# Patient Record
Sex: MALE | Race: WHITE | NOT HISPANIC OR LATINO | Employment: UNEMPLOYED | URBAN - METROPOLITAN AREA
[De-identification: names, ages, dates, MRNs, and addresses within clinical notes are randomized per-mention and may not be internally consistent; named-entity substitution may affect disease eponyms.]

---

## 2023-12-15 ENCOUNTER — OFFICE VISIT (OUTPATIENT)
Dept: WOUND CARE | Facility: CLINIC | Age: 63
End: 2023-12-15
Payer: COMMERCIAL

## 2023-12-15 PROCEDURE — 11042 DBRDMT SUBQ TIS 1ST 20SQCM/<: CPT

## 2023-12-15 PROCEDURE — 99213 OFFICE O/P EST LOW 20 MIN: CPT | Mod: 25

## 2023-12-15 PROCEDURE — 87185 SC STD ENZYME DETCJ PER NZM: CPT | Mod: OUT,PORLAB | Performed by: CLINICAL NURSE SPECIALIST

## 2023-12-15 PROCEDURE — 11042 DBRDMT SUBQ TIS 1ST 20SQCM/<: CPT | Performed by: CLINICAL NURSE SPECIALIST

## 2023-12-15 PROCEDURE — 99203 OFFICE O/P NEW LOW 30 MIN: CPT | Performed by: CLINICAL NURSE SPECIALIST

## 2023-12-18 ENCOUNTER — LAB REQUISITION (OUTPATIENT)
Dept: LAB | Facility: HOSPITAL | Age: 63
End: 2023-12-18
Payer: COMMERCIAL

## 2023-12-18 DIAGNOSIS — E11.621 TYPE 2 DIABETES MELLITUS WITH FOOT ULCER (CODE) (MULTI): ICD-10-CM

## 2023-12-20 LAB
B-LACTAMASE ORGANISM ISLT: NEGATIVE
BACTERIA SPEC CULT: ABNORMAL
BACTERIA SPEC CULT: ABNORMAL
GRAM STN SPEC: ABNORMAL
GRAM STN SPEC: ABNORMAL

## 2023-12-22 ENCOUNTER — APPOINTMENT (OUTPATIENT)
Dept: CARDIOLOGY | Facility: HOSPITAL | Age: 63
End: 2023-12-22
Payer: COMMERCIAL

## 2023-12-22 ENCOUNTER — HOSPITAL ENCOUNTER (EMERGENCY)
Facility: HOSPITAL | Age: 63
Discharge: HOME | End: 2023-12-22
Attending: EMERGENCY MEDICINE
Payer: COMMERCIAL

## 2023-12-22 ENCOUNTER — APPOINTMENT (OUTPATIENT)
Dept: RADIOLOGY | Facility: HOSPITAL | Age: 63
End: 2023-12-22
Payer: COMMERCIAL

## 2023-12-22 ENCOUNTER — APPOINTMENT (OUTPATIENT)
Dept: WOUND CARE | Facility: CLINIC | Age: 63
End: 2023-12-22
Payer: COMMERCIAL

## 2023-12-22 VITALS
BODY MASS INDEX: 32.19 KG/M2 | HEART RATE: 85 BPM | RESPIRATION RATE: 16 BRPM | WEIGHT: 224.87 LBS | HEIGHT: 70 IN | DIASTOLIC BLOOD PRESSURE: 96 MMHG | SYSTOLIC BLOOD PRESSURE: 142 MMHG | OXYGEN SATURATION: 96 % | TEMPERATURE: 99.4 F

## 2023-12-22 DIAGNOSIS — R73.9 HYPERGLYCEMIA: Primary | ICD-10-CM

## 2023-12-22 DIAGNOSIS — Z51.89 VISIT FOR WOUND CHECK: ICD-10-CM

## 2023-12-22 LAB
ALBUMIN SERPL BCP-MCNC: 4 G/DL (ref 3.4–5)
ALP SERPL-CCNC: 110 U/L (ref 33–136)
ALT SERPL W P-5'-P-CCNC: 34 U/L (ref 10–52)
ANION GAP BLDV CALCULATED.4IONS-SCNC: 13 MMOL/L (ref 10–25)
ANION GAP SERPL CALC-SCNC: 13 MMOL/L (ref 10–20)
AST SERPL W P-5'-P-CCNC: 21 U/L (ref 9–39)
BASE EXCESS BLDV CALC-SCNC: 0.3 MMOL/L (ref -2–3)
BASOPHILS # BLD AUTO: 0.03 X10*3/UL (ref 0–0.1)
BASOPHILS NFR BLD AUTO: 0.5 %
BILIRUB SERPL-MCNC: 1.4 MG/DL (ref 0–1.2)
BNP SERPL-MCNC: 7 PG/ML (ref 0–99)
BODY TEMPERATURE: 37 DEGREES CELSIUS
BUN SERPL-MCNC: 13 MG/DL (ref 6–23)
CA-I BLDV-SCNC: 1.14 MMOL/L (ref 1.1–1.33)
CALCIUM SERPL-MCNC: 8.6 MG/DL (ref 8.6–10.3)
CARDIAC TROPONIN I PNL SERPL HS: 6 NG/L (ref 0–20)
CARDIAC TROPONIN I PNL SERPL HS: 7 NG/L (ref 0–20)
CHLORIDE BLDV-SCNC: 98 MMOL/L (ref 98–107)
CHLORIDE SERPL-SCNC: 99 MMOL/L (ref 98–107)
CO2 SERPL-SCNC: 24 MMOL/L (ref 21–32)
CREAT SERPL-MCNC: 1.12 MG/DL (ref 0.5–1.3)
CRP SERPL-MCNC: 0.42 MG/DL
EOSINOPHIL # BLD AUTO: 0.03 X10*3/UL (ref 0–0.7)
EOSINOPHIL NFR BLD AUTO: 0.5 %
ERYTHROCYTE [DISTWIDTH] IN BLOOD BY AUTOMATED COUNT: 13.5 % (ref 11.5–14.5)
ERYTHROCYTE [SEDIMENTATION RATE] IN BLOOD BY WESTERGREN METHOD: 4 MM/H (ref 0–20)
GFR SERPL CREATININE-BSD FRML MDRD: 75 ML/MIN/1.73M*2
GLUCOSE BLD MANUAL STRIP-MCNC: 333 MG/DL (ref 74–99)
GLUCOSE BLD MANUAL STRIP-MCNC: 450 MG/DL (ref 74–99)
GLUCOSE BLDV-MCNC: 425 MG/DL (ref 74–99)
GLUCOSE SERPL-MCNC: 441 MG/DL (ref 74–99)
HCO3 BLDV-SCNC: 25.4 MMOL/L (ref 22–26)
HCT VFR BLD AUTO: 38.4 % (ref 41–52)
HCT VFR BLD EST: 43 % (ref 41–52)
HGB BLD-MCNC: 14.3 G/DL (ref 13.5–17.5)
HGB BLDV-MCNC: 14.4 G/DL (ref 13.5–17.5)
IMM GRANULOCYTES # BLD AUTO: 0.08 X10*3/UL (ref 0–0.7)
IMM GRANULOCYTES NFR BLD AUTO: 1.4 % (ref 0–0.9)
INHALED O2 CONCENTRATION: 40 %
INR PPP: 1.2 (ref 0.9–1.1)
LACTATE BLDV-SCNC: 1.6 MMOL/L (ref 0.4–2)
LACTATE BLDV-SCNC: 2.5 MMOL/L (ref 0.4–2)
LYMPHOCYTES # BLD AUTO: 1.42 X10*3/UL (ref 1.2–4.8)
LYMPHOCYTES NFR BLD AUTO: 25.4 %
MAGNESIUM SERPL-MCNC: 1.63 MG/DL (ref 1.6–2.4)
MCH RBC QN AUTO: 33.6 PG (ref 26–34)
MCHC RBC AUTO-ENTMCNC: 37.2 G/DL (ref 32–36)
MCV RBC AUTO: 90 FL (ref 80–100)
MONOCYTES # BLD AUTO: 0.41 X10*3/UL (ref 0.1–1)
MONOCYTES NFR BLD AUTO: 7.3 %
NEUTROPHILS # BLD AUTO: 3.61 X10*3/UL (ref 1.2–7.7)
NEUTROPHILS NFR BLD AUTO: 64.9 %
NRBC BLD-RTO: 0 /100 WBCS (ref 0–0)
OXYHGB MFR BLDV: 46.1 % (ref 45–75)
PCO2 BLDV: 42 MM HG (ref 41–51)
PH BLDV: 7.39 PH (ref 7.33–7.43)
PLATELET # BLD AUTO: 112 X10*3/UL (ref 150–450)
PO2 BLDV: 29 MM HG (ref 35–45)
POTASSIUM BLDV-SCNC: 4.4 MMOL/L (ref 3.5–5.3)
POTASSIUM SERPL-SCNC: 4.2 MMOL/L (ref 3.5–5.3)
PROT SERPL-MCNC: 6.7 G/DL (ref 6.4–8.2)
PROTHROMBIN TIME: 13.1 SECONDS (ref 9.8–12.8)
RBC # BLD AUTO: 4.25 X10*6/UL (ref 4.5–5.9)
SAO2 % BLDV: 47 % (ref 45–75)
SODIUM BLDV-SCNC: 132 MMOL/L (ref 136–145)
SODIUM SERPL-SCNC: 132 MMOL/L (ref 136–145)
WBC # BLD AUTO: 5.6 X10*3/UL (ref 4.4–11.3)

## 2023-12-22 PROCEDURE — 83880 ASSAY OF NATRIURETIC PEPTIDE: CPT | Performed by: EMERGENCY MEDICINE

## 2023-12-22 PROCEDURE — 83735 ASSAY OF MAGNESIUM: CPT | Performed by: EMERGENCY MEDICINE

## 2023-12-22 PROCEDURE — 85652 RBC SED RATE AUTOMATED: CPT | Performed by: EMERGENCY MEDICINE

## 2023-12-22 PROCEDURE — 85025 COMPLETE CBC W/AUTO DIFF WBC: CPT | Performed by: EMERGENCY MEDICINE

## 2023-12-22 PROCEDURE — 96374 THER/PROPH/DIAG INJ IV PUSH: CPT | Performed by: EMERGENCY MEDICINE

## 2023-12-22 PROCEDURE — 84484 ASSAY OF TROPONIN QUANT: CPT | Performed by: EMERGENCY MEDICINE

## 2023-12-22 PROCEDURE — 83605 ASSAY OF LACTIC ACID: CPT | Performed by: EMERGENCY MEDICINE

## 2023-12-22 PROCEDURE — 36415 COLL VENOUS BLD VENIPUNCTURE: CPT | Performed by: EMERGENCY MEDICINE

## 2023-12-22 PROCEDURE — 84132 ASSAY OF SERUM POTASSIUM: CPT | Performed by: EMERGENCY MEDICINE

## 2023-12-22 PROCEDURE — 73630 X-RAY EXAM OF FOOT: CPT | Mod: RT,FR

## 2023-12-22 PROCEDURE — 73630 X-RAY EXAM OF FOOT: CPT | Mod: RIGHT SIDE | Performed by: RADIOLOGY

## 2023-12-22 PROCEDURE — 82947 ASSAY GLUCOSE BLOOD QUANT: CPT

## 2023-12-22 PROCEDURE — 85610 PROTHROMBIN TIME: CPT | Performed by: EMERGENCY MEDICINE

## 2023-12-22 PROCEDURE — 93005 ELECTROCARDIOGRAM TRACING: CPT

## 2023-12-22 PROCEDURE — 71045 X-RAY EXAM CHEST 1 VIEW: CPT

## 2023-12-22 PROCEDURE — 2500000002 HC RX 250 W HCPCS SELF ADMINISTERED DRUGS (ALT 637 FOR MEDICARE OP, ALT 636 FOR OP/ED): Performed by: EMERGENCY MEDICINE

## 2023-12-22 PROCEDURE — 86140 C-REACTIVE PROTEIN: CPT | Performed by: EMERGENCY MEDICINE

## 2023-12-22 PROCEDURE — 99284 EMERGENCY DEPT VISIT MOD MDM: CPT | Performed by: EMERGENCY MEDICINE

## 2023-12-22 PROCEDURE — 71045 X-RAY EXAM CHEST 1 VIEW: CPT | Mod: FOREIGN READ | Performed by: RADIOLOGY

## 2023-12-22 RX ADMIN — INSULIN HUMAN 4 UNITS: 100 INJECTION, SOLUTION PARENTERAL at 15:35

## 2023-12-22 ASSESSMENT — PAIN DESCRIPTION - ORIENTATION: ORIENTATION: RIGHT

## 2023-12-22 ASSESSMENT — COLUMBIA-SUICIDE SEVERITY RATING SCALE - C-SSRS
1. IN THE PAST MONTH, HAVE YOU WISHED YOU WERE DEAD OR WISHED YOU COULD GO TO SLEEP AND NOT WAKE UP?: NO
6. HAVE YOU EVER DONE ANYTHING, STARTED TO DO ANYTHING, OR PREPARED TO DO ANYTHING TO END YOUR LIFE?: NO
2. HAVE YOU ACTUALLY HAD ANY THOUGHTS OF KILLING YOURSELF?: NO

## 2023-12-22 ASSESSMENT — PAIN SCALES - GENERAL: PAINLEVEL_OUTOF10: 7

## 2023-12-22 ASSESSMENT — PAIN - FUNCTIONAL ASSESSMENT: PAIN_FUNCTIONAL_ASSESSMENT: 0-10

## 2023-12-22 ASSESSMENT — PAIN DESCRIPTION - PAIN TYPE: TYPE: ACUTE PAIN;SURGICAL PAIN

## 2023-12-22 ASSESSMENT — PAIN DESCRIPTION - LOCATION: LOCATION: FOOT

## 2023-12-22 NOTE — ED PROVIDER NOTES
HPI   Chief Complaint   Patient presents with    Hyperglycemia     Mechanical fall  -LOC        HPI  HISTORY OF PRESENT ILLNESS:  Patient is a 63-year-old male who presents emergency department multiple complaints.  He states that 1 week ago had a office visit where his physician had removed something on the plantar surface of the foot.  Patient stated that today, he was getting dizzy lightheaded.  Slipped on the wet floor and hit his back against a heavy door.  The patient was having some pain with his back.  Denied his head or pass out.  Continues to feel lightheaded.  Noted that his blood sugar was high after being instructed by his nursing hotline to check.  Was in the 400s.  Patient is not insulin dependent.  Uncertain whether or not the patient has had a history of diabetic ketoacidosis.    Past Medical History: Hypertension, orthostatic dizziness, diabetes, gout, benign tremor, depression      __________________________________________________________  PHYSICAL EXAM:    Appearance: Nontoxic-appearing  male, appears stated age, alert, oriented , cooperative   Skin: Skin at bottom of right foot look like there was a callus that had been shaved off.  No signs of infection.  Intact,  dry skin, no lesions, rash, petechiae or purpura.   Eyes: PERRLA, EOMs intact,  Conjunctiva pink with no redness or exudates.    HENT: Normocephalic, atraumatic. Nares patent   Neck: Supple. Trachea at midline.   Pulmonary: Lung sounds are clear bilaterally.  There is no rales, rhonchi, or wheezing.  Cardiac: Regular rate and rhythm, no rubs, murmurs, or gallops. No JVD,   Abdomen: Abdomen is soft, nontender, and nondistended.  No palpable organomegaly.  No rebound or guarding.  No CVA tenderness. Nonsurgical abdomen  Musculoskeletal: no edema, pain, cyanosis, or deformity in extremities. Pulses full and equal.   Neurological:  Cranial nerves are grossly intact, grossly normal sensation, no weakness, no focal findings  identified.    __________________________________________________________  MEDICAL DECISION MAKING:    Patient was seen and examined. Differential diagnosis for hyperglycemia includes occult infection, diabetic ketoacidosis, ACS.  Patient vital signs show no evidence of infection.  The patient was provided IV fluids.  Blood sugar was 450.  Labs were obtained.  Venous blood gas showed no evidence of diabetic ketoacidosis.  Troponins were negative x 2.  Twelve-lead EKG appeared nonischemic.  Patient was feeling better after IV fluids.  Blood sugar at downtrending patient was provided with 4 units of insulin and was allowed to eat.  I had also obtained inflammatory markers in addition to x-ray of the foot.  Patient CRP and ESR were negative.  X-ray showed no evidence of osteomyelitis.  He was feeling well enough to be discharged.  Given return precautions.  Patient verbalized understanding, agreed to plan, the patient was discharged home.    External Records Reviewed: I reviewed recent and relevant outside records including: Patient cardiology note from June 30, 2023    David Hartley  Emergency Medicine                  [place] Coma Scale Score: 15                  Patient History   Past Medical History:   Diagnosis Date    Diabetes (CMS/HCC)     HTN (hypertension)     Personal history of other diseases of the musculoskeletal system and connective tissue     History of gout    Personal history of other mental and behavioral disorders     History of depression     Past Surgical History:   Procedure Laterality Date    NECK SURGERY  02/01/2018    Neck Surgery    OTHER SURGICAL HISTORY  02/01/2018    Surgery Excision Lipoma    OTHER SURGICAL HISTORY  04/25/2022    Colonoscopy     No family history on file.  Social History     Tobacco Use    Smoking status: Former     Types: Cigarettes    Smokeless tobacco: Never   Vaping Use    Vaping Use: Never used   Substance Use Topics    Alcohol use: Never    Drug use: Never        Physical Exam   ED Triage Vitals [12/22/23 1143]   Temp Heart Rate Resp BP   37.4 °C (99.4 °F) 85 12 146/76      SpO2 Temp Source Heart Rate Source Patient Position   100 % Tympanic Monitor --      BP Location FiO2 (%)     -- --       Physical Exam    ED Course & [facility]   ED Course as of 12/22/23 1708   Fri Dec 22, 2023   1217 POCT Glucose, Venous(!): 425 [WJ]   1708 Patient twelve-lead EKG inter by myself shows sinus rhythm ventricular rate 88, normal [place] interval, left axis deviation, normal QRS duration, normal QT, no STEMI present. [WJ]      ED Course User Index  [WJ] David Hartley DO         Diagnoses as of 12/22/23 1708   Hyperglycemia   Visit for wound check       Medical Decision Making      Procedure  Procedures     David Hartley DO  12/23/23 1816

## 2024-01-05 ENCOUNTER — OFFICE VISIT (OUTPATIENT)
Dept: WOUND CARE | Facility: CLINIC | Age: 64
End: 2024-01-05
Payer: COMMERCIAL

## 2024-01-05 PROCEDURE — 11042 DBRDMT SUBQ TIS 1ST 20SQCM/<: CPT | Performed by: CLINICAL NURSE SPECIALIST

## 2024-01-05 PROCEDURE — 11042 DBRDMT SUBQ TIS 1ST 20SQCM/<: CPT | Mod: ZK

## 2024-01-10 LAB
ATRIAL RATE: 88 BPM
P AXIS: 39 DEGREES
PR INTERVAL: 147 MS
Q ONSET: 249 MS
QRS COUNT: 15 BEATS
QRS DURATION: 92 MS
QT INTERVAL: 359 MS
QTC CALCULATION(BAZETT): 435 MS
QTC FREDERICIA: 408 MS
R AXIS: -33 DEGREES
T AXIS: 47 DEGREES
T OFFSET: 429 MS
VENTRICULAR RATE: 88 BPM

## 2024-01-12 ENCOUNTER — OFFICE VISIT (OUTPATIENT)
Dept: WOUND CARE | Facility: CLINIC | Age: 64
End: 2024-01-12
Payer: COMMERCIAL

## 2024-01-12 PROCEDURE — 11042 DBRDMT SUBQ TIS 1ST 20SQCM/<: CPT

## 2024-01-12 PROCEDURE — 11042 DBRDMT SUBQ TIS 1ST 20SQCM/<: CPT | Performed by: CLINICAL NURSE SPECIALIST

## 2024-01-19 ENCOUNTER — APPOINTMENT (OUTPATIENT)
Dept: WOUND CARE | Facility: CLINIC | Age: 64
End: 2024-01-19
Payer: COMMERCIAL

## 2024-01-26 ENCOUNTER — OFFICE VISIT (OUTPATIENT)
Dept: WOUND CARE | Facility: CLINIC | Age: 64
End: 2024-01-26
Payer: COMMERCIAL

## 2024-01-26 PROCEDURE — 11042 DBRDMT SUBQ TIS 1ST 20SQCM/<: CPT

## 2024-02-02 ENCOUNTER — APPOINTMENT (OUTPATIENT)
Dept: WOUND CARE | Facility: CLINIC | Age: 64
End: 2024-02-02
Payer: COMMERCIAL

## 2024-02-07 ENCOUNTER — HOSPITAL ENCOUNTER (OUTPATIENT)
Dept: RADIOLOGY | Facility: HOSPITAL | Age: 64
Discharge: HOME | End: 2024-02-07
Payer: COMMERCIAL

## 2024-02-07 DIAGNOSIS — M50.10 CERVICAL DISC DISORDER WITH RADICULOPATHY, UNSPECIFIED CERVICAL REGION: ICD-10-CM

## 2024-02-07 PROCEDURE — 72141 MRI NECK SPINE W/O DYE: CPT | Performed by: RADIOLOGY

## 2024-02-07 PROCEDURE — 72141 MRI NECK SPINE W/O DYE: CPT

## 2024-02-09 ENCOUNTER — OFFICE VISIT (OUTPATIENT)
Dept: WOUND CARE | Facility: CLINIC | Age: 64
End: 2024-02-09
Payer: COMMERCIAL

## 2024-02-09 PROCEDURE — 11042 DBRDMT SUBQ TIS 1ST 20SQCM/<: CPT

## 2024-02-16 ENCOUNTER — OFFICE VISIT (OUTPATIENT)
Dept: WOUND CARE | Facility: CLINIC | Age: 64
End: 2024-02-16
Payer: COMMERCIAL

## 2024-02-16 PROCEDURE — 11042 DBRDMT SUBQ TIS 1ST 20SQCM/<: CPT

## 2024-02-23 ENCOUNTER — APPOINTMENT (OUTPATIENT)
Dept: WOUND CARE | Facility: CLINIC | Age: 64
End: 2024-02-23
Payer: COMMERCIAL

## 2024-02-25 ENCOUNTER — APPOINTMENT (OUTPATIENT)
Dept: CARDIOLOGY | Facility: HOSPITAL | Age: 64
End: 2024-02-25
Payer: COMMERCIAL

## 2024-02-25 ENCOUNTER — APPOINTMENT (OUTPATIENT)
Dept: RADIOLOGY | Facility: HOSPITAL | Age: 64
End: 2024-02-25
Payer: COMMERCIAL

## 2024-02-25 ENCOUNTER — HOSPITAL ENCOUNTER (EMERGENCY)
Facility: HOSPITAL | Age: 64
Discharge: HOME | End: 2024-02-25
Attending: STUDENT IN AN ORGANIZED HEALTH CARE EDUCATION/TRAINING PROGRAM
Payer: COMMERCIAL

## 2024-02-25 VITALS
HEART RATE: 80 BPM | HEIGHT: 71 IN | DIASTOLIC BLOOD PRESSURE: 85 MMHG | WEIGHT: 220 LBS | SYSTOLIC BLOOD PRESSURE: 142 MMHG | OXYGEN SATURATION: 97 % | RESPIRATION RATE: 18 BRPM | BODY MASS INDEX: 30.8 KG/M2 | TEMPERATURE: 96.9 F

## 2024-02-25 DIAGNOSIS — R51.9 NONINTRACTABLE HEADACHE, UNSPECIFIED CHRONICITY PATTERN, UNSPECIFIED HEADACHE TYPE: ICD-10-CM

## 2024-02-25 DIAGNOSIS — R53.1 GENERALIZED WEAKNESS: Primary | ICD-10-CM

## 2024-02-25 LAB
ANION GAP SERPL CALC-SCNC: 13 MMOL/L (ref 10–20)
BASOPHILS # BLD AUTO: 0.02 X10*3/UL (ref 0–0.1)
BASOPHILS NFR BLD AUTO: 0.4 %
BUN SERPL-MCNC: 17 MG/DL (ref 6–23)
CALCIUM SERPL-MCNC: 9 MG/DL (ref 8.6–10.3)
CARDIAC TROPONIN I PNL SERPL HS: 6 NG/L (ref 0–20)
CHLORIDE SERPL-SCNC: 101 MMOL/L (ref 98–107)
CO2 SERPL-SCNC: 23 MMOL/L (ref 21–32)
CREAT SERPL-MCNC: 1.02 MG/DL (ref 0.5–1.3)
EGFRCR SERPLBLD CKD-EPI 2021: 84 ML/MIN/1.73M*2
EOSINOPHIL # BLD AUTO: 0.04 X10*3/UL (ref 0–0.7)
EOSINOPHIL NFR BLD AUTO: 0.8 %
ERYTHROCYTE [DISTWIDTH] IN BLOOD BY AUTOMATED COUNT: 14 % (ref 11.5–14.5)
GLUCOSE SERPL-MCNC: 250 MG/DL (ref 74–99)
HCT VFR BLD AUTO: 37.8 % (ref 41–52)
HGB BLD-MCNC: 14 G/DL (ref 13.5–17.5)
IMM GRANULOCYTES # BLD AUTO: 0.04 X10*3/UL (ref 0–0.7)
IMM GRANULOCYTES NFR BLD AUTO: 0.8 % (ref 0–0.9)
LACTATE SERPL-SCNC: 1.9 MMOL/L (ref 0.4–2)
LYMPHOCYTES # BLD AUTO: 1.29 X10*3/UL (ref 1.2–4.8)
LYMPHOCYTES NFR BLD AUTO: 24.9 %
MCH RBC QN AUTO: 33.8 PG (ref 26–34)
MCHC RBC AUTO-ENTMCNC: 37 G/DL (ref 32–36)
MCV RBC AUTO: 91 FL (ref 80–100)
MONOCYTES # BLD AUTO: 0.4 X10*3/UL (ref 0.1–1)
MONOCYTES NFR BLD AUTO: 7.7 %
NEUTROPHILS # BLD AUTO: 3.4 X10*3/UL (ref 1.2–7.7)
NEUTROPHILS NFR BLD AUTO: 65.4 %
NRBC BLD-RTO: 0 /100 WBCS (ref 0–0)
PLATELET # BLD AUTO: 107 X10*3/UL (ref 150–450)
POTASSIUM SERPL-SCNC: 4.7 MMOL/L (ref 3.5–5.3)
RBC # BLD AUTO: 4.14 X10*6/UL (ref 4.5–5.9)
SODIUM SERPL-SCNC: 132 MMOL/L (ref 136–145)
WBC # BLD AUTO: 5.2 X10*3/UL (ref 4.4–11.3)

## 2024-02-25 PROCEDURE — 70450 CT HEAD/BRAIN W/O DYE: CPT

## 2024-02-25 PROCEDURE — 70450 CT HEAD/BRAIN W/O DYE: CPT | Performed by: RADIOLOGY

## 2024-02-25 PROCEDURE — 36415 COLL VENOUS BLD VENIPUNCTURE: CPT | Performed by: NURSE PRACTITIONER

## 2024-02-25 PROCEDURE — 83605 ASSAY OF LACTIC ACID: CPT | Performed by: NURSE PRACTITIONER

## 2024-02-25 PROCEDURE — 71046 X-RAY EXAM CHEST 2 VIEWS: CPT

## 2024-02-25 PROCEDURE — 71046 X-RAY EXAM CHEST 2 VIEWS: CPT | Performed by: RADIOLOGY

## 2024-02-25 PROCEDURE — 2500000004 HC RX 250 GENERAL PHARMACY W/ HCPCS (ALT 636 FOR OP/ED): Performed by: NURSE PRACTITIONER

## 2024-02-25 PROCEDURE — 96374 THER/PROPH/DIAG INJ IV PUSH: CPT

## 2024-02-25 PROCEDURE — 99285 EMERGENCY DEPT VISIT HI MDM: CPT | Mod: 25

## 2024-02-25 PROCEDURE — 84484 ASSAY OF TROPONIN QUANT: CPT | Performed by: NURSE PRACTITIONER

## 2024-02-25 PROCEDURE — 93005 ELECTROCARDIOGRAM TRACING: CPT

## 2024-02-25 PROCEDURE — 80048 BASIC METABOLIC PNL TOTAL CA: CPT | Performed by: NURSE PRACTITIONER

## 2024-02-25 PROCEDURE — 85025 COMPLETE CBC W/AUTO DIFF WBC: CPT | Performed by: NURSE PRACTITIONER

## 2024-02-25 RX ORDER — ACETAMINOPHEN 325 MG/1
650 TABLET ORAL ONCE
Status: COMPLETED | OUTPATIENT
Start: 2024-02-25 | End: 2024-02-25

## 2024-02-25 RX ORDER — KETOROLAC TROMETHAMINE 30 MG/ML
15 INJECTION, SOLUTION INTRAMUSCULAR; INTRAVENOUS ONCE
Status: COMPLETED | OUTPATIENT
Start: 2024-02-25 | End: 2024-02-25

## 2024-02-25 RX ADMIN — KETOROLAC TROMETHAMINE 15 MG: 30 INJECTION, SOLUTION INTRAMUSCULAR; INTRAVENOUS at 18:05

## 2024-02-25 RX ADMIN — ACETAMINOPHEN 650 MG: 325 TABLET ORAL at 18:05

## 2024-02-25 ASSESSMENT — HEART SCORE
RISK FACTORS: >2 RISK FACTORS OR HX OF ATHEROSCLEROTIC DISEASE
HEART SCORE: 3
AGE: 45-64
HISTORY: SLIGHTLY SUSPICIOUS
TROPONIN: LESS THAN OR EQUAL TO NORMAL LIMIT
ECG: NORMAL

## 2024-02-25 ASSESSMENT — PAIN SCALES - GENERAL
PAINLEVEL_OUTOF10: 2
PAINLEVEL_OUTOF10: 4

## 2024-02-25 ASSESSMENT — PAIN DESCRIPTION - LOCATION: LOCATION: NECK

## 2024-02-25 ASSESSMENT — COLUMBIA-SUICIDE SEVERITY RATING SCALE - C-SSRS
6. HAVE YOU EVER DONE ANYTHING, STARTED TO DO ANYTHING, OR PREPARED TO DO ANYTHING TO END YOUR LIFE?: NO
1. IN THE PAST MONTH, HAVE YOU WISHED YOU WERE DEAD OR WISHED YOU COULD GO TO SLEEP AND NOT WAKE UP?: NO
2. HAVE YOU ACTUALLY HAD ANY THOUGHTS OF KILLING YOURSELF?: NO

## 2024-02-25 ASSESSMENT — LIFESTYLE VARIABLES
HAVE YOU EVER FELT YOU SHOULD CUT DOWN ON YOUR DRINKING: NO
HAVE PEOPLE ANNOYED YOU BY CRITICIZING YOUR DRINKING: NO
EVER FELT BAD OR GUILTY ABOUT YOUR DRINKING: NO
EVER HAD A DRINK FIRST THING IN THE MORNING TO STEADY YOUR NERVES TO GET RID OF A HANGOVER: NO

## 2024-02-25 ASSESSMENT — PAIN - FUNCTIONAL ASSESSMENT: PAIN_FUNCTIONAL_ASSESSMENT: 0-10

## 2024-02-25 NOTE — ED PROVIDER NOTES
Chief Complaint   Patient presents with   • Weakness, Gen       HPI       63 year old female presents to the Emergency Department today complaining of generalized weakness since this afternoon. Notes that he has had a discomfort in the right side of his head chronically that worsened this afternoon. The headache is not the first or worst of their life. Not of sudden onset or thunderclap in nature. Reports hat his blood pressure and blood sugar became elevated. Denies any associated fever, chills, neck pain, chest pain, shortness of breath, abdominal pain, nausea, vomiting, diarrhea, constipation, or urinary symptoms.        History provided by:  Patient             Patient History   Past Medical History:   Diagnosis Date   • Diabetes (CMS/HCC)    • HTN (hypertension)    • Personal history of other diseases of the musculoskeletal system and connective tissue     History of gout   • Personal history of other mental and behavioral disorders     History of depression     Past Surgical History:   Procedure Laterality Date   • NECK SURGERY  02/01/2018    Neck Surgery   • OTHER SURGICAL HISTORY  02/01/2018    Surgery Excision Lipoma   • OTHER SURGICAL HISTORY  04/25/2022    Colonoscopy     No family history on file.  Social History     Tobacco Use   • Smoking status: Former     Types: Cigarettes   • Smokeless tobacco: Never   Vaping Use   • Vaping Use: Never used   Substance Use Topics   • Alcohol use: Never   • Drug use: Never           Physical Exam  Constitutional:       Appearance: Normal appearance.   HENT:      Head: Normocephalic.      Right Ear: Tympanic membrane, ear canal and external ear normal.      Left Ear: Tympanic membrane, ear canal and external ear normal.      Nose: Nose normal.      Mouth/Throat:      Mouth: Mucous membranes are moist.      Pharynx: Oropharynx is clear. No oropharyngeal exudate or posterior oropharyngeal erythema.   Eyes:      Conjunctiva/sclera: Conjunctivae normal.      Pupils: Pupils  are equal, round, and reactive to light.   Cardiovascular:      Rate and Rhythm: Normal rate and regular rhythm.      Pulses:           Radial pulses are 3+ on the right side and 3+ on the left side.        Dorsalis pedis pulses are 3+ on the right side and 3+ on the left side.      Heart sounds: Normal heart sounds. No murmur heard.     No friction rub. No gallop.   Pulmonary:      Effort: Pulmonary effort is normal. No respiratory distress.      Breath sounds: Normal breath sounds. No wheezing, rhonchi or rales.   Abdominal:      General: Abdomen is flat. Bowel sounds are normal.      Palpations: Abdomen is soft.      Tenderness: There is no abdominal tenderness. There is no right CVA tenderness, left CVA tenderness, guarding or rebound. Negative signs include Torres's sign and McBurney's sign.   Musculoskeletal:         General: No swelling or deformity.      Cervical back: Full passive range of motion without pain.      Right lower leg: No edema.      Left lower leg: No edema.   Lymphadenopathy:      Cervical: No cervical adenopathy.   Skin:     Capillary Refill: Capillary refill takes less than 2 seconds.      Coloration: Skin is not jaundiced.      Findings: No rash.   Neurological:      General: No focal deficit present.      Mental Status: He is alert and oriented to person, place, and time. Mental status is at baseline.      Gait: Gait is intact.   Psychiatric:         Mood and Affect: Mood normal.         Behavior: Behavior is cooperative.         Labs Reviewed   CBC WITH AUTO DIFFERENTIAL - Abnormal       Result Value    WBC 5.2      nRBC 0.0      RBC 4.14 (*)     Hemoglobin 14.0      Hematocrit 37.8 (*)     MCV 91      MCH 33.8      MCHC 37.0 (*)     RDW 14.0      Platelets 107 (*)     Neutrophils % 65.4      Immature Granulocytes %, Automated 0.8      Lymphocytes % 24.9      Monocytes % 7.7      Eosinophils % 0.8      Basophils % 0.4      Neutrophils Absolute 3.40      Immature Granulocytes Absolute,  Automated 0.04      Lymphocytes Absolute 1.29      Monocytes Absolute 0.40      Eosinophils Absolute 0.04      Basophils Absolute 0.02     BASIC METABOLIC PANEL - Abnormal    Glucose 250 (*)     Sodium 132 (*)     Potassium 4.7      Chloride 101      Bicarbonate 23      Anion Gap 13      Urea Nitrogen 17      Creatinine 1.02      eGFR 84      Calcium 9.0     LACTATE - Normal    Lactate 1.9      Narrative:     Venipuncture immediately after or during the administration of Metamizole may lead to falsely low results. Testing should be performed immediately  prior to Metamizole dosing.   TROPONIN I, HIGH SENSITIVITY - Normal    Troponin I, High Sensitivity 6      Narrative:     Less than 99th percentile of normal range cutoff-  Female and children under 18 years old <14 ng/L; Male <21 ng/L: Negative  Repeat testing should be performed if clinically indicated.     Female and children under 18 years old 14-50 ng/L; Male 21-50 ng/L:  Consistent with possible cardiac damage and possible increased clinical   risk. Serial measurements may help to assess extent of myocardial damage.     >50 ng/L: Consistent with cardiac damage, increased clinical risk and  myocardial infarction. Serial measurements may help assess extent of   myocardial damage.      NOTE: Children less than 1 year old may have higher baseline troponin   levels and results should be interpreted in conjunction with the overall   clinical context.     NOTE: Troponin I testing is performed using a different   testing methodology at [facility] than at other   [facility]. Direct result comparisons should only   be made within the same method.       CT head wo IV contrast   Final Result   No evidence of acute cortical infarct or intracranial hemorrhage.                  MACRO:   None                  Signed by: Blas Akins 2/25/2024 4:27 PM   Dictation workstation:   [id number]      XR chest 2 views   Final Result   1.  No evidence of acute  cardiopulmonary process.                  MACRO:   None        Signed by: Nahum Ramirez 2/25/2024 4:21 PM   Dictation workstation:   [id number]               ED Course & MDM   ED Course as of 02/25/24 1753   Sun Feb 25, 2024   1455 EKG as interpreted by myself demonstrates sinus rhythm ventricular rate of 89, left axis deviation with left ventricular hypertrophy noted, normal intervals, no evidence of an acute STEMI or malignant arrhythmia. [NS]      ED Course User Index  [NS] Diego Donnelly MD         Diagnoses as of 02/25/24 1753   Generalized weakness   Nonintractable headache, unspecified chronicity pattern, unspecified headache type           Medical Decision Making  EKG interpreted by Dr. Donnelly. Indication: hypertension. Findings: NSR with a ventricular rate of 89, left axis, normal intervals, and no acute ischemic or injury pattern. Impression: No acute pathology.       Patient was seen and evaluated by Dr. Donnelly. Saline lock was established with labs drawn and results as above. Blood counts, electrolytes, kidney function, and lactate were unremarkable. Heart Score- 3 with normal EKG and troponin. At this time, we feel that the weakness is atypical for acute coronary syndrome. We find no underlying evidence of an acute infectious process or pneumothorax on CXR. Clinically, we do not feel they are exhibiting signs of pulmonary embolism or thoracic aortic dissection (no connective tissue disorder, no tachycardia, tachypnea, hypoxia, and mediastinum normal in size on CXR). CT scan of his head without contrast shows no acute pathology. There are no signs of meningitis or subarachnoid hemorrhage. Exhibits no signs of CVA/TIA. Given Tylenol and Toradol with improvement in his headache. Follow up with their doctor in 3 days. Return if worse in any way. Discharged in stable condition with computer instructions.    Diagnostic Impression:     1. Acute weakness with cephalgia    2. IV meds  in ED             Your medication list      You have not been prescribed any medications.           Procedure  Procedures     Ulises Castillo, APRN-CNP  02/25/24 0580

## 2024-02-25 NOTE — ED TRIAGE NOTES
Pt to ED with c/o high blood pressure and blood sugar. Pt took metformin prior to ems arrival. BP was 151/91 upon arrival. Pt reports feeling shaky today, but is not sure if it is related to his pinched nerve in his neck he's had an mri for. Pt also complains kishan a HA. Per ems after 500cc IVF .

## 2024-03-01 ENCOUNTER — OFFICE VISIT (OUTPATIENT)
Dept: WOUND CARE | Facility: CLINIC | Age: 64
End: 2024-03-01
Payer: COMMERCIAL

## 2024-03-01 PROCEDURE — 11042 DBRDMT SUBQ TIS 1ST 20SQCM/<: CPT

## 2024-03-04 ENCOUNTER — APPOINTMENT (OUTPATIENT)
Dept: CARDIOLOGY | Facility: HOSPITAL | Age: 64
End: 2024-03-04
Payer: COMMERCIAL

## 2024-03-04 ENCOUNTER — HOSPITAL ENCOUNTER (EMERGENCY)
Facility: HOSPITAL | Age: 64
Discharge: HOME | End: 2024-03-04
Attending: EMERGENCY MEDICINE
Payer: COMMERCIAL

## 2024-03-04 ENCOUNTER — APPOINTMENT (OUTPATIENT)
Dept: RADIOLOGY | Facility: HOSPITAL | Age: 64
End: 2024-03-04
Payer: COMMERCIAL

## 2024-03-04 VITALS
RESPIRATION RATE: 18 BRPM | TEMPERATURE: 97.9 F | OXYGEN SATURATION: 97 % | HEART RATE: 92 BPM | BODY MASS INDEX: 30.8 KG/M2 | SYSTOLIC BLOOD PRESSURE: 144 MMHG | WEIGHT: 220 LBS | DIASTOLIC BLOOD PRESSURE: 92 MMHG | HEIGHT: 71 IN

## 2024-03-04 DIAGNOSIS — R42 LIGHTHEADED: ICD-10-CM

## 2024-03-04 DIAGNOSIS — R19.7 ACUTE DIARRHEA: Primary | ICD-10-CM

## 2024-03-04 LAB
ALBUMIN SERPL BCP-MCNC: 4.5 G/DL (ref 3.4–5)
ALP SERPL-CCNC: 105 U/L (ref 33–136)
ALT SERPL W P-5'-P-CCNC: 32 U/L (ref 10–52)
ANION GAP SERPL CALC-SCNC: 14 MMOL/L (ref 10–20)
APPEARANCE UR: CLEAR
AST SERPL W P-5'-P-CCNC: 20 U/L (ref 9–39)
BASOPHILS # BLD AUTO: 0.02 X10*3/UL (ref 0–0.1)
BASOPHILS NFR BLD AUTO: 0.4 %
BILIRUB SERPL-MCNC: 1 MG/DL (ref 0–1.2)
BILIRUB UR STRIP.AUTO-MCNC: NEGATIVE MG/DL
BUN SERPL-MCNC: 20 MG/DL (ref 6–23)
CALCIUM SERPL-MCNC: 9.8 MG/DL (ref 8.6–10.3)
CARDIAC TROPONIN I PNL SERPL HS: 4 NG/L (ref 0–20)
CARDIAC TROPONIN I PNL SERPL HS: 5 NG/L (ref 0–20)
CHLORIDE SERPL-SCNC: 104 MMOL/L (ref 98–107)
CO2 SERPL-SCNC: 22 MMOL/L (ref 21–32)
COLOR UR: YELLOW
CREAT SERPL-MCNC: 1 MG/DL (ref 0.5–1.3)
EGFRCR SERPLBLD CKD-EPI 2021: 86 ML/MIN/1.73M*2
EOSINOPHIL # BLD AUTO: 0.01 X10*3/UL (ref 0–0.7)
EOSINOPHIL NFR BLD AUTO: 0.2 %
ERYTHROCYTE [DISTWIDTH] IN BLOOD BY AUTOMATED COUNT: 14 % (ref 11.5–14.5)
GLUCOSE SERPL-MCNC: 281 MG/DL (ref 74–99)
GLUCOSE UR STRIP.AUTO-MCNC: ABNORMAL MG/DL
HCT VFR BLD AUTO: 38.2 % (ref 41–52)
HGB BLD-MCNC: 13.7 G/DL (ref 13.5–17.5)
HOLD SPECIMEN: NORMAL
IMM GRANULOCYTES # BLD AUTO: 0.04 X10*3/UL (ref 0–0.7)
IMM GRANULOCYTES NFR BLD AUTO: 0.9 % (ref 0–0.9)
KETONES UR STRIP.AUTO-MCNC: ABNORMAL MG/DL
LACTATE SERPL-SCNC: 1.5 MMOL/L (ref 0.4–2)
LEUKOCYTE ESTERASE UR QL STRIP.AUTO: NEGATIVE
LIPASE SERPL-CCNC: 32 U/L (ref 9–82)
LYMPHOCYTES # BLD AUTO: 0.89 X10*3/UL (ref 1.2–4.8)
LYMPHOCYTES NFR BLD AUTO: 19.3 %
MCH RBC QN AUTO: 32.9 PG (ref 26–34)
MCHC RBC AUTO-ENTMCNC: 35.9 G/DL (ref 32–36)
MCV RBC AUTO: 92 FL (ref 80–100)
MONOCYTES # BLD AUTO: 0.35 X10*3/UL (ref 0.1–1)
MONOCYTES NFR BLD AUTO: 7.6 %
NEUTROPHILS # BLD AUTO: 3.3 X10*3/UL (ref 1.2–7.7)
NEUTROPHILS NFR BLD AUTO: 71.6 %
NITRITE UR QL STRIP.AUTO: NEGATIVE
NRBC BLD-RTO: 0 /100 WBCS (ref 0–0)
PH UR STRIP.AUTO: 6 [PH]
PLATELET # BLD AUTO: 118 X10*3/UL (ref 150–450)
POTASSIUM SERPL-SCNC: 4.2 MMOL/L (ref 3.5–5.3)
PROT SERPL-MCNC: 7.3 G/DL (ref 6.4–8.2)
PROT UR STRIP.AUTO-MCNC: NEGATIVE MG/DL
RBC # BLD AUTO: 4.16 X10*6/UL (ref 4.5–5.9)
RBC # UR STRIP.AUTO: NEGATIVE /UL
SODIUM SERPL-SCNC: 136 MMOL/L (ref 136–145)
SP GR UR STRIP.AUTO: 1.03
UROBILINOGEN UR STRIP.AUTO-MCNC: 4 MG/DL
WBC # BLD AUTO: 4.6 X10*3/UL (ref 4.4–11.3)

## 2024-03-04 PROCEDURE — 71045 X-RAY EXAM CHEST 1 VIEW: CPT

## 2024-03-04 PROCEDURE — 84484 ASSAY OF TROPONIN QUANT: CPT | Performed by: EMERGENCY MEDICINE

## 2024-03-04 PROCEDURE — 99283 EMERGENCY DEPT VISIT LOW MDM: CPT | Mod: 25

## 2024-03-04 PROCEDURE — 96360 HYDRATION IV INFUSION INIT: CPT

## 2024-03-04 PROCEDURE — 83690 ASSAY OF LIPASE: CPT | Performed by: EMERGENCY MEDICINE

## 2024-03-04 PROCEDURE — 93005 ELECTROCARDIOGRAM TRACING: CPT

## 2024-03-04 PROCEDURE — 85025 COMPLETE CBC W/AUTO DIFF WBC: CPT | Performed by: EMERGENCY MEDICINE

## 2024-03-04 PROCEDURE — 80053 COMPREHEN METABOLIC PANEL: CPT | Performed by: EMERGENCY MEDICINE

## 2024-03-04 PROCEDURE — 2500000004 HC RX 250 GENERAL PHARMACY W/ HCPCS (ALT 636 FOR OP/ED): Performed by: EMERGENCY MEDICINE

## 2024-03-04 PROCEDURE — 81003 URINALYSIS AUTO W/O SCOPE: CPT | Performed by: EMERGENCY MEDICINE

## 2024-03-04 PROCEDURE — 83605 ASSAY OF LACTIC ACID: CPT | Performed by: EMERGENCY MEDICINE

## 2024-03-04 PROCEDURE — 71045 X-RAY EXAM CHEST 1 VIEW: CPT | Mod: FOREIGN READ | Performed by: RADIOLOGY

## 2024-03-04 PROCEDURE — 36415 COLL VENOUS BLD VENIPUNCTURE: CPT | Performed by: EMERGENCY MEDICINE

## 2024-03-04 RX ADMIN — SODIUM CHLORIDE, POTASSIUM CHLORIDE, SODIUM LACTATE AND CALCIUM CHLORIDE 1000 ML: 600; 310; 30; 20 INJECTION, SOLUTION INTRAVENOUS at 09:00

## 2024-03-04 ASSESSMENT — LIFESTYLE VARIABLES
HAVE PEOPLE ANNOYED YOU BY CRITICIZING YOUR DRINKING: NO
EVER HAD A DRINK FIRST THING IN THE MORNING TO STEADY YOUR NERVES TO GET RID OF A HANGOVER: NO
HAVE YOU EVER FELT YOU SHOULD CUT DOWN ON YOUR DRINKING: NO
EVER FELT BAD OR GUILTY ABOUT YOUR DRINKING: NO

## 2024-03-04 ASSESSMENT — PAIN SCALES - GENERAL: PAINLEVEL_OUTOF10: 0 - NO PAIN

## 2024-03-04 ASSESSMENT — COLUMBIA-SUICIDE SEVERITY RATING SCALE - C-SSRS
2. HAVE YOU ACTUALLY HAD ANY THOUGHTS OF KILLING YOURSELF?: NO
1. IN THE PAST MONTH, HAVE YOU WISHED YOU WERE DEAD OR WISHED YOU COULD GO TO SLEEP AND NOT WAKE UP?: NO
6. HAVE YOU EVER DONE ANYTHING, STARTED TO DO ANYTHING, OR PREPARED TO DO ANYTHING TO END YOUR LIFE?: NO

## 2024-03-04 ASSESSMENT — PAIN DESCRIPTION - PROGRESSION: CLINICAL_PROGRESSION: NOT CHANGED

## 2024-03-04 ASSESSMENT — PAIN - FUNCTIONAL ASSESSMENT: PAIN_FUNCTIONAL_ASSESSMENT: 0-10

## 2024-03-04 NOTE — ED PROVIDER NOTES
HPI   Chief Complaint   Patient presents with    Diarrhea     Also complaint of being shakey, dizzy, and blurred vision bilaterally        HPI     HISTORY OF PRESENT ILLNESS:  63-year-old male presents emergency department diarrhea.  Patient states that he had 2 episodes of loose stools this morning. He also noted his blood sugars were in 340s.  The patient also feels like he is lightheaded, having intermittent blurred vision with positional changes.  No abdominal pain chest pain, nausea, vomiting, dizziness, trouble with ambulation.    Past Medical History:Hypertension, orthostatic dizziness, diabetes, gout, benign tremor, depression  Past Surgical History: ? epigastric abdominal surgery  social: nondrinker    _________________________________________________________  PHYSICAL EXAM:    Appearance: Nontoxic-appearing elderly  male, appears stated age, alert, oriented , cooperative   Skin: Intact,  dry skin, no lesions, rash, petechiae or purpura.   Eyes: PERRLA, EOMs intact,  Conjunctiva pink with no redness or exudates.    HENT: Normocephalic, atraumatic. Nares patent   Neck: Supple. Trachea at midline.   Pulmonary: Lung sounds are clear bilaterally.  There is no rales, rhonchi, or wheezing.  Cardiac: Regular rate and rhythm, no rubs, murmurs, or gallops. No JVD,   Abdomen: Abdomen is soft, nontender, and nondistended.  No palpable organomegaly.  No rebound or guarding.  No CVA tenderness. Nonsurgical abdomen  Genitourinary: Exam deferred.  Musculoskeletal: no edema, pain, cyanosis, or deformity in extremities. Pulses full and equal.   Neurological:  Cranial nerves are grossly intact, grossly normal sensation, no weakness, no focal findings identified.    __________________________________________________________  MEDICAL DECISION MAKING:    Patient was seen and examined. Differential diagnosis fo for this and elevated blood sugars includes dehydration, diabetic ketoacidosis, other occult injury. The  patient was complaining of blurry vision.  However, he is able to ambulate without difficulty.  Initial heart rate was tachycardic at 110.  Patient was provided IV fluids and laboratory workup was obtained to evaluate.  Patient had no evidence of a leukocytosis.  Troponins were negative for possible cold ACS.  Urinalysis negative.  Patient chest x-ray was negative.  Tachycardia resolved.  Blood sugars were 281.  Patient did not have an anion gap.  Patient at this point was reevaluated.  Is feeling better in terms of dizziness.  He was advised supportive care instructions regarding his diarrhea stools and advised to monitor blood sugars.  Return precautions given.  Patient advised close outpatient follow-up.  Patient was discharged from the emergency department.    Chronic Medical Conditions Significantly Affecting Care: Functional, orthostatic dizziness: Diabetes, gout, benign hypertension, depression    External Records Reviewed: I reviewed recent and relevant outside records including: Coronary artery recurrent current cardiac regimen with her    David Hartley  Emergency Medicine               [place] Coma Scale Score: 15                     Patient History   Past Medical History:   Diagnosis Date    Diabetes (CMS/HCC)     HTN (hypertension)     Personal history of other diseases of the musculoskeletal system and connective tissue     History of gout    Personal history of other mental and behavioral disorders     History of depression     Past Surgical History:   Procedure Laterality Date    NECK SURGERY  02/01/2018    Neck Surgery    OTHER SURGICAL HISTORY  02/01/2018    Surgery Excision Lipoma    OTHER SURGICAL HISTORY  04/25/2022    Colonoscopy     No family history on file.  Social History     Tobacco Use    Smoking status: Former     Types: Cigarettes    Smokeless tobacco: Never   Vaping Use    Vaping Use: Never used   Substance Use Topics    Alcohol use: Never    Drug use: Never       Physical Exam   ED  Triage Vitals [03/04/24 0835]   Temperature Heart Rate Respirations BP   36.6 °C (97.9 °F) (!) 110 18 147/63      Pulse Ox Temp src Heart Rate Source Patient Position   97 % -- -- --      BP Location FiO2 (%)     -- --       Physical Exam    ED Course & [facility]   ED Course as of 03/04/24 1553   Mon Mar 04, 2024   0931 Patient twelve-lead EKG inter by myself shows sinus rhythm, ventricular rate 99, normal [place] interval, left axis deviation, normal QRS duration, normal QT, no STEMI. [WJ]      ED Course User Index  [WJ] David Hartley DO         Diagnoses as of 03/04/24 1553   Acute diarrhea   Lightheaded       Medical Decision Making      Procedure  Procedures     David Hartley DO  03/06/24 0815

## 2024-03-08 ENCOUNTER — APPOINTMENT (OUTPATIENT)
Dept: WOUND CARE | Facility: CLINIC | Age: 64
End: 2024-03-08
Payer: COMMERCIAL

## 2024-03-12 LAB
ATRIAL RATE: 89 BPM
P AXIS: 53 DEGREES
PR INTERVAL: 156 MS
Q ONSET: 251 MS
QRS COUNT: 14 BEATS
QRS DURATION: 91 MS
QT INTERVAL: 364 MS
QTC CALCULATION(BAZETT): 443 MS
QTC FREDERICIA: 414 MS
R AXIS: -37 DEGREES
T AXIS: 40 DEGREES
T OFFSET: 433 MS
VENTRICULAR RATE: 89 BPM

## 2024-03-15 ENCOUNTER — APPOINTMENT (OUTPATIENT)
Dept: CARDIOLOGY | Facility: CLINIC | Age: 64
End: 2024-03-15
Payer: COMMERCIAL

## 2024-03-16 LAB
ATRIAL RATE: 99 BPM
P AXIS: 25 DEGREES
PR INTERVAL: 152 MS
Q ONSET: 249 MS
QRS COUNT: 16 BEATS
QRS DURATION: 92 MS
QT INTERVAL: 361 MS
QTC CALCULATION(BAZETT): 464 MS
QTC FREDERICIA: 426 MS
R AXIS: -32 DEGREES
T AXIS: 62 DEGREES
T OFFSET: 430 MS
VENTRICULAR RATE: 99 BPM

## 2024-03-22 ENCOUNTER — APPOINTMENT (OUTPATIENT)
Dept: WOUND CARE | Facility: CLINIC | Age: 64
End: 2024-03-22
Payer: COMMERCIAL

## 2024-03-29 ENCOUNTER — APPOINTMENT (OUTPATIENT)
Dept: WOUND CARE | Facility: CLINIC | Age: 64
End: 2024-03-29
Payer: COMMERCIAL

## 2024-06-04 ENCOUNTER — PHARMACY VISIT (OUTPATIENT)
Dept: PHARMACY | Facility: CLINIC | Age: 64
End: 2024-06-04
Payer: MEDICAID

## 2024-06-04 ENCOUNTER — APPOINTMENT (OUTPATIENT)
Dept: RADIOLOGY | Facility: HOSPITAL | Age: 64
End: 2024-06-04
Payer: COMMERCIAL

## 2024-06-04 ENCOUNTER — HOSPITAL ENCOUNTER (EMERGENCY)
Facility: HOSPITAL | Age: 64
Discharge: HOME | End: 2024-06-04
Attending: EMERGENCY MEDICINE
Payer: COMMERCIAL

## 2024-06-04 ENCOUNTER — APPOINTMENT (OUTPATIENT)
Dept: CARDIOLOGY | Facility: HOSPITAL | Age: 64
End: 2024-06-04
Payer: COMMERCIAL

## 2024-06-04 VITALS
OXYGEN SATURATION: 98 % | HEIGHT: 70 IN | SYSTOLIC BLOOD PRESSURE: 148 MMHG | WEIGHT: 211.64 LBS | TEMPERATURE: 96.8 F | BODY MASS INDEX: 30.3 KG/M2 | RESPIRATION RATE: 18 BRPM | DIASTOLIC BLOOD PRESSURE: 90 MMHG | HEART RATE: 82 BPM

## 2024-06-04 DIAGNOSIS — J98.4 PNEUMONITIS: ICD-10-CM

## 2024-06-04 DIAGNOSIS — R10.11 RIGHT UPPER QUADRANT ABDOMINAL PAIN: Primary | ICD-10-CM

## 2024-06-04 LAB
ALBUMIN SERPL BCP-MCNC: 4.2 G/DL (ref 3.4–5)
ALP SERPL-CCNC: 105 U/L (ref 33–136)
ALT SERPL W P-5'-P-CCNC: 36 U/L (ref 10–52)
ANION GAP BLDV CALCULATED.4IONS-SCNC: 7 MMOL/L (ref 10–25)
ANION GAP SERPL CALC-SCNC: 13 MMOL/L (ref 10–20)
APPEARANCE UR: CLEAR
AST SERPL W P-5'-P-CCNC: 21 U/L (ref 9–39)
ATRIAL RATE: 76 BPM
BASE EXCESS BLDV CALC-SCNC: 4 MMOL/L (ref -2–3)
BASOPHILS # BLD AUTO: 0.02 X10*3/UL (ref 0–0.1)
BASOPHILS NFR BLD AUTO: 0.3 %
BILIRUB SERPL-MCNC: 0.8 MG/DL (ref 0–1.2)
BILIRUB UR STRIP.AUTO-MCNC: NEGATIVE MG/DL
BODY TEMPERATURE: 37 DEGREES CELSIUS
BUN SERPL-MCNC: 10 MG/DL (ref 6–23)
CA-I BLDV-SCNC: 1.18 MMOL/L (ref 1.1–1.33)
CALCIUM SERPL-MCNC: 9.5 MG/DL (ref 8.6–10.3)
CHLORIDE BLDV-SCNC: 99 MMOL/L (ref 98–107)
CHLORIDE SERPL-SCNC: 98 MMOL/L (ref 98–107)
CO2 SERPL-SCNC: 25 MMOL/L (ref 21–32)
COLOR UR: YELLOW
CREAT SERPL-MCNC: 0.94 MG/DL (ref 0.5–1.3)
EGFRCR SERPLBLD CKD-EPI 2021: >90 ML/MIN/1.73M*2
EOSINOPHIL # BLD AUTO: 0.03 X10*3/UL (ref 0–0.7)
EOSINOPHIL NFR BLD AUTO: 0.5 %
ERYTHROCYTE [DISTWIDTH] IN BLOOD BY AUTOMATED COUNT: 14.6 % (ref 11.5–14.5)
GLUCOSE BLDV-MCNC: 360 MG/DL (ref 74–99)
GLUCOSE SERPL-MCNC: 327 MG/DL (ref 74–99)
GLUCOSE UR STRIP.AUTO-MCNC: ABNORMAL MG/DL
HCO3 BLDV-SCNC: 29.7 MMOL/L (ref 22–26)
HCT VFR BLD AUTO: 35.3 % (ref 41–52)
HCT VFR BLD EST: 38 % (ref 41–52)
HGB BLD-MCNC: 13.1 G/DL (ref 13.5–17.5)
HGB BLDV-MCNC: 12.5 G/DL (ref 13.5–17.5)
HOLD SPECIMEN: NORMAL
IMM GRANULOCYTES # BLD AUTO: 0.12 X10*3/UL (ref 0–0.7)
IMM GRANULOCYTES NFR BLD AUTO: 1.8 % (ref 0–0.9)
INHALED O2 CONCENTRATION: 21 %
KETONES UR STRIP.AUTO-MCNC: NEGATIVE MG/DL
LACTATE BLDV-SCNC: 2.2 MMOL/L (ref 0.4–2)
LACTATE BLDV-SCNC: 3 MMOL/L (ref 0.4–2)
LEUKOCYTE ESTERASE UR QL STRIP.AUTO: NEGATIVE
LIPASE SERPL-CCNC: 48 U/L (ref 9–82)
LYMPHOCYTES # BLD AUTO: 1.69 X10*3/UL (ref 1.2–4.8)
LYMPHOCYTES NFR BLD AUTO: 26 %
MCH RBC QN AUTO: 33.9 PG (ref 26–34)
MCHC RBC AUTO-ENTMCNC: 37.1 G/DL (ref 32–36)
MCV RBC AUTO: 92 FL (ref 80–100)
MONOCYTES # BLD AUTO: 0.42 X10*3/UL (ref 0.1–1)
MONOCYTES NFR BLD AUTO: 6.5 %
NEUTROPHILS # BLD AUTO: 4.23 X10*3/UL (ref 1.2–7.7)
NEUTROPHILS NFR BLD AUTO: 64.9 %
NITRITE UR QL STRIP.AUTO: NEGATIVE
NRBC BLD-RTO: 0 /100 WBCS (ref 0–0)
OXYHGB MFR BLDV: 32.7 % (ref 45–75)
P AXIS: 34 DEGREES
PCO2 BLDV: 48 MM HG (ref 41–51)
PH BLDV: 7.4 PH (ref 7.33–7.43)
PH UR STRIP.AUTO: 7.5 [PH]
PLATELET # BLD AUTO: 114 X10*3/UL (ref 150–450)
PO2 BLDV: 24 MM HG (ref 35–45)
POTASSIUM BLDV-SCNC: 4.7 MMOL/L (ref 3.5–5.3)
POTASSIUM SERPL-SCNC: 4.6 MMOL/L (ref 3.5–5.3)
PR INTERVAL: 167 MS
PROT SERPL-MCNC: 6.9 G/DL (ref 6.4–8.2)
PROT UR STRIP.AUTO-MCNC: NEGATIVE MG/DL
Q ONSET: 249 MS
QRS COUNT: 12 BEATS
QRS DURATION: 90 MS
QT INTERVAL: 382 MS
QTC CALCULATION(BAZETT): 430 MS
QTC FREDERICIA: 413 MS
R AXIS: -31 DEGREES
RBC # BLD AUTO: 3.86 X10*6/UL (ref 4.5–5.9)
RBC # UR STRIP.AUTO: NEGATIVE /UL
SAO2 % BLDV: 33 % (ref 45–75)
SODIUM BLDV-SCNC: 131 MMOL/L (ref 136–145)
SODIUM SERPL-SCNC: 131 MMOL/L (ref 136–145)
SP GR UR STRIP.AUTO: 1.01
T AXIS: 11 DEGREES
T OFFSET: 440 MS
UROBILINOGEN UR STRIP.AUTO-MCNC: ABNORMAL MG/DL
VENTRICULAR RATE: 76 BPM
WBC # BLD AUTO: 6.5 X10*3/UL (ref 4.4–11.3)

## 2024-06-04 PROCEDURE — 82247 BILIRUBIN TOTAL: CPT | Performed by: EMERGENCY MEDICINE

## 2024-06-04 PROCEDURE — 74177 CT ABD & PELVIS W/CONTRAST: CPT | Performed by: RADIOLOGY

## 2024-06-04 PROCEDURE — 84132 ASSAY OF SERUM POTASSIUM: CPT | Mod: 59 | Performed by: EMERGENCY MEDICINE

## 2024-06-04 PROCEDURE — 2500000004 HC RX 250 GENERAL PHARMACY W/ HCPCS (ALT 636 FOR OP/ED): Performed by: EMERGENCY MEDICINE

## 2024-06-04 PROCEDURE — 36415 COLL VENOUS BLD VENIPUNCTURE: CPT | Performed by: EMERGENCY MEDICINE

## 2024-06-04 PROCEDURE — RXMED WILLOW AMBULATORY MEDICATION CHARGE

## 2024-06-04 PROCEDURE — 81003 URINALYSIS AUTO W/O SCOPE: CPT | Performed by: EMERGENCY MEDICINE

## 2024-06-04 PROCEDURE — 93005 ELECTROCARDIOGRAM TRACING: CPT

## 2024-06-04 PROCEDURE — 76705 ECHO EXAM OF ABDOMEN: CPT | Performed by: RADIOLOGY

## 2024-06-04 PROCEDURE — 85025 COMPLETE CBC W/AUTO DIFF WBC: CPT | Performed by: EMERGENCY MEDICINE

## 2024-06-04 PROCEDURE — 76705 ECHO EXAM OF ABDOMEN: CPT

## 2024-06-04 PROCEDURE — 96361 HYDRATE IV INFUSION ADD-ON: CPT

## 2024-06-04 PROCEDURE — 83690 ASSAY OF LIPASE: CPT | Performed by: EMERGENCY MEDICINE

## 2024-06-04 PROCEDURE — 2550000001 HC RX 255 CONTRASTS: Performed by: EMERGENCY MEDICINE

## 2024-06-04 PROCEDURE — 96374 THER/PROPH/DIAG INJ IV PUSH: CPT

## 2024-06-04 PROCEDURE — 83605 ASSAY OF LACTIC ACID: CPT | Performed by: EMERGENCY MEDICINE

## 2024-06-04 PROCEDURE — 96375 TX/PRO/DX INJ NEW DRUG ADDON: CPT

## 2024-06-04 PROCEDURE — 74177 CT ABD & PELVIS W/CONTRAST: CPT

## 2024-06-04 PROCEDURE — 99285 EMERGENCY DEPT VISIT HI MDM: CPT | Mod: 25

## 2024-06-04 RX ORDER — OXYCODONE AND ACETAMINOPHEN 5; 325 MG/1; MG/1
1 TABLET ORAL EVERY 6 HOURS PRN
Qty: 10 TABLET | Refills: 0 | Status: SHIPPED | OUTPATIENT
Start: 2024-06-04 | End: 2024-06-07

## 2024-06-04 RX ORDER — ONDANSETRON HYDROCHLORIDE 2 MG/ML
4 INJECTION, SOLUTION INTRAVENOUS ONCE
Status: COMPLETED | OUTPATIENT
Start: 2024-06-04 | End: 2024-06-04

## 2024-06-04 RX ORDER — LEVOFLOXACIN 750 MG/1
750 TABLET ORAL DAILY
Qty: 5 TABLET | Refills: 0 | Status: SHIPPED | OUTPATIENT
Start: 2024-06-04 | End: 2024-06-09

## 2024-06-04 RX ORDER — ONDANSETRON 8 MG/1
8 TABLET, ORALLY DISINTEGRATING ORAL EVERY 8 HOURS PRN
Qty: 20 TABLET | Refills: 0 | Status: SHIPPED | OUTPATIENT
Start: 2024-06-04

## 2024-06-04 RX ADMIN — ONDANSETRON 4 MG: 2 INJECTION INTRAMUSCULAR; INTRAVENOUS at 06:39

## 2024-06-04 RX ADMIN — SODIUM CHLORIDE 1000 ML: 9 INJECTION, SOLUTION INTRAVENOUS at 07:08

## 2024-06-04 RX ADMIN — IOHEXOL 75 ML: 350 INJECTION, SOLUTION INTRAVENOUS at 06:15

## 2024-06-04 RX ADMIN — HYDROMORPHONE HYDROCHLORIDE 0.5 MG: 0.5 INJECTION, SOLUTION INTRAMUSCULAR; INTRAVENOUS; SUBCUTANEOUS at 06:40

## 2024-06-04 ASSESSMENT — PAIN SCALES - GENERAL
PAINLEVEL_OUTOF10: 10 - WORST POSSIBLE PAIN
PAINLEVEL_OUTOF10: 9
PAINLEVEL_OUTOF10: 9
PAINLEVEL_OUTOF10: 4
PAINLEVEL_OUTOF10: 9
PAINLEVEL_OUTOF10: 3

## 2024-06-04 ASSESSMENT — PAIN DESCRIPTION - ORIENTATION: ORIENTATION: RIGHT

## 2024-06-04 ASSESSMENT — PAIN - FUNCTIONAL ASSESSMENT
PAIN_FUNCTIONAL_ASSESSMENT: 0-10

## 2024-06-04 ASSESSMENT — PAIN DESCRIPTION - LOCATION
LOCATION: ABDOMEN

## 2024-06-04 NOTE — ED TRIAGE NOTES
Pt states abdominal pain started approx 0100 this AM while lying in bed awake. Pt states vomiting x 1 episode. States last BM was early on Monday.

## 2024-06-04 NOTE — ED PROVIDER NOTES
HPI   Chief Complaint   Patient presents with    Abdominal Pain     Pt states abdominal pain started approx 0100 this AM while lying in bed awake. Pt states vomiting x 1 episode. States last BM was early on Monday.        HPI:  63-year-old male with history of hypertension presents emergency department with complaint of abdominal pain.  He was awakened around midnight began having mid abdominal pain that was gradual in onset and now is severe denies any radiation of the pain to any other part of his body he had a normal bowel movement just prior to the onset of his abdominal pain he says he has been very gassy and passing a lot of flatus and also was nauseous and had an episode of vomiting he denies any obvious fevers or chills he was in his usual state of health until around midnight when he began having symptoms of abdominal pain    Limitations to history: None  Independent Historians: None  External Records Reviewed: EMR record  ------------------------------------------------------------------------------------------------------------------------------------------  ROS: a ten point review of systems was performed and negative except as per HPI.  ------------------------------------------------------------------------------------------------------------------------------------------  PMH / PSH: as per HPI, reviewed in EMR and discussed with the patient []  MEDS:  reviewed in EMR and discussed with the patient []  ALLERGIES: reviewed in EMR[]  SocH:  as per HPI, otherwise reviewed in EMR []  FH:  as per HPI, otherwise reviewed in EMR []  ------------------------------------------------------------------------------------------------------------------------------------------  Physical Exam:  VS: As documented in the triage note and EMR flowsheet from this visit was reviewed  General: Well appearing. No acute distress.   Eyes:  Extraocular movements grossly intact. No scleral icterus.   HEENT: Atraumatic.  Normocephalic.    Neck: Supple. No gross masses  CV: RRR, audible S1/S2, 2+ symmetric peripheral pulses  Resp: Clear to auscultation bilaterally. No respiratory distress.  Non-labored respirations  GI: Soft, diffuse-tender, mildly distended, no rebound or gaurding  MSK: Symmetric muscle bulk. No gross step offs or deformities.  Skin: Warm, dry, no obvious rash.  Neuro: Speech fluent. Awake. Alert. Appropriate conversation.  Psych: Appropriate mood and affect for situation  ------------------------------------------------------------------------------------------------------------------------------------------  Hospital Course / Medical Decision Makin-year-old male presents emergency department with complaint of abdominal pain that started around midnight.  He also had an episode of nausea and vomiting as well.  Denies any prior abdominal surgeries he is diffusely tender on exam.  He was hooked up to the cardiac monitor peripheral IV access was obtained labs were drawn.  Labs showed shows normal lipase level electrolytes were all normal he underwent CT of the abdomen and pelvis which showed multiple different findings inflammation of the gallbladder with may be reactive in nature versus possible lymphoproliferative process and splenomegaly as well.  Because of the patient's tenderness right upper quadrant ultrasound was ordered and is currently pending he was given Dilaudid for pain control as well as Zofran no episodes of vomiting while here in the emergency department of note he had significant glucose in his urine he does have a history of diabetes on Trulicity and metformin.  He was counseled regarding his blood sugars running high as he does have glucose pulling into his urine he was given IV fluids no signs of acidosis or ketones in the urine.  Patient was signed out to the oncoming provider to follow-up on ultrasound results and final disposition    Final diagnosis and disposition: [] Abdominal pain,  glucosuria            aMrcy Kay MD                                      [place] Coma Scale Score: 15                     Patient History   Past Medical History:   Diagnosis Date    Diabetes (Multi)     HTN (hypertension)     Personal history of other diseases of the musculoskeletal system and connective tissue     History of gout    Personal history of other mental and behavioral disorders     History of depression     Past Surgical History:   Procedure Laterality Date    NECK SURGERY  02/01/2018    Neck Surgery    OTHER SURGICAL HISTORY  02/01/2018    Surgery Excision Lipoma    OTHER SURGICAL HISTORY  04/25/2022    Colonoscopy     No family history on file.  Social History     Tobacco Use    Smoking status: Former     Types: Cigarettes    Smokeless tobacco: Never   Vaping Use    Vaping status: Never Used   Substance Use Topics    Alcohol use: Never    Drug use: Never       Physical Exam   ED Triage Vitals [06/04/24 0322]   Temperature Heart Rate Respirations BP   35.8 °C (96.5 °F) 77 20 (!) 169/95      Pulse Ox Temp Source Heart Rate Source Patient Position   100 % Temporal -- Sitting      BP Location FiO2 (%)     Left arm --       Physical Exam    ED Course & MDM        Medical Decision Making      Procedure  Procedures     Marcy Kay MD  06/04/24 0702       Marcy Kay MD  06/04/24 0705

## 2024-06-04 NOTE — PROGRESS NOTES
Emergency Medicine Transition of Care Note.    I received Mane Nath in signout from Dr. Kay.  Please see the previous ED provider note for all HPI, PE and MDM up to the time of signout. This is in addition to the primary record.    In brief Mane Nath is an 63 y.o. male presenting for   Chief Complaint   Patient presents with    Abdominal Pain     Pt states abdominal pain started approx 0100 this AM while lying in bed awake. Pt states vomiting x 1 episode. States last BM was early on Monday.         Diagnoses as of 06/04/24 0942   Right upper quadrant abdominal pain   Pneumonitis       Medical Decision Making  Patient was signed out to me pending imaging studies.  I did review his workup.  His WBC count is normal.  pH normal.  Liver enzymes and lipase is normal.  Urinalysis is negative for infection.  CAT scan of the abdomen and pelvis shows some mild gallbladder wall distention with minimal wall thickening.  There is some groundglass opacities in the lower lungs.  Gallbladder ultrasound shows a dilated gallbladder with a positive sonographic Torres sign although there are no gallstones or biliary duct dilation.  At this time I do not feel this represents cholecystitis.  He may need surgical follow-up and an outpatient HIDA scan.  There is no signs of any infection at this time in the gallbladder region.  However, I will place him on antibiotics for the groundglass opacities in his lungs which could be causing the lower lung/upper abdominal pain.  I will treat him with 5 days of levofloxacin.  I will give him pain control as well as nausea control for home.  I will give him surgeon follow-up.        Final diagnoses:   None           Procedure  Procedures    Anupam Trujillo MD

## 2024-06-11 ENCOUNTER — OFFICE VISIT (OUTPATIENT)
Dept: SURGERY | Facility: CLINIC | Age: 64
End: 2024-06-11
Payer: COMMERCIAL

## 2024-06-11 VITALS
HEART RATE: 96 BPM | BODY MASS INDEX: 29.72 KG/M2 | DIASTOLIC BLOOD PRESSURE: 76 MMHG | SYSTOLIC BLOOD PRESSURE: 121 MMHG | WEIGHT: 207.6 LBS | OXYGEN SATURATION: 98 % | HEIGHT: 70 IN

## 2024-06-11 DIAGNOSIS — K59.00 CONSTIPATION, UNSPECIFIED CONSTIPATION TYPE: ICD-10-CM

## 2024-06-11 DIAGNOSIS — K76.0 FATTY LIVER: ICD-10-CM

## 2024-06-11 DIAGNOSIS — R10.11 RIGHT UPPER QUADRANT ABDOMINAL PAIN: Primary | ICD-10-CM

## 2024-06-11 PROBLEM — E78.00 HYPERCHOLESTEREMIA: Status: ACTIVE | Noted: 2024-06-11

## 2024-06-11 PROBLEM — R06.09 DYSPNEA ON EXERTION: Status: ACTIVE | Noted: 2024-06-11

## 2024-06-11 PROBLEM — R00.2 HEART PALPITATIONS: Status: ACTIVE | Noted: 2024-06-11

## 2024-06-11 PROBLEM — E11.9 DIABETES MELLITUS (MULTI): Status: ACTIVE | Noted: 2024-06-11

## 2024-06-11 PROBLEM — I10 HTN (HYPERTENSION): Status: ACTIVE | Noted: 2024-06-11

## 2024-06-11 PROBLEM — R42 DIZZINESS: Status: ACTIVE | Noted: 2024-06-11

## 2024-06-11 PROCEDURE — 99214 OFFICE O/P EST MOD 30 MIN: CPT | Performed by: SURGERY

## 2024-06-11 PROCEDURE — 3074F SYST BP LT 130 MM HG: CPT | Performed by: SURGERY

## 2024-06-11 PROCEDURE — 3078F DIAST BP <80 MM HG: CPT | Performed by: SURGERY

## 2024-06-11 PROCEDURE — 1036F TOBACCO NON-USER: CPT | Performed by: SURGERY

## 2024-06-11 RX ORDER — DOCUSATE SODIUM 100 MG/1
CAPSULE, LIQUID FILLED ORAL
COMMUNITY
Start: 2023-06-27

## 2024-06-11 RX ORDER — AMLODIPINE AND BENAZEPRIL HYDROCHLORIDE 10; 40 MG/1; MG/1
CAPSULE ORAL
COMMUNITY
Start: 2023-09-08

## 2024-06-11 RX ORDER — GABAPENTIN 400 MG/1
CAPSULE ORAL
COMMUNITY

## 2024-06-11 RX ORDER — ALBUTEROL SULFATE 90 UG/1
AEROSOL, METERED RESPIRATORY (INHALATION)
COMMUNITY
Start: 2023-11-15

## 2024-06-11 RX ORDER — CLINDAMYCIN HYDROCHLORIDE 150 MG/1
150 CAPSULE ORAL EVERY 6 HOURS
COMMUNITY
Start: 2023-12-21

## 2024-06-11 RX ORDER — PRIMIDONE 50 MG/1
1 TABLET ORAL NIGHTLY
COMMUNITY
Start: 2020-03-12

## 2024-06-11 RX ORDER — PREDNISONE 10 MG/1
TABLET ORAL
COMMUNITY
Start: 2023-11-15

## 2024-06-11 RX ORDER — MENTHOL/SORBITOL
LOZENGE MUCOUS MEMBRANE
COMMUNITY
Start: 2023-06-27

## 2024-06-11 RX ORDER — ALLOPURINOL 100 MG/1
TABLET ORAL
COMMUNITY
Start: 2017-09-19

## 2024-06-11 RX ORDER — PRAVASTATIN SODIUM 20 MG/1
1 TABLET ORAL NIGHTLY
COMMUNITY

## 2024-06-11 RX ORDER — VENLAFAXINE HYDROCHLORIDE 150 MG/1
CAPSULE, EXTENDED RELEASE ORAL
COMMUNITY

## 2024-06-11 RX ORDER — HYDROXYZINE PAMOATE 25 MG/1
CAPSULE ORAL
COMMUNITY
Start: 2024-05-26

## 2024-06-11 RX ORDER — BUSPIRONE HYDROCHLORIDE 10 MG/1
TABLET ORAL
COMMUNITY

## 2024-06-11 RX ORDER — NAPROXEN 500 MG/1
TABLET ORAL 2 TIMES DAILY
COMMUNITY
Start: 2022-12-11

## 2024-06-11 RX ORDER — ARIPIPRAZOLE 30 MG/1
30 TABLET ORAL NIGHTLY
COMMUNITY

## 2024-06-11 RX ORDER — VIT C/E/ZN/COPPR/LUTEIN/ZEAXAN 250MG-90MG
25 CAPSULE ORAL DAILY
COMMUNITY

## 2024-06-11 RX ORDER — OMEGA-3 FATTY ACIDS/FISH OIL 340-1000MG
CAPSULE ORAL
COMMUNITY

## 2024-06-11 RX ORDER — CYCLOBENZAPRINE HCL 10 MG
TABLET ORAL
COMMUNITY
Start: 2022-12-11

## 2024-06-11 RX ORDER — FENOFIBRATE 160 MG/1
1 TABLET ORAL DAILY
COMMUNITY
Start: 2017-09-19

## 2024-06-11 RX ORDER — DICYCLOMINE HYDROCHLORIDE 20 MG/1
TABLET ORAL 4 TIMES DAILY PRN
COMMUNITY
Start: 2021-12-21

## 2024-06-11 RX ORDER — METOPROLOL SUCCINATE 50 MG/1
50 TABLET, EXTENDED RELEASE ORAL DAILY
COMMUNITY

## 2024-06-11 RX ORDER — SIMETHICONE 80 MG
TABLET,CHEWABLE ORAL
COMMUNITY
Start: 2023-06-30

## 2024-06-11 RX ORDER — OMEPRAZOLE 40 MG/1
40 CAPSULE, DELAYED RELEASE ORAL DAILY
COMMUNITY

## 2024-06-11 RX ORDER — METFORMIN HYDROCHLORIDE 1000 MG/1
500 TABLET ORAL
COMMUNITY

## 2024-06-11 RX ORDER — TRAZODONE HYDROCHLORIDE 100 MG/1
TABLET ORAL
COMMUNITY

## 2024-06-11 RX ORDER — DONEPEZIL HYDROCHLORIDE 5 MG/1
TABLET, FILM COATED ORAL
COMMUNITY
Start: 2023-06-30

## 2024-06-11 RX ORDER — AMOXICILLIN AND CLAVULANATE POTASSIUM 875; 125 MG/1; MG/1
1 TABLET, FILM COATED ORAL 2 TIMES DAILY
Qty: 20 TABLET | Refills: 0 | Status: SHIPPED | OUTPATIENT
Start: 2024-06-11 | End: 2024-06-21

## 2024-06-11 RX ORDER — ATORVASTATIN CALCIUM 80 MG/1
1 TABLET, FILM COATED ORAL
COMMUNITY
Start: 2024-03-27

## 2024-06-11 RX ORDER — DULAGLUTIDE 3 MG/.5ML
INJECTION, SOLUTION SUBCUTANEOUS
COMMUNITY
Start: 2024-03-28

## 2024-06-11 RX ORDER — POLYETHYLENE GLYCOL 3350 17 G/17G
17 POWDER, FOR SOLUTION ORAL
COMMUNITY
Start: 2021-12-21

## 2024-06-11 ASSESSMENT — ENCOUNTER SYMPTOMS
COUGH: 0
ABDOMINAL PAIN: 1
EYE PAIN: 0
CONSTIPATION: 1
DIARRHEA: 0
WOUND: 0
DYSURIA: 0
ARTHRALGIAS: 1
VOMITING: 0
HEMATURIA: 0
POLYPHAGIA: 0
EYE REDNESS: 0
SHORTNESS OF BREATH: 1
PALPITATIONS: 1
WEAKNESS: 1
FREQUENCY: 0
NAUSEA: 0
MYALGIAS: 1
AGITATION: 0
FATIGUE: 0
CHILLS: 0
CONFUSION: 1
FLANK PAIN: 0
BRUISES/BLEEDS EASILY: 0
FEVER: 0
HEADACHES: 0
SPEECH DIFFICULTY: 0

## 2024-06-11 NOTE — LETTER
June 11, 2024     Thalia Smith MD  [address]  [facility] [address]  [facility] [address]    Patient: Mane Nath   YOB: 1960   Date of Visit: 6/11/2024       Dear Dr. Thalia Smith MD:    Thank you for referring Mane Nath to me for evaluation. Below are my notes for this consultation.  If you have questions, please do not hesitate to call me. I look forward to following your patient along with you.       Sincerely,     Sandra Jesus MD      CC: No Recipients  ______________________________________________________________________________________        GENERAL SURGERY OFFICE NOTE    Patient: Mane Nath    Age: 63 y.o.   Gender: male    MRN: [id number]    PCP: Thalia Smith MD        SUBJECTIVE     Chief Complaint  New Patient Visit (Patient was referred by [facility] ED for gallbladder. Patient states that he went to the ED for stomach pain that started on 6/3/24. Patient states that he had some vomiting with the pain. Patient states that he has been having abdominal pain off and on.)       JULY Gilliam is a 63-year-old white male who I am seeing as a referral from the emergency room for evaluation of abdominal pain.  About a week ago, he had gone to the emergency room in the evening after eating several corndogs for dinner.  He was experiencing some diffuse abdominal pain as well as right upper quadrant abdominal pain.  The pain had been intermittent.  He had a right upper quadrant ultrasound and CT evaluation of the abdomen/pelvis in the ER which both showed slight dilation of the gallbladder, but no gallstones and no secondary evidence of acute cholecystitis.  His WBC and LFTs were all within normal limits at that time.  He was given a 5-day course of levofloxacin which he felt helped with his abdominal pain.  He took all of the Percocet pain medication, but did not take any over-the-counter medications.  His last colonoscopy was more than 10 years ago, and he has never had an EGD.  " He does feel that eating makes his abdominal pain worse.  He has been on Prilosec for \"a few months\" due to some reflux symptoms.  His CT evaluation also showed constipation and a fatty liver.  He was found to have a glucose level greater than 300.  He has been to the emergency room 4 times in the last 6 months.  He states that he has an appointment with his PCP next week, but they are not on [facility] associated.  He is being followed by cardiology for chronic dizziness, hypertension, hypercholesterolemia, cardiac palpitations and dyspnea on exertion, but has not seen cardiology in more than a year.  He is not currently running any fevers.  Denies any diarrhea.    ROS  Review of Systems   Constitutional:  Negative for chills, fatigue and fever.   HENT:  Positive for hearing loss. Negative for congestion and ear pain.    Eyes:  Negative for pain and redness.   Respiratory:  Positive for shortness of breath. Negative for cough.    Cardiovascular:  Positive for palpitations. Negative for chest pain and leg swelling.   Gastrointestinal:  Positive for abdominal pain and constipation. Negative for diarrhea, nausea and vomiting.   Endocrine: Negative for polyphagia.   Genitourinary:  Negative for dysuria, flank pain, frequency and hematuria.   Musculoskeletal:  Positive for arthralgias and myalgias.   Skin:  Negative for rash and wound.   Allergic/Immunologic: Negative for immunocompromised state.   Neurological:  Positive for weakness. Negative for speech difficulty and headaches.   Hematological:  Does not bruise/bleed easily.   Psychiatric/Behavioral:  Positive for confusion. Negative for agitation.           HISTORY     Past Medical History:   Diagnosis Date   • Diabetes (Multi)    • HTN (hypertension)    • Personal history of other diseases of the musculoskeletal system and connective tissue     History of gout   • Personal history of other mental and behavioral disorders     History of depression        Past Surgical " History:   Procedure Laterality Date   • NECK SURGERY  02/01/2018    Neck Surgery   • OTHER SURGICAL HISTORY  02/01/2018    Surgery Excision Lipoma   • OTHER SURGICAL HISTORY  04/25/2022    Colonoscopy        Family History   Problem Relation Name Age of Onset   • Dementia Mother     • Heart attack Father     • Cholecystitis Sister          x3   • Cancer Father's Sister     • Prostate cancer Father's Brother     • Cancer Paternal Grandmother     • Diabetes Paternal Grandfather          Allergies   Allergen Reactions   • Latex Rash and Unknown        Social History     Tobacco Use   Smoking Status Former   • Types: Cigarettes   Smokeless Tobacco Never        Social History     Substance and Sexual Activity   Alcohol Use Never        HOME MEDICATIONS  Current Outpatient Medications   Medication Instructions   • albuterol 90 mcg/actuation inhaler inhale 2 puffs every 6 hours if needed   • allopurinol (Zyloprim) 100 mg tablet oral   • amLODIPine-benazepriL (Lotrel) 10-40 mg capsule TAKE ONE (1) CAPSULE BY MOUTH ONCE DAILY. **(LOTREL 10-40 MG CAP EQUIV)**   • ARIPiprazole (ABILIFY) 30 mg, oral, Nightly   • atorvastatin (Lipitor) 80 mg tablet 1 tablet, oral, Daily (0630)   • busPIRone (Buspar) 10 mg tablet take 2 tablets by mouth twice a day as directed   • cholecalciferol (VITAMIN D-3) 25 mcg, oral, Daily   • clindamycin (CLEOCIN) 150 mg, oral, Every 6 hours   • cyclobenzaprine (Flexeril) 10 mg tablet oral   • dicyclomine (Bentyl) 20 mg tablet 4 times daily PRN   • docusate sodium (Colace) 100 mg capsule TAKE ONE (1) CAPSULE BY MOUTH ONCE DAILY AS NEEDED. **(COLACE 100 MG CAP EQUIV)**   • donepezil (Aricept) 5 mg tablet oral, Daily RT   • fenofibrate (Triglide) 160 mg tablet 1 tablet, oral, Daily   • gabapentin (Neurontin) 400 mg capsule take 1 capsule by mouth twice a day THEN TAKE 2 CAPSULES AT BEDTIME   • hydrOXYzine pamoate (Vistaril) 25 mg capsule take 1 capsule by mouth every evening MAY TAKE IF UNABLE TO SLEEP  "AFTER 1 HOUR IN BED   • KRILL OIL ORAL oral   • metFORMIN (GLUCOPHAGE) 500 mg, oral, 2 times daily after meals   • metoprolol succinate XL (TOPROL-XL) 50 mg, oral, Daily   • naproxen (Naprosyn) 500 mg tablet oral, 2 times daily   • omega-3 fatty acids-fish oil (Fish OiL) 340-1,000 mg capsule oral   • omeprazole (PRILOSEC) 40 mg, oral, Daily   • ondansetron ODT (Zofran-ODT) 8 mg disintegrating tablet Dissolve 1 tablet (8 mg) by mouth every 8 hours if needed for nausea or vomiting.   • polyethylene glycol (MIRALAX) 17 g, oral   • pravastatin (Pravachol) 20 mg tablet 1 tablet, oral, Nightly   • predniSONE (Deltasone) 10 mg tablet take 3 tablets by mouth once daily for 3 days then 2 tablets once...  (REFER TO PRESCRIPTION NOTES).   • primidone (Mysoline) 50 mg tablet 1 tablet, oral, Nightly   • Probiotic, S.boulardii, 250 mg capsule TAKE ONE (1) CAPSULE BY MOUTH ONCE DAILY. *(FLORASTOR 250 MG CAP EQUIV)*   • simethicone (Mylicon) 80 mg chewable tablet oral   • traZODone (Desyrel) 100 mg tablet take 1 to 3 tablet by mouth at bedtime if needed   • Trulicity 3 mg/0.5 mL pen injector    • venlafaxine XR (Effexor-XR) 150 mg 24 hr capsule take 2 capsules by mouth once daily as directed          OBJECTIVE   Last Recorded Vitals.  Blood pressure 121/76, pulse 96, height 1.79 m (5' 10.47\"), weight 94.2 kg (207 lb 9.6 oz), SpO2 98%.     PHYSICAL EXAM  Physical Exam   General: Well-developed, well-nourished and in no acute distress.  Head: Normocephalic. Atraumatic.  Neck/thyroid: Neck is supple.   Eyes: Pupils equal round and reactive to light. Conjunctiva normal.  ENMT: No masses or deformity of external nose. External ears without masses.  Respiratory/Chest:  Normal respiratory effort.    Cardiovascular: Regular rate and rhythm.   Abdomen: Abdomen rounded secondary to body habitus.  Slightly tympanitic, but soft.  No peritoneal signs.  Small reducible umbilical hernia.  Musculoskeletal: Joints and limbs are grossly normal. " Normal gait. Normal range of motion of major joints.  Neuro: Oriented to person, place and time. No obvious neurological deficit. Motor strength grossly normal.  Psych: Normal mood and affect.    RESULTS   Labs  Component  Ref Range & Units 7 d ago  (6/4/24) 3 mo ago  (3/4/24) 3 mo ago  (2/25/24) 5 mo ago  (12/22/23) 8 mo ago  (9/23/23) 11 mo ago  (6/23/23) 1 yr ago  (10/7/22)   Glucose  74 - 99 mg/dL 327 High  281 High  250 High  441 High  312 High  139 High  260 High    Sodium  136 - 145 mmol/L 131 Low  136 132 Low  132 Low  134 Low  137 136   Potassium  3.5 - 5.3 mmol/L 4.6 4.2 4.7 4.2 5.0 CM 4.0 4.4   Chloride  98 - 107 mmol/L 98 104 101 99 97 Low  102 102   Bicarbonate  21 - 32 mmol/L 25 22 23 24 23 21 24   Anion Gap  10 - 20 mmol/L 13 14 13 13 19 18 14   Urea Nitrogen  6 - 23 mg/dL 10 20 17 13 34 High  17 11   Creatinine  0.50 - 1.30 mg/dL 0.94 1.00 1.02 1.12 1.40 High  1.15 1.02   eGFR  >60 mL/min/1.73m*2 >90 86 CM 84 CM 75 CM      Comment: Calculations of estimated GFR are performed using the 2021 CKD-EPI Study Refit equation without the race variable for the IDMS-Traceable creatinine methods.  https://jasn.asnjournals.org/content/early/2021/09/22/ASN.9893512135   Calcium  8.6 - 10.3 mg/dL 9.5 9.8 9.0 8.6 10.2 10.4 High  9.3   Albumin  3.4 - 5.0 g/dL 4.2 4.5  4.0 4.7 5.1 High     Alkaline Phosphatase  33 - 136 U/L 105 105  110 142 High  111    Total Protein  6.4 - 8.2 g/dL 6.9 7.3  6.7 7.6 7.8    AST  9 - 39 U/L 21 20  21 43 High  CM 31    Bilirubin, Total  0.0 - 1.2 mg/dL 0.8 1.0  1.4 High  1.5 High  1.7 High     ALT  10 - 52 U/L 36 32 CM  34 CM 66 High  CM 42 CM    Comment: Patients treated with Sulfasalazine may generate falsely decreased results for ALT.   [facility]       Component  Ref Range & Units 7 d ago  (6/4/24) 3 mo ago  (3/4/24) 3 mo ago  (2/25/24) 5 mo ago  (12/22/23) 8 mo ago  (9/23/23) 11 mo ago  (6/23/23) 1  yr ago  (10/7/22)   WBC  4.4 - 11.3 x10*3/uL 6.5 4.6 5.2 5.6 7.4 R 9.0 R 4.5 R   nRBC  0.0 - 0.0 /100 WBCs 0.0 0.0 0.0 0.0      RBC  4.50 - 5.90 x10*6/uL 3.86 Low  4.16 Low  4.14 Low  4.25 Low  4.34 Low  R 4.69 R 3.92 Low  R   Hemoglobin  13.5 - 17.5 g/dL 13.1 Low  13.7 14.0 14.3 14.6 15.7 13.1 Low    Hematocrit  41.0 - 52.0 % 35.3 Low  38.2 Low  37.8 Low  38.4 Low  39.8 Low  42.3 35.8 Low    MCV  80 - 100 fL 92 92 91 90 92 90 91   MCH  26.0 - 34.0 pg 33.9 32.9 33.8 33.6      MCHC  32.0 - 36.0 g/dL 37.1 High  35.9 37.0 High  37.2 High  36.7 High  37.1 High  36.6 High    RDW  11.5 - 14.5 % 14.6 High  14.0 14.0 13.5 13.5 13.5 13.2   Platelets  150 - 450 x10*3/uL 114 Low  118 Low  107 Low  112 Low  163 R 143 Low  R 126 Low  R       Radiology Resutls  US GALLBLADDER; 8:49 am      INDICATION:  Signs/Symptoms:epigastric abd pain n/v.      COMPARISON:  None.      ACCESSION NUMBER(S):  [id number]      ORDERING CLINICIAN:  ROBERT PRITCHETT      TECHNIQUE:  Limited abdominal ultrasound of the right upper quadrant was  performed utilizing gray scale imaging.      FINDINGS:  Liver: Mildly prominent with increased echogenicity of the parenchyma  due to fatty infiltration. No focal lesions. No intrahepatic biliary  distention. Gallbladder: Dilated gallbladder. No gallstones, wall  thickening, or pericholecystic fluid. Sonographic Torres's sign:  Positive Pancreas: The pancreas is obscured by overlying bowel gas.  CBD: 0.3 cm      IMPRESSION:  Dilated gallbladder and positive sonographic Torres's sign, however  no gallstones or biliary duct dilatation. Correlate clinically and if  there is high clinical suspicion for acute calculus cholecystitis  further evaluate with hepatobiliary scan.      MACRO:  None.      Signed by: Sergo Nichole 6/4/2024 9:03 AM  Dictation workstation:   [id number]    CT ABDOMEN PELVIS W IV CONTRAST;  6/4/2024 6:31 am      INDICATION:  Signs/Symptoms:abd pain n/v.      COMPARISON:  09/23/2023       ACCESSION NUMBER(S):  [id number]      ORDERING CLINICIAN:  ROBERT PRITCHETT      TECHNIQUE:  Axial CT images of the abdomen and pelvis with coronal and sagittal  reconstructed images obtained after intravenous administration of  contrast      FINDINGS:  LOWER CHEST: Nodular ground-glass opacities predominantly in the left  lower lobe which may be infectious or inflammatory. Small hiatal  hernia. BONES: No acute osseous abnormality. Mild degenerative  changes present. ABDOMINAL WALL: Small umbilical hernia.      ABDOMEN:      LIVER: Decreased attenuation of the liver parenchyma measuring up to  20.8 cm. BILE DUCTS: Normal caliber.  GALLBLADDER: No calcified gallstones. The gallbladder is mildly  distended up to 5.1 cm with possible trace wall thickening..  PANCREAS: Unremarkable. SPLEEN: Splenomegaly, 19.2 cm.  ADRENALS: Within normal limits.  KIDNEYS and URETERS: Symmetric renal enhancement. No hydronephrosis  or perinephric fluid collection.          VESSELS: Atherosclerotic calcifications without aneurysmal dilatation  seen. RETROPERITONEUM: No pathologically enlarged retroperitoneal  lymph nodes.      PELVIS:      REPRODUCTIVE ORGANS: The prostate measures up to 6.1 cm, enlarged.  BLADDER: Wall thickening of the urinary bladder noted.      BOWEL: No dilated bowel. Moderate stool burden. Diverticulosis  without CT evidence of acute diverticulitis. Normal appendix.  PERITONEUM: No ascites or free air, no fluid collection.      IMPRESSION:  Ground-glass nodule in opacities in the left lower lobe which may be  infectious or inflammatory.      Mild gallbladder wall distension with minimal wall thickening which  may be reactive. Hepatic steatosis noted.      Splenomegaly superior clinical correlation incurring  lymphoproliferative process recommended.      Moderate stool burden suggestive of constipation.      Diverticulosis.      MACRO:  None      Signed by: Sharmin Calvillo 6/4/2024 6:45 AM  Dictation workstation:    [id number]      ASSESSMENT / PLAN   Diagnoses and all orders for this visit:  Right upper quadrant abdominal pain  -     NM hepatobiliary w cholecystokinin; Future  -     amoxicillin-pot clavulanate (Augmentin) 875-125 mg tablet; Take 1 tablet by mouth 2 times a day for 10 days.  Fatty liver  Constipation, unspecified constipation type      Plan  1.  The patient that all of his abdominal pain does not seem classic for biliary disease.  Certainly some of the right upper quadrant pain could be associated with his gallbladder.  The CT/ultrasound only showed slight distention of his gallbladder which could have been secondary to his n.p.o. status.  Will get HIDA scan for further evaluation of his gallbladder.  Plan to call the patient with the results.  If the HIDA scan is abnormal, can consider a laparoscopic cholecystectomy surgery.  If the HIDA scan is normal, we will plan referral to GI for further evaluation as he has not had a colonoscopy in more than 10 years, CT scan showed constipation and may need to consider EGD.  2.  Will place him on a short course of Augmentin antibiotics to see if this helps with his pain.  3.  Recommended that he keep his appointment with his PCP for better control of his diabetes in case surgical intervention is needed.  4.  He has not seen cardiology in some time.  If he does require surgery, or even endoscopy, he would need cardiac clearance.  Will try to facilitate appointment with cardiology.  5.  He may use any over-the-counter medications to help with his constipation.  6.  He does have a fatty liver, but LFTs were within normal limits.  Recommended ongoing weight loss.  7.  Patient is on Trulicity.  This would need to be held for greater than 7 days prior to any surgical intervention.  8.  Recommended that he stay on a low grease low-fat diet to see if this helps with his symptoms.  The gallbladder dietary sheet was provided from the office.      Sandra Jesus MD,  [facility] Surgery  [address] [place] [address];   [address]  [place]  [address] [phone]

## 2024-06-11 NOTE — PATIENT INSTRUCTIONS
1.  A 10-day course of Augmentin (antibiotic) has been prescribed.  Start taking this antibiotic today.  This antibiotic is taken twice a day.  Take until gone.  2.  For further evaluation of your gallbladder, a HIDA scan has been scheduled.  Dr. Jesus will call you with these results.  3.  Keep your follow-up appointment with your primary care physician, especially for control of your sugar levels.  4.  Stay on a low grease, low-fat diet to see if this helps with your symptoms.  See the gallbladder dietary sheet provided from the office.  5.  In case you need surgery, you will need clearance from your heart doctor (cardiologist).  Please follow-up with your cardiologist within the next few weeks.  6.  The CT evaluation of your abdomen from the emergency room showed that you are constipated.  Please use any over-the-counter medications to help with constipation.

## 2024-06-11 NOTE — PROGRESS NOTES
"    GENERAL SURGERY OFFICE NOTE    Patient: Mane Nath    Age: 63 y.o.   Gender: male    MRN: [id number]    PCP: Thalia Smith MD        SUBJECTIVE     Chief Complaint  New Patient Visit (Patient was referred by [facility] ED for gallbladder. Patient states that he went to the ED for stomach pain that started on 6/3/24. Patient states that he had some vomiting with the pain. Patient states that he has been having abdominal pain off and on.)       JULY Gilliam is a 63-year-old white male who I am seeing as a referral from the emergency room for evaluation of abdominal pain.  About a week ago, he had gone to the emergency room in the evening after eating several corndogs for dinner.  He was experiencing some diffuse abdominal pain as well as right upper quadrant abdominal pain.  The pain had been intermittent.  He had a right upper quadrant ultrasound and CT evaluation of the abdomen/pelvis in the ER which both showed slight dilation of the gallbladder, but no gallstones and no secondary evidence of acute cholecystitis.  His WBC and LFTs were all within normal limits at that time.  He was given a 5-day course of levofloxacin which he felt helped with his abdominal pain.  He took all of the Percocet pain medication, but did not take any over-the-counter medications.  His last colonoscopy was more than 10 years ago, and he has never had an EGD.  He does feel that eating makes his abdominal pain worse.  He has been on Prilosec for \"a few months\" due to some reflux symptoms.  His CT evaluation also showed constipation and a fatty liver.  He was found to have a glucose level greater than 300.  He has been to the emergency room 4 times in the last 6 months.  He states that he has an appointment with his PCP next week, but they are not on  associated.  He is being followed by cardiology for chronic dizziness, hypertension, hypercholesterolemia, cardiac palpitations and dyspnea on exertion, but has not seen cardiology in " more than a year.  He is not currently running any fevers.  Denies any diarrhea.    ROS  Review of Systems   Constitutional:  Negative for chills, fatigue and fever.   HENT:  Positive for hearing loss. Negative for congestion and ear pain.    Eyes:  Negative for pain and redness.   Respiratory:  Positive for shortness of breath. Negative for cough.    Cardiovascular:  Positive for palpitations. Negative for chest pain and leg swelling.   Gastrointestinal:  Positive for abdominal pain and constipation. Negative for diarrhea, nausea and vomiting.   Endocrine: Negative for polyphagia.   Genitourinary:  Negative for dysuria, flank pain, frequency and hematuria.   Musculoskeletal:  Positive for arthralgias and myalgias.   Skin:  Negative for rash and wound.   Allergic/Immunologic: Negative for immunocompromised state.   Neurological:  Positive for weakness. Negative for speech difficulty and headaches.   Hematological:  Does not bruise/bleed easily.   Psychiatric/Behavioral:  Positive for confusion. Negative for agitation.           HISTORY     Past Medical History:   Diagnosis Date    Diabetes (Multi)     HTN (hypertension)     Personal history of other diseases of the musculoskeletal system and connective tissue     History of gout    Personal history of other mental and behavioral disorders     History of depression        Past Surgical History:   Procedure Laterality Date    NECK SURGERY  02/01/2018    Neck Surgery    OTHER SURGICAL HISTORY  02/01/2018    Surgery Excision Lipoma    OTHER SURGICAL HISTORY  04/25/2022    Colonoscopy        Family History   Problem Relation Name Age of Onset    Dementia Mother      Heart attack Father      Cholecystitis Sister          x3    Cancer Father's Sister      Prostate cancer Father's Brother      Cancer Paternal Grandmother      Diabetes Paternal Grandfather          Allergies   Allergen Reactions    Latex Rash and Unknown        Social History     Tobacco Use   Smoking Status  Former    Types: Cigarettes   Smokeless Tobacco Never        Social History     Substance and Sexual Activity   Alcohol Use Never        HOME MEDICATIONS  Current Outpatient Medications   Medication Instructions    albuterol 90 mcg/actuation inhaler inhale 2 puffs every 6 hours if needed    allopurinol (Zyloprim) 100 mg tablet oral    amLODIPine-benazepriL (Lotrel) 10-40 mg capsule TAKE ONE (1) CAPSULE BY MOUTH ONCE DAILY. **(LOTREL 10-40 MG CAP EQUIV)**    ARIPiprazole (ABILIFY) 30 mg, oral, Nightly    atorvastatin (Lipitor) 80 mg tablet 1 tablet, oral, Daily (0630)    busPIRone (Buspar) 10 mg tablet take 2 tablets by mouth twice a day as directed    cholecalciferol (VITAMIN D-3) 25 mcg, oral, Daily    clindamycin (CLEOCIN) 150 mg, oral, Every 6 hours    cyclobenzaprine (Flexeril) 10 mg tablet oral    dicyclomine (Bentyl) 20 mg tablet 4 times daily PRN    docusate sodium (Colace) 100 mg capsule TAKE ONE (1) CAPSULE BY MOUTH ONCE DAILY AS NEEDED. **(COLACE 100 MG CAP EQUIV)**    donepezil (Aricept) 5 mg tablet oral, Daily RT    fenofibrate (Triglide) 160 mg tablet 1 tablet, oral, Daily    gabapentin (Neurontin) 400 mg capsule take 1 capsule by mouth twice a day THEN TAKE 2 CAPSULES AT BEDTIME    hydrOXYzine pamoate (Vistaril) 25 mg capsule take 1 capsule by mouth every evening MAY TAKE IF UNABLE TO SLEEP AFTER 1 HOUR IN BED    KRILL OIL ORAL oral    metFORMIN (GLUCOPHAGE) 500 mg, oral, 2 times daily after meals    metoprolol succinate XL (TOPROL-XL) 50 mg, oral, Daily    naproxen (Naprosyn) 500 mg tablet oral, 2 times daily    omega-3 fatty acids-fish oil (Fish OiL) 340-1,000 mg capsule oral    omeprazole (PRILOSEC) 40 mg, oral, Daily    ondansetron ODT (Zofran-ODT) 8 mg disintegrating tablet Dissolve 1 tablet (8 mg) by mouth every 8 hours if needed for nausea or vomiting.    polyethylene glycol (MIRALAX) 17 g, oral    pravastatin (Pravachol) 20 mg tablet 1 tablet, oral, Nightly    predniSONE (Deltasone) 10 mg  "tablet take 3 tablets by mouth once daily for 3 days then 2 tablets once...  (REFER TO PRESCRIPTION NOTES).    primidone (Mysoline) 50 mg tablet 1 tablet, oral, Nightly    Probiotic, S.boulardii, 250 mg capsule TAKE ONE (1) CAPSULE BY MOUTH ONCE DAILY. *(FLORASTOR 250 MG CAP EQUIV)*    simethicone (Mylicon) 80 mg chewable tablet oral    traZODone (Desyrel) 100 mg tablet take 1 to 3 tablet by mouth at bedtime if needed    Trulicity 3 mg/0.5 mL pen injector     venlafaxine XR (Effexor-XR) 150 mg 24 hr capsule take 2 capsules by mouth once daily as directed          OBJECTIVE   Last Recorded Vitals.  Blood pressure 121/76, pulse 96, height 1.79 m (5' 10.47\"), weight 94.2 kg (207 lb 9.6 oz), SpO2 98%.     PHYSICAL EXAM  Physical Exam   General: Well-developed, well-nourished and in no acute distress.  Head: Normocephalic. Atraumatic.  Neck/thyroid: Neck is supple.   Eyes: Pupils equal round and reactive to light. Conjunctiva normal.  ENMT: No masses or deformity of external nose. External ears without masses.  Respiratory/Chest:  Normal respiratory effort.    Cardiovascular: Regular rate and rhythm.   Abdomen: Abdomen rounded secondary to body habitus.  Slightly tympanitic, but soft.  No peritoneal signs.  Small reducible umbilical hernia.  Musculoskeletal: Joints and limbs are grossly normal. Normal gait. Normal range of motion of major joints.  Neuro: Oriented to person, place and time. No obvious neurological deficit. Motor strength grossly normal.  Psych: Normal mood and affect.    RESULTS   Labs  Component  Ref Range & Units 7 d ago  (6/4/24) 3 mo ago  (3/4/24) 3 mo ago  (2/25/24) 5 mo ago  (12/22/23) 8 mo ago  (9/23/23) 11 mo ago  (6/23/23) 1 yr ago  (10/7/22)   Glucose  74 - 99 mg/dL 327 High  281 High  250 High  441 High  312 High  139 High  260 High    Sodium  136 - 145 mmol/L 131 Low  136 132 Low  132 Low  134 Low  137 136   Potassium  3.5 - 5.3 mmol/L 4.6 4.2 4.7 4.2 5.0 CM 4.0 4.4   Chloride  98 - 107 mmol/L " 98 104 101 99 97 Low  102 102   Bicarbonate  21 - 32 mmol/L 25 22 23 24 23 21 24   Anion Gap  10 - 20 mmol/L 13 14 13 13 19 18 14   Urea Nitrogen  6 - 23 mg/dL 10 20 17 13 34 High  17 11   Creatinine  0.50 - 1.30 mg/dL 0.94 1.00 1.02 1.12 1.40 High  1.15 1.02   eGFR  >60 mL/min/1.73m*2 >90 86 CM 84 CM 75 CM      Comment: Calculations of estimated GFR are performed using the 2021 CKD-EPI Study Refit equation without the race variable for the IDMS-Traceable creatinine methods.  https://jasn.asnjournals.org/content/early/2021/09/22/ASN.4216075415   Calcium  8.6 - 10.3 mg/dL 9.5 9.8 9.0 8.6 10.2 10.4 High  9.3   Albumin  3.4 - 5.0 g/dL 4.2 4.5  4.0 4.7 5.1 High     Alkaline Phosphatase  33 - 136 U/L 105 105  110 142 High  111    Total Protein  6.4 - 8.2 g/dL 6.9 7.3  6.7 7.6 7.8    AST  9 - 39 U/L 21 20  21 43 High  CM 31    Bilirubin, Total  0.0 - 1.2 mg/dL 0.8 1.0  1.4 High  1.5 High  1.7 High     ALT  10 - 52 U/L 36 32 CM  34 CM 66 High  CM 42 CM    Comment: Patients treated with Sulfasalazine may generate falsely decreased results for ALT.   Resulting [facility]       Component  Ref Range & Units 7 d ago  (6/4/24) 3 mo ago  (3/4/24) 3 mo ago  (2/25/24) 5 mo ago  (12/22/23) 8 mo ago  (9/23/23) 11 mo ago  (6/23/23) 1 yr ago  (10/7/22)   WBC  4.4 - 11.3 x10*3/uL 6.5 4.6 5.2 5.6 7.4 R 9.0 R 4.5 R   nRBC  0.0 - 0.0 /100 WBCs 0.0 0.0 0.0 0.0      RBC  4.50 - 5.90 x10*6/uL 3.86 Low  4.16 Low  4.14 Low  4.25 Low  4.34 Low  R 4.69 R 3.92 Low  R   Hemoglobin  13.5 - 17.5 g/dL 13.1 Low  13.7 14.0 14.3 14.6 15.7 13.1 Low    Hematocrit  41.0 - 52.0 % 35.3 Low  38.2 Low  37.8 Low  38.4 Low  39.8 Low  42.3 35.8 Low    MCV  80 - 100 fL 92 92 91 90 92 90 91   MCH  26.0 - 34.0 pg 33.9 32.9 33.8 33.6      MCHC  32.0 - 36.0 g/dL 37.1 High  35.9 37.0 High  37.2 High  36.7 High  37.1 High  36.6 High    RDW  11.5 - 14.5 % 14.6 High  14.0 14.0 13.5 13.5 13.5 13.2    Platelets  150 - 450 x10*3/uL 114 Low  118 Low  107 Low  112 Low  163 R 143 Low  R 126 Low  R       [facility]  US GALLBLADDER; 8:49 am      INDICATION:  Signs/Symptoms:epigastric abd pain n/v.      COMPARISON:  None.      ACCESSION NUMBER(S):  [id number]      ORDERING CLINICIAN:  ROBERT PRITCHETT      TECHNIQUE:  Limited abdominal ultrasound of the right upper quadrant was  performed utilizing gray scale imaging.      FINDINGS:  Liver: Mildly prominent with increased echogenicity of the parenchyma  due to fatty infiltration. No focal lesions. No intrahepatic biliary  distention. Gallbladder: Dilated gallbladder. No gallstones, wall  thickening, or pericholecystic fluid. Sonographic Torres's sign:  Positive Pancreas: The pancreas is obscured by overlying bowel gas.  CBD: 0.3 cm      IMPRESSION:  Dilated gallbladder and positive sonographic Torres's sign, however  no gallstones or biliary duct dilatation. Correlate clinically and if  there is high clinical suspicion for acute calculus cholecystitis  further evaluate with hepatobiliary scan.      MACRO:  None.      Signed by: Sergo Nichole 6/4/2024 9:03 AM  Dictation workstation:   [id number]    CT ABDOMEN PELVIS W IV CONTRAST;  6/4/2024 6:31 am      INDICATION:  Signs/Symptoms:abd pain n/v.      COMPARISON:  09/23/2023      ACCESSION NUMBER(S):  [id number]      ORDERING CLINICIAN:  ROBERT PRITCHETT      TECHNIQUE:  Axial CT images of the abdomen and pelvis with coronal and sagittal  reconstructed images obtained after intravenous administration of  contrast      FINDINGS:  LOWER CHEST: Nodular ground-glass opacities predominantly in the left  lower lobe which may be infectious or inflammatory. Small hiatal  hernia. BONES: No acute osseous abnormality. Mild degenerative  changes present. ABDOMINAL WALL: Small umbilical hernia.      ABDOMEN:      LIVER: Decreased attenuation of the liver parenchyma measuring up to  20.8 cm. BILE DUCTS: Normal  caliber.  GALLBLADDER: No calcified gallstones. The gallbladder is mildly  distended up to 5.1 cm with possible trace wall thickening..  PANCREAS: Unremarkable. SPLEEN: Splenomegaly, 19.2 cm.  ADRENALS: Within normal limits.  KIDNEYS and URETERS: Symmetric renal enhancement. No hydronephrosis  or perinephric fluid collection.          VESSELS: Atherosclerotic calcifications without aneurysmal dilatation  seen. RETROPERITONEUM: No pathologically enlarged retroperitoneal  lymph nodes.      PELVIS:      REPRODUCTIVE ORGANS: The prostate measures up to 6.1 cm, enlarged.  BLADDER: Wall thickening of the urinary bladder noted.      BOWEL: No dilated bowel. Moderate stool burden. Diverticulosis  without CT evidence of acute diverticulitis. Normal appendix.  PERITONEUM: No ascites or free air, no fluid collection.      IMPRESSION:  Ground-glass nodule in opacities in the left lower lobe which may be  infectious or inflammatory.      Mild gallbladder wall distension with minimal wall thickening which  may be reactive. Hepatic steatosis noted.      Splenomegaly superior clinical correlation incurring  lymphoproliferative process recommended.      Moderate stool burden suggestive of constipation.      Diverticulosis.      MACRO:  None      Signed by: Sharmin Calvillo 6/4/2024 6:45 AM  Dictation workstation:   [id number]      ASSESSMENT / PLAN   Diagnoses and all orders for this visit:  Right upper quadrant abdominal pain  -     NM hepatobiliary w cholecystokinin; Future  -     amoxicillin-pot clavulanate (Augmentin) 875-125 mg tablet; Take 1 tablet by mouth 2 times a day for 10 days.  Fatty liver  Constipation, unspecified constipation type      Plan  1.  The patient that all of his abdominal pain does not seem classic for biliary disease.  Certainly some of the right upper quadrant pain could be associated with his gallbladder.  The CT/ultrasound only showed slight distention of his gallbladder which could have been secondary  to his n.p.o. status.  Will get HIDA scan for further evaluation of his gallbladder.  Plan to call the patient with the results.  If the HIDA scan is abnormal, can consider a laparoscopic cholecystectomy surgery.  If the HIDA scan is normal, we will plan referral to GI for further evaluation as he has not had a colonoscopy in more than 10 years, CT scan showed constipation and may need to consider EGD.  2.  Will place him on a short course of Augmentin antibiotics to see if this helps with his pain.  3.  Recommended that he keep his appointment with his PCP for better control of his diabetes in case surgical intervention is needed.  4.  He has not seen cardiology in some time.  If he does require surgery, or even endoscopy, he would need cardiac clearance.  Will try to facilitate appointment with cardiology.  5.  He may use any over-the-counter medications to help with his constipation.  6.  He does have a fatty liver, but LFTs were within normal limits.  Recommended ongoing weight loss.  7.  Patient is on Trulicity.  This would need to be held for greater than 7 days prior to any surgical intervention.  8.  Recommended that he stay on a low grease low-fat diet to see if this helps with his symptoms.  The gallbladder dietary sheet was provided from the office.      Sandra Jesus MD, FACS   [facility]  [address];   [address]  [place]  [address] [phone]

## 2024-06-13 LAB
ATRIAL RATE: 76 BPM
P AXIS: 34 DEGREES
PR INTERVAL: 167 MS
Q ONSET: 249 MS
QRS COUNT: 12 BEATS
QRS DURATION: 90 MS
QT INTERVAL: 382 MS
QTC CALCULATION(BAZETT): 430 MS
QTC FREDERICIA: 413 MS
R AXIS: -31 DEGREES
T AXIS: 11 DEGREES
T OFFSET: 440 MS
VENTRICULAR RATE: 76 BPM

## 2024-06-25 ENCOUNTER — HOSPITAL ENCOUNTER (OUTPATIENT)
Dept: RADIOLOGY | Facility: HOSPITAL | Age: 64
Discharge: HOME | End: 2024-06-25
Payer: COMMERCIAL

## 2024-06-25 VITALS — BODY MASS INDEX: 29.16 KG/M2 | WEIGHT: 206 LBS

## 2024-06-25 DIAGNOSIS — R10.11 RIGHT UPPER QUADRANT ABDOMINAL PAIN: ICD-10-CM

## 2024-06-25 PROCEDURE — 3430000001 HC RX 343 DIAGNOSTIC RADIOPHARMACEUTICALS: Performed by: NUCLEAR MEDICINE

## 2024-06-25 PROCEDURE — A9537 TC99M MEBROFENIN: HCPCS | Performed by: NUCLEAR MEDICINE

## 2024-06-25 PROCEDURE — 78227 HEPATOBIL SYST IMAGE W/DRUG: CPT | Performed by: RADIOLOGY

## 2024-06-25 PROCEDURE — 78227 HEPATOBIL SYST IMAGE W/DRUG: CPT

## 2024-06-25 RX ORDER — SINCALIDE 5 UG/5ML
0.02 INJECTION, POWDER, LYOPHILIZED, FOR SOLUTION INTRAVENOUS ONCE
Status: CANCELLED | OUTPATIENT
Start: 2024-06-25 | End: 2024-06-25

## 2024-06-25 RX ORDER — SINCALIDE 5 UG/5ML
0.02 INJECTION, POWDER, LYOPHILIZED, FOR SOLUTION INTRAVENOUS ONCE
Status: CANCELLED | OUTPATIENT
Start: 2024-06-25

## 2024-06-25 RX ORDER — KIT FOR THE PREPARATION OF TECHNETIUM TC 99M MEBROFENIN 45 MG/10ML
5 INJECTION, POWDER, LYOPHILIZED, FOR SOLUTION INTRAVENOUS
Status: COMPLETED | OUTPATIENT
Start: 2024-06-25 | End: 2024-06-25

## 2024-06-26 ENCOUNTER — TELEPHONE (OUTPATIENT)
Dept: SURGERY | Facility: CLINIC | Age: 64
End: 2024-06-26
Payer: COMMERCIAL

## 2024-06-26 NOTE — RESULT ENCOUNTER NOTE
Please, call the patient and let him know that his hida scan showed that his gallbladder is NOT functioning normally and likely is the cause of some of his abdominal issues. Recommend surgical removal of the gallbladder. Can schedule after he is cleared by cardiology (appointment 7/2/24). When would he like to schedule surgery?

## 2024-06-26 NOTE — TELEPHONE ENCOUNTER
----- Message from Sandra Jesus MD sent at 6/26/2024 11:26 AM EDT -----  Please, call the patient and let him know that his hida scan showed that his gallbladder is NOT functioning normally and likely is the cause of some of his abdominal issues. Recommend surgical removal of the gallbladder. Can schedule after he is cleared by cardiology (appointment 7/2/24). When would he like to schedule surgery?

## 2024-07-02 ENCOUNTER — TELEPHONE (OUTPATIENT)
Dept: SURGERY | Facility: CLINIC | Age: 64
End: 2024-07-02

## 2024-07-02 ENCOUNTER — APPOINTMENT (OUTPATIENT)
Dept: CARDIOLOGY | Facility: CLINIC | Age: 64
End: 2024-07-02
Payer: COMMERCIAL

## 2024-07-02 VITALS
DIASTOLIC BLOOD PRESSURE: 84 MMHG | SYSTOLIC BLOOD PRESSURE: 130 MMHG | BODY MASS INDEX: 29.49 KG/M2 | WEIGHT: 206 LBS | HEART RATE: 91 BPM | HEIGHT: 70 IN

## 2024-07-02 DIAGNOSIS — I10 HYPERTENSION, UNSPECIFIED TYPE: Primary | ICD-10-CM

## 2024-07-02 DIAGNOSIS — Z01.810 PREOPERATIVE CARDIOVASCULAR EXAMINATION: ICD-10-CM

## 2024-07-02 DIAGNOSIS — R00.2 HEART PALPITATIONS: ICD-10-CM

## 2024-07-02 DIAGNOSIS — E78.00 HYPERCHOLESTEREMIA: ICD-10-CM

## 2024-07-02 PROCEDURE — 3008F BODY MASS INDEX DOCD: CPT | Performed by: INTERNAL MEDICINE

## 2024-07-02 PROCEDURE — 3075F SYST BP GE 130 - 139MM HG: CPT | Performed by: INTERNAL MEDICINE

## 2024-07-02 PROCEDURE — 3079F DIAST BP 80-89 MM HG: CPT | Performed by: INTERNAL MEDICINE

## 2024-07-02 PROCEDURE — 99214 OFFICE O/P EST MOD 30 MIN: CPT | Performed by: INTERNAL MEDICINE

## 2024-07-02 RX ORDER — DAPAGLIFLOZIN 10 MG/1
10 TABLET, FILM COATED ORAL DAILY
COMMUNITY
Start: 2023-06-30

## 2024-07-02 NOTE — TELEPHONE ENCOUNTER
Patient called stating that he has been cleared for gallbladder surgery by his cardiologist Dr. Soto 07/02/24. Patient would like a call back to schedule.

## 2024-07-02 NOTE — PATIENT INSTRUCTIONS
Thanks for following up in office today.    1)  I am going to say that you are at an acceptable risk for your upcoming laparoscopic cholecystectomy.       2)  No changes to your medications at this time.    3)  Please continue your cardiac medications as prescribed.    Follow up with LAXMI Bhagat NP  in 6  months  If you have any questions, please call [phone] and choose option for Dr. Soto's nurse Joellen Austin

## 2024-07-02 NOTE — PROGRESS NOTES
"Chief Complaint:   No chief complaint on file.     History Of Present Illness:    Mane Nath is a 63 y.o. male presenting for yearly follow up and risk stratification prior to gallbladder surgery.  H/o HTN, orthostatic dizziness, DM2, Gout, Unsteady gait, Benign tremor, Depression who presents for follow up.     To have laparoscopic cholecystectomy by Dr. Jesus, not yet scheduled.  Telling me he is no longer having any chest pain/pressure.  He can go up and down two flights at [employer] without any chest pain/SOB.  He still does have R shoulder pain, but feels like he has \"gotten used to it.\"  No LE edema, orthopnea, PND.  EKG today is NSR HR 91, left axis, and LVH.    ROS:  The remainder of the review of systems was obtained, as was negative as pertains to the chief complaint.    CV testing and labs reviewed:  CMP 6/2024  K 4.6  BUN 10  crea 0.94  ast 21  alt 36  H&H 13.1  35.3    Cardiac Stress Test 8/9/23:Adequate level of stress achieved.  2. No ECG changes from baseline.  3. There were no stress-induced wall motion abnormalities. This is a negative stress echo test for ischemia     NM Cardiac Stress 5/13/22: Normal stress myocardial perfusion imaging in response to  pharmacologic stress. Mild apical attenuation present, likely soft  tissue. Patient could not participate in prone imaging.  2. Well-maintained left ventricular function.     TTE 5/6/22:The left ventricular systolic function is normal with a 55-60% estimated ejection fraction.  2. There is moderate concentric left ventricular hypertrophy.                        Prior hx:  The patient was seen by me in 2018 for dizziness, had a 30 day event monitor which demonstrated SR and short runs of SVT but otherwise no heart block or other findings. TTE 1/2018 demonstrated normal LV function. He has noticed several new symptoms lately including: racing heart that he feels in his neck, can't tell me how often it happens, but this has been going on for the " past month. He has dizziness with standing. He also reports SINGH with walking short distances.      His EKG 4/22 demonstrates new findings including: SR with LVH, Tw abnormality in lead III and to a lesser extent in AvF, as well as repolarization abnl in I, aVL, and LVH.     In 2018 he was admitted to [facility] with dizziness, palpitations. He got up ~ 2AM to use the bathroom when he felt irregular HR, dizziness which lasted a few minutes. He did not actually pass out at the time. He was admitted to the hospital for ? Afib (EKG showed sinus rhythm with PAC). Serial cardiac enzymes were negative, echo demonstrated normal LV function, mild LVH, impaired relaxation. The plan at discharge was for a 30-day event monitor.       Last Recorded Vitals:  There were no vitals filed for this visit.    Past Medical History:  He has a past medical history of Diabetes (Multi), HTN (hypertension), Personal history of other diseases of the musculoskeletal system and connective tissue, and Personal history of other mental and behavioral disorders.    Past Surgical History:  He has a past surgical history that includes Neck surgery (02/01/2018); Other surgical history (02/01/2018); and Other surgical history (04/25/2022).      Social History:  He reports that he has quit smoking. His smoking use included cigarettes. He has never used smokeless tobacco. He reports that he does not drink alcohol and does not use drugs.    Family History:  Family History   Problem Relation Name Age of Onset    Dementia Mother      Heart attack Father      Cholecystitis Sister          x3    Cancer Father's Sister      Prostate cancer Father's Brother      Cancer Paternal Grandmother      Diabetes Paternal Grandfather          Allergies:  Latex    Outpatient Medications:  Current Outpatient Medications   Medication Instructions    albuterol 90 mcg/actuation inhaler inhale 2 puffs every 6 hours if needed    allopurinol (Zyloprim) 100 mg tablet oral     amLODIPine-benazepriL (Lotrel) 10-40 mg capsule TAKE ONE (1) CAPSULE BY MOUTH ONCE DAILY. **(LOTREL 10-40 MG CAP EQUIV)**    ARIPiprazole (ABILIFY) 30 mg, oral, Nightly    atorvastatin (Lipitor) 80 mg tablet 1 tablet, oral, Daily (0630)    busPIRone (Buspar) 10 mg tablet take 2 tablets by mouth twice a day as directed    cholecalciferol (VITAMIN D-3) 25 mcg, oral, Daily    clindamycin (CLEOCIN) 150 mg, oral, Every 6 hours    cyclobenzaprine (Flexeril) 10 mg tablet oral    dicyclomine (Bentyl) 20 mg tablet 4 times daily PRN    docusate sodium (Colace) 100 mg capsule TAKE ONE (1) CAPSULE BY MOUTH ONCE DAILY AS NEEDED. **(COLACE 100 MG CAP EQUIV)**    donepezil (Aricept) 5 mg tablet oral, Daily RT    fenofibrate (Triglide) 160 mg tablet 1 tablet, oral, Daily    gabapentin (Neurontin) 400 mg capsule take 1 capsule by mouth twice a day THEN TAKE 2 CAPSULES AT BEDTIME    hydrOXYzine pamoate (Vistaril) 25 mg capsule take 1 capsule by mouth every evening MAY TAKE IF UNABLE TO SLEEP AFTER 1 HOUR IN BED    KRILL OIL ORAL oral    metFORMIN (GLUCOPHAGE) 500 mg, oral, 2 times daily after meals    metoprolol succinate XL (TOPROL-XL) 50 mg, oral, Daily    naproxen (Naprosyn) 500 mg tablet oral, 2 times daily    omega-3 fatty acids-fish oil (Fish OiL) 340-1,000 mg capsule oral    omeprazole (PRILOSEC) 40 mg, oral, Daily    ondansetron ODT (Zofran-ODT) 8 mg disintegrating tablet Dissolve 1 tablet (8 mg) by mouth every 8 hours if needed for nausea or vomiting.    polyethylene glycol (MIRALAX) 17 g, oral    pravastatin (Pravachol) 20 mg tablet 1 tablet, oral, Nightly    predniSONE (Deltasone) 10 mg tablet take 3 tablets by mouth once daily for 3 days then 2 tablets once...  (REFER TO PRESCRIPTION NOTES).    primidone (Mysoline) 50 mg tablet 1 tablet, oral, Nightly    Probiotic, S.boulardii, 250 mg capsule TAKE ONE (1) CAPSULE BY MOUTH ONCE DAILY. *(FLORASTOR 250 MG CAP EQUIV)*    simethicone (Mylicon) 80 mg chewable tablet oral     "traZODone (Desyrel) 100 mg tablet take 1 to 3 tablet by mouth at bedtime if needed    Trulicity 3 mg/0.5 mL pen injector     venlafaxine XR (Effexor-XR) 150 mg 24 hr capsule take 2 capsules by mouth once daily as directed       Physical Exam:  Physical Exam  HENT:      Head: Normocephalic.      Nose: Nose normal.      Mouth/Throat:      Mouth: Mucous membranes are moist.   Cardiovascular:      Rate and Rhythm: Regular rhythm. Tachycardia present.      Comments: No murmurs  minimal leg swelling   Pulmonary:      Effort: Pulmonary effort is normal.      Breath sounds: Normal breath sounds.   Abdominal:      Palpations: Abdomen is soft.   Musculoskeletal:         General: Normal range of motion.      Cervical back: Normal range of motion.   Skin:     General: Skin is warm and dry.   Neurological:      General: No focal deficit present.      Mental Status: He is alert.   Psychiatric:         Mood and Affect: Mood normal.            Last Labs:  CBC -  Lab Results   Component Value Date    WBC 6.5 06/04/2024    HGB 13.1 (L) 06/04/2024    HCT 35.3 (L) 06/04/2024    MCV 92 06/04/2024     (L) 06/04/2024       CMP -  Lab Results   Component Value Date    CALCIUM 9.5 06/04/2024    PROT 6.9 06/04/2024    ALBUMIN 4.2 06/04/2024    AST 21 06/04/2024    ALT 36 06/04/2024    ALKPHOS 105 06/04/2024    BILITOT 0.8 06/04/2024       LIPID PANEL -   No results found for: \"CHOL\", \"TRIG\", \"HDL\", \"CHHDL\", \"LDLF\", \"VLDL\", \"NHDL\"    RENAL FUNCTION PANEL -   Lab Results   Component Value Date    GLUCOSE 327 (H) 06/04/2024     (L) 06/04/2024    K 4.6 06/04/2024    CL 98 06/04/2024    CO2 25 06/04/2024    ANIONGAP 13 06/04/2024    BUN 10 06/04/2024    CREATININE 0.94 06/04/2024    GFRMALE 57 (A) 09/23/2023    CALCIUM 9.5 06/04/2024    ALBUMIN 4.2 06/04/2024        Lab Results   Component Value Date    BNP 7 12/22/2023             Assessment/Plan   Preoperative risk stratification prior to laparoscopic cholecystectomy:  Currently " chest pain free at low-moderate level of physical activity, also had two recent stress tests - one MPI with low probability of ischemia, and stress echo no evidence of ischemia.      He is considered to be at acceptable (low) risk for cardiovascular events during his upcoming laparoscopic cholecystectomy.  He should continue his cardiovascular medications in the perioperative period.    Hypertension  Today BP in office 130/84  -continue amlodipine 10 mg/Benzepril 40 mg daily      R shoulder/arm tingling/weakness  Stress test negative for ischemia  Follow-up with PCP to be evaluated for right shoulder arthritis or other nerve issues     Orthostatic dizziness  Resolved with discontinuation of hydrochlorothiazide  Stable - denies symptoms     Dyslipidemia   No lipid panel on record  Patient states he has labs done at [facility], if no lipid panel in last year will complete as ordered     Type II DM  Managed by PCP

## 2024-07-03 ENCOUNTER — HOSPITAL ENCOUNTER (OUTPATIENT)
Facility: HOSPITAL | Age: 64
Setting detail: OUTPATIENT SURGERY
End: 2024-07-03
Attending: SURGERY | Admitting: SURGERY
Payer: COMMERCIAL

## 2024-07-03 DIAGNOSIS — K82.8 BILIARY DYSKINESIA: Primary | ICD-10-CM

## 2024-07-03 NOTE — TELEPHONE ENCOUNTER
Patient is scheduled, mailed instructions, and sent cardiac clearance to [facility].

## 2024-07-05 ENCOUNTER — TELEPHONE (OUTPATIENT)
Dept: SURGERY | Facility: CLINIC | Age: 64
End: 2024-07-05
Payer: COMMERCIAL

## 2024-07-05 NOTE — TELEPHONE ENCOUNTER
----- Message from Sandra Jesus MD sent at 7/5/2024  9:46 AM EDT -----  Regarding: Reschedule surgery  May reschedule his surgery to 7/29/2024 per his request.  Let him know that his surgery would be tentatively at 10 AM now, so he may have a clear liquid diet the morning of his surgery until 6 AM.    ----- Message -----  From: Awa Salamanca CMA  Sent: 7/5/2024   9:11 AM EDT  To: Sandra Jesus MD    Patient called in stating that he is not able to get transportation for the scheduled 7/22/24 surgery.  They patient would like to reschedule to 7/29/24.

## 2024-07-12 ENCOUNTER — CLINICAL SUPPORT (OUTPATIENT)
Dept: PREADMISSION TESTING | Facility: HOSPITAL | Age: 64
End: 2024-07-12
Payer: COMMERCIAL

## 2024-07-12 ASSESSMENT — DUKE ACTIVITY SCORE INDEX (DASI)
CAN YOU DO LIGHT WORK AROUND THE HOUSE LIKE DUSTING OR WASHING DISHES: YES
DASI METS SCORE: 5.7
CAN YOU HAVE SEXUAL RELATIONS: YES
CAN YOU CLIMB A FLIGHT OF STAIRS OR WALK UP A HILL: YES
CAN YOU DO MODERATE WORK AROUND THE HOUSE LIKE VACUUMING, SWEEPING FLOORS OR CARRYING GROCERIES: YES
CAN YOU WALK A BLOCK OR TWO ON LEVEL GROUND: YES
CAN YOU DO HEAVY WORK AROUND THE HOUSE LIKE SCRUBBING FLOORS OR LIFTING AND MOVING HEAVY FURNITURE: NO
CAN YOU RUN A SHORT DISTANCE: NO
CAN YOU DO YARD WORK LIKE RAKING LEAVES, WEEDING OR PUSHING A MOWER: NO
CAN YOU PARTICIPATE IN STRENOUS SPORTS LIKE SWIMMING, SINGLES TENNIS, FOOTBALL, BASKETBALL, OR SKIING: NO
TOTAL_SCORE: 24.2
CAN YOU PARTICIPATE IN MODERATE RECREATIONAL ACTIVITIES LIKE GOLF, BOWLING, DANCING, DOUBLES TENNIS OR THROWING A BASEBALL OR FOOTBALL: NO
CAN YOU WALK INDOORS, SUCH AS AROUND YOUR HOUSE: YES
CAN YOU TAKE CARE OF YOURSELF (EAT, DRESS, BATHE, OR USE TOILET): YES

## 2024-07-12 NOTE — PREPROCEDURE INSTRUCTIONS
No outpatient medications have been marked as taking for the 7/12/24 encounter (Clinical Support) with [facility].     Take Primidone and Metoprolol the morning of surgery.  Take Amlodipine the evening before as you normally do.   Last dose of Trulicity 7-20-24  Last dose of Farxiga 7-25-24         NPO Instructions:     Do not drink any liquid after midnight the night before your surgery  Do not eat any food after midnight the night before your surgery/procedure.  Candy, gum, mints and smoking of cigarettes, marijuana or vaping is not permitted after midnight prior to your surgery   Do not drink Alcohol 24 hours prior to surgery      Increase fluid intake day before surgery    Additional Instructions:      Review your medication instructions, take indicated medications    Wear  comfortable loose fitting clothing  Do not use moisturizers, creams, lotions or perfume  All jewelry and valuables should be left at home. May bring glasses and partials.    Stop blood thinning medications as instructed by ordering physician or surgeon    Shower or bathe the night before or day of surgery.  Reminder to use surgical soap if instructed to do so by your surgeon.    Brush teeth and avoid perfumes, colognes, powders, makeup, aftershave and hair spray    Go to Registration, in the main lobby, upon arrival on the day of surgery and have 's license and medical insurance card available.    Call [phone] the day before your surgery/procedure to find out what time you are to arrive the next day.     Please have a responsible adult to drive you home and be available to help you as needed after surgery.         NPO Instructions:    Do not eat any food after midnight the night before your surgery/procedure.    Additional Instructions:

## 2024-07-17 ENCOUNTER — ANESTHESIA EVENT (OUTPATIENT)
Dept: OPERATING ROOM | Facility: HOSPITAL | Age: 64
End: 2024-07-17
Payer: COMMERCIAL

## 2024-07-17 RX ORDER — MORPHINE SULFATE 2 MG/ML
2 INJECTION, SOLUTION INTRAMUSCULAR; INTRAVENOUS EVERY 5 MIN PRN
OUTPATIENT
Start: 2024-07-17

## 2024-07-17 RX ORDER — DIPHENHYDRAMINE HYDROCHLORIDE 50 MG/ML
12.5 INJECTION INTRAMUSCULAR; INTRAVENOUS ONCE AS NEEDED
OUTPATIENT
Start: 2024-07-17

## 2024-07-17 RX ORDER — ONDANSETRON HYDROCHLORIDE 2 MG/ML
4 INJECTION, SOLUTION INTRAVENOUS ONCE AS NEEDED
OUTPATIENT
Start: 2024-07-17

## 2024-07-17 RX ORDER — DROPERIDOL 2.5 MG/ML
0.62 INJECTION, SOLUTION INTRAMUSCULAR; INTRAVENOUS ONCE AS NEEDED
OUTPATIENT
Start: 2024-07-17

## 2024-07-17 RX ORDER — SODIUM CHLORIDE, SODIUM LACTATE, POTASSIUM CHLORIDE, CALCIUM CHLORIDE 600; 310; 30; 20 MG/100ML; MG/100ML; MG/100ML; MG/100ML
100 INJECTION, SOLUTION INTRAVENOUS CONTINUOUS
OUTPATIENT
Start: 2024-07-17

## 2024-07-17 RX ORDER — FAMOTIDINE 10 MG/ML
20 INJECTION INTRAVENOUS ONCE
OUTPATIENT
Start: 2024-07-17 | End: 2024-07-17

## 2024-07-17 RX ORDER — MEPERIDINE HYDROCHLORIDE 25 MG/ML
12.5 INJECTION INTRAMUSCULAR; INTRAVENOUS; SUBCUTANEOUS EVERY 10 MIN PRN
OUTPATIENT
Start: 2024-07-17

## 2024-07-17 RX ORDER — LIDOCAINE HYDROCHLORIDE 10 MG/ML
0.1 INJECTION, SOLUTION EPIDURAL; INFILTRATION; INTRACAUDAL; PERINEURAL ONCE
OUTPATIENT
Start: 2024-07-17 | End: 2024-07-17

## 2024-07-17 RX ORDER — HYDRALAZINE HYDROCHLORIDE 20 MG/ML
5 INJECTION INTRAMUSCULAR; INTRAVENOUS EVERY 30 MIN PRN
OUTPATIENT
Start: 2024-07-17

## 2024-07-17 RX ORDER — LABETALOL HYDROCHLORIDE 5 MG/ML
5 INJECTION, SOLUTION INTRAVENOUS ONCE AS NEEDED
OUTPATIENT
Start: 2024-07-17

## 2024-07-17 RX ORDER — OXYCODONE AND ACETAMINOPHEN 5; 325 MG/1; MG/1
1 TABLET ORAL EVERY 4 HOURS PRN
OUTPATIENT
Start: 2024-07-17

## 2024-07-17 RX ORDER — ALBUTEROL SULFATE 0.83 MG/ML
2.5 SOLUTION RESPIRATORY (INHALATION) ONCE AS NEEDED
OUTPATIENT
Start: 2024-07-17

## 2024-07-24 ENCOUNTER — PREP FOR PROCEDURE (OUTPATIENT)
Dept: SURGERY | Facility: CLINIC | Age: 64
End: 2024-07-24
Payer: COMMERCIAL

## 2024-07-24 RX ORDER — CEFAZOLIN SODIUM 2 G/100ML
2 INJECTION, SOLUTION INTRAVENOUS ONCE
OUTPATIENT
Start: 2024-07-24 | End: 2024-07-24

## 2024-07-24 NOTE — H&P
"GENERAL SURGERY PREOP H&P     Patient: Mane Nath    Age: 63 y.o.   Gender: male    MRN: [id number]    PCP: Thalia Smith MD          SUBJECTIVE      Chief Complaint  New Patient Visit (Patient was referred by [facility] ED for gallbladder. Patient states that he went to the ED for stomach pain that started on 6/3/24. Patient states that he had some vomiting with the pain. Patient states that he has been having abdominal pain off and on.)        JULY Gilliam is a 63-year-old white male who presents for a laparoscopic cholecystectomy surgery.  He had gone to the emergency room in the evening after eating several corndogs for dinner.  He was experiencing some diffuse abdominal pain as well as right upper quadrant abdominal pain.  The pain had been intermittent.  He had a right upper quadrant ultrasound and CT evaluation of the abdomen/pelvis in the ER which both showed slight dilation of the gallbladder, but no gallstones and no secondary evidence of acute cholecystitis.  His WBC and LFTs were all within normal limits at that time.  He was given a 5-day course of levofloxacin which he felt helped with his abdominal pain.  He took all of the Percocet pain medication, but did not take any over-the-counter medications.  His last colonoscopy was more than 10 years ago, and he has never had an EGD.  He does feel that eating makes his abdominal pain worse.  He has been on Prilosec for \"a few months\" due to some reflux symptoms.  His CT evaluation also showed constipation and a fatty liver.  He was found to have a glucose level greater than 300.  He has been to the emergency room 4 times in the last 6 months.  He states that he has an appointment with his PCP next week, but they are not on [facility].  He is being followed by cardiology for chronic dizziness, hypertension, hypercholesterolemia, cardiac palpitations and dyspnea on exertion, but has not seen cardiology in more than a year.  He is not currently running any " fevers.  Denies any diarrhea.  He had undergone a HIDA scan showing a gallbladder ejection fraction of 24%.     ROS  Review of Systems   Constitutional:  Negative for chills, fatigue and fever.   HENT:  Positive for hearing loss. Negative for congestion and ear pain.    Eyes:  Negative for pain and redness.   Respiratory:  Positive for shortness of breath. Negative for cough.    Cardiovascular:  Positive for palpitations. Negative for chest pain and leg swelling.   Gastrointestinal:  Positive for abdominal pain and constipation. Negative for diarrhea, nausea and vomiting.   Endocrine: Negative for polyphagia.   Genitourinary:  Negative for dysuria, flank pain, frequency and hematuria.   Musculoskeletal:  Positive for arthralgias and myalgias.   Skin:  Negative for rash and wound.   Allergic/Immunologic: Negative for immunocompromised state.   Neurological:  Positive for weakness. Negative for speech difficulty and headaches.   Hematological:  Does not bruise/bleed easily.   Psychiatric/Behavioral:  Positive for confusion. Negative for agitation.             HISTORY      Medical History        Past Medical History:   Diagnosis Date    Diabetes (Multi)      HTN (hypertension)      Personal history of other diseases of the musculoskeletal system and connective tissue       History of gout    Personal history of other mental and behavioral disorders       History of depression            Surgical History         Past Surgical History:   Procedure Laterality Date    NECK SURGERY   02/01/2018     Neck Surgery    OTHER SURGICAL HISTORY   02/01/2018     Surgery Excision Lipoma    OTHER SURGICAL HISTORY   04/25/2022     Colonoscopy            Family History          Family History   Problem Relation Name Age of Onset    Dementia Mother        Heart attack Father        Cholecystitis Sister             x3    Cancer Father's Sister        Prostate cancer Father's Brother        Cancer Paternal Grandmother        Diabetes  Paternal Grandfather                Allergies        Allergies   Allergen Reactions    Latex Rash and Unknown            Tobacco Use History   Social History           Tobacco Use   Smoking Status Former    Types: Cigarettes   Smokeless Tobacco Never            Social History          Substance and Sexual Activity   Alcohol Use Never         HOME MEDICATIONS       Current Outpatient Medications   Medication Instructions    albuterol 90 mcg/actuation inhaler inhale 2 puffs every 6 hours if needed    allopurinol (Zyloprim) 100 mg tablet oral    amLODIPine-benazepriL (Lotrel) 10-40 mg capsule TAKE ONE (1) CAPSULE BY MOUTH ONCE DAILY. **(LOTREL 10-40 MG CAP EQUIV)**    ARIPiprazole (ABILIFY) 30 mg, oral, Nightly    atorvastatin (Lipitor) 80 mg tablet 1 tablet, oral, Daily (0630)    busPIRone (Buspar) 10 mg tablet take 2 tablets by mouth twice a day as directed    cholecalciferol (VITAMIN D-3) 25 mcg, oral, Daily    clindamycin (CLEOCIN) 150 mg, oral, Every 6 hours    cyclobenzaprine (Flexeril) 10 mg tablet oral    dicyclomine (Bentyl) 20 mg tablet 4 times daily PRN    docusate sodium (Colace) 100 mg capsule TAKE ONE (1) CAPSULE BY MOUTH ONCE DAILY AS NEEDED. **(COLACE 100 MG CAP EQUIV)**    donepezil (Aricept) 5 mg tablet oral, Daily RT    fenofibrate (Triglide) 160 mg tablet 1 tablet, oral, Daily    gabapentin (Neurontin) 400 mg capsule take 1 capsule by mouth twice a day THEN TAKE 2 CAPSULES AT BEDTIME    hydrOXYzine pamoate (Vistaril) 25 mg capsule take 1 capsule by mouth every evening MAY TAKE IF UNABLE TO SLEEP AFTER 1 HOUR IN BED    KRILL OIL ORAL oral    metFORMIN (GLUCOPHAGE) 500 mg, oral, 2 times daily after meals    metoprolol succinate XL (TOPROL-XL) 50 mg, oral, Daily    naproxen (Naprosyn) 500 mg tablet oral, 2 times daily    omega-3 fatty acids-fish oil (Fish OiL) 340-1,000 mg capsule oral    omeprazole (PRILOSEC) 40 mg, oral, Daily    ondansetron ODT (Zofran-ODT) 8 mg disintegrating tablet Dissolve 1  "tablet (8 mg) by mouth every 8 hours if needed for nausea or vomiting.    polyethylene glycol (MIRALAX) 17 g, oral    pravastatin (Pravachol) 20 mg tablet 1 tablet, oral, Nightly    predniSONE (Deltasone) 10 mg tablet take 3 tablets by mouth once daily for 3 days then 2 tablets once...  (REFER TO PRESCRIPTION NOTES).    primidone (Mysoline) 50 mg tablet 1 tablet, oral, Nightly    Probiotic, S.boulardii, 250 mg capsule TAKE ONE (1) CAPSULE BY MOUTH ONCE DAILY. *(FLORASTOR 250 MG CAP EQUIV)*    simethicone (Mylicon) 80 mg chewable tablet oral    traZODone (Desyrel) 100 mg tablet take 1 to 3 tablet by mouth at bedtime if needed    Trulicity 3 mg/0.5 mL pen injector      venlafaxine XR (Effexor-XR) 150 mg 24 hr capsule take 2 capsules by mouth once daily as directed            OBJECTIVE   Last Recorded Vitals.  Blood pressure 121/76, pulse 96, height 1.79 m (5' 10.47\"), weight 94.2 kg (207 lb 9.6 oz), SpO2 98%.      PHYSICAL EXAM  Physical Exam   General: Well-developed, well-nourished and in no acute distress.  Head: Normocephalic. Atraumatic.  Neck/thyroid: Neck is supple.   Eyes: Pupils equal round and reactive to light. Conjunctiva normal.  ENMT: No masses or deformity of external nose. External ears without masses.  Respiratory/Chest:  Normal respiratory effort.     Cardiovascular: Regular rate and rhythm.   Abdomen: Abdomen rounded secondary to body habitus.  Slightly tympanitic, but soft.  No peritoneal signs.  Small reducible umbilical hernia.  Musculoskeletal: Joints and limbs are grossly normal. Normal gait. Normal range of motion of major joints.  Neuro: Oriented to person, place and time. No obvious neurological deficit. Motor strength grossly normal.  Psych: Normal mood and affect.     RESULTS   Labs  Component  Ref Range & Units 7 d ago  (6/4/24) 3 mo ago  (3/4/24) 3 mo ago  (2/25/24) 5 mo ago  (12/22/23) 8 mo ago  (9/23/23) 11 mo ago  (6/23/23) 1 yr ago  (10/7/22)   Glucose  74 - 99 mg/dL 327 High  281 " High  250 High  441 High  312 High  139 High  260 High    Sodium  136 - 145 mmol/L 131 Low  136 132 Low  132 Low  134 Low  137 136   Potassium  3.5 - 5.3 mmol/L 4.6 4.2 4.7 4.2 5.0 CM 4.0 4.4   Chloride  98 - 107 mmol/L 98 104 101 99 97 Low  102 102   Bicarbonate  21 - 32 mmol/L 25 22 23 24 23 21 24   Anion Gap  10 - 20 mmol/L 13 14 13 13 19 18 14   Urea Nitrogen  6 - 23 mg/dL 10 20 17 13 34 High  17 11   Creatinine  0.50 - 1.30 mg/dL 0.94 1.00 1.02 1.12 1.40 High  1.15 1.02   eGFR  >60 mL/min/1.73m*2 >90 86 CM 84 CM 75 CM         Comment: Calculations of estimated GFR are performed using the 2021 CKD-EPI Study Refit equation without the race variable for the IDMS-Traceable creatinine methods.  https://jasn.asnjournals.org/content/early/2021/09/22/ASN.0650552298   Calcium  8.6 - 10.3 mg/dL 9.5 9.8 9.0 8.6 10.2 10.4 High  9.3   Albumin  3.4 - 5.0 g/dL 4.2 4.5   4.0 4.7 5.1 High      Alkaline Phosphatase  33 - 136 U/L 105 105   110 142 High  111     Total Protein  6.4 - 8.2 g/dL 6.9 7.3   6.7 7.6 7.8     AST  9 - 39 U/L 21 20   21 43 High  CM 31     Bilirubin, Total  0.0 - 1.2 mg/dL 0.8 1.0   1.4 High  1.5 High  1.7 High      ALT  10 - 52 U/L 36 32 CM   34 CM 66 High  CM 42 CM     Comment: Patients treated with Sulfasalazine may generate falsely decreased results for ALT.   Resulting Agency [facility]         Component  Ref Range & Units 7 d ago  (6/4/24) 3 mo ago  (3/4/24) 3 mo ago  (2/25/24) 5 mo ago  (12/22/23) 8 mo ago  (9/23/23) 11 mo ago  (6/23/23) 1 yr ago  (10/7/22)   WBC  4.4 - 11.3 x10*3/uL 6.5 4.6 5.2 5.6 7.4 R 9.0 R 4.5 R   nRBC  0.0 - 0.0 /100 WBCs 0.0 0.0 0.0 0.0         RBC  4.50 - 5.90 x10*6/uL 3.86 Low  4.16 Low  4.14 Low  4.25 Low  4.34 Low  R 4.69 R 3.92 Low  R   Hemoglobin  13.5 - 17.5 g/dL 13.1 Low  13.7 14.0 14.3 14.6 15.7 13.1 Low    Hematocrit  41.0 - 52.0 % 35.3 Low  38.2 Low  37.8 Low  38.4 Low  39.8 Low  42.3 35.8 Low     MCV  80 - 100 fL 92 92 91 90 92 90 91   MCH  26.0 - 34.0 pg 33.9 32.9 33.8 33.6         MCHC  32.0 - 36.0 g/dL 37.1 High  35.9 37.0 High  37.2 High  36.7 High  37.1 High  36.6 High    RDW  11.5 - 14.5 % 14.6 High  14.0 14.0 13.5 13.5 13.5 13.2   Platelets  150 - 450 x10*3/uL 114 Low  118 Low  107 Low  112 Low  163 R 143 Low  R 126 Low  R         Radiology [facility]  US GALLBLADDER; 8:49 am      INDICATION:  Signs/Symptoms:epigastric abd pain n/v.      COMPARISON:  None.      ACCESSION NUMBER(S):  [id number]      ORDERING CLINICIAN:  ROBERT PRITCHETT      TECHNIQUE:  Limited abdominal ultrasound of the right upper quadrant was  performed utilizing gray scale imaging.      FINDINGS:  Liver: Mildly prominent with increased echogenicity of the parenchyma  due to fatty infiltration. No focal lesions. No intrahepatic biliary  distention. Gallbladder: Dilated gallbladder. No gallstones, wall  thickening, or pericholecystic fluid. Sonographic Torres's sign:  Positive Pancreas: The pancreas is obscured by overlying bowel gas.  CBD: 0.3 cm      IMPRESSION:  Dilated gallbladder and positive sonographic Torres's sign, however  no gallstones or biliary duct dilatation. Correlate clinically and if  there is high clinical suspicion for acute calculus cholecystitis  further evaluate with hepatobiliary scan.      MACRO:  None.      Signed by: Sergo Nichole 6/4/2024 9:03 AM  Dictation workstation:   [id number]     CT ABDOMEN PELVIS W IV CONTRAST;  6/4/2024 6:31 am      INDICATION:  Signs/Symptoms:abd pain n/v.      COMPARISON:  09/23/2023      ACCESSION NUMBER(S):  [id number]      ORDERING CLINICIAN:  ROBERT PRITCHETT      TECHNIQUE:  Axial CT images of the abdomen and pelvis with coronal and sagittal  reconstructed images obtained after intravenous administration of  contrast      FINDINGS:  LOWER CHEST: Nodular ground-glass opacities predominantly in the left  lower lobe which may be infectious or inflammatory. Small  hiatal  hernia. BONES: No acute osseous abnormality. Mild degenerative  changes present. ABDOMINAL WALL: Small umbilical hernia.      ABDOMEN:      LIVER: Decreased attenuation of the liver parenchyma measuring up to  20.8 cm. BILE DUCTS: Normal caliber.  GALLBLADDER: No calcified gallstones. The gallbladder is mildly  distended up to 5.1 cm with possible trace wall thickening..  PANCREAS: Unremarkable. SPLEEN: Splenomegaly, 19.2 cm.  ADRENALS: Within normal limits.  KIDNEYS and URETERS: Symmetric renal enhancement. No hydronephrosis  or perinephric fluid collection.          VESSELS: Atherosclerotic calcifications without aneurysmal dilatation  seen. RETROPERITONEUM: No pathologically enlarged retroperitoneal  lymph nodes.      PELVIS:      REPRODUCTIVE ORGANS: The prostate measures up to 6.1 cm, enlarged.  BLADDER: Wall thickening of the urinary bladder noted.      BOWEL: No dilated bowel. Moderate stool burden. Diverticulosis  without CT evidence of acute diverticulitis. Normal appendix.  PERITONEUM: No ascites or free air, no fluid collection.      IMPRESSION:  Ground-glass nodule in opacities in the left lower lobe which may be  infectious or inflammatory.      Mild gallbladder wall distension with minimal wall thickening which  may be reactive. Hepatic steatosis noted.      Splenomegaly superior clinical correlation incurring  lymphoproliferative process recommended.      Moderate stool burden suggestive of constipation.      Diverticulosis.      MACRO:  None      Signed by: Sharmin Calvillo 6/4/2024 6:45 AM  Dictation workstation:   [id number]     NM HEPATOBILIARY W CHOLECYSTOKININ;  6/25/2024 2:29 pm      INDICATION:  Signs/Symptoms:RUQ pain; US without stones.      COMPARISON:  None.      ACCESSION NUMBER(S):  [id number]      ORDERING CLINICIAN:  JOSELIN SOLIZ      TECHNIQUE:  DIVISION OF NUCLEAR MEDICINE  HEPATOBILIARY SCAN (HIDA), QUANTITATIVE      The patient received an intravenous dose of  5 mCi  of Tc-99m  mebrofenin (Choletec).  Sequential images of the upper abdomen were  then acquired over the next 60 minutes. In additional 30 minutes of  dynamic and static imaging were also obtained. An intravenous  infusion of the cholecystokinin (CCK) analogue, Sincalide, was then  administered followed by an additional period of imaging.  Computer  quantification of gallbladder emptying was also performed      FINDINGS:  There is prompt accumulation of activity within the liver and  adequate subsequent excretion via the biliary ductal system into the  small bowel.  The gallbladder first visualizes at about  35-40  minutes after radiopharmaceutical injection and progressively fills.  It should be noted that the orientation of the gallbladder was not  typical.      After Sincalide administration, there is poor contraction of the  gallbladder with further anterograde transit of activity into the  small bowel.  The gallbladder ejection fraction is calculated to be  24 % (normal above 38%).      IMPRESSION:  Patency of the common bile duct and cystic duct. No definite evidence  of acute cholecystitis.      Gallbladder ejection fraction was calculated to be 24% which is in  the abnormal range. In the proper clinical context, findings would be  consistent with biliary dyskinesia/chronic cholecystitis.     ASSESSMENT / PLAN   Biliary dyskinesia  Right upper quadrant abdominal pain  -     NM hepatobiliary w cholecystokinin; Future  -     amoxicillin-pot clavulanate (Augmentin) 875-125 mg tablet; Take 1 tablet by mouth 2 times a day for 10 days.  Fatty liver  Constipation, unspecified constipation type        Plan  1.  All of his abdominal pain does not seem classic for biliary disease.  Certainly some of the right upper quadrant pain could be associated with his gallbladder.  The CT/ultrasound only showed slight distention of his gallbladder which could have been secondary to his n.p.o. status.  HIDA scan showed a gallbladder  ejection fraction of 24% which is consistent with biliary dyskinesia.  Therefore, a cholecystectomy surgery has been offered.  The benefits and risk of the laparoscopic cholecystectomy have been reviewed with the patient.  The nature of the procedure was reviewed with the patient which included the risk and benefits of having surgery.  Alternative treatment options were reviewed. The risks included, but not limited to, infection, bleeding, injury surrounding organs, possible need for open procedure, possible need for other procedure, hernia formation at any incision site, postoperative bile leak, allergic reaction to medication and death.  2.  Educational material has been given to the patient regarding gallbladder surgery. We also discussed postcholecystectomy urgency/diarrhea.  3.  The patient has been encouraged to stay on a low crease, low-fat diet to help control symptoms pre-and postoperatively.  4.  Surgery is scheduled for 7/24/2024.  5.  Recommended that he keep his appointment with his PCP for better control of his diabetes in case surgical intervention is needed.  6.  He may use any over-the-counter medications to help with his constipation.  7.  He does have a fatty liver, but LFTs were within normal limits.  Recommended ongoing weight loss.  8.  Patient is on Trulicity.  This would need to be held for greater than 7 days prior to any surgical intervention.          Sandra Jesus MD, FACS   [facility]  [address];   [employer] [address]  [place]  [address] [phone]

## 2024-07-29 ENCOUNTER — APPOINTMENT (OUTPATIENT)
Dept: RADIOLOGY | Facility: HOSPITAL | Age: 64
End: 2024-07-29
Payer: COMMERCIAL

## 2024-07-29 ENCOUNTER — HOSPITAL ENCOUNTER (EMERGENCY)
Facility: HOSPITAL | Age: 64
Discharge: HOME | End: 2024-07-30
Attending: EMERGENCY MEDICINE
Payer: COMMERCIAL

## 2024-07-29 ENCOUNTER — TELEPHONE (OUTPATIENT)
Dept: SURGERY | Facility: CLINIC | Age: 64
End: 2024-07-29

## 2024-07-29 ENCOUNTER — APPOINTMENT (OUTPATIENT)
Dept: CARDIOLOGY | Facility: HOSPITAL | Age: 64
End: 2024-07-29
Payer: COMMERCIAL

## 2024-07-29 ENCOUNTER — ANESTHESIA (OUTPATIENT)
Dept: OPERATING ROOM | Facility: HOSPITAL | Age: 64
End: 2024-07-29
Payer: COMMERCIAL

## 2024-07-29 DIAGNOSIS — U07.1 COVID-19 VIRUS INFECTION: Primary | ICD-10-CM

## 2024-07-29 LAB
ALBUMIN SERPL BCP-MCNC: 4 G/DL (ref 3.4–5)
ALP SERPL-CCNC: 97 U/L (ref 33–136)
ALT SERPL W P-5'-P-CCNC: 22 U/L (ref 10–52)
ANION GAP SERPL CALC-SCNC: 10 MMOL/L (ref 10–20)
AST SERPL W P-5'-P-CCNC: 22 U/L (ref 9–39)
BILIRUB SERPL-MCNC: 1.3 MG/DL (ref 0–1.2)
BUN SERPL-MCNC: 15 MG/DL (ref 6–23)
CALCIUM SERPL-MCNC: 9.2 MG/DL (ref 8.6–10.3)
CHLORIDE SERPL-SCNC: 100 MMOL/L (ref 98–107)
CO2 SERPL-SCNC: 24 MMOL/L (ref 21–32)
CREAT SERPL-MCNC: 0.96 MG/DL (ref 0.5–1.3)
EGFRCR SERPLBLD CKD-EPI 2021: 90 ML/MIN/1.73M*2
GLUCOSE SERPL-MCNC: 262 MG/DL (ref 74–99)
POTASSIUM SERPL-SCNC: 4.2 MMOL/L (ref 3.5–5.3)
PROT SERPL-MCNC: 6.7 G/DL (ref 6.4–8.2)
SODIUM SERPL-SCNC: 130 MMOL/L (ref 136–145)

## 2024-07-29 PROCEDURE — 85379 FIBRIN DEGRADATION QUANT: CPT | Performed by: EMERGENCY MEDICINE

## 2024-07-29 PROCEDURE — 71045 X-RAY EXAM CHEST 1 VIEW: CPT | Mod: FOREIGN READ | Performed by: RADIOLOGY

## 2024-07-29 PROCEDURE — 83880 ASSAY OF NATRIURETIC PEPTIDE: CPT | Performed by: EMERGENCY MEDICINE

## 2024-07-29 PROCEDURE — 85025 COMPLETE CBC W/AUTO DIFF WBC: CPT | Performed by: EMERGENCY MEDICINE

## 2024-07-29 PROCEDURE — 85610 PROTHROMBIN TIME: CPT | Performed by: EMERGENCY MEDICINE

## 2024-07-29 PROCEDURE — 93005 ELECTROCARDIOGRAM TRACING: CPT

## 2024-07-29 PROCEDURE — 80053 COMPREHEN METABOLIC PANEL: CPT | Performed by: EMERGENCY MEDICINE

## 2024-07-29 PROCEDURE — 71045 X-RAY EXAM CHEST 1 VIEW: CPT

## 2024-07-29 PROCEDURE — 99283 EMERGENCY DEPT VISIT LOW MDM: CPT

## 2024-07-29 PROCEDURE — 84484 ASSAY OF TROPONIN QUANT: CPT | Performed by: EMERGENCY MEDICINE

## 2024-07-29 PROCEDURE — 36415 COLL VENOUS BLD VENIPUNCTURE: CPT | Performed by: EMERGENCY MEDICINE

## 2024-07-29 ASSESSMENT — LIFESTYLE VARIABLES
TOTAL SCORE: 0
EVER FELT BAD OR GUILTY ABOUT YOUR DRINKING: NO
HAVE PEOPLE ANNOYED YOU BY CRITICIZING YOUR DRINKING: NO
HAVE YOU EVER FELT YOU SHOULD CUT DOWN ON YOUR DRINKING: NO
EVER HAD A DRINK FIRST THING IN THE MORNING TO STEADY YOUR NERVES TO GET RID OF A HANGOVER: NO

## 2024-07-29 ASSESSMENT — PAIN SCALES - GENERAL: PAINLEVEL_OUTOF10: 0 - NO PAIN

## 2024-07-29 ASSESSMENT — PAIN - FUNCTIONAL ASSESSMENT: PAIN_FUNCTIONAL_ASSESSMENT: 0-10

## 2024-07-29 NOTE — TELEPHONE ENCOUNTER
Patient called and states that his laparoscopic cholecystectomy had to be canceled today 7/29/24 because he tested positive for covid. Patient would like to reschedule surgery for 8/26/24. Please advise.

## 2024-07-30 VITALS
RESPIRATION RATE: 10 BRPM | SYSTOLIC BLOOD PRESSURE: 124 MMHG | OXYGEN SATURATION: 95 % | HEIGHT: 70 IN | DIASTOLIC BLOOD PRESSURE: 58 MMHG | HEART RATE: 81 BPM | BODY MASS INDEX: 29.63 KG/M2 | TEMPERATURE: 98.2 F | WEIGHT: 207 LBS

## 2024-07-30 DIAGNOSIS — K82.8 BILIARY DYSKINESIA: Primary | ICD-10-CM

## 2024-07-30 LAB
ATRIAL RATE: 89 BPM
BASOPHILS # BLD AUTO: 0.01 X10*3/UL (ref 0–0.1)
BASOPHILS NFR BLD AUTO: 0.3 %
BNP SERPL-MCNC: 13 PG/ML (ref 0–99)
CARDIAC TROPONIN I PNL SERPL HS: 8 NG/L (ref 0–20)
D DIMER PPP FEU-MCNC: 277 NG/ML FEU
EOSINOPHIL # BLD AUTO: 0.04 X10*3/UL (ref 0–0.7)
EOSINOPHIL NFR BLD AUTO: 1 %
ERYTHROCYTE [DISTWIDTH] IN BLOOD BY AUTOMATED COUNT: 13.4 % (ref 11.5–14.5)
HCT VFR BLD AUTO: 33.5 % (ref 41–52)
HGB BLD-MCNC: 12.1 G/DL (ref 13.5–17.5)
IMM GRANULOCYTES # BLD AUTO: 0.02 X10*3/UL (ref 0–0.7)
IMM GRANULOCYTES NFR BLD AUTO: 0.5 % (ref 0–0.9)
INR PPP: 1.2 (ref 0.9–1.1)
LYMPHOCYTES # BLD AUTO: 1.02 X10*3/UL (ref 1.2–4.8)
LYMPHOCYTES NFR BLD AUTO: 26.7 %
MCH RBC QN AUTO: 32.9 PG (ref 26–34)
MCHC RBC AUTO-ENTMCNC: 36.1 G/DL (ref 32–36)
MCV RBC AUTO: 91 FL (ref 80–100)
MONOCYTES # BLD AUTO: 0.61 X10*3/UL (ref 0.1–1)
MONOCYTES NFR BLD AUTO: 16 %
NEUTROPHILS # BLD AUTO: 2.12 X10*3/UL (ref 1.2–7.7)
NEUTROPHILS NFR BLD AUTO: 55.5 %
NRBC BLD-RTO: 0 /100 WBCS (ref 0–0)
P AXIS: 31 DEGREES
PLATELET # BLD AUTO: 97 X10*3/UL (ref 150–450)
PR INTERVAL: 158 MS
PROTHROMBIN TIME: 13.5 SECONDS (ref 9.8–12.8)
Q ONSET: 251 MS
QRS COUNT: 15 BEATS
QRS DURATION: 94 MS
QT INTERVAL: 356 MS
QTC CALCULATION(BAZETT): 434 MS
QTC FREDERICIA: 405 MS
R AXIS: -29 DEGREES
RBC # BLD AUTO: 3.68 X10*6/UL (ref 4.5–5.9)
RBC MORPH BLD: NORMAL
T AXIS: 43 DEGREES
T OFFSET: 429 MS
VENTRICULAR RATE: 89 BPM
WBC # BLD AUTO: 3.8 X10*3/UL (ref 4.4–11.3)

## 2024-07-30 NOTE — ED PROVIDER NOTES
HPI   Chief Complaint   Patient presents with    COVID positive     Pt c/o symptoms since 7/27 and tested positive  at Dr. Office today  started on meds  today c/o increased coughing and not able to sleep  concerned getting worse   pt concerned about living alone and being able to care for self while sick        Patient presents to the emergency department secondary to COVID symptoms.  Patient tested +2 days ago for COVID.  Symptoms started about 3 days ago.  Patient has cough with sputum production.  He has general malaise.  Starting to have some chest discomfort with coughing.  No documented fever.  No abdominal pain.  No nausea or vomiting.  No change in bowel movements.  No change in urination.  Patient has tested positive for COVID in the past.  He has never received any COVID vaccines.  He states that he feels so unwell he is unsure about his ability to care for himself at home.                          Gilson Coma Scale Score: 15                  Patient History   Past Medical History:   Diagnosis Date    Diabetes (Multi)     HTN (hypertension)     Personal history of other diseases of the musculoskeletal system and connective tissue     History of gout    Personal history of other mental and behavioral disorders     History of depression     Past Surgical History:   Procedure Laterality Date    NECK SURGERY  02/01/2018    Neck Surgery    OTHER SURGICAL HISTORY  02/01/2018    Surgery Excision Lipoma    OTHER SURGICAL HISTORY  04/25/2022    Colonoscopy     Family History   Problem Relation Name Age of Onset    Dementia Mother      Heart attack Father      Cholecystitis Sister          x3    Cancer Father's Sister      Prostate cancer Father's Brother      Cancer Paternal Grandmother      Diabetes Paternal Grandfather       Social History     Tobacco Use    Smoking status: Former     Types: Cigarettes    Smokeless tobacco: Never   Vaping Use    Vaping status: Never Used   Substance Use Topics    Alcohol use:  Never    Drug use: Never       Physical Exam   ED Triage Vitals [07/29/24 2244]   Temperature Heart Rate Respirations BP   36.8 °C (98.2 °F) 93 20 138/75      Pulse Ox Temp Source Heart Rate Source Patient Position   98 % Tympanic Monitor Sitting      BP Location FiO2 (%)     Left arm --       Physical Exam  Vitals and nursing note reviewed.   Constitutional:       Appearance: Normal appearance.   HENT:      Head: Normocephalic and atraumatic.      Nose: Nose normal.      Mouth/Throat:      Mouth: Mucous membranes are moist.   Eyes:      Extraocular Movements: Extraocular movements intact.      Pupils: Pupils are equal, round, and reactive to light.   Cardiovascular:      Rate and Rhythm: Normal rate and regular rhythm.      Pulses: Normal pulses.      Heart sounds: Normal heart sounds.   Pulmonary:      Effort: Pulmonary effort is normal.      Breath sounds: Normal breath sounds.   Abdominal:      General: Abdomen is flat. Bowel sounds are normal.      Palpations: Abdomen is soft.      Tenderness: There is no abdominal tenderness. There is no guarding or rebound.   Musculoskeletal:         General: No swelling or tenderness. Normal range of motion.      Cervical back: Normal range of motion.      Right lower leg: No edema.      Left lower leg: No edema.   Skin:     General: Skin is warm and dry.      Capillary Refill: Capillary refill takes less than 2 seconds.   Neurological:      General: No focal deficit present.      Mental Status: He is alert and oriented to person, place, and time.   Psychiatric:         Mood and Affect: Mood normal.         Behavior: Behavior normal.       Labs Reviewed   CBC WITH AUTO DIFFERENTIAL - Abnormal       Result Value    WBC 3.8 (*)     nRBC 0.0      RBC 3.68 (*)     Hemoglobin 12.1 (*)     Hematocrit 33.5 (*)     MCV 91      MCH 32.9      MCHC 36.1 (*)     RDW 13.4      Platelets 97 (*)     Neutrophils % 55.5      Immature Granulocytes %, Automated 0.5      Lymphocytes % 26.7       Monocytes % 16.0      Eosinophils % 1.0      Basophils % 0.3      Neutrophils Absolute 2.12      Immature Granulocytes Absolute, Automated 0.02      Lymphocytes Absolute 1.02 (*)     Monocytes Absolute 0.61      Eosinophils Absolute 0.04      Basophils Absolute 0.01     COMPREHENSIVE METABOLIC PANEL - Abnormal    Glucose 262 (*)     Sodium 130 (*)     Potassium 4.2      Chloride 100      Bicarbonate 24      Anion Gap 10      Urea Nitrogen 15      Creatinine 0.96      eGFR 90      Calcium 9.2      Albumin 4.0      Alkaline Phosphatase 97      Total Protein 6.7      AST 22      Bilirubin, Total 1.3 (*)     ALT 22     PROTIME-INR - Abnormal    Protime 13.5 (*)     INR 1.2 (*)    TROPONIN I, HIGH SENSITIVITY - Normal    Troponin I, High Sensitivity 8      Narrative:     Less than 99th percentile of normal range cutoff-  Female and children under 18 years old <14 ng/L; Male <21 ng/L: Negative  Repeat testing should be performed if clinically indicated.     Female and children under 18 years old 14-50 ng/L; Male 21-50 ng/L:  Consistent with possible cardiac damage and possible increased clinical   risk. Serial measurements may help to assess extent of myocardial damage.     >50 ng/L: Consistent with cardiac damage, increased clinical risk and  myocardial infarction. Serial measurements may help assess extent of   myocardial damage.      NOTE: Children less than 1 year old may have higher baseline troponin   levels and results should be interpreted in conjunction with the overall   clinical context.     NOTE: Troponin I testing is performed using a different   testing methodology at [facility] than at other   [facility] hospitals. Direct result comparisons should only   be made within the same method.   B-TYPE NATRIURETIC PEPTIDE - Normal    BNP 13      Narrative:        <100 pg/mL - Heart failure unlikely  100-299 pg/mL - Intermediate probability of acute heart                  failure exacerbation. Correlate  with clinical                  context and patient history.    >=300 pg/mL - Heart Failure likely. Correlate with clinical                  context and patient history.    BNP testing is performed using different testing methodology at [facility] than at other [facility] hospitals. Direct result comparisons should only be made within the same method.      D-DIMER, NON VTE - Normal    D-Dimer Non VTE, Quant (ng/mL FEU) 277      Narrative:     The D-Dimer assay is reported in ng/mL Fibrinogen Equivalent Units (FEU). The results of this assay should NOT be used for the exclusion of Deep Vein Thrombosis and/or Pulmonary Embolism.   URINALYSIS WITH REFLEX CULTURE AND MICROSCOPIC    Narrative:     The following orders were created for panel order Urinalysis with Reflex Culture and Microscopic.  Procedure                               Abnormality         Status                     ---------                               -----------         ------                     Urinalysis with Reflex C...[[id number]]                                                 Extra Urine Gray Tube[[id number]]                                                         Please view results for these tests on the individual orders.   URINALYSIS WITH REFLEX CULTURE AND MICROSCOPIC   EXTRA URINE GRAY TUBE   MORPHOLOGY    RBC Morphology No significant RBC morphology present       Pain Management Panel           No data to display              XR chest 1 view   Final Result   Normal AP chest.   Signed by Adrián Garg MD        ED Course & MDM   Diagnoses as of 07/30/24 0231   COVID-19 virus infection       Medical Decision Making  Patient presents secondary to shortness of breath cough and chest pain.  Patient is positive for COVID.  Patient is evaluated in the emergency department with chest x-ray EKG and laboratory workup.  Patient's laboratory workup at this time is unremarkable.  He has a mild leukopenia associated with the viral infection.   D-dimer is negative.  Chest x-ray shows no acute disease.  Patient's laboratory workup does not meet criteria for admission to the hospital.  Patient's vital signs are stable.  Walking pulse ox is 95%.  At this time he can be discharged home and follow-up with his primary care physician or return here for any worsening symptoms.        Procedure  ECG 12 lead    Performed by: Char Rodriguez MD  Authorized by: Char Rodriguez MD    Interpretation:     Details:  EKG was obtained at 10:34 PM.  It is sinus rhythm rate of 89.  No acute ST elevation.  AL interval is 158 and QTc is 434.       Char Rodriguez MD  07/30/24 0231

## 2024-08-01 ENCOUNTER — TELEPHONE (OUTPATIENT)
Dept: PREADMISSION TESTING | Facility: HOSPITAL | Age: 64
End: 2024-08-01
Payer: COMMERCIAL

## 2024-08-14 ENCOUNTER — ANESTHESIA EVENT (OUTPATIENT)
Dept: OPERATING ROOM | Facility: HOSPITAL | Age: 64
End: 2024-08-14
Payer: COMMERCIAL

## 2024-08-16 ENCOUNTER — PHARMACY VISIT (OUTPATIENT)
Dept: PHARMACY | Facility: CLINIC | Age: 64
End: 2024-08-16
Payer: MEDICAID

## 2024-08-16 ENCOUNTER — APPOINTMENT (OUTPATIENT)
Dept: RADIOLOGY | Facility: HOSPITAL | Age: 64
End: 2024-08-16
Payer: COMMERCIAL

## 2024-08-16 ENCOUNTER — HOSPITAL ENCOUNTER (EMERGENCY)
Facility: HOSPITAL | Age: 64
Discharge: HOME | End: 2024-08-16
Payer: COMMERCIAL

## 2024-08-16 VITALS
TEMPERATURE: 98.4 F | OXYGEN SATURATION: 97 % | RESPIRATION RATE: 16 BRPM | HEART RATE: 67 BPM | HEIGHT: 70 IN | BODY MASS INDEX: 29.64 KG/M2 | SYSTOLIC BLOOD PRESSURE: 124 MMHG | WEIGHT: 207.01 LBS | DIASTOLIC BLOOD PRESSURE: 76 MMHG

## 2024-08-16 DIAGNOSIS — S80.01XA CONTUSION OF RIGHT KNEE, INITIAL ENCOUNTER: Primary | ICD-10-CM

## 2024-08-16 PROCEDURE — 99283 EMERGENCY DEPT VISIT LOW MDM: CPT

## 2024-08-16 PROCEDURE — 73564 X-RAY EXAM KNEE 4 OR MORE: CPT | Mod: RT

## 2024-08-16 PROCEDURE — 73564 X-RAY EXAM KNEE 4 OR MORE: CPT | Mod: RIGHT SIDE | Performed by: RADIOLOGY

## 2024-08-16 PROCEDURE — RXMED WILLOW AMBULATORY MEDICATION CHARGE

## 2024-08-16 RX ORDER — NAPROXEN 500 MG/1
500 TABLET ORAL
Qty: 17 TABLET | Refills: 0 | Status: SHIPPED | OUTPATIENT
Start: 2024-08-16

## 2024-08-16 ASSESSMENT — PAIN SCALES - GENERAL: PAINLEVEL_OUTOF10: 6

## 2024-08-16 ASSESSMENT — LIFESTYLE VARIABLES
HAVE PEOPLE ANNOYED YOU BY CRITICIZING YOUR DRINKING: NO
TOTAL SCORE: 0
HAVE YOU EVER FELT YOU SHOULD CUT DOWN ON YOUR DRINKING: NO
EVER FELT BAD OR GUILTY ABOUT YOUR DRINKING: NO
EVER HAD A DRINK FIRST THING IN THE MORNING TO STEADY YOUR NERVES TO GET RID OF A HANGOVER: NO

## 2024-08-16 ASSESSMENT — PAIN - FUNCTIONAL ASSESSMENT: PAIN_FUNCTIONAL_ASSESSMENT: 0-10

## 2024-08-16 ASSESSMENT — PAIN DESCRIPTION - PAIN TYPE: TYPE: ACUTE PAIN

## 2024-08-16 ASSESSMENT — PAIN DESCRIPTION - LOCATION: LOCATION: KNEE

## 2024-08-16 ASSESSMENT — PAIN DESCRIPTION - PROGRESSION: CLINICAL_PROGRESSION: NOT CHANGED

## 2024-08-16 NOTE — ED PROVIDER NOTES
"Chief Complaint   Patient presents with    Knee Pain     Fall \"a few days ago\", -LOC, slid down wall, -hit head, -thinner       HPI       63 year old male presents to the Emergency Department today complaining of right knee pain status post fall that occurred 2 days ago. Reports that he lost his balance as he was walking out of the bathroom in middle of the night. Notes that he landed on his right knee and has had pain with weight bearing since. Denies hitting their head, loss of consciousness, or seizure-like activity. Denies any other injuries.       History provided by:  Patient             Patient History   Past Medical History:   Diagnosis Date    Diabetes (Multi)     HTN (hypertension)     Personal history of other diseases of the musculoskeletal system and connective tissue     History of gout    Personal history of other mental and behavioral disorders     History of depression     Past Surgical History:   Procedure Laterality Date    NECK SURGERY  02/01/2018    Neck Surgery    OTHER SURGICAL HISTORY  02/01/2018    Surgery Excision Lipoma    OTHER SURGICAL HISTORY  04/25/2022    Colonoscopy     Family History   Problem Relation Name Age of Onset    Dementia Mother      Heart attack Father      Cholecystitis Sister          x3    Cancer Father's Sister      Prostate cancer Father's Brother      Cancer Paternal Grandmother      Diabetes Paternal Grandfather       Social History     Tobacco Use    Smoking status: Former     Types: Cigarettes    Smokeless tobacco: Never   Vaping Use    Vaping status: Never Used   Substance Use Topics    Alcohol use: Never    Drug use: Never           Physical Exam  Constitutional:       General: He is awake.      Appearance: Normal appearance.   Cardiovascular:      Rate and Rhythm: Normal rate and regular rhythm.      Pulses:           Radial pulses are 3+ on the right side and 3+ on the left side.      Heart sounds: Normal heart sounds. No murmur heard.     No friction rub. No " gallop.   Pulmonary:      Effort: Pulmonary effort is normal.      Breath sounds: Normal breath sounds and air entry.   Musculoskeletal:      Comments: No obvious deformity to the right knee, but there is edema, ecchymosis, and diffuse tenderness noted to the right knee. Full ROM. Extensor mechanism is intact. Right dorsalis pedis pulse is strong and regular. Capillary refill was within normal limits. Sensation is intact distally.    Neurological:      Mental Status: He is alert.   Psychiatric:         Behavior: Behavior is cooperative.         Labs Reviewed - No data to display    XR knee right 4+ views   Final Result   No acute fracture or dislocation. 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 Question a small suprapatellar joint   effusion.             Signed by: Mio Rose 8/16/2024 11:05 AM   Dictation workstation:   [id number]               ED Course & MDM   Diagnoses as of 08/16/24 1139   Contusion of right knee, initial encounter           Medical Decision Making  Patient was seen and evaluated by myself. Right knee x-ray shows arthritic changes without any bony abnormality such as fracture. Instructed to ice and elevate the sore area as much as possible. Given a prescription for Naprosyn. No contraindications to NSAIDs are noted. I applied an ace wrap to the right knee to wear for comfort. Capillary refill was within normal limits and sensation was intact distally post-application. Follow up with their doctor in 1 week. Return if worse in any way. Discharged in stable condition with computer instructions.    Diagnostic Impression:     1. Acute right knee contusion    2. Prescription therapy            Your medication list        ASK your doctor about these medications        Instructions Last Dose Given Next Dose Due   albuterol 90 mcg/actuation inhaler           allopurinol 100 mg tablet  Commonly known as:  Zyloprim           amLODIPine-benazepriL 10-40 mg capsule  Commonly known as: Lotrel           ARIPiprazole 30 mg tablet  Commonly known as: Abilify           atorvastatin 80 mg tablet  Commonly known as: Lipitor           busPIRone 10 mg tablet  Commonly known as: Buspar           cholecalciferol 25 MCG (1000 UT) capsule  Commonly known as: Vitamin D-3           cyclobenzaprine 10 mg tablet  Commonly known as: Flexeril           dapagliflozin propanediol 10 mg  Commonly known as: Farxiga           dicyclomine 20 mg tablet  Commonly known as: Bentyl           docusate sodium 100 mg capsule  Commonly known as: Colace           donepezil 5 mg tablet  Commonly known as: Aricept           fenofibrate 160 mg tablet  Commonly known as: Triglide           Fish OiL 340-1,000 mg capsule  Generic drug: omega-3 fatty acids-fish oil           gabapentin 400 mg capsule  Commonly known as: Neurontin           hydrOXYzine pamoate 25 mg capsule  Commonly known as: Vistaril           metFORMIN 1,000 mg tablet  Commonly known as: Glucophage           metoprolol succinate XL 50 mg 24 hr tablet  Commonly known as: Toprol-XL           Miralax 17 gram/dose powder  Generic drug: polyethylene glycol           naproxen 500 mg tablet  Commonly known as: Naprosyn           pravastatin 20 mg tablet  Commonly known as: Pravachol           primidone 50 mg tablet  Commonly known as: Mysoline           Probiotic (S.boulardii) 250 mg capsule  Generic drug: saccharomyces boulardii           simethicone 80 mg chewable tablet  Commonly known as: Mylicon           traZODone 100 mg tablet  Commonly known as: Desyrel           Trulicity 3 mg/0.5 mL pen injector  Generic drug: dulaglutide           venlafaxine  mg 24 hr capsule  Commonly known as: Effexor-XR                      Procedure  Procedures     KRISTEN Coreas-CNP  08/16/24 1107

## 2024-08-23 ENCOUNTER — PREP FOR PROCEDURE (OUTPATIENT)
Dept: SURGERY | Facility: HOSPITAL | Age: 64
End: 2024-08-23
Payer: COMMERCIAL

## 2024-08-23 NOTE — H&P
"GENERAL SURGERY PREOP H&P     Patient: Mane Nath    Age: 63 y.o.   Gender: male    MRN: [id number]    PCP: Thalia Smith MD          SUBJECTIVE      HPI  Mane is a 63-year-old white male who presents for a laparoscopic cholecystectomy surgery for biliary dyskinesia about a month ago, he had gone to the emergency room in the evening after eating several corndogs for dinner.  He was experiencing some diffuse abdominal pain as well as right upper quadrant abdominal pain.  The pain had been intermittent.  He had a right upper quadrant ultrasound and CT evaluation of the abdomen/pelvis in the ER which both showed slight dilation of the gallbladder, but no gallstones and no secondary evidence of acute cholecystitis.  His WBC and LFTs were all within normal limits at that time.  He was given a 5-day course of levofloxacin which he felt helped with his abdominal pain.  He took all of the Percocet pain medication, but did not take any over-the-counter medications.  His last colonoscopy was more than 10 years ago, and he has never had an EGD.  He does feel that eating makes his abdominal pain worse.  He has been on Prilosec for \"a few months\" due to some reflux symptoms.  His CT evaluation also showed constipation and a fatty liver.  He was found to have a glucose level greater than 300.  He has been to the emergency room 4 times in the last 6 months.  He states that he has an appointment with his PCP next week, but they are not on [facility] associated.  He is being followed by cardiology for chronic dizziness, hypertension, hypercholesterolemia, cardiac palpitations and dyspnea on exertion, but has not seen cardiology in more than a year.  He is not currently running any fevers.  Denies any diarrhea.  He had undergone a HIDA scan which showed a gallbladder ejection fraction of 24%.  Cholecystectomy was recommended.     ROS  Review of Systems   Constitutional:  Negative for chills, fatigue and fever.   HENT:  Positive for " hearing loss. Negative for congestion and ear pain.    Eyes:  Negative for pain and redness.   Respiratory:  Positive for shortness of breath. Negative for cough.    Cardiovascular:  Positive for palpitations. Negative for chest pain and leg swelling.   Gastrointestinal:  Positive for abdominal pain and constipation. Negative for diarrhea, nausea and vomiting.   Endocrine: Negative for polyphagia.   Genitourinary:  Negative for dysuria, flank pain, frequency and hematuria.   Musculoskeletal:  Positive for arthralgias and myalgias.   Skin:  Negative for rash and wound.   Allergic/Immunologic: Negative for immunocompromised state.   Neurological:  Positive for weakness. Negative for speech difficulty and headaches.   Hematological:  Does not bruise/bleed easily.   Psychiatric/Behavioral:  Positive for confusion. Negative for agitation.             HISTORY      Medical History        Past Medical History:   Diagnosis Date    Diabetes (Multi)      HTN (hypertension)      Personal history of other diseases of the musculoskeletal system and connective tissue       History of gout    Personal history of other mental and behavioral disorders       History of depression            Surgical History         Past Surgical History:   Procedure Laterality Date    NECK SURGERY   02/01/2018     Neck Surgery    OTHER SURGICAL HISTORY   02/01/2018     Surgery Excision Lipoma    OTHER SURGICAL HISTORY   04/25/2022     Colonoscopy            Family History          Family History   Problem Relation Name Age of Onset    Dementia Mother        Heart attack Father        Cholecystitis Sister             x3    Cancer Father's Sister        Prostate cancer Father's Brother        Cancer Paternal Grandmother        Diabetes Paternal Grandfather                Allergies        Allergies   Allergen Reactions    Latex Rash and Unknown            Tobacco Use History   Social History           Tobacco Use   Smoking Status Former    Types:  Cigarettes   Smokeless Tobacco Never            Social History          Substance and Sexual Activity   Alcohol Use Never         HOME MEDICATIONS       Current Outpatient Medications   Medication Instructions    albuterol 90 mcg/actuation inhaler inhale 2 puffs every 6 hours if needed    allopurinol (Zyloprim) 100 mg tablet oral    amLODIPine-benazepriL (Lotrel) 10-40 mg capsule TAKE ONE (1) CAPSULE BY MOUTH ONCE DAILY. **(LOTREL 10-40 MG CAP EQUIV)**    ARIPiprazole (ABILIFY) 30 mg, oral, Nightly    atorvastatin (Lipitor) 80 mg tablet 1 tablet, oral, Daily (0630)    busPIRone (Buspar) 10 mg tablet take 2 tablets by mouth twice a day as directed    cholecalciferol (VITAMIN D-3) 25 mcg, oral, Daily    clindamycin (CLEOCIN) 150 mg, oral, Every 6 hours    cyclobenzaprine (Flexeril) 10 mg tablet oral    dicyclomine (Bentyl) 20 mg tablet 4 times daily PRN    docusate sodium (Colace) 100 mg capsule TAKE ONE (1) CAPSULE BY MOUTH ONCE DAILY AS NEEDED. **(COLACE 100 MG CAP EQUIV)**    donepezil (Aricept) 5 mg tablet oral, Daily RT    fenofibrate (Triglide) 160 mg tablet 1 tablet, oral, Daily    gabapentin (Neurontin) 400 mg capsule take 1 capsule by mouth twice a day THEN TAKE 2 CAPSULES AT BEDTIME    hydrOXYzine pamoate (Vistaril) 25 mg capsule take 1 capsule by mouth every evening MAY TAKE IF UNABLE TO SLEEP AFTER 1 HOUR IN BED    KRILL OIL ORAL oral    metFORMIN (GLUCOPHAGE) 500 mg, oral, 2 times daily after meals    metoprolol succinate XL (TOPROL-XL) 50 mg, oral, Daily    naproxen (Naprosyn) 500 mg tablet oral, 2 times daily    omega-3 fatty acids-fish oil (Fish OiL) 340-1,000 mg capsule oral    omeprazole (PRILOSEC) 40 mg, oral, Daily    ondansetron ODT (Zofran-ODT) 8 mg disintegrating tablet Dissolve 1 tablet (8 mg) by mouth every 8 hours if needed for nausea or vomiting.    polyethylene glycol (MIRALAX) 17 g, oral    pravastatin (Pravachol) 20 mg tablet 1 tablet, oral, Nightly    predniSONE (Deltasone) 10 mg tablet  "take 3 tablets by mouth once daily for 3 days then 2 tablets once...  (REFER TO PRESCRIPTION NOTES).    primidone (Mysoline) 50 mg tablet 1 tablet, oral, Nightly    Probiotic, S.boulardii, 250 mg capsule TAKE ONE (1) CAPSULE BY MOUTH ONCE DAILY. *(FLORASTOR 250 MG CAP EQUIV)*    simethicone (Mylicon) 80 mg chewable tablet oral    traZODone (Desyrel) 100 mg tablet take 1 to 3 tablet by mouth at bedtime if needed    Trulicity 3 mg/0.5 mL pen injector      venlafaxine XR (Effexor-XR) 150 mg 24 hr capsule take 2 capsules by mouth once daily as directed            OBJECTIVE   Last Recorded Vitals.  Blood pressure 121/76, pulse 96, height 1.79 m (5' 10.47\"), weight 94.2 kg (207 lb 9.6 oz), SpO2 98%.      PHYSICAL EXAM  Physical Exam   General: Well-developed, well-nourished and in no acute distress.  Head: Normocephalic. Atraumatic.  Neck/thyroid: Neck is supple.   Eyes: Pupils equal round and reactive to light. Conjunctiva normal.  ENMT: No masses or deformity of external nose. External ears without masses.  Respiratory/Chest:  Normal respiratory effort.     Cardiovascular: Regular rate and rhythm.   Abdomen: Abdomen rounded secondary to body habitus.  Slightly tympanitic, but soft.  No peritoneal signs.  Small reducible umbilical hernia.  Musculoskeletal: Joints and limbs are grossly normal. Normal gait. Normal range of motion of major joints.  Neuro: Oriented to person, place and time. No obvious neurological deficit. Motor strength grossly normal.  Psych: Normal mood and affect.     RESULTS   Labs  Component  Ref Range & Units 7 d ago  (6/4/24) 3 mo ago  (3/4/24) 3 mo ago  (2/25/24) 5 mo ago  (12/22/23) 8 mo ago  (9/23/23) 11 mo ago  (6/23/23) 1 yr ago  (10/7/22)   Glucose  74 - 99 mg/dL 327 High  281 High  250 High  441 High  312 High  139 High  260 High    Sodium  136 - 145 mmol/L 131 Low  136 132 Low  132 Low  134 Low  137 136   Potassium  3.5 - 5.3 mmol/L 4.6 4.2 4.7 4.2 5.0 CM 4.0 4.4   Chloride  98 - 107 mmol/L " 98 104 101 99 97 Low  102 102   Bicarbonate  21 - 32 mmol/L 25 22 23 24 23 21 24   Anion Gap  10 - 20 mmol/L 13 14 13 13 19 18 14   Urea Nitrogen  6 - 23 mg/dL 10 20 17 13 34 High  17 11   Creatinine  0.50 - 1.30 mg/dL 0.94 1.00 1.02 1.12 1.40 High  1.15 1.02   eGFR  >60 mL/min/1.73m*2 >90 86 CM 84 CM 75 CM         Comment: Calculations of estimated GFR are performed using the 2021 CKD-EPI Study Refit equation without the race variable for the IDMS-Traceable creatinine methods.  https://jasn.asnjournals.org/content/early/2021/09/22/ASN.4315602547   Calcium  8.6 - 10.3 mg/dL 9.5 9.8 9.0 8.6 10.2 10.4 High  9.3   Albumin  3.4 - 5.0 g/dL 4.2 4.5   4.0 4.7 5.1 High      Alkaline Phosphatase  33 - 136 U/L 105 105   110 142 High  111     Total Protein  6.4 - 8.2 g/dL 6.9 7.3   6.7 7.6 7.8     AST  9 - 39 U/L 21 20   21 43 High  CM 31     Bilirubin, Total  0.0 - 1.2 mg/dL 0.8 1.0   1.4 High  1.5 High  1.7 High      ALT  10 - 52 U/L 36 32 CM   34 CM 66 High  CM 42 CM     Comment: Patients treated with Sulfasalazine may generate falsely decreased results for ALT.   Resulting [facility]         Component  Ref Range & Units 7 d ago  (6/4/24) 3 mo ago  (3/4/24) 3 mo ago  (2/25/24) 5 mo ago  (12/22/23) 8 mo ago  (9/23/23) 11 mo ago  (6/23/23) 1 yr ago  (10/7/22)   WBC  4.4 - 11.3 x10*3/uL 6.5 4.6 5.2 5.6 7.4 R 9.0 R 4.5 R   nRBC  0.0 - 0.0 /100 WBCs 0.0 0.0 0.0 0.0         RBC  4.50 - 5.90 x10*6/uL 3.86 Low  4.16 Low  4.14 Low  4.25 Low  4.34 Low  R 4.69 R 3.92 Low  R   Hemoglobin  13.5 - 17.5 g/dL 13.1 Low  13.7 14.0 14.3 14.6 15.7 13.1 Low    Hematocrit  41.0 - 52.0 % 35.3 Low  38.2 Low  37.8 Low  38.4 Low  39.8 Low  42.3 35.8 Low    MCV  80 - 100 fL 92 92 91 90 92 90 91   MCH  26.0 - 34.0 pg 33.9 32.9 33.8 33.6         MCHC  32.0 - 36.0 g/dL 37.1 High  35.9 37.0 High  37.2 High  36.7 High  37.1 High  36.6 High    RDW  11.5 - 14.5 % 14.6 High  14.0 14.0  13.5 13.5 13.5 13.2   Platelets  150 - 450 x10*3/uL 114 Low  118 Low  107 Low  112 Low  163 R 143 Low  R 126 Low  R         [facility]  US GALLBLADDER; 8:49 am      INDICATION:  Signs/Symptoms:epigastric abd pain n/v.      COMPARISON:  None.      ACCESSION NUMBER(S):  [id number]      ORDERING CLINICIAN:  ROBERT PRITCHETT      TECHNIQUE:  Limited abdominal ultrasound of the right upper quadrant was  performed utilizing gray scale imaging.      FINDINGS:  Liver: Mildly prominent with increased echogenicity of the parenchyma  due to fatty infiltration. No focal lesions. No intrahepatic biliary  distention. Gallbladder: Dilated gallbladder. No gallstones, wall  thickening, or pericholecystic fluid. Sonographic Torres's sign:  Positive Pancreas: The pancreas is obscured by overlying bowel gas.  CBD: 0.3 cm      IMPRESSION:  Dilated gallbladder and positive sonographic Torres's sign, however  no gallstones or biliary duct dilatation. Correlate clinically and if  there is high clinical suspicion for acute calculus cholecystitis  further evaluate with hepatobiliary scan.      MACRO:  None.      Signed by: Sergo Nichole 6/4/2024 9:03 AM  Dictation workstation:   [id number]     CT ABDOMEN PELVIS W IV CONTRAST;  6/4/2024 6:31 am      INDICATION:  Signs/Symptoms:abd pain n/v.      COMPARISON:  09/23/2023      ACCESSION NUMBER(S):  [id number]      ORDERING CLINICIAN:  ROBERT PRITCHETT      TECHNIQUE:  Axial CT images of the abdomen and pelvis with coronal and sagittal  reconstructed images obtained after intravenous administration of  contrast      FINDINGS:  LOWER CHEST: Nodular ground-glass opacities predominantly in the left  lower lobe which may be infectious or inflammatory. Small hiatal  hernia. BONES: No acute osseous abnormality. Mild degenerative  changes present. ABDOMINAL WALL: Small umbilical hernia.      ABDOMEN:      LIVER: Decreased attenuation of the liver parenchyma measuring up to  20.8 cm. BILE  DUCTS: Normal caliber.  GALLBLADDER: No calcified gallstones. The gallbladder is mildly  distended up to 5.1 cm with possible trace wall thickening..  PANCREAS: Unremarkable. SPLEEN: Splenomegaly, 19.2 cm.  ADRENALS: Within normal limits.  KIDNEYS and URETERS: Symmetric renal enhancement. No hydronephrosis  or perinephric fluid collection.          VESSELS: Atherosclerotic calcifications without aneurysmal dilatation  seen. RETROPERITONEUM: No pathologically enlarged retroperitoneal  lymph nodes.      PELVIS:      REPRODUCTIVE ORGANS: The prostate measures up to 6.1 cm, enlarged.  BLADDER: Wall thickening of the urinary bladder noted.      BOWEL: No dilated bowel. Moderate stool burden. Diverticulosis  without CT evidence of acute diverticulitis. Normal appendix.  PERITONEUM: No ascites or free air, no fluid collection.      IMPRESSION:  Ground-glass nodule in opacities in the left lower lobe which may be  infectious or inflammatory.      Mild gallbladder wall distension with minimal wall thickening which  may be reactive. Hepatic steatosis noted.      Splenomegaly superior clinical correlation incurring  lymphoproliferative process recommended.      Moderate stool burden suggestive of constipation.      Diverticulosis.      MACRO:  None      Signed by: Sharmin Calvillo 6/4/2024 6:45 AM  Dictation workstation:   [id number]     HEPATOBILIARY SCAN (HIDA), QUANTITATIVE      The patient received an intravenous dose of  5 mCi of Tc-99m  mebrofenin (Choletec).  Sequential images of the upper abdomen were  then acquired over the next 60 minutes. In additional 30 minutes of  dynamic and static imaging were also obtained. An intravenous  infusion of the cholecystokinin (CCK) analogue, Sincalide, was then  administered followed by an additional period of imaging.  Computer  quantification of gallbladder emptying was also performed      FINDINGS:  There is prompt accumulation of activity within the liver and  adequate  subsequent excretion via the biliary ductal system into the  small bowel.  The gallbladder first visualizes at about  35-40  minutes after radiopharmaceutical injection and progressively fills.  It should be noted that the orientation of the gallbladder was not  typical.      After Sincalide administration, there is poor contraction of the  gallbladder with further anterograde transit of activity into the  small bowel.  The gallbladder ejection fraction is calculated to be  24 % (normal above 38%).      IMPRESSION:  Patency of the common bile duct and cystic duct. No definite evidence  of acute cholecystitis.      Gallbladder ejection fraction was calculated to be 24% which is in  the abnormal range. In the proper clinical context, findings would be  consistent with biliary dyskinesia/chronic cholecystitis.     ASSESSMENT / PLAN   Diagnoses and all orders for this visit:  Right upper quadrant abdominal pain  Biliary dyskinesia  Fatty liver  Constipation, unspecified constipation type        Plan  1.  The patient that all of his abdominal pain does not seem classic for biliary disease.  Certainly some of the right upper quadrant pain could be associated with his gallbladder.  The CT/ultrasound only showed slight distention of his gallbladder which could have been secondary to his n.p.o. status.  HIDA scan demonstrated low gallbladder ejection fraction of 24%.  Reviewed that some of his symptoms may improved with a cholecystectomy surgery. The benefits and risk of the laparoscopic cholecystectomy have been reviewed with the patient.  The nature of the procedure was reviewed with the patient which included the risk and benefits of having surgery.  Alternative treatment options were reviewed. The risks included, but not limited to, infection, bleeding, injury surrounding organs, possible need for open procedure, possible need for other procedure, hernia formation at any incision site, postoperative bile leak, allergic  reaction to medication and death.  2.  Educational material has been given to the patient regarding gallbladder surgery. We also discussed postcholecystectomy urgency/diarrhea.  3.  Surgery was originally scheduled on 7/29/2024.  However, surgery was delayed due to a positive COVID test.  Surgery is now scheduled on 8/26/2024.  4.  Recommended that he keep his appointment with his PCP for better control of his diabetes in case surgical intervention is needed.  5.  He has not seen cardiology in some time.  If he does require surgery, or even endoscopy, he would need cardiac clearance.  Will try to facilitate appointment with cardiology.  6.  He does have a fatty liver, but LFTs were within normal limits.  Recommended ongoing weight loss.  7.  Patient is on Trulicity.  This would need to be held for greater than 7 days prior to any surgical intervention.  8.  Recommended that he stay on a low grease low-fat diet to see if this helps with his symptoms.  The gallbladder dietary sheet was provided from the office.  9.  He may use any over-the-counter medications to help with his constipation. He has not had a colonoscopy in more than 10 years. CT scan showed constipation and may need to consider EGD.        Sandra Jesus MD, FACS  [facility] [place] [facility]  [id number] [facility];   [address]  [place]  [address] [phone]

## 2024-08-23 NOTE — H&P (VIEW-ONLY)
"GENERAL SURGERY PREOP H&P     Patient: Mane Nath    Age: 63 y.o.   Gender: male    MRN: [id number]    PCP: Thalia Smith MD          SUBJECTIVE      HPI  Mane is a 63-year-old white male who presents for a laparoscopic cholecystectomy surgery for biliary dyskinesia about a month ago, he had gone to the emergency room in the evening after eating several corndogs for dinner.  He was experiencing some diffuse abdominal pain as well as right upper quadrant abdominal pain.  The pain had been intermittent.  He had a right upper quadrant ultrasound and CT evaluation of the abdomen/pelvis in the ER which both showed slight dilation of the gallbladder, but no gallstones and no secondary evidence of acute cholecystitis.  His WBC and LFTs were all within normal limits at that time.  He was given a 5-day course of levofloxacin which he felt helped with his abdominal pain.  He took all of the Percocet pain medication, but did not take any over-the-counter medications.  His last colonoscopy was more than 10 years ago, and he has never had an EGD.  He does feel that eating makes his abdominal pain worse.  He has been on Prilosec for \"a few months\" due to some reflux symptoms.  His CT evaluation also showed constipation and a fatty liver.  He was found to have a glucose level greater than 300.  He has been to the emergency room 4 times in the last 6 months.  He states that he has an appointment with his PCP next week, but they are not on [facility] associated.  He is being followed by cardiology for chronic dizziness, hypertension, hypercholesterolemia, cardiac palpitations and dyspnea on exertion, but has not seen cardiology in more than a year.  He is not currently running any fevers.  Denies any diarrhea.  He had undergone a HIDA scan which showed a gallbladder ejection fraction of 24%.  Cholecystectomy was recommended.     ROS  Review of Systems   Constitutional:  Negative for chills, fatigue and fever.   HENT:  Positive for " hearing loss. Negative for congestion and ear pain.    Eyes:  Negative for pain and redness.   Respiratory:  Positive for shortness of breath. Negative for cough.    Cardiovascular:  Positive for palpitations. Negative for chest pain and leg swelling.   Gastrointestinal:  Positive for abdominal pain and constipation. Negative for diarrhea, nausea and vomiting.   Endocrine: Negative for polyphagia.   Genitourinary:  Negative for dysuria, flank pain, frequency and hematuria.   Musculoskeletal:  Positive for arthralgias and myalgias.   Skin:  Negative for rash and wound.   Allergic/Immunologic: Negative for immunocompromised state.   Neurological:  Positive for weakness. Negative for speech difficulty and headaches.   Hematological:  Does not bruise/bleed easily.   Psychiatric/Behavioral:  Positive for confusion. Negative for agitation.             HISTORY      Medical History        Past Medical History:   Diagnosis Date   • Diabetes (Multi)     • HTN (hypertension)     • Personal history of other diseases of the musculoskeletal system and connective tissue       History of gout   • Personal history of other mental and behavioral disorders       History of depression            Surgical History         Past Surgical History:   Procedure Laterality Date   • NECK SURGERY   02/01/2018     Neck Surgery   • OTHER SURGICAL HISTORY   02/01/2018     Surgery Excision Lipoma   • OTHER SURGICAL HISTORY   04/25/2022     Colonoscopy            Family History          Family History   Problem Relation Name Age of Onset   • Dementia Mother       • Heart attack Father       • Cholecystitis Sister             x3   • Cancer Father's Sister       • Prostate cancer Father's Brother       • Cancer Paternal Grandmother       • Diabetes Paternal Grandfather                Allergies        Allergies   Allergen Reactions   • Latex Rash and Unknown            Tobacco Use History   Social History           Tobacco Use   Smoking Status Former    • Types: Cigarettes   Smokeless Tobacco Never            Social History          Substance and Sexual Activity   Alcohol Use Never         HOME MEDICATIONS       Current Outpatient Medications   Medication Instructions   • albuterol 90 mcg/actuation inhaler inhale 2 puffs every 6 hours if needed   • allopurinol (Zyloprim) 100 mg tablet oral   • amLODIPine-benazepriL (Lotrel) 10-40 mg capsule TAKE ONE (1) CAPSULE BY MOUTH ONCE DAILY. **(LOTREL 10-40 MG CAP EQUIV)**   • ARIPiprazole (ABILIFY) 30 mg, oral, Nightly   • atorvastatin (Lipitor) 80 mg tablet 1 tablet, oral, Daily (0630)   • busPIRone (Buspar) 10 mg tablet take 2 tablets by mouth twice a day as directed   • cholecalciferol (VITAMIN D-3) 25 mcg, oral, Daily   • clindamycin (CLEOCIN) 150 mg, oral, Every 6 hours   • cyclobenzaprine (Flexeril) 10 mg tablet oral   • dicyclomine (Bentyl) 20 mg tablet 4 times daily PRN   • docusate sodium (Colace) 100 mg capsule TAKE ONE (1) CAPSULE BY MOUTH ONCE DAILY AS NEEDED. **(COLACE 100 MG CAP EQUIV)**   • donepezil (Aricept) 5 mg tablet oral, Daily RT   • fenofibrate (Triglide) 160 mg tablet 1 tablet, oral, Daily   • gabapentin (Neurontin) 400 mg capsule take 1 capsule by mouth twice a day THEN TAKE 2 CAPSULES AT BEDTIME   • hydrOXYzine pamoate (Vistaril) 25 mg capsule take 1 capsule by mouth every evening MAY TAKE IF UNABLE TO SLEEP AFTER 1 HOUR IN BED   • KRILL OIL ORAL oral   • metFORMIN (GLUCOPHAGE) 500 mg, oral, 2 times daily after meals   • metoprolol succinate XL (TOPROL-XL) 50 mg, oral, Daily   • naproxen (Naprosyn) 500 mg tablet oral, 2 times daily   • omega-3 fatty acids-fish oil (Fish OiL) 340-1,000 mg capsule oral   • omeprazole (PRILOSEC) 40 mg, oral, Daily   • ondansetron ODT (Zofran-ODT) 8 mg disintegrating tablet Dissolve 1 tablet (8 mg) by mouth every 8 hours if needed for nausea or vomiting.   • polyethylene glycol (MIRALAX) 17 g, oral   • pravastatin (Pravachol) 20 mg tablet 1 tablet, oral, Nightly   •  "predniSONE (Deltasone) 10 mg tablet take 3 tablets by mouth once daily for 3 days then 2 tablets once...  (REFER TO PRESCRIPTION NOTES).   • primidone (Mysoline) 50 mg tablet 1 tablet, oral, Nightly   • Probiotic, S.boulardii, 250 mg capsule TAKE ONE (1) CAPSULE BY MOUTH ONCE DAILY. *(FLORASTOR 250 MG CAP EQUIV)*   • simethicone (Mylicon) 80 mg chewable tablet oral   • traZODone (Desyrel) 100 mg tablet take 1 to 3 tablet by mouth at bedtime if needed   • Trulicity 3 mg/0.5 mL pen injector     • venlafaxine XR (Effexor-XR) 150 mg 24 hr capsule take 2 capsules by mouth once daily as directed            OBJECTIVE   Last Recorded Vitals.  Blood pressure 121/76, pulse 96, height 1.79 m (5' 10.47\"), weight 94.2 kg (207 lb 9.6 oz), SpO2 98%.      PHYSICAL EXAM  Physical Exam   General: Well-developed, well-nourished and in no acute distress.  Head: Normocephalic. Atraumatic.  Neck/thyroid: Neck is supple.   Eyes: Pupils equal round and reactive to light. Conjunctiva normal.  ENMT: No masses or deformity of external nose. External ears without masses.  Respiratory/Chest:  Normal respiratory effort.     Cardiovascular: Regular rate and rhythm.   Abdomen: Abdomen rounded secondary to body habitus.  Slightly tympanitic, but soft.  No peritoneal signs.  Small reducible umbilical hernia.  Musculoskeletal: Joints and limbs are grossly normal. Normal gait. Normal range of motion of major joints.  Neuro: Oriented to person, place and time. No obvious neurological deficit. Motor strength grossly normal.  Psych: Normal mood and affect.     RESULTS   Labs  Component  Ref Range & Units 7 d ago  (6/4/24) 3 mo ago  (3/4/24) 3 mo ago  (2/25/24) 5 mo ago  (12/22/23) 8 mo ago  (9/23/23) 11 mo ago  (6/23/23) 1 yr ago  (10/7/22)   Glucose  74 - 99 mg/dL 327 High  281 High  250 High  441 High  312 High  139 High  260 High    Sodium  136 - 145 mmol/L 131 Low  136 132 Low  132 Low  134 Low  137 136   Potassium  3.5 - 5.3 mmol/L 4.6 4.2 4.7 4.2 " 5.0 CM 4.0 4.4   Chloride  98 - 107 mmol/L 98 104 101 99 97 Low  102 102   Bicarbonate  21 - 32 mmol/L 25 22 23 24 23 21 24   Anion Gap  10 - 20 mmol/L 13 14 13 13 19 18 14   Urea Nitrogen  6 - 23 mg/dL 10 20 17 13 34 High  17 11   Creatinine  0.50 - 1.30 mg/dL 0.94 1.00 1.02 1.12 1.40 High  1.15 1.02   eGFR  >60 mL/min/1.73m*2 >90 86 CM 84 CM 75 CM         Comment: Calculations of estimated GFR are performed using the 2021 CKD-EPI Study Refit equation without the race variable for the IDMS-Traceable creatinine methods.  https://jasn.asnjournals.org/content/early/2021/09/22/ASN.6575637374   Calcium  8.6 - 10.3 mg/dL 9.5 9.8 9.0 8.6 10.2 10.4 High  9.3   Albumin  3.4 - 5.0 g/dL 4.2 4.5   4.0 4.7 5.1 High      Alkaline Phosphatase  33 - 136 U/L 105 105   110 142 High  111     Total Protein  6.4 - 8.2 g/dL 6.9 7.3   6.7 7.6 7.8     AST  9 - 39 U/L 21 20   21 43 High  CM 31     Bilirubin, Total  0.0 - 1.2 mg/dL 0.8 1.0   1.4 High  1.5 High  1.7 High      ALT  10 - 52 U/L 36 32 CM   34 CM 66 High  CM 42 CM     Comment: Patients treated with Sulfasalazine may generate falsely decreased results for ALT.   Resulting Agency [facility]         Component  Ref Range & Units 7 d ago  (6/4/24) 3 mo ago  (3/4/24) 3 mo ago  (2/25/24) 5 mo ago  (12/22/23) 8 mo ago  (9/23/23) 11 mo ago  (6/23/23) 1 yr ago  (10/7/22)   WBC  4.4 - 11.3 x10*3/uL 6.5 4.6 5.2 5.6 7.4 R 9.0 R 4.5 R   nRBC  0.0 - 0.0 /100 WBCs 0.0 0.0 0.0 0.0         RBC  4.50 - 5.90 x10*6/uL 3.86 Low  4.16 Low  4.14 Low  4.25 Low  4.34 Low  R 4.69 R 3.92 Low  R   Hemoglobin  13.5 - 17.5 g/dL 13.1 Low  13.7 14.0 14.3 14.6 15.7 13.1 Low    Hematocrit  41.0 - 52.0 % 35.3 Low  38.2 Low  37.8 Low  38.4 Low  39.8 Low  42.3 35.8 Low    MCV  80 - 100 fL 92 92 91 90 92 90 91   MCH  26.0 - 34.0 pg 33.9 32.9 33.8 33.6         MCHC  32.0 - 36.0 g/dL 37.1 High  35.9 37.0 High  37.2 High  36.7 High  37.1 High  36.6 High     RDW  11.5 - 14.5 % 14.6 High  14.0 14.0 13.5 13.5 13.5 13.2   Platelets  150 - 450 x10*3/uL 114 Low  118 Low  107 Low  112 Low  163 R 143 Low  R 126 Low  R         [facility]  US GALLBLADDER; 8:49 am      INDICATION:  Signs/Symptoms:epigastric abd pain n/v.      COMPARISON:  None.      ACCESSION NUMBER(S):  [id number]      ORDERING CLINICIAN:  ROBERT PRITCHETT      TECHNIQUE:  Limited abdominal ultrasound of the right upper quadrant was  performed utilizing gray scale imaging.      FINDINGS:  Liver: Mildly prominent with increased echogenicity of the parenchyma  due to fatty infiltration. No focal lesions. No intrahepatic biliary  distention. Gallbladder: Dilated gallbladder. No gallstones, wall  thickening, or pericholecystic fluid. Sonographic Torres's sign:  Positive Pancreas: The pancreas is obscured by overlying bowel gas.  CBD: 0.3 cm      IMPRESSION:  Dilated gallbladder and positive sonographic Torres's sign, however  no gallstones or biliary duct dilatation. Correlate clinically and if  there is high clinical suspicion for acute calculus cholecystitis  further evaluate with hepatobiliary scan.      MACRO:  None.      Signed by: Sergo Nichole 6/4/2024 9:03 AM  Dictation workstation:   [id number]     CT ABDOMEN PELVIS W IV CONTRAST;  6/4/2024 6:31 am      INDICATION:  Signs/Symptoms:abd pain n/v.      COMPARISON:  09/23/2023      ACCESSION NUMBER(S):  [id number]      ORDERING CLINICIAN:  ROBERT PRITCHETT      TECHNIQUE:  Axial CT images of the abdomen and pelvis with coronal and sagittal  reconstructed images obtained after intravenous administration of  contrast      FINDINGS:  LOWER CHEST: Nodular ground-glass opacities predominantly in the left  lower lobe which may be infectious or inflammatory. Small hiatal  hernia. BONES: No acute osseous abnormality. Mild degenerative  changes present. ABDOMINAL WALL: Small umbilical hernia.      ABDOMEN:      LIVER: Decreased attenuation of the liver  parenchyma measuring up to  20.8 cm. BILE DUCTS: Normal caliber.  GALLBLADDER: No calcified gallstones. The gallbladder is mildly  distended up to 5.1 cm with possible trace wall thickening..  PANCREAS: Unremarkable. SPLEEN: Splenomegaly, 19.2 cm.  ADRENALS: Within normal limits.  KIDNEYS and URETERS: Symmetric renal enhancement. No hydronephrosis  or perinephric fluid collection.          VESSELS: Atherosclerotic calcifications without aneurysmal dilatation  seen. RETROPERITONEUM: No pathologically enlarged retroperitoneal  lymph nodes.      PELVIS:      REPRODUCTIVE ORGANS: The prostate measures up to 6.1 cm, enlarged.  BLADDER: Wall thickening of the urinary bladder noted.      BOWEL: No dilated bowel. Moderate stool burden. Diverticulosis  without CT evidence of acute diverticulitis. Normal appendix.  PERITONEUM: No ascites or free air, no fluid collection.      IMPRESSION:  Ground-glass nodule in opacities in the left lower lobe which may be  infectious or inflammatory.      Mild gallbladder wall distension with minimal wall thickening which  may be reactive. Hepatic steatosis noted.      Splenomegaly superior clinical correlation incurring  lymphoproliferative process recommended.      Moderate stool burden suggestive of constipation.      Diverticulosis.      MACRO:  None      Signed by: Sharmin Calvillo 6/4/2024 6:45 AM  Dictation workstation:   [id number]     HEPATOBILIARY SCAN (HIDA), QUANTITATIVE      The patient received an intravenous dose of  5 mCi of Tc-99m  mebrofenin (Choletec).  Sequential images of the upper abdomen were  then acquired over the next 60 minutes. In additional 30 minutes of  dynamic and static imaging were also obtained. An intravenous  infusion of the cholecystokinin (CCK) analogue, Sincalide, was then  administered followed by an additional period of imaging.  Computer  quantification of gallbladder emptying was also performed      FINDINGS:  There is prompt accumulation of  activity within the liver and  adequate subsequent excretion via the biliary ductal system into the  small bowel.  The gallbladder first visualizes at about  35-40  minutes after radiopharmaceutical injection and progressively fills.  It should be noted that the orientation of the gallbladder was not  typical.      After Sincalide administration, there is poor contraction of the  gallbladder with further anterograde transit of activity into the  small bowel.  The gallbladder ejection fraction is calculated to be  24 % (normal above 38%).      IMPRESSION:  Patency of the common bile duct and cystic duct. No definite evidence  of acute cholecystitis.      Gallbladder ejection fraction was calculated to be 24% which is in  the abnormal range. In the proper clinical context, findings would be  consistent with biliary dyskinesia/chronic cholecystitis.     ASSESSMENT / PLAN   Diagnoses and all orders for this visit:  Right upper quadrant abdominal pain  Biliary dyskinesia  Fatty liver  Constipation, unspecified constipation type        Plan  1.  The patient that all of his abdominal pain does not seem classic for biliary disease.  Certainly some of the right upper quadrant pain could be associated with his gallbladder.  The CT/ultrasound only showed slight distention of his gallbladder which could have been secondary to his n.p.o. status.  HIDA scan demonstrated low gallbladder ejection fraction of 24%.  Reviewed that some of his symptoms may improved with a cholecystectomy surgery. The benefits and risk of the laparoscopic cholecystectomy have been reviewed with the patient.  The nature of the procedure was reviewed with the patient which included the risk and benefits of having surgery.  Alternative treatment options were reviewed. The risks included, but not limited to, infection, bleeding, injury surrounding organs, possible need for open procedure, possible need for other procedure, hernia formation at any incision  site, postoperative bile leak, allergic reaction to medication and death.  2.  Educational material has been given to the patient regarding gallbladder surgery. We also discussed postcholecystectomy urgency/diarrhea.  3.  Surgery was originally scheduled on 7/29/2024.  However, surgery was delayed due to a positive COVID test.  Surgery is now scheduled on 8/26/2024.  4.  Recommended that he keep his appointment with his PCP for better control of his diabetes in case surgical intervention is needed.  5.  He has not seen cardiology in some time.  If he does require surgery, or even endoscopy, he would need cardiac clearance.  Will try to facilitate appointment with cardiology.  6.  He does have a fatty liver, but LFTs were within normal limits.  Recommended ongoing weight loss.  7.  Patient is on Trulicity.  This would need to be held for greater than 7 days prior to any surgical intervention.  8.  Recommended that he stay on a low grease low-fat diet to see if this helps with his symptoms.  The gallbladder dietary sheet was provided from the office.  9.  He may use any over-the-counter medications to help with his constipation. He has not had a colonoscopy in more than 10 years. CT scan showed constipation and may need to consider EGD.        Sandra Jessu MD, FACS  [facility]  [address];   [employer] [address]; [address]  [place]  [address] [phone]

## 2024-08-26 ENCOUNTER — ANESTHESIA (OUTPATIENT)
Dept: OPERATING ROOM | Facility: HOSPITAL | Age: 64
End: 2024-08-26
Payer: COMMERCIAL

## 2024-08-26 ENCOUNTER — HOSPITAL ENCOUNTER (INPATIENT)
Facility: HOSPITAL | Age: 64
LOS: 2 days | Discharge: NURSING, REHAB OR LONG-TERM CARE | End: 2024-08-29
Attending: SURGERY | Admitting: SURGERY
Payer: COMMERCIAL

## 2024-08-26 DIAGNOSIS — K80.13 CALCULUS OF GALLBLADDER WITH ACUTE ON CHRONIC CHOLECYSTITIS WITH OBSTRUCTION: Primary | ICD-10-CM

## 2024-08-26 DIAGNOSIS — R00.2 HEART PALPITATIONS: ICD-10-CM

## 2024-08-26 DIAGNOSIS — K82.8 BILIARY DYSKINESIA: ICD-10-CM

## 2024-08-26 PROBLEM — T88.4XXA DIFFICULT INTUBATION: Status: ACTIVE | Noted: 2024-08-26

## 2024-08-26 LAB
GLUCOSE BLD MANUAL STRIP-MCNC: 269 MG/DL (ref 74–99)
GLUCOSE BLD MANUAL STRIP-MCNC: 288 MG/DL (ref 74–99)
GLUCOSE BLD MANUAL STRIP-MCNC: 293 MG/DL (ref 74–99)
GLUCOSE BLD MANUAL STRIP-MCNC: 345 MG/DL (ref 74–99)
GLUCOSE BLD MANUAL STRIP-MCNC: 346 MG/DL (ref 74–99)

## 2024-08-26 PROCEDURE — 3600000003 HC OR TIME - INITIAL BASE CHARGE - PROCEDURE LEVEL THREE: Performed by: SURGERY

## 2024-08-26 PROCEDURE — 47600 CHOLECYSTECTOMY: CPT | Performed by: SURGERY

## 2024-08-26 PROCEDURE — 0FB14ZX EXCISION OF RIGHT LOBE LIVER, PERCUTANEOUS ENDOSCOPIC APPROACH, DIAGNOSTIC: ICD-10-PCS | Performed by: SURGERY

## 2024-08-26 PROCEDURE — 7100000001 HC RECOVERY ROOM TIME - INITIAL BASE CHARGE: Performed by: SURGERY

## 2024-08-26 PROCEDURE — 2500000004 HC RX 250 GENERAL PHARMACY W/ HCPCS (ALT 636 FOR OP/ED): Performed by: ANESTHESIOLOGY

## 2024-08-26 PROCEDURE — 2720000007 HC OR 272 NO HCPCS: Performed by: SURGERY

## 2024-08-26 PROCEDURE — G0378 HOSPITAL OBSERVATION PER HR: HCPCS

## 2024-08-26 PROCEDURE — 2500000002 HC RX 250 W HCPCS SELF ADMINISTERED DRUGS (ALT 637 FOR MEDICARE OP, ALT 636 FOR OP/ED): Performed by: SURGERY

## 2024-08-26 PROCEDURE — 3600000008 HC OR TIME - EACH INCREMENTAL 1 MINUTE - PROCEDURE LEVEL THREE: Performed by: SURGERY

## 2024-08-26 PROCEDURE — 2500000005 HC RX 250 GENERAL PHARMACY W/O HCPCS: Performed by: NURSE ANESTHETIST, CERTIFIED REGISTERED

## 2024-08-26 PROCEDURE — 3700000002 HC GENERAL ANESTHESIA TIME - EACH INCREMENTAL 1 MINUTE: Performed by: SURGERY

## 2024-08-26 PROCEDURE — 0FJ44ZZ INSPECTION OF GALLBLADDER, PERCUTANEOUS ENDOSCOPIC APPROACH: ICD-10-PCS | Performed by: SURGERY

## 2024-08-26 PROCEDURE — 47100 WEDGE BIOPSY OF LIVER: CPT | Performed by: SURGERY

## 2024-08-26 PROCEDURE — 2500000004 HC RX 250 GENERAL PHARMACY W/ HCPCS (ALT 636 FOR OP/ED): Performed by: NURSE ANESTHETIST, CERTIFIED REGISTERED

## 2024-08-26 PROCEDURE — 2500000002 HC RX 250 W HCPCS SELF ADMINISTERED DRUGS (ALT 637 FOR MEDICARE OP, ALT 636 FOR OP/ED): Performed by: ANESTHESIOLOGY

## 2024-08-26 PROCEDURE — 7100000002 HC RECOVERY ROOM TIME - EACH INCREMENTAL 1 MINUTE: Performed by: SURGERY

## 2024-08-26 PROCEDURE — 82947 ASSAY GLUCOSE BLOOD QUANT: CPT

## 2024-08-26 PROCEDURE — 0FB40ZZ EXCISION OF GALLBLADDER, OPEN APPROACH: ICD-10-PCS | Performed by: SURGERY

## 2024-08-26 PROCEDURE — 2500000004 HC RX 250 GENERAL PHARMACY W/ HCPCS (ALT 636 FOR OP/ED): Performed by: SURGERY

## 2024-08-26 PROCEDURE — 2500000001 HC RX 250 WO HCPCS SELF ADMINISTERED DRUGS (ALT 637 FOR MEDICARE OP): Performed by: SURGERY

## 2024-08-26 PROCEDURE — 3700000001 HC GENERAL ANESTHESIA TIME - INITIAL BASE CHARGE: Performed by: SURGERY

## 2024-08-26 PROCEDURE — 2500000005 HC RX 250 GENERAL PHARMACY W/O HCPCS: Performed by: SURGERY

## 2024-08-26 PROCEDURE — 88304 TISSUE EXAM BY PATHOLOGIST: CPT | Mod: TC,PORLAB | Performed by: SURGERY

## 2024-08-26 RX ORDER — ALBUTEROL SULFATE 90 UG/1
2 INHALANT RESPIRATORY (INHALATION) EVERY 4 HOURS PRN
Status: DISCONTINUED | OUTPATIENT
Start: 2024-08-26 | End: 2024-08-30 | Stop reason: HOSPADM

## 2024-08-26 RX ORDER — INSULIN LISPRO 100 [IU]/ML
8 INJECTION, SOLUTION INTRAVENOUS; SUBCUTANEOUS ONCE
Status: COMPLETED | OUTPATIENT
Start: 2024-08-26 | End: 2024-08-26

## 2024-08-26 RX ORDER — PRAVASTATIN SODIUM 20 MG/1
20 TABLET ORAL NIGHTLY
Status: DISCONTINUED | OUTPATIENT
Start: 2024-08-26 | End: 2024-08-27

## 2024-08-26 RX ORDER — ONDANSETRON 4 MG/1
4 TABLET, ORALLY DISINTEGRATING ORAL EVERY 8 HOURS PRN
Status: DISCONTINUED | OUTPATIENT
Start: 2024-08-26 | End: 2024-08-30 | Stop reason: HOSPADM

## 2024-08-26 RX ORDER — HYDRALAZINE HYDROCHLORIDE 20 MG/ML
5 INJECTION INTRAMUSCULAR; INTRAVENOUS EVERY 30 MIN PRN
Status: DISCONTINUED | OUTPATIENT
Start: 2024-08-26 | End: 2024-08-26 | Stop reason: HOSPADM

## 2024-08-26 RX ORDER — MIDAZOLAM HYDROCHLORIDE 1 MG/ML
INJECTION, SOLUTION INTRAMUSCULAR; INTRAVENOUS AS NEEDED
Status: DISCONTINUED | OUTPATIENT
Start: 2024-08-26 | End: 2024-08-26

## 2024-08-26 RX ORDER — FAMOTIDINE 10 MG/ML
20 INJECTION INTRAVENOUS ONCE
Status: COMPLETED | OUTPATIENT
Start: 2024-08-26 | End: 2024-08-26

## 2024-08-26 RX ORDER — OXYCODONE HYDROCHLORIDE 10 MG/1
10 TABLET ORAL EVERY 4 HOURS PRN
Status: DISCONTINUED | OUTPATIENT
Start: 2024-08-26 | End: 2024-08-27

## 2024-08-26 RX ORDER — AMLODIPINE BESYLATE 5 MG/1
5 TABLET ORAL DAILY
Status: DISCONTINUED | OUTPATIENT
Start: 2024-08-26 | End: 2024-08-30 | Stop reason: HOSPADM

## 2024-08-26 RX ORDER — BUSPIRONE HYDROCHLORIDE 5 MG/1
10 TABLET ORAL 2 TIMES DAILY
Status: DISCONTINUED | OUTPATIENT
Start: 2024-08-26 | End: 2024-08-30 | Stop reason: HOSPADM

## 2024-08-26 RX ORDER — VENLAFAXINE HYDROCHLORIDE 150 MG/1
150 CAPSULE, EXTENDED RELEASE ORAL
Status: DISCONTINUED | OUTPATIENT
Start: 2024-08-27 | End: 2024-08-30 | Stop reason: HOSPADM

## 2024-08-26 RX ORDER — LISINOPRIL 20 MG/1
40 TABLET ORAL DAILY
Status: DISCONTINUED | OUTPATIENT
Start: 2024-08-26 | End: 2024-08-27

## 2024-08-26 RX ORDER — NALOXONE HYDROCHLORIDE 1 MG/ML
0.2 INJECTION INTRAMUSCULAR; INTRAVENOUS; SUBCUTANEOUS EVERY 5 MIN PRN
Status: DISCONTINUED | OUTPATIENT
Start: 2024-08-26 | End: 2024-08-30 | Stop reason: HOSPADM

## 2024-08-26 RX ORDER — METFORMIN HYDROCHLORIDE 500 MG/1
500 TABLET ORAL
Status: DISCONTINUED | OUTPATIENT
Start: 2024-08-26 | End: 2024-08-30 | Stop reason: HOSPADM

## 2024-08-26 RX ORDER — DAPAGLIFLOZIN 5 MG/1
10 TABLET, FILM COATED ORAL DAILY
Status: DISCONTINUED | OUTPATIENT
Start: 2024-08-26 | End: 2024-08-30 | Stop reason: HOSPADM

## 2024-08-26 RX ORDER — OXYCODONE AND ACETAMINOPHEN 5; 325 MG/1; MG/1
1 TABLET ORAL EVERY 4 HOURS PRN
Status: DISCONTINUED | OUTPATIENT
Start: 2024-08-26 | End: 2024-08-26 | Stop reason: HOSPADM

## 2024-08-26 RX ORDER — MEPERIDINE HYDROCHLORIDE 25 MG/ML
12.5 INJECTION INTRAMUSCULAR; INTRAVENOUS; SUBCUTANEOUS EVERY 10 MIN PRN
Status: DISCONTINUED | OUTPATIENT
Start: 2024-08-26 | End: 2024-08-26 | Stop reason: HOSPADM

## 2024-08-26 RX ORDER — CEFAZOLIN 1 G/1
INJECTION, POWDER, FOR SOLUTION INTRAVENOUS AS NEEDED
Status: DISCONTINUED | OUTPATIENT
Start: 2024-08-26 | End: 2024-08-26

## 2024-08-26 RX ORDER — GABAPENTIN 400 MG/1
400 CAPSULE ORAL EVERY 8 HOURS SCHEDULED
Status: DISCONTINUED | OUTPATIENT
Start: 2024-08-26 | End: 2024-08-30 | Stop reason: HOSPADM

## 2024-08-26 RX ORDER — DICLOFENAC SODIUM 10 MG/G
GEL TOPICAL
COMMUNITY

## 2024-08-26 RX ORDER — FENTANYL CITRATE 50 UG/ML
INJECTION, SOLUTION INTRAMUSCULAR; INTRAVENOUS AS NEEDED
Status: DISCONTINUED | OUTPATIENT
Start: 2024-08-26 | End: 2024-08-26

## 2024-08-26 RX ORDER — ARIPIPRAZOLE 10 MG/1
30 TABLET ORAL NIGHTLY
Status: DISCONTINUED | OUTPATIENT
Start: 2024-08-26 | End: 2024-08-30 | Stop reason: HOSPADM

## 2024-08-26 RX ORDER — ONDANSETRON HYDROCHLORIDE 2 MG/ML
4 INJECTION, SOLUTION INTRAVENOUS EVERY 8 HOURS PRN
Status: DISCONTINUED | OUTPATIENT
Start: 2024-08-26 | End: 2024-08-30 | Stop reason: HOSPADM

## 2024-08-26 RX ORDER — DIPHENHYDRAMINE HYDROCHLORIDE 50 MG/ML
12.5 INJECTION INTRAMUSCULAR; INTRAVENOUS ONCE AS NEEDED
Status: DISCONTINUED | OUTPATIENT
Start: 2024-08-26 | End: 2024-08-26 | Stop reason: HOSPADM

## 2024-08-26 RX ORDER — ATORVASTATIN CALCIUM 40 MG/1
80 TABLET, FILM COATED ORAL
Status: DISCONTINUED | OUTPATIENT
Start: 2024-08-26 | End: 2024-08-30 | Stop reason: HOSPADM

## 2024-08-26 RX ORDER — LIDOCAINE HYDROCHLORIDE 10 MG/ML
0.1 INJECTION, SOLUTION EPIDURAL; INFILTRATION; INTRACAUDAL; PERINEURAL ONCE
Status: DISCONTINUED | OUTPATIENT
Start: 2024-08-26 | End: 2024-08-26 | Stop reason: HOSPADM

## 2024-08-26 RX ORDER — ROCURONIUM BROMIDE 50 MG/5 ML
SYRINGE (ML) INTRAVENOUS AS NEEDED
Status: DISCONTINUED | OUTPATIENT
Start: 2024-08-26 | End: 2024-08-26

## 2024-08-26 RX ORDER — DEXTROSE 50 % IN WATER (D50W) INTRAVENOUS SYRINGE
12.5
Status: DISCONTINUED | OUTPATIENT
Start: 2024-08-26 | End: 2024-08-30 | Stop reason: HOSPADM

## 2024-08-26 RX ORDER — ONDANSETRON HYDROCHLORIDE 2 MG/ML
4 INJECTION, SOLUTION INTRAVENOUS ONCE AS NEEDED
Status: DISCONTINUED | OUTPATIENT
Start: 2024-08-26 | End: 2024-08-26 | Stop reason: HOSPADM

## 2024-08-26 RX ORDER — SODIUM CHLORIDE, SODIUM LACTATE, POTASSIUM CHLORIDE, CALCIUM CHLORIDE 600; 310; 30; 20 MG/100ML; MG/100ML; MG/100ML; MG/100ML
100 INJECTION, SOLUTION INTRAVENOUS CONTINUOUS
Status: DISCONTINUED | OUTPATIENT
Start: 2024-08-26 | End: 2024-08-26 | Stop reason: HOSPADM

## 2024-08-26 RX ORDER — INSULIN LISPRO 100 [IU]/ML
0-10 INJECTION, SOLUTION INTRAVENOUS; SUBCUTANEOUS
Status: DISCONTINUED | OUTPATIENT
Start: 2024-08-26 | End: 2024-08-26

## 2024-08-26 RX ORDER — CEFAZOLIN SODIUM 2 G/100ML
2 INJECTION, SOLUTION INTRAVENOUS ONCE
Status: DISCONTINUED | OUTPATIENT
Start: 2024-08-26 | End: 2024-08-26 | Stop reason: HOSPADM

## 2024-08-26 RX ORDER — FENOFIBRATE 160 MG/1
160 TABLET ORAL DAILY
Status: DISCONTINUED | OUTPATIENT
Start: 2024-08-26 | End: 2024-08-30 | Stop reason: HOSPADM

## 2024-08-26 RX ORDER — BUPIVACAINE HCL/EPINEPHRINE 0.5-1:200K
VIAL (ML) INJECTION AS NEEDED
Status: DISCONTINUED | OUTPATIENT
Start: 2024-08-26 | End: 2024-08-26 | Stop reason: HOSPADM

## 2024-08-26 RX ORDER — ACETAMINOPHEN 325 MG/1
650 TABLET ORAL EVERY 6 HOURS
Status: DISCONTINUED | OUTPATIENT
Start: 2024-08-26 | End: 2024-08-30 | Stop reason: HOSPADM

## 2024-08-26 RX ORDER — INSULIN LISPRO 100 [IU]/ML
0-10 INJECTION, SOLUTION INTRAVENOUS; SUBCUTANEOUS
Status: DISCONTINUED | OUTPATIENT
Start: 2024-08-27 | End: 2024-08-30 | Stop reason: HOSPADM

## 2024-08-26 RX ORDER — DROPERIDOL 2.5 MG/ML
0.62 INJECTION, SOLUTION INTRAMUSCULAR; INTRAVENOUS ONCE AS NEEDED
Status: DISCONTINUED | OUTPATIENT
Start: 2024-08-26 | End: 2024-08-26 | Stop reason: HOSPADM

## 2024-08-26 RX ORDER — DEXTROSE 50 % IN WATER (D50W) INTRAVENOUS SYRINGE
25
Status: DISCONTINUED | OUTPATIENT
Start: 2024-08-26 | End: 2024-08-30 | Stop reason: HOSPADM

## 2024-08-26 RX ORDER — ACETAMINOPHEN 650 MG/1
650 SUPPOSITORY RECTAL EVERY 6 HOURS
Status: DISCONTINUED | OUTPATIENT
Start: 2024-08-26 | End: 2024-08-30 | Stop reason: HOSPADM

## 2024-08-26 RX ORDER — ONDANSETRON HYDROCHLORIDE 2 MG/ML
INJECTION, SOLUTION INTRAVENOUS AS NEEDED
Status: DISCONTINUED | OUTPATIENT
Start: 2024-08-26 | End: 2024-08-26

## 2024-08-26 RX ORDER — DONEPEZIL HYDROCHLORIDE 5 MG/1
5 TABLET, FILM COATED ORAL
Status: DISCONTINUED | OUTPATIENT
Start: 2024-08-26 | End: 2024-08-30 | Stop reason: HOSPADM

## 2024-08-26 RX ORDER — OMEPRAZOLE 40 MG/1
40 CAPSULE, DELAYED RELEASE ORAL
COMMUNITY

## 2024-08-26 RX ORDER — POLYETHYLENE GLYCOL 3350 17 G/17G
17 POWDER, FOR SOLUTION ORAL DAILY
Status: DISCONTINUED | OUTPATIENT
Start: 2024-08-26 | End: 2024-08-30 | Stop reason: HOSPADM

## 2024-08-26 RX ORDER — LABETALOL HYDROCHLORIDE 5 MG/ML
5 INJECTION, SOLUTION INTRAVENOUS ONCE AS NEEDED
Status: DISCONTINUED | OUTPATIENT
Start: 2024-08-26 | End: 2024-08-26 | Stop reason: HOSPADM

## 2024-08-26 RX ORDER — SODIUM CHLORIDE, SODIUM LACTATE, POTASSIUM CHLORIDE, CALCIUM CHLORIDE 600; 310; 30; 20 MG/100ML; MG/100ML; MG/100ML; MG/100ML
100 INJECTION, SOLUTION INTRAVENOUS CONTINUOUS
Status: DISCONTINUED | OUTPATIENT
Start: 2024-08-26 | End: 2024-08-26

## 2024-08-26 RX ORDER — METOPROLOL SUCCINATE 50 MG/1
50 TABLET, EXTENDED RELEASE ORAL DAILY
Status: DISCONTINUED | OUTPATIENT
Start: 2024-08-26 | End: 2024-08-29

## 2024-08-26 RX ORDER — LIDOCAINE HYDROCHLORIDE 20 MG/ML
INJECTION, SOLUTION INFILTRATION; PERINEURAL AS NEEDED
Status: DISCONTINUED | OUTPATIENT
Start: 2024-08-26 | End: 2024-08-26

## 2024-08-26 RX ORDER — OXYCODONE HYDROCHLORIDE 5 MG/1
5 TABLET ORAL EVERY 6 HOURS PRN
Status: DISCONTINUED | OUTPATIENT
Start: 2024-08-26 | End: 2024-08-27

## 2024-08-26 RX ORDER — PROPOFOL 10 MG/ML
INJECTION, EMULSION INTRAVENOUS AS NEEDED
Status: DISCONTINUED | OUTPATIENT
Start: 2024-08-26 | End: 2024-08-26

## 2024-08-26 RX ORDER — ALBUTEROL SULFATE 0.83 MG/ML
2.5 SOLUTION RESPIRATORY (INHALATION) ONCE AS NEEDED
Status: DISCONTINUED | OUTPATIENT
Start: 2024-08-26 | End: 2024-08-26 | Stop reason: HOSPADM

## 2024-08-26 RX ORDER — MORPHINE SULFATE 2 MG/ML
2 INJECTION, SOLUTION INTRAMUSCULAR; INTRAVENOUS EVERY 5 MIN PRN
Status: DISCONTINUED | OUTPATIENT
Start: 2024-08-26 | End: 2024-08-26 | Stop reason: HOSPADM

## 2024-08-26 RX ORDER — AMLODIPINE BESYLATE 5 MG/1
TABLET ORAL DAILY
COMMUNITY

## 2024-08-26 RX ORDER — ACETAMINOPHEN 160 MG/5ML
650 SOLUTION ORAL EVERY 6 HOURS
Status: DISCONTINUED | OUTPATIENT
Start: 2024-08-26 | End: 2024-08-30 | Stop reason: HOSPADM

## 2024-08-26 SDOH — HEALTH STABILITY: MENTAL HEALTH: CURRENT SMOKER: 0

## 2024-08-26 SDOH — SOCIAL STABILITY: SOCIAL INSECURITY: ARE YOU OR HAVE YOU BEEN THREATENED OR ABUSED PHYSICALLY, EMOTIONALLY, OR SEXUALLY BY ANYONE?: NO

## 2024-08-26 SDOH — SOCIAL STABILITY: SOCIAL INSECURITY: WERE YOU ABLE TO COMPLETE ALL THE BEHAVIORAL HEALTH SCREENINGS?: YES

## 2024-08-26 SDOH — SOCIAL STABILITY: SOCIAL INSECURITY: DOES ANYONE TRY TO KEEP YOU FROM HAVING/CONTACTING OTHER FRIENDS OR DOING THINGS OUTSIDE YOUR HOME?: NO

## 2024-08-26 SDOH — SOCIAL STABILITY: SOCIAL INSECURITY: HAVE YOU HAD THOUGHTS OF HARMING ANYONE ELSE?: NO

## 2024-08-26 SDOH — SOCIAL STABILITY: SOCIAL INSECURITY: ABUSE: ADULT

## 2024-08-26 SDOH — SOCIAL STABILITY: SOCIAL INSECURITY: DO YOU FEEL UNSAFE GOING BACK TO THE PLACE WHERE YOU ARE LIVING?: YES

## 2024-08-26 SDOH — SOCIAL STABILITY: SOCIAL INSECURITY: HAVE YOU HAD ANY THOUGHTS OF HARMING ANYONE ELSE?: NO

## 2024-08-26 SDOH — SOCIAL STABILITY: SOCIAL INSECURITY: ARE THERE ANY APPARENT SIGNS OF INJURIES/BEHAVIORS THAT COULD BE RELATED TO ABUSE/NEGLECT?: NO

## 2024-08-26 SDOH — SOCIAL STABILITY: SOCIAL INSECURITY: HAS ANYONE EVER THREATENED TO HURT YOUR FAMILY OR YOUR PETS?: NO

## 2024-08-26 SDOH — SOCIAL STABILITY: SOCIAL INSECURITY: DO YOU FEEL ANYONE HAS EXPLOITED OR TAKEN ADVANTAGE OF YOU FINANCIALLY OR OF YOUR PERSONAL PROPERTY?: NO

## 2024-08-26 ASSESSMENT — LIFESTYLE VARIABLES
AUDIT-C TOTAL SCORE: 0
HOW OFTEN DO YOU HAVE 6 OR MORE DRINKS ON ONE OCCASION: NEVER
SUBSTANCE_ABUSE_PAST_12_MONTHS: NO
AUDIT-C TOTAL SCORE: 0
SKIP TO QUESTIONS 9-10: 1
PRESCIPTION_ABUSE_PAST_12_MONTHS: NO
HOW OFTEN DO YOU HAVE A DRINK CONTAINING ALCOHOL: NEVER
HOW MANY STANDARD DRINKS CONTAINING ALCOHOL DO YOU HAVE ON A TYPICAL DAY: PATIENT DOES NOT DRINK

## 2024-08-26 ASSESSMENT — PATIENT HEALTH QUESTIONNAIRE - PHQ9
2. FEELING DOWN, DEPRESSED OR HOPELESS: SEVERAL DAYS
1. LITTLE INTEREST OR PLEASURE IN DOING THINGS: SEVERAL DAYS
SUM OF ALL RESPONSES TO PHQ9 QUESTIONS 1 & 2: 2

## 2024-08-26 ASSESSMENT — PAIN SCALES - GENERAL
PAINLEVEL_OUTOF10: 10 - WORST POSSIBLE PAIN
PAINLEVEL_OUTOF10: 0 - NO PAIN
PAINLEVEL_OUTOF10: 8
PAINLEVEL_OUTOF10: 7
PAINLEVEL_OUTOF10: 10 - WORST POSSIBLE PAIN
PAINLEVEL_OUTOF10: 0 - NO PAIN
PAINLEVEL_OUTOF10: 8
PAINLEVEL_OUTOF10: 7
PAIN_LEVEL: 1
PAINLEVEL_OUTOF10: 10 - WORST POSSIBLE PAIN
PAINLEVEL_OUTOF10: 8
PAINLEVEL_OUTOF10: 7

## 2024-08-26 ASSESSMENT — ACTIVITIES OF DAILY LIVING (ADL)
ADEQUATE_TO_COMPLETE_ADL: YES
HEARING - RIGHT EAR: FUNCTIONAL
FEEDING YOURSELF: INDEPENDENT
BATHING: INDEPENDENT
GROOMING: INDEPENDENT
TOILETING: INDEPENDENT
DRESSING YOURSELF: INDEPENDENT
HEARING - LEFT EAR: FUNCTIONAL
JUDGMENT_ADEQUATE_SAFELY_COMPLETE_DAILY_ACTIVITIES: YES
WALKS IN HOME: INDEPENDENT
PATIENT'S MEMORY ADEQUATE TO SAFELY COMPLETE DAILY ACTIVITIES?: UNABLE TO ASSESS
LACK_OF_TRANSPORTATION: NO

## 2024-08-26 ASSESSMENT — COLUMBIA-SUICIDE SEVERITY RATING SCALE - C-SSRS
1. IN THE PAST MONTH, HAVE YOU WISHED YOU WERE DEAD OR WISHED YOU COULD GO TO SLEEP AND NOT WAKE UP?: NO
2. HAVE YOU ACTUALLY HAD ANY THOUGHTS OF KILLING YOURSELF?: NO
6. HAVE YOU EVER DONE ANYTHING, STARTED TO DO ANYTHING, OR PREPARED TO DO ANYTHING TO END YOUR LIFE?: NO

## 2024-08-26 ASSESSMENT — PAIN DESCRIPTION - DESCRIPTORS
DESCRIPTORS: CRUSHING;PRESSURE
DESCRIPTORS: ACHING;PRESSURE

## 2024-08-26 ASSESSMENT — PAIN - FUNCTIONAL ASSESSMENT
PAIN_FUNCTIONAL_ASSESSMENT: 0-10

## 2024-08-26 ASSESSMENT — COGNITIVE AND FUNCTIONAL STATUS - GENERAL
DRESSING REGULAR UPPER BODY CLOTHING: A LITTLE
STANDING UP FROM CHAIR USING ARMS: A LITTLE
CLIMB 3 TO 5 STEPS WITH RAILING: A LOT
MOBILITY SCORE: 17
TOILETING: A LITTLE
WALKING IN HOSPITAL ROOM: A LITTLE
DAILY ACTIVITIY SCORE: 18
PERSONAL GROOMING: A LITTLE
MOVING FROM LYING ON BACK TO SITTING ON SIDE OF FLAT BED WITH BEDRAILS: A LITTLE
MOVING TO AND FROM BED TO CHAIR: A LITTLE
HELP NEEDED FOR BATHING: A LITTLE
DRESSING REGULAR LOWER BODY CLOTHING: A LOT
PATIENT BASELINE BEDBOUND: NO
TURNING FROM BACK TO SIDE WHILE IN FLAT BAD: A LITTLE

## 2024-08-26 ASSESSMENT — PAIN DESCRIPTION - LOCATION: LOCATION: ABDOMEN

## 2024-08-26 NOTE — ANESTHESIA PREPROCEDURE EVALUATION
Patient: Mane Nath    Procedure Information       Date/Time: 08/26/24 0805    Procedure: Laparoscopic cholecystectomy - 8 AM    Location: [facility] / [facility]    Surgeons: Sandra Jesus MD            Relevant Problems   Anesthesia   (+) Difficult intubation      Cardiac   (+) HTN (hypertension)   (+) Hypercholesteremia      Pulmonary   (+) Dyspnea on exertion      Liver   (+) Fatty liver       Clinical information reviewed:   Tobacco  Allergies  Meds   Med Hx  Surg Hx   Fam Hx  Soc Hx        NPO Detail:  NPO/Void Status  Date of Last Liquid: 08/25/24  Date of Last Solid: 08/25/24         Physical Exam    Airway  Mallampati: III  TM distance: >3 FB  Neck ROM: limited     Cardiovascular - normal exam     Dental        Pulmonary - normal exam     Abdominal - normal exam             Anesthesia Plan    History of general anesthesia?: yes  History of complications of general anesthesia?: no    ASA 3     general     The patient is not a current smoker.    Postoperative administration of opioids is intended.  Anesthetic plan and risks discussed with patient.  Use of blood products discussed with patient who.    Plan discussed with CRNA.

## 2024-08-26 NOTE — OP NOTE
partial Laparoscopic cholecystectomy convert to open Operative Note     Date: 2024  OR Location: [facility]    Name: Mane Nath : 1960, Age: 64 y.o., MRN: [id number], Sex: male    Diagnosis  Pre-op Diagnosis      * Biliary dyskinesia [K82.8] Post op diagnosis  Severe chronic cholecystitis with cholelithiasis  Nodular liver concerns for cirrhosis     Procedures  1.  Laparoscopic converted to open partial cholecystectomy and drain placement  2.  Liver biopsy    Surgeons      * Sandra Jesus - Primary    Resident/Fellow/Other Assistant:  Rodrigo Pisano DO    Procedure Summary  Anesthesia: General  ASA: III  Anesthesia Staff: CRNA: KRISTEN Hernandez-CRNA  Estimated Blood Loss: 25mL  Intra-op Medications:   Administrations occurring from 0805 to 1000 on 24:   Medication Name Total Dose   BUPivacaine-EPINEPHrine (Marcaine w/EPI) 0.5 %-1:200,000 injection 25 mL              Anesthesia Record               Intraprocedure I/O Totals          Intake    Autologous Blood 0 mL    Cell Saver 0 mL    Total Intake 0 mL       Output    Urine 0 mL    Est. Blood Loss 25 mL    NG/OG Tube Output 100 mL    Other 0 mL    Total Output 125 mL       Net    Net Volume -125 mL          Specimen:   ID Type Source Tests Collected by Time   1 : gallbladder, gallstone, omentum Tissue GALLBLADDER CHOLECYSTECTOMY SURGICAL PATHOLOGY EXAM Sandra Jesus MD 2024 0914   2 : liver biopsy Tissue LIVER BIOPSY RIGHT SURGICAL PATHOLOGY EXAM Sandra Jesus MD 2024 0933        Staff:   Circulator: Giovanna Burgessub Person: Loren         Drains and/or Catheters:   Closed/Suction Drain 1 Lateral RLQ Bulb 15 Fr. (Active)       Tourniquet Times:         Implants:     Findings: Dense omental adhesions to the gallbladder.  Gallstones.  Nodular liver    Indications: Mane Nath is an 64 y.o. male who is having surgery for Biliary dyskinesia [K82.8].  He had previously undergone ultrasound and CTs which did not  demonstrate any gallstones.  He did have a HIDA scan which showed a low gallbladder ejection fraction.  The benefits and risk of laparoscopic cholecystectomy for biliary dyskinesia was reviewed with the patient and he signed consent.    The patient was seen in the preoperative area. The risks, benefits, complications, treatment options, non-operative alternatives, expected recovery and outcomes were discussed with the patient. The possibilities of reaction to medication, pulmonary aspiration, injury to surrounding structures, bleeding, recurrent infection, the need for additional procedures, failure to diagnose a condition, and creating a complication requiring transfusion or operation were discussed with the patient. The patient concurred with the proposed plan, giving informed consent.  The site of surgery was properly noted/marked if necessary per policy. The patient has been actively warmed in preoperative area. Preoperative antibiotics have been ordered and given within 1 hours of incision. Venous thrombosis prophylaxis have been ordered including bilateral sequential compression devices    Procedure Details:   The patient was brought into the operating room and was norah in the supine position. After placement under general anesthesia, MARY JANE hose and SCDs were placed on bilateral lower extremities. The abdomen was prepped with ChloraPrep and draped in the usual sterile fashion. A pause was taken and the correct patient and procedure were identified.    After injection of half percent Marcaine with epinephrine, a 2 cm vertical incision was made right through the base of the umbilicus as he did have a small umbilical hernia.  This incision was carried down to the level of the hernia sac which was grasped with a Diamond clamp and dissected off of the surrounding tissue.  The hernia sac was opened up and the contents were reduced back into the intra-abdominal cavity.  This fascial defect measuring 1 x 1 cm was used to  place the Griffin port.  After placement of the Griffin port, and confirmation of intra-abdominal placement, the abdomen was insufflated up to 15 mmHg. On quick examination with the scope, there was no evidence of any injury during entry. The parts of the large and small bowel that were visualized all appeared within normal limits. The pelvis region could not be seen. No obvious ventral wall hernias. Attention now was placed towards the right upper quadrant. The liver appeared nodular and there was concern for possible early cirrhosis.  Therefore, at the end of the procedure a liver biopsy was performed.  The patient now was placed in reverse Trendelenburg with slight right-sided up. All other ports were placed after local anesthesia was injected. A 5 mm port was placed into the epigastrium, and two 5mm ports were placed into the right upper quadrant.    I was a large amount of omentum adherent into the right upper quadrant.  Using some blunt dissection, eventually the gallbladder was identified.  An attempt was made to dissect the omental adhesions off of the gallbladder, but these adhesions were extremely dense.  The actual gallbladder wall could never be identified completely.  After 10 minutes of dissection, it was elected to proceed with an open procedure.  At this time all laparoscopic equipment was removed and the pneumoperitoneum was released.    At this time a right upper quadrant oblique incision was made by connecting the 3 laparoscopic incisions.  The abdomen was entered in the standard fashion and a Bookwalter retractor was used for exposure.  The gallbladder was grasped with a Diamond clamp.  Using a combination of blunt dissection, finger fracture dissection and cautery, the gallbladder was eventually identified.  However, the inflammation around the infundibulum the gallbladder did not allow for easy identification of the cystic duct.  Therefore a top-down dissection was undertaken.  The posterior wall  of the gallbladder was quite adherent to the liver.  There was some bleeding from the liver bed likely due to some underlying cirrhosis as the liver bed was very nodular as well.  Therefore, the posterior wall of the gallbladder was left adherent to the liver bed.  The gallbladder was opened up.  A few gallstones were extracted.  Using finger manipulation from within the gallbladder, the anterior wall of the gallbladder was removed using cautery.  The area of the cystic artery was oversewn using a figure-of-eight stitch of 2-0 Vicryl suture.  Clips were placed on what was thought to be the cystic duct externally, but this was quite lateral.  From within the gallbladder itself, the area of the cystic duct could be visualized and this area was oversewn using mucosal bites only.  A 2-0 Vicryl figure-of-eight stitch was used to try to close off the opening of the cystic duct on the gallbladder side.  Once the anterior wall was completely off.  The mucosa of the remaining gallbladder was cauterized.  The area then was copiously irrigated.  There was no evidence of any active bleeding.  Due to anticipated postoperative bile leak from the cystic stump remanent, a 15 Maori Ahmet drain was pulled in from the lateral aspect and was laid in the gallbladder fossa bed.  It was sewn to the skin using a 2-0 nylon suture and eventually placed to bulb suction.    At this time attention was placed towards the liver.  Just lateral to the gallbladder fossa bed, a wedge biopsy of the liver was undertaken.  This area was cauterized for hemostasis.    At this time all contents were allowed to fall back to its natural position.  The abdomen was closed in 2 layers.  A posterior layer of #1 Vicryl suture was used to close the posterior fascia.  Anterior running 0 PDS suture x 2 was used to close the anterior fascia.  Each layer was irrigated and hemostasis was achieved using cautery.  The fascia of the umbilical area was closed using a  figure-of-eight stitch of 0 Vicryl suture which it effectively close the hernia defect.  All skin was reapproximated using staples.  Because of his larger size, 13 Chalino strips were packed between the staples to decrease risk of postoperative seroma.  The wounds then were all dressed with 4 x 4's and tape.  He tolerated the procedure well.  He then was awoken from anesthesia and taken to the recovery room in stable condition.    Counts: Correct ×2.  Complications: None.  Drains: 15 Danish Ahmet drain in the gallbladder fossa bed    Complications:  None; patient tolerated the procedure well.    Disposition: PACU - hemodynamically stable.  Condition: stable         Additional Details:     Attending Attestation: I was present for the entire procedure.    Sandra Jesus  Phone Number: [phone]

## 2024-08-26 NOTE — ANESTHESIA POSTPROCEDURE EVALUATION
Patient: Mane Nath    Procedure Summary       Date: 08/26/24 Room / Location: [facility] 01 / [facility]    Anesthesia Start: 0756 Anesthesia Stop: 1023    Procedure: partial Laparoscopic cholecystectomy convert to open Diagnosis:       Biliary dyskinesia      (Biliary dyskinesia [K82.8])    Surgeons: Sandra Jesus MD Responsible Provider: IRON Hernandez    Anesthesia Type: general ASA Status: 3            Anesthesia Type: general    Vitals Value Taken Time   /93 08/26/24 1021   Temp 98.2 08/26/24 1025   Pulse 80 08/26/24 1024   Resp 18 08/26/24 1024   SpO2 84 % 08/26/24 1024   Vitals shown include unfiled device data.    Anesthesia Post Evaluation    Patient location during evaluation: PACU  Patient participation: complete - patient participated  Level of consciousness: awake and alert  Pain score: 1  Pain management: adequate  Airway patency: patent  Cardiovascular status: acceptable  Respiratory status: acceptable  Hydration status: acceptable  Postoperative Nausea and Vomiting: none      Encounter Notable Events   Notable Event Outcome Phase Comment   Airway obstruction, partial (incl.stridor, snoring)  Intraprocedure

## 2024-08-26 NOTE — ANESTHESIA PROCEDURE NOTES
Airway  Date/Time: 8/26/2024 8:12 AM  Urgency: elective    Airway not difficult    Staffing  Performed: CRNA   Authorized by: IRON Hernandez    Performed by: IRON Hernandez  Patient location during procedure: OR    Indications and Patient Condition  Indications for airway management: anesthesia  Spontaneous ventilation: present  Sedation level: deep  Preoxygenated: yes  Patient position: sniffing  MILS maintained throughout  Mask difficulty assessment: 1 - vent by mask  No planned trial extubation    Final Airway Details  Final airway type: endotracheal airway      Successful airway: ETT  Cuffed: yes   Successful intubation technique: direct laryngoscopy  Endotracheal tube insertion site: oral  Blade: Travis  Blade size: #4  ETT size (mm): 7.5  Cormack-Lehane Classification: grade IIb - view of arytenoids or posterior of glottis only  Placement verified by: chest auscultation   Cuff volume (mL): 8  Measured from: lips  ETT to lips (cm): 23  Number of attempts at approach: 1  Ventilation between attempts: none  Number of other approaches attempted: 0

## 2024-08-26 NOTE — PROGRESS NOTES
Mane Nath is a 64 y.o. male admitted for Calculus of gallbladder with acute on chronic cholecystitis with obstruction. Pharmacy reviewed the patient's [id number]-ioilkjulf medications and allergies for accuracy.    The list below reflects the PTA list prior to pharmacy medication history. A summary a changes to the PTA medication list has been listed below. Please review each medication in order reconciliation for additional clarification and justification.    Source of information: t2p     Medications added:  Norvasc 5mg 1 every day   Voltaren gel use as directed   Prilosec 40mg 1 every day     Medications modified:  Aricept 5mg tk po every day --> tk 1 t po at bedtime  Metformin 1000mg .5 tablet bid after meals --> 1 bid with breakfast and dinner   Simethicone 80 chewable no directions --> 1 tid before meals and at bedtime prn   Trulicity 3/.5ml no directions --> inject 4.5mg weekly       Medications to be removed:  Allopurinol 100mg   Lotrel 10-40mg   Vitamin d3 1000mg  Flexeril 10mg   Naproxen 500mg   Pravastatin 20mg   Omega 3 -fish oil   Vistaril 25mg   Fenofibrate 160mg   Bentyl 20mg     Medications of concern:      Prior to Admission Medications   Prescriptions Last Dose Informant Patient Reported? Taking?   ARIPiprazole (Abilify) 30 mg tablet Past Week  Yes Yes   Sig: Take 1 tablet (30 mg) by mouth once daily at bedtime.   Trulicity 3 mg/0.5 mL pen injector Past Week  Yes Yes   albuterol 90 mcg/actuation inhaler Unknown  Yes No   Sig: inhale 2 puffs every 6 hours if needed   allopurinol (Zyloprim) 100 mg tablet Unknown  Yes No   Sig: Take by mouth.   amLODIPine-benazepriL (Lotrel) 10-40 mg capsule Unknown  Yes No   Sig: TAKE ONE (1) CAPSULE BY MOUTH ONCE DAILY. **(LOTREL 10-40 MG CAP EQUIV)**   atorvastatin (Lipitor) 80 mg tablet Unknown  Yes No   Sig: Take 1 tablet (80 mg) by mouth early in the morning..   busPIRone (Buspar) 10 mg tablet Past Week  Yes Yes   Sig: take 2 tablets by mouth twice a day as  directed   cholecalciferol (Vitamin D-3) 25 MCG (1000 UT) capsule Past Week  Yes Yes   Sig: Take 1 capsule (25 mcg) by mouth once daily.   cyclobenzaprine (Flexeril) 10 mg tablet Past Week  Yes Yes   Sig: Take by mouth.   dapagliflozin propanediol (Farxiga) 10 mg Past Week  Yes Yes   Sig: Take 1 tablet (10 mg) by mouth once daily.   dicyclomine (Bentyl) 20 mg tablet Unknown  Yes No   Si times a day as needed.   docusate sodium (Colace) 100 mg capsule Past Week  Yes Yes   Sig: TAKE ONE (1) CAPSULE BY MOUTH ONCE DAILY AS NEEDED. **(COLACE 100 MG CAP EQUIV)**   donepezil (Aricept) 5 mg tablet Past Week  Yes Yes   Sig: Take by mouth once daily.   fenofibrate (Triglide) 160 mg tablet Past Week  Yes Yes   Sig: Take 1 tablet (160 mg) by mouth once daily.   gabapentin (Neurontin) 400 mg capsule Past Week  Yes Yes   Sig: take 1 capsule by mouth twice a day THEN TAKE 2 CAPSULES AT BEDTIME   hydrOXYzine pamoate (Vistaril) 25 mg capsule Past Week  Yes Yes   Sig: take 1 capsule by mouth every evening MAY TAKE IF UNABLE TO SLEEP AFTER 1 HOUR IN BED   metFORMIN (Glucophage) 1,000 mg tablet 2024  Yes Yes   Sig: Take 0.5 tablets (500 mg) by mouth 2 times a day after meals.   metoprolol succinate XL (Toprol-XL) 50 mg 24 hr tablet Past Week  Yes Yes   Sig: Take 1 tablet (50 mg) by mouth once daily.   naproxen (Naprosyn) 500 mg tablet Past Week  Yes Yes   Sig: Take by mouth twice a day.   omega-3 fatty acids-fish oil (Fish OiL) 340-1,000 mg capsule Unknown  Yes No   Sig: Take by mouth.   polyethylene glycol (Miralax) 17 gram/dose powder Unknown  Yes No   Sig: Take 17 g by mouth.   pravastatin (Pravachol) 20 mg tablet Past Week  Yes Yes   Sig: Take 1 tablet (20 mg) by mouth once daily at bedtime.   simethicone (Mylicon) 80 mg chewable tablet Unknown  Yes No   Sig: Chew.   traZODone (Desyrel) 100 mg tablet Past Week  Yes Yes   Sig: take 1 to 3 tablet by mouth at bedtime if needed   venlafaxine XR (Effexor-XR) 150 mg 24 hr  capsule Past Week  Yes Yes   Sig: take 2 capsules by mouth once daily as directed      Facility-Administered Medications: None       SNEHA NICHOLS

## 2024-08-26 NOTE — CARE PLAN
Problem: Skin  Goal: Decreased wound size/increased tissue granulation at next dressing change  Outcome: Progressing  Flowsheets (Taken 8/26/2024 1457)  Decreased wound size/increased tissue granulation at next dressing change: Promote sleep for wound healing  Goal: Participates in plan/prevention/treatment measures  Outcome: Progressing  Flowsheets (Taken 8/26/2024 1457)  Participates in plan/prevention/treatment measures: Increase activity/out of bed for meals  Goal: Prevent/manage excess moisture  Outcome: Progressing  Flowsheets (Taken 8/26/2024 1457)  Prevent/manage excess moisture: Monitor for/manage infection if present  Goal: Prevent/minimize sheer/friction injuries  Outcome: Progressing  Flowsheets (Taken 8/26/2024 1457)  Prevent/minimize sheer/friction injuries:   Increase activity/out of bed for meals   HOB 30 degrees or less  Goal: Promote/optimize nutrition  Outcome: Progressing  Flowsheets (Taken 8/26/2024 1457)  Promote/optimize nutrition:   Monitor/record intake including meals   Consume > 50% meals/supplements  Goal: Promote skin healing  Outcome: Progressing  Flowsheets (Taken 8/26/2024 1457)  Promote skin healing: Assess skin/pad under line(s)/device(s)     Problem: Fall/Injury  Goal: Not fall by end of shift  Outcome: Progressing  Goal: Be free from injury by end of the shift  Outcome: Progressing  Goal: Verbalize understanding of personal risk factors for fall in the hospital  Outcome: Progressing  Goal: Verbalize understanding of risk factor reduction measures to prevent injury from fall in the home  Outcome: Progressing  Goal: Use assistive devices by end of the shift  Outcome: Progressing  Goal: Pace activities to prevent fatigue by end of the shift  Outcome: Progressing     Problem: Pain  Goal: Takes deep breaths with improved pain control throughout the shift  Outcome: Progressing  Goal: Turns in bed with improved pain control throughout the shift  Outcome: Progressing  Goal: Walks with  improved pain control throughout the shift  Outcome: Progressing  Goal: Performs ADL's with improved pain control throughout shift  Outcome: Progressing  Goal: Participates in PT with improved pain control throughout the shift  Outcome: Progressing  Goal: Free from opioid side effects throughout the shift  Outcome: Progressing  Goal: Free from acute confusion related to pain meds throughout the shift  Outcome: Progressing     Problem: Pain - Adult  Goal: Verbalizes/displays adequate comfort level or baseline comfort level  Outcome: Progressing  Flowsheets (Taken 8/26/2024 8754)  Verbalizes/displays adequate comfort level or baseline comfort level:   Encourage patient to monitor pain and request assistance   Assess pain using appropriate pain scale   Administer analgesics based on type and severity of pain and evaluate response   Implement non-pharmacological measures as appropriate and evaluate response     Problem: Safety - Adult  Goal: Free from fall injury  Outcome: Progressing     Problem: Discharge Planning  Goal: Discharge to home or other facility with appropriate resources  Outcome: Progressing     Problem: Chronic Conditions and Co-morbidities  Goal: Patient's chronic conditions and co-morbidity symptoms are monitored and maintained or improved  Outcome: Progressing   The patient's goals for the shift include      The clinical goals for the shift include Pt will have controlled pain during my shift.

## 2024-08-27 LAB
BASOPHILS # BLD AUTO: 0.03 X10*3/UL (ref 0–0.1)
BASOPHILS NFR BLD AUTO: 0.2 %
EOSINOPHIL # BLD AUTO: 0.08 X10*3/UL (ref 0–0.7)
EOSINOPHIL NFR BLD AUTO: 0.6 %
ERYTHROCYTE [DISTWIDTH] IN BLOOD BY AUTOMATED COUNT: 15 % (ref 11.5–14.5)
ERYTHROCYTE [DISTWIDTH] IN BLOOD BY AUTOMATED COUNT: 15 % (ref 11.5–14.5)
GIANT PLATELETS BLD QL SMEAR: NORMAL
GLUCOSE BLD MANUAL STRIP-MCNC: 168 MG/DL (ref 74–99)
GLUCOSE BLD MANUAL STRIP-MCNC: 207 MG/DL (ref 74–99)
GLUCOSE BLD MANUAL STRIP-MCNC: 208 MG/DL (ref 74–99)
GLUCOSE BLD MANUAL STRIP-MCNC: 299 MG/DL (ref 74–99)
HCT VFR BLD AUTO: 38.1 % (ref 41–52)
HCT VFR BLD AUTO: 39.1 % (ref 41–52)
HGB BLD-MCNC: 13.3 G/DL (ref 13.5–17.5)
HGB BLD-MCNC: 13.4 G/DL (ref 13.5–17.5)
IMM GRANULOCYTES # BLD AUTO: 0.09 X10*3/UL (ref 0–0.7)
IMM GRANULOCYTES NFR BLD AUTO: 0.7 % (ref 0–0.9)
LYMPHOCYTES # BLD AUTO: 2.47 X10*3/UL (ref 1.2–4.8)
LYMPHOCYTES NFR BLD AUTO: 18.1 %
MCH RBC QN AUTO: 32.9 PG (ref 26–34)
MCH RBC QN AUTO: 33.2 PG (ref 26–34)
MCHC RBC AUTO-ENTMCNC: 34.3 G/DL (ref 32–36)
MCHC RBC AUTO-ENTMCNC: 34.9 G/DL (ref 32–36)
MCV RBC AUTO: 95 FL (ref 80–100)
MCV RBC AUTO: 96 FL (ref 80–100)
MONOCYTES # BLD AUTO: 1.46 X10*3/UL (ref 0.1–1)
MONOCYTES NFR BLD AUTO: 10.7 %
NEUTROPHILS # BLD AUTO: 9.51 X10*3/UL (ref 1.2–7.7)
NEUTROPHILS NFR BLD AUTO: 69.7 %
NRBC BLD-RTO: 0 /100 WBCS (ref 0–0)
NRBC BLD-RTO: 0 /100 WBCS (ref 0–0)
PLATELET # BLD AUTO: 144 X10*3/UL (ref 150–450)
PLATELET # BLD AUTO: 186 X10*3/UL (ref 150–450)
RBC # BLD AUTO: 4.01 X10*6/UL (ref 4.5–5.9)
RBC # BLD AUTO: 4.07 X10*6/UL (ref 4.5–5.9)
RBC MORPH BLD: NORMAL
WBC # BLD AUTO: 11.2 X10*3/UL (ref 4.4–11.3)
WBC # BLD AUTO: 13.6 X10*3/UL (ref 4.4–11.3)

## 2024-08-27 PROCEDURE — 2500000004 HC RX 250 GENERAL PHARMACY W/ HCPCS (ALT 636 FOR OP/ED): Performed by: SURGERY

## 2024-08-27 PROCEDURE — 2500000001 HC RX 250 WO HCPCS SELF ADMINISTERED DRUGS (ALT 637 FOR MEDICARE OP): Performed by: SURGERY

## 2024-08-27 PROCEDURE — 2500000002 HC RX 250 W HCPCS SELF ADMINISTERED DRUGS (ALT 637 FOR MEDICARE OP, ALT 636 FOR OP/ED)

## 2024-08-27 PROCEDURE — 36415 COLL VENOUS BLD VENIPUNCTURE: CPT

## 2024-08-27 PROCEDURE — 99024 POSTOP FOLLOW-UP VISIT: CPT | Performed by: SURGERY

## 2024-08-27 PROCEDURE — 85027 COMPLETE CBC AUTOMATED: CPT

## 2024-08-27 PROCEDURE — 82947 ASSAY GLUCOSE BLOOD QUANT: CPT

## 2024-08-27 PROCEDURE — 85025 COMPLETE CBC W/AUTO DIFF WBC: CPT

## 2024-08-27 PROCEDURE — 1100000001 HC PRIVATE ROOM DAILY

## 2024-08-27 PROCEDURE — 2500000002 HC RX 250 W HCPCS SELF ADMINISTERED DRUGS (ALT 637 FOR MEDICARE OP, ALT 636 FOR OP/ED): Performed by: SURGERY

## 2024-08-27 RX ORDER — MORPHINE SULFATE 2 MG/ML
2 INJECTION, SOLUTION INTRAMUSCULAR; INTRAVENOUS EVERY 4 HOURS PRN
Status: DISCONTINUED | OUTPATIENT
Start: 2024-08-27 | End: 2024-08-30 | Stop reason: HOSPADM

## 2024-08-27 RX ORDER — PANTOPRAZOLE SODIUM 40 MG/1
40 TABLET, DELAYED RELEASE ORAL
Status: DISCONTINUED | OUTPATIENT
Start: 2024-08-27 | End: 2024-08-30 | Stop reason: HOSPADM

## 2024-08-27 RX ORDER — BACITRACIN ZINC 500 UNIT/G
OINTMENT (GRAM) TOPICAL DAILY
Status: DISCONTINUED | OUTPATIENT
Start: 2024-08-27 | End: 2024-08-30 | Stop reason: HOSPADM

## 2024-08-27 RX ORDER — HYDROCODONE BITARTRATE AND ACETAMINOPHEN 7.5; 325 MG/1; MG/1
1 TABLET ORAL EVERY 6 HOURS PRN
Status: DISCONTINUED | OUTPATIENT
Start: 2024-08-27 | End: 2024-08-30 | Stop reason: HOSPADM

## 2024-08-27 ASSESSMENT — COGNITIVE AND FUNCTIONAL STATUS - GENERAL
DRESSING REGULAR LOWER BODY CLOTHING: A LOT
MOVING FROM LYING ON BACK TO SITTING ON SIDE OF FLAT BED WITH BEDRAILS: A LITTLE
HELP NEEDED FOR BATHING: A LITTLE
WALKING IN HOSPITAL ROOM: A LOT
HELP NEEDED FOR BATHING: A LOT
TURNING FROM BACK TO SIDE WHILE IN FLAT BAD: A LITTLE
DAILY ACTIVITIY SCORE: 19
PERSONAL GROOMING: A LITTLE
DRESSING REGULAR UPPER BODY CLOTHING: A LITTLE
MOVING TO AND FROM BED TO CHAIR: A LITTLE
MOBILITY SCORE: 16
CLIMB 3 TO 5 STEPS WITH RAILING: A LOT
MOVING TO AND FROM BED TO CHAIR: A LITTLE
TURNING FROM BACK TO SIDE WHILE IN FLAT BAD: A LITTLE
DAILY ACTIVITIY SCORE: 16
DRESSING REGULAR LOWER BODY CLOTHING: A LOT
DRESSING REGULAR UPPER BODY CLOTHING: A LOT
STANDING UP FROM CHAIR USING ARMS: A LITTLE
CLIMB 3 TO 5 STEPS WITH RAILING: A LOT
WALKING IN HOSPITAL ROOM: A LOT
MOVING FROM LYING ON BACK TO SITTING ON SIDE OF FLAT BED WITH BEDRAILS: A LITTLE
TOILETING: A LITTLE
STANDING UP FROM CHAIR USING ARMS: A LITTLE
TOILETING: A LITTLE
MOBILITY SCORE: 16

## 2024-08-27 ASSESSMENT — PAIN SCALES - GENERAL
PAINLEVEL_OUTOF10: 3
PAINLEVEL_OUTOF10: 8
PAINLEVEL_OUTOF10: 7
PAINLEVEL_OUTOF10: 9
PAINLEVEL_OUTOF10: 0 - NO PAIN
PAINLEVEL_OUTOF10: 7
PAINLEVEL_OUTOF10: 6
PAINLEVEL_OUTOF10: 8
PAINLEVEL_OUTOF10: 6
PAINLEVEL_OUTOF10: 9

## 2024-08-27 ASSESSMENT — ENCOUNTER SYMPTOMS
CONFUSION: 0
VOMITING: 0
NAUSEA: 0
ABDOMINAL PAIN: 1
SHORTNESS OF BREATH: 0
COUGH: 0
FEVER: 0

## 2024-08-27 ASSESSMENT — PAIN - FUNCTIONAL ASSESSMENT
PAIN_FUNCTIONAL_ASSESSMENT: 0-10

## 2024-08-27 ASSESSMENT — PAIN DESCRIPTION - LOCATION
LOCATION: ABDOMEN
LOCATION: ABDOMEN

## 2024-08-27 ASSESSMENT — PAIN DESCRIPTION - DESCRIPTORS
DESCRIPTORS: ACHING;PRESSURE
DESCRIPTORS: ACHING;PRESSURE

## 2024-08-27 ASSESSMENT — ACTIVITIES OF DAILY LIVING (ADL): LACK_OF_TRANSPORTATION: NO

## 2024-08-27 NOTE — CARE PLAN
Problem: Skin  Goal: Decreased wound size/increased tissue granulation at next dressing change  Outcome: Progressing  Flowsheets (Taken 8/27/2024 0732)  Decreased wound size/increased tissue granulation at next dressing change:   Promote sleep for wound healing   Protective dressings over bony prominences  Goal: Participates in plan/prevention/treatment measures  Flowsheets (Taken 8/27/2024 0732)  Participates in plan/prevention/treatment measures:   Elevate heels   Increase activity/out of bed for meals  Goal: Prevent/manage excess moisture  Outcome: Progressing  Flowsheets (Taken 8/27/2024 0732)  Prevent/manage excess moisture: Monitor for/manage infection if present  Goal: Prevent/minimize sheer/friction injuries  Outcome: Progressing  Flowsheets (Taken 8/27/2024 0732)  Prevent/minimize sheer/friction injuries:   Increase activity/out of bed for meals   HOB 30 degrees or less  Goal: Promote/optimize nutrition  Outcome: Progressing  Flowsheets (Taken 8/27/2024 0732)  Promote/optimize nutrition:   Monitor/record intake including meals   Consume > 50% meals/supplements  Goal: Promote skin healing  Outcome: Progressing  Flowsheets (Taken 8/27/2024 0732)  Promote skin healing: Assess skin/pad under line(s)/device(s)     Problem: Fall/Injury  Goal: Not fall by end of shift  Outcome: Progressing  Goal: Be free from injury by end of the shift  Outcome: Progressing  Goal: Verbalize understanding of personal risk factors for fall in the hospital  Outcome: Progressing  Goal: Verbalize understanding of risk factor reduction measures to prevent injury from fall in the home  Outcome: Progressing  Goal: Use assistive devices by end of the shift  Outcome: Progressing  Goal: Pace activities to prevent fatigue by end of the shift  Outcome: Progressing     Problem: Pain  Goal: Takes deep breaths with improved pain control throughout the shift  Outcome: Progressing  Goal: Turns in bed with improved pain control throughout the  shift  Outcome: Progressing  Goal: Walks with improved pain control throughout the shift  Outcome: Progressing  Goal: Performs ADL's with improved pain control throughout shift  Outcome: Progressing  Goal: Participates in PT with improved pain control throughout the shift  Outcome: Progressing  Goal: Free from opioid side effects throughout the shift  Outcome: Progressing  Goal: Free from acute confusion related to pain meds throughout the shift  Outcome: Progressing     Problem: Pain - Adult  Goal: Verbalizes/displays adequate comfort level or baseline comfort level  Outcome: Progressing     Problem: Safety - Adult  Goal: Free from fall injury  Outcome: Progressing     Problem: Discharge Planning  Goal: Discharge to home or other facility with appropriate resources  Outcome: Progressing     Problem: Chronic Conditions and Co-morbidities  Goal: Patient's chronic conditions and co-morbidity symptoms are monitored and maintained or improved  Outcome: Progressing

## 2024-08-27 NOTE — CONSULTS
"Nutrition Initial Assessment:   Nutrition Assessment    Reason for Assessment: Admission nursing screening    Medical history per chart:    63-year-old white male who presents for a laparoscopic cholecystectomy surgery for biliary dyskinesia about a month ago, he had gone to the emergency room in the evening after eating several corndogs for dinner.  He was experiencing some diffuse abdominal pain as well as right upper quadrant abdominal pain.  The pain had been intermittent.     Pt is s/p partial Laparoscopic cholecystectomy convert to open     8/27:  Pt awake, tolerated a small breakfast on Low fat diet, and reports he had good PO intake prior to admit.  Provided Low fat diet education, and will provide supplements for trial.     Nutrition History:  Food and Nutrient History: Patient reports having fair-good PO intake at home  Energy Intake: Fair 50-75 %, Good > 75 %    Current Diet: Adult diet Regular, Fat restricted 60 gm    Average meal Intake during admission: <50%       Nutrition Related Findings:    Teeth: Missing teeth     Food allergies: NKFA. is allergic to latex.  Meds/Labs reviewed.  acetaminophen, 650 mg, oral, q6h   Or  acetaminophen, 650 mg, nasogastric tube, q6h   Or  acetaminophen, 650 mg, rectal, q6h  amLODIPine, 5 mg, oral, Daily  ARIPiprazole, 30 mg, oral, Nightly  atorvastatin, 80 mg, oral, Daily  bacitracin, , Topical, Daily  busPIRone, 10 mg, oral, BID  dapagliflozin propanediol, 10 mg, oral, Daily  donepezil, 5 mg, oral, Daily  fenofibrate, 160 mg, oral, Daily  gabapentin, 400 mg, oral, q8h THU  insulin lispro, 0-10 Units, subcutaneous, Before meals & nightly  metFORMIN, 500 mg, oral, BID after meals  metoprolol succinate XL, 50 mg, oral, Daily  pantoprazole, 40 mg, oral, Daily before breakfast  polyethylene glycol, 17 g, oral, Daily  venlafaxine XR, 150 mg, oral, Daily with breakfast             Nutrition Significant Labs:        No results found for: \"HGBA1C\"  Results from last 7 days " "  Lab Units 08/27/24  1126 08/27/24  0626 08/26/24  2048 08/26/24  1641 08/26/24  1326 08/26/24  1105 08/26/24  0657   POCT GLUCOSE mg/dL 299* 207* 346* 293* 288* 345* 269*       Anthropometrics:  Height: 177.8 cm (5' 10\")   Weight: 95.8 kg (211 lb 3.2 oz)   BMI (Calculated): 30.3  IBW/kg (Dietitian Calculated): 75 kg          Weight History:   Wt Readings from Last 10 Encounters:   08/26/24 95.8 kg (211 lb 3.2 oz)   08/16/24 93.9 kg (207 lb 0.2 oz)   07/29/24 93.9 kg (207 lb)   07/02/24 93.4 kg (206 lb)   06/25/24 93.4 kg (206 lb)   06/11/24 94.2 kg (207 lb 9.6 oz)   06/04/24 96 kg (211 lb 10.3 oz)   03/04/24 99.8 kg (220 lb)   02/25/24 99.8 kg (220 lb)   02/07/24 99.8 kg (220 lb)        Weight Change %:  Weight History / % Weight Change: Noted stable weight per records  Significant Weight Loss: No          Nutrition Focused Physical Exam Findings:    Subcutaneous Fat Loss:   Orbital Fat Pads: Well nourished (slightly bulging fat pads)  Buccal Fat Pads: Well nourished (full, rounded cheeks)  Triceps: Mild-Moderate (less than ample fat tissue)  Muscle Wasting:  Temporalis: Well nourished (well-defined muscle)  Pectoralis (Clavicular Region): Well nourished (clavicle not visible)  Deltoid/Trapezius: Well nourished (rounded appearance at arm, shoulder, neck)  Interosseous: Well nourished (muscle bulges)  Edema:     Physical Findings:  Skin: Positive (ABD incision, and R heel)    Estimated Needs:   Total Energy Estimated Needs (kCal): 2250 kCal  Method for Estimating Needs: 30 kcal/kg IBW  Total Protein Estimated Needs (g): 90 g  Method for Estimating Needs: 1.2 gm/kg IBW     Method for Estimating Needs: 1ml/kcal        Nutrition Diagnosis   Nutrition Diagnosis:  Malnutrition Diagnosis  Patient has Malnutrition Diagnosis: No    Nutrition Diagnosis  Patient has Nutrition Diagnosis: Yes  Diagnosis Status (1): New  Nutrition Diagnosis 1: Predicted inadequate energy intake  Related to (1): GI Function s/p partial lap blayne " with drain  As Evidenced by (1): PO intake <75%       Nutrition Interventions/Recommendations   Nutrition Interventions and Recommendations:        Nutrition Prescription:  Individualized Nutrition Prescription Provided for : supplements        Nutrition Interventions:   Food and/or Nutrient Delivery Interventions  Interventions: Medical food supplement  Medical Food Supplement: Commercial beverage  Goal: Fernando Ensure plus high protein BID         Nutrition Education:   Education Documentation  Nutrition Care Manual, taught by Pat Pfeiffer RD at 8/27/2024  2:32 PM.  Learner: Patient  Readiness: Acceptance  Method: Explanation, Handout  Response: Verbalizes Understanding  Comment: Low fat nutrition therapy        Nutrition Counseling  Counseling Theoretical Approach: Nutrition counseling based on health belief model  Goal: Provided Low fat diet education, and reviewed supplements       Nutrition Monitoring and Evaluation   Monitoring/Evaluation:   Food/Nutrient Related History Monitoring  Monitoring and Evaluation Plan: Energy intake  Energy Intake: Estimated energy intake  Criteria: PO and supplement intake >75%    Body Composition/Growth/Weight History  Monitoring and Evaluation Plan: Weight  Weight: Measured weight  Criteria: Stable weight    Biochemical Data, Medical Tests and Procedures  Monitoring and Evaluation Plan: Electrolyte/renal panel, Glucose/endocrine profile  Electrolyte and Renal Panel: BUN, Sodium, Calcium, serum, Creatinine, Magnesium, Phosphorus, Potassium  Criteria: WNL  Glucose/Endocrine Profile: Glucose, casual  Criteria: WNL    Nutrition Focused Physical Findings  Monitoring and Evaluation Plan: Skin  Skin: Impaired wound healing  Criteria: Promote healing            Time Spent/Follow-up Reminder:   Follow Up  Time Spent (min): 45 minutes  Follow up: Provided inpatient RDN contact information  Last Date of Nutrition Visit: 08/27/24  Nutrition Follow-Up Needed?: Dietitian to reassess per  policy  Follow up Comment: 8/30

## 2024-08-27 NOTE — CARE PLAN
Problem: Skin  Goal: Participates in plan/prevention/treatment measures  Flowsheets (Taken 8/27/2024 0605)  Participates in plan/prevention/treatment measures:   Elevate heels   Discuss with provider PT/OT consult   Increase activity/out of bed for meals   The patient's goals for the shift include      The clinical goals for the shift include monitor pain and mena drain

## 2024-08-27 NOTE — PROGRESS NOTES
08/27/24 1110   Discharge Planning   Living Arrangements Family members   Support Systems Family members   Assistance Needed food assistance--SNAP   Type of Residence Private residence   Number of Stairs to Enter Residence 5   Number of Stairs Within Residence 0   Do you have animals or pets at home? No   Who is requesting discharge planning? Provider   Home or Post Acute Services In home services   Type of Home Care Services Home PT;Home OT;Home nursing visits   Expected Discharge Disposition  Services   Does the patient need discharge transport arranged? No   Financial Resource Strain   How hard is it for you to pay for the very basics like food, housing, medical care, and heating? Not very   Housing Stability   In the last 12 months, was there a time when you were not able to pay the mortgage or rent on time? N   In the past 12 months, how many times have you moved where you were living? 0   At any time in the past 12 months, were you homeless or living in a shelter (including now)? N   Transportation Needs   In the past 12 months, has lack of transportation kept you from medical appointments or from getting medications? no   In the past 12 months, has lack of transportation kept you from meetings, work, or from getting things needed for daily living? No     Care Transitions: Spoke with pt and verified address, phone number and emergency contact information. PCP is Dr Smith last seen this month and preferred pharmacy is [facility]. He denies issues obtaining or affording medications and takes as ordered. Pt is independent, lives at home w/ family and feels safe.  Pt has a glucometer but needs batteries, a cane and a shower chair but it doesn't fit in the shower. Pt has requested a [facility] choice list. SW was asked to deliver the choice list. [facility] 19/16. ADOD today. CT to follow. Martita Gibson BSN/RN-TCC

## 2024-08-27 NOTE — PROGRESS NOTES
"    GENERAL SURGERY PROGRESS NOTE    Patient: Mane Nath  Room: 3316/3316-A    Age: 64 y.o.   Gender: male  Attending: Sandra Jesus MD    MRN: [id number]  Admission Date: 8/26/2024    PCP: Thalia Smith MD       Mane Nath is a 64 y.o. male on day 0  of admission presenting with Calculus of gallbladder with acute on chronic cholecystitis with obstruction.    SUBJECTIVE   Interval History:  Tolerated a small amount of food last night.  Had some throat irritation, but no nausea or vomiting.  Did not get out of bed last night.  Drain output is serosanguineous.  Small amount of drainage from the surgical incision which is also serosanguineous.    ROS  Review of Systems   Constitutional:  Negative for fever.   Respiratory:  Negative for cough and shortness of breath.    Cardiovascular:  Negative for chest pain.   Gastrointestinal:  Positive for abdominal pain. Negative for nausea and vomiting.   Psychiatric/Behavioral:  Negative for confusion.           OBJECTIVE   Last Recorded Vitals  Blood pressure 108/71, pulse 74, temperature 36.9 °C (98.5 °F), temperature source Temporal, resp. rate 18, height 1.778 m (5' 10\"), weight 95.8 kg (211 lb 3.2 oz), SpO2 94%.    Intake/Output last 3 Shifts:  I/O last 3 completed shifts:  In: 1407 (14.7 mL/kg) [P.O.:462; I.V.:945 (9.9 mL/kg)]  Out: 2370 (24.7 mL/kg) [Urine:1650 (0.5 mL/kg/hr); Emesis/NG output:100; Drains:595; Blood:25]  Weight: 95.8 kg     PHYSICAL EXAM  Physical Exam   General: Well-developed, well-nourished and in no acute distress.  Head: Normocephalic. Atraumatic.  Neck/thyroid: Neck is supple.   Eyes: Pupils equal round and reactive to light. Conjunctiva normal.  ENMT: No masses or deformity of external nose. External ears without masses.  Respiratory/Chest:  Normal respiratory effort.  Cardiovascular: Regular rate and rhythm.   Abdomen: Softly rounded.  Tenderness around the surgical incision.  Moderate amount of serosanguineous drainage from " the incision.  Drain output is serosanguineous to bloody output.  Abdominal exam benign.  Musculoskeletal: Joints and limbs are grossly normal.   Neuro: Oriented to person, place and time. No obvious neurological deficit.   Psych: Normal mood and affect.    RESULTS   Labs  Results for orders placed or performed during the hospital encounter of 08/26/24 (from the past 24 hour(s))   POCT GLUCOSE   Result Value Ref Range    POCT Glucose 345 (H) 74 - 99 mg/dL   POCT GLUCOSE   Result Value Ref Range    POCT Glucose 288 (H) 74 - 99 mg/dL   POCT GLUCOSE   Result Value Ref Range    POCT Glucose 293 (H) 74 - 99 mg/dL   POCT GLUCOSE   Result Value Ref Range    POCT Glucose 346 (H) 74 - 99 mg/dL   POCT GLUCOSE   Result Value Ref Range    POCT Glucose 207 (H) 74 - 99 mg/dL       Radiology Resutls  No results found.       ASSESSMENT / PLAN   Principal Problem:    Calculus of gallbladder with acute on chronic cholecystitis with obstruction  Active Problems:    Difficult intubation       PLAN  1.  Continue on regular diet.  2.  The drain output appears somewhat bloody, and he is having some bloody drainage from his right upper quadrant incision.  Check CBC this morning.  Will hold off on any anticoagulation or anti-inflammatories.  Family to learn drain care.  He does live with his son and has 2 sisters nearby who can help with care.  Anticipate that the drain output may turn bilious given the inability to clip the cystic duct.  3.  Needs increased activity.  Encourage patient to ambulate in hallway with assistance of nursing.  Will get PT/OT to see the patient as well to make sure he is safe for discharge home.  4.  Continue home medications.  Patient receiving sliding scale insulin to cover for hyperglycemia.  5.  Plan removal of packing from abdominal incision on postoperative day #2/tomorrow.  6.  Will need at least another 24 to 48 hours of observation before he can be discharged home.    Sandra Jesus MD, FACS  [facility]  [address] [place] [address];   [address]  [place]  [address] [phone]

## 2024-08-27 NOTE — CARE PLAN
The patient's goals for the shift include      The clinical goals for the shift include monitor pain and mena drain    Problem: Pain  Goal: Takes deep breaths with improved pain control throughout the shift  Outcome: Progressing

## 2024-08-28 PROBLEM — K74.60 CIRRHOSIS (MULTI): Status: ACTIVE | Noted: 2024-08-28

## 2024-08-28 LAB
ANION GAP SERPL CALC-SCNC: 13 MMOL/L (ref 10–20)
BUN SERPL-MCNC: 26 MG/DL (ref 6–23)
CALCIUM SERPL-MCNC: 8.6 MG/DL (ref 8.6–10.3)
CARDIAC TROPONIN I PNL SERPL HS: 7 NG/L (ref 0–20)
CHLORIDE SERPL-SCNC: 100 MMOL/L (ref 98–107)
CO2 SERPL-SCNC: 26 MMOL/L (ref 21–32)
CREAT SERPL-MCNC: 1.01 MG/DL (ref 0.5–1.3)
EGFRCR SERPLBLD CKD-EPI 2021: 84 ML/MIN/1.73M*2
GLUCOSE BLD MANUAL STRIP-MCNC: 145 MG/DL (ref 74–99)
GLUCOSE BLD MANUAL STRIP-MCNC: 156 MG/DL (ref 74–99)
GLUCOSE BLD MANUAL STRIP-MCNC: 198 MG/DL (ref 74–99)
GLUCOSE BLD MANUAL STRIP-MCNC: 254 MG/DL (ref 74–99)
GLUCOSE SERPL-MCNC: 198 MG/DL (ref 74–99)
MAGNESIUM SERPL-MCNC: 1.81 MG/DL (ref 1.6–2.4)
PHOSPHATE SERPL-MCNC: 3.1 MG/DL (ref 2.5–4.9)
POTASSIUM SERPL-SCNC: 4.2 MMOL/L (ref 3.5–5.3)
SODIUM SERPL-SCNC: 135 MMOL/L (ref 136–145)

## 2024-08-28 PROCEDURE — 99024 POSTOP FOLLOW-UP VISIT: CPT | Performed by: SURGERY

## 2024-08-28 PROCEDURE — 97166 OT EVAL MOD COMPLEX 45 MIN: CPT | Mod: GO

## 2024-08-28 PROCEDURE — 83735 ASSAY OF MAGNESIUM: CPT

## 2024-08-28 PROCEDURE — 97162 PT EVAL MOD COMPLEX 30 MIN: CPT | Mod: GP

## 2024-08-28 PROCEDURE — 84100 ASSAY OF PHOSPHORUS: CPT

## 2024-08-28 PROCEDURE — 97530 THERAPEUTIC ACTIVITIES: CPT | Mod: GO

## 2024-08-28 PROCEDURE — 1200000002 HC GENERAL ROOM WITH TELEMETRY DAILY

## 2024-08-28 PROCEDURE — 2500000004 HC RX 250 GENERAL PHARMACY W/ HCPCS (ALT 636 FOR OP/ED): Performed by: SURGERY

## 2024-08-28 PROCEDURE — 82947 ASSAY GLUCOSE BLOOD QUANT: CPT

## 2024-08-28 PROCEDURE — 2500000002 HC RX 250 W HCPCS SELF ADMINISTERED DRUGS (ALT 637 FOR MEDICARE OP, ALT 636 FOR OP/ED)

## 2024-08-28 PROCEDURE — 2500000002 HC RX 250 W HCPCS SELF ADMINISTERED DRUGS (ALT 637 FOR MEDICARE OP, ALT 636 FOR OP/ED): Performed by: SURGERY

## 2024-08-28 PROCEDURE — 80048 BASIC METABOLIC PNL TOTAL CA: CPT

## 2024-08-28 PROCEDURE — 2500000004 HC RX 250 GENERAL PHARMACY W/ HCPCS (ALT 636 FOR OP/ED)

## 2024-08-28 PROCEDURE — 2500000001 HC RX 250 WO HCPCS SELF ADMINISTERED DRUGS (ALT 637 FOR MEDICARE OP): Performed by: SURGERY

## 2024-08-28 PROCEDURE — 36415 COLL VENOUS BLD VENIPUNCTURE: CPT

## 2024-08-28 PROCEDURE — 97530 THERAPEUTIC ACTIVITIES: CPT | Mod: GP

## 2024-08-28 PROCEDURE — 84484 ASSAY OF TROPONIN QUANT: CPT

## 2024-08-28 RX ORDER — HYDROCODONE BITARTRATE AND ACETAMINOPHEN 7.5; 325 MG/1; MG/1
1 TABLET ORAL EVERY 6 HOURS PRN
Qty: 10 TABLET | Refills: 0 | Status: SHIPPED | OUTPATIENT
Start: 2024-08-28 | End: 2024-08-29

## 2024-08-28 RX ORDER — MAGNESIUM SULFATE HEPTAHYDRATE 40 MG/ML
2 INJECTION, SOLUTION INTRAVENOUS ONCE
Status: COMPLETED | OUTPATIENT
Start: 2024-08-28 | End: 2024-08-29

## 2024-08-28 ASSESSMENT — COGNITIVE AND FUNCTIONAL STATUS - GENERAL
MOBILITY SCORE: 8
TURNING FROM BACK TO SIDE WHILE IN FLAT BAD: TOTAL
WALKING IN HOSPITAL ROOM: A LITTLE
MOBILITY SCORE: 16
DRESSING REGULAR UPPER BODY CLOTHING: A LOT
MOVING FROM LYING ON BACK TO SITTING ON SIDE OF FLAT BED WITH BEDRAILS: TOTAL
MOVING TO AND FROM BED TO CHAIR: A LITTLE
MOVING TO AND FROM BED TO CHAIR: TOTAL
CLIMB 3 TO 5 STEPS WITH RAILING: A LOT
CLIMB 3 TO 5 STEPS WITH RAILING: TOTAL
DRESSING REGULAR LOWER BODY CLOTHING: A LOT
HELP NEEDED FOR BATHING: A LOT
MOVING TO AND FROM BED TO CHAIR: A LITTLE
DAILY ACTIVITIY SCORE: 15
TURNING FROM BACK TO SIDE WHILE IN FLAT BAD: A LITTLE
MOBILITY SCORE: 16
DRESSING REGULAR LOWER BODY CLOTHING: A LOT
DRESSING REGULAR UPPER BODY CLOTHING: A LITTLE
DRESSING REGULAR LOWER BODY CLOTHING: A LOT
HELP NEEDED FOR BATHING: A LOT
TOILETING: A LOT
TOILETING: A LOT
STANDING UP FROM CHAIR USING ARMS: TOTAL
MOVING FROM LYING ON BACK TO SITTING ON SIDE OF FLAT BED WITH BEDRAILS: A LITTLE
WALKING IN HOSPITAL ROOM: A LOT
DRESSING REGULAR UPPER BODY CLOTHING: A LOT
STANDING UP FROM CHAIR USING ARMS: A LITTLE
PERSONAL GROOMING: A LITTLE
STANDING UP FROM CHAIR USING ARMS: A LITTLE
TOILETING: A LOT
MOVING FROM LYING ON BACK TO SITTING ON SIDE OF FLAT BED WITH BEDRAILS: A LITTLE
EATING MEALS: A LITTLE
CLIMB 3 TO 5 STEPS WITH RAILING: A LOT
TURNING FROM BACK TO SIDE WHILE IN FLAT BAD: A LITTLE
DAILY ACTIVITIY SCORE: 16
HELP NEEDED FOR BATHING: A LOT
DAILY ACTIVITIY SCORE: 16
WALKING IN HOSPITAL ROOM: A LOT

## 2024-08-28 ASSESSMENT — PAIN - FUNCTIONAL ASSESSMENT
PAIN_FUNCTIONAL_ASSESSMENT: 0-10

## 2024-08-28 ASSESSMENT — PAIN SCALES - GENERAL
PAINLEVEL_OUTOF10: 4
PAINLEVEL_OUTOF10: 6
PAINLEVEL_OUTOF10: 7
PAINLEVEL_OUTOF10: 6
PAINLEVEL_OUTOF10: 6
PAINLEVEL_OUTOF10: 0 - NO PAIN
PAINLEVEL_OUTOF10: 4

## 2024-08-28 ASSESSMENT — ACTIVITIES OF DAILY LIVING (ADL)
BATHING_ASSISTANCE: MODERATE
ADL_ASSISTANCE: INDEPENDENT

## 2024-08-28 ASSESSMENT — PAIN DESCRIPTION - DESCRIPTORS: DESCRIPTORS: PRESSURE

## 2024-08-28 NOTE — PROGRESS NOTES
Social work consult placed for positive medical risk screen. SW reviewed pt's chart and communicated with TCC. SW notified by [facility], pt is connected with [facility]. SW met with pt to assess needs and provide support, introduced self and my role as  with care transition team, accompanied by TCC. Pt agreeable to SW contacting [facility] and confirming upcoming appointments. SW confirmed pt has telehealth appointment September 26 and in person appointment October 4. Pt aware and denied any other SW needs. Pt appreciative of SW support. No other needs identified at this time, SW signing off,  pt and care team aware of SW availability while inpt.    MUKESH Garcia ([id number])   Care Transitions

## 2024-08-28 NOTE — DISCHARGE SUMMARY
Discharge Diagnosis  Calculus of gallbladder with acute on chronic cholecystitis with obstruction    Issues Requiring Follow-Up  Routine postop and drain care    Test Results Pending At Discharge  Pending Labs       Order Current Status    Surgical Pathology Exam In process            Hospital Course   He had presented to the hospital on 8/26/2024 for elective surgery for biliary dyskinesia.  He was taken to the operating room where he was found to have severe chronic cholecystitis requiring an open partial cholecystectomy and drain placement.  He was found to have gallstones.  He also had a significant nodularity to his liver most consistent with cirrhosis.  A liver biopsy was performed.  Postoperatively, he was sent to the surgical floor.  He was monitored for 48 hours postoperatively.  He was tolerating a diet without any nausea or vomiting.  His drain output was mostly bloody drainage.  Rechecks of his hemoglobin showed a stable hemoglobin level.  He remained hemodynamically stable.  The packing was pulled out between his staples on postoperative day #2.  Wound care and drain care was taught to family.  After receiving optimal therapy in the hospital, he was discharged home in stable condition.    Pertinent Physical Exam At Time of Discharge  Physical Exam  General: Well-developed, well-nourished and in no acute distress.  Head: Normocephalic. Atraumatic.  Neck/thyroid: Neck is supple.   Eyes: Pupils equal round and reactive to light. Conjunctiva normal.  ENMT: Poor dentition.  Halitosis.  Respiratory/Chest:  Normal respiratory effort.  Cardiovascular: Regular rate and rhythm.   Abdomen: Abdomen rounded secondary to body habitus.  Right oblique incision and umbilical incision are clean, dry and intact.  Small amount of bloody drainage from the right upper quadrant incision.  No significant bruising.  Drain in the right upper quadrant with serosanguineous output.  Musculoskeletal: Joints and limbs are grossly  normal.  Neuro: Oriented to person, place and time. No obvious neurological deficit.   Psych: Normal mood and affect.    Home Medications     Medication List      START taking these medications     HYDROcodone-acetaminophen 7.5-325 mg tablet; Commonly known as: Norco;   Take 1 tablet by mouth every 6 hours if needed for severe pain (7 - 10).     CONTINUE taking these medications     albuterol 90 mcg/actuation inhaler   amLODIPine 5 mg tablet; Commonly known as: Norvasc   ARIPiprazole 30 mg tablet; Commonly known as: Abilify   atorvastatin 80 mg tablet; Commonly known as: Lipitor   busPIRone 10 mg tablet; Commonly known as: Buspar   dapagliflozin propanediol 10 mg; Commonly known as: Farxiga   docusate sodium 100 mg capsule; Commonly known as: Colace   donepezil 5 mg tablet; Commonly known as: Aricept   gabapentin 400 mg capsule; Commonly known as: Neurontin   metFORMIN 1,000 mg tablet; Commonly known as: Glucophage   metoprolol succinate XL 50 mg 24 hr tablet; Commonly known as: Toprol-XL   Miralax 17 gram/dose powder; Generic drug: polyethylene glycol   omeprazole 40 mg DR capsule; Commonly known as: PriLOSEC   simethicone 80 mg chewable tablet; Commonly known as: Mylicon   traZODone 100 mg tablet; Commonly known as: Desyrel   Trulicity 3 mg/0.5 mL pen injector; Generic drug: dulaglutide   venlafaxine  mg 24 hr capsule; Commonly known as: Effexor-XR   Voltaren Arthritis Pain 1 % gel; Generic drug: diclofenac sodium       Outpatient Follow-Up  Future Appointments   Date Time Provider Department [place]   9/6/2024  9:00 AM Sandra Jesus MD [id number] [facility]   1/3/2025 10:30 AM Nayely Bhagat, APRN-CNP [id number] [place]       Sandra Jesus MD

## 2024-08-28 NOTE — PROGRESS NOTES
Physical Therapy    Physical Therapy Evaluation    Patient Name: Mane Nath  MRN: [id number]  Today's Date: 8/28/2024   Time Calculation  Start Time: 1044  Stop Time: 1110  Time Calculation (min): 26 min  3316/3316-A    Assessment/Plan   PT Assessment  PT Assessment Results: Decreased strength, Impaired balance, Decreased mobility, Pain  Rehab Prognosis: Good  Barriers to Discharge: No assistance available during the day (brother sleeping) or at night (brother working). Sisters are not always available. Requires 5 LIS home.  Evaluation/Treatment Tolerance: Patient tolerated treatment well  End of Session Communication: Bedside nurse, Care Coordinator  Assessment Comment: 63 y/o male admitted with dx of gallbladder calculus and cholesystitis requiring partial laparoscopic cholecystectomy converted to open. Patient presents with deficits in strength, balance, unaware of abdominal precautions, recent fall history. Patient will benefit from continued PT to focus on these impairments with recommendation for high intensity rehab to safely return home at MOD I level of mobility.  End of Session Patient Position: Bed, 3 rail up, Alarm on  IP OR SWING BED PT PLAN  Inpatient or Swing Bed: Inpatient     08/28/24 1044   PT Plan   Treatment/Interventions Bed mobility;Transfer training;Gait training;Balance training;Strengthening;Therapeutic exercise;Therapeutic activity   PT Plan Ongoing PT   PT Frequency 5 times per week   PT Discharge Recommendations High intensity level of continued care   Equipment Recommended upon Discharge   (TBD. May benefit from WW.)   PT Recommended Transfer Status Assist x1   PT - OK to Discharge Yes  (To next level of care when medically cleared.)     Subjective     Current Problem:  1. Calculus of gallbladder with acute on chronic cholecystitis with obstruction  HYDROcodone-acetaminophen (Norco) 7.5-325 mg tablet      2. Biliary dyskinesia  Surgical Pathology Exam    Surgical Pathology Exam         Patient Active Problem List   Diagnosis    Right upper quadrant abdominal pain    Fatty liver    Constipation    Dizziness    HTN (hypertension)    Hypercholesteremia    Heart palpitations    Dyspnea on exertion    Diabetes mellitus (Multi)    Calculus of gallbladder with acute on chronic cholecystitis with obstruction    Difficult intubation    Cirrhosis (Multi)       General Visit Information:  General  Reason for Referral: Abdominal surgery. Impaired mobility.  Referred By: Sandra Jesus MD  Past Medical History Relevant to Rehab: 65 y/o male admitted with dx of gallbladder calculus and cholesystitis requiring partial laparoscopic cholecystectomy converted to open. (PMHx: Cervical surgery 2009 with cadaver disc per sister: possible cause of recent increased shaking on L side. No other noted PMHx.)  Family/Caregiver Present: Yes (3 sisters)  Caregiver Feedback: Sisters report pt has a h/o falls (2 in bathroom in past 6 months) with concern for pt stepping out of tub on R foot wound possibly causing fall. Report pt does not always wear Lifeline.  Co-Treatment: OT  Co-Treatment Reason: To optimize safe functional mobility with consideration of recent abdominal surgery.  Prior to Session Communication: Bedside nurse  Patient Position Received: Bed, 3 rail up, Alarm on  General Comment: Patient alert, agreeable to participate in PT.    Home Living:  Home Living  Type of Home: Mobile home  Lives With: Siblings (Brother (works at night and sleeps during the day).)  Home Adaptive Equipment: Quad cane  Home Layout: One level  Home Access: Stairs to enter with rails  Entrance Stairs-Rails:  (1)  Entrance Stairs-Number of Steps: 5  Bathroom Shower/Tub: Tub/shower unit  Bathroom Equipment: Grab bars in shower (Has shower chair but will not fit in tub.)    Prior Level of Function:  Prior Function Per Pt/Caregiver Report  Level of [place]: Independent with ADLs and functional transfers, Independent with  homemaking with ambulation  Receives Help From: Family  Ambulatory Assistance:  (Uses quad cane.)  Prior Function Comments: Sisters provide transportation.    Precautions:  Precautions  Precautions Comment: Fall precautions. Abdominal precautions. 10lb lifting restriction. Bulb drain R LQ.    Vital Signs:     Objective     Pain:  Pain Assessment  Pain Assessment: 0-10  0-10 (Numeric) Pain Score: 6 (with movement; increases with coughing.)  Pain Type: Surgical pain  Pain Location: Abdomen  Pain Orientation: Right    Cognition:  Cognition  Overall Cognitive Status: Within Functional Limits  Orientation Level: Oriented X4    General Assessments:  General Observation  General Observation: Educated pt in log roll technique for bed mobility with VC, TC, facilitation for movement. VC for pursed lip exhale to manage pain with supine > sit, pt demonstrating good technique. Provided patient with WW due to decreased step length and need for B UE support during gait. Pt then ambulated 50 feet with WW and CGA, increased step length and balance noted. Pt lifting WW during turns. Instructed pt to scoot vs lift walker for improved balance and to maintain abdominal precautions.  Upon instruction to attempt closing eyes without UE support in stand, LOB posteriorly requiring MIN A to recover. Pt reports hx of this occurring. Educated pt in recommendation to grasp grab bar or sit on seat when closing eyes to rinse off face or hair in the shower and to turn on lights at night when ambulating.   Activity Tolerance  Endurance: Decreased tolerance for upright activites  Sensation  Light Touch: No apparent deficits  Strength  Strength Comments: B LE grossly 4/5.  Perception  Inattention/Neglect: Appears intact  Coordination  Movements are Fluid and Coordinated: Yes  Coordination Comment: L UE tremors occasionally.     Static Sitting Balance  Static Sitting-Balance Support: Bilateral upper extremity supported  Static Sitting-Level of  Assistance: Close supervision  Static Standing Balance  Static Standing-Balance Support: Bilateral upper extremity supported  Static Standing-Level of Assistance: Contact guard    Functional Assessments:     Bed Mobility  Bed Mobility:  (Supine > sit MOD A of 2. Sit > supine MIN A of 2 with VC throughout for log roll technique.)  Transfers  Transfer:  (Sit <> stand MIN A of 2.)  Ambulation/Gait Training  Ambulation/Gait Training Performed:  (Ambulated 20 feet with HHA and MIN A of 2. Decreased step length.)  Stairs  Stairs: No       Extremity/Trunk Assessments:        RLE   RLE : Within Functional Limits  LLE   LLE : Within Functional Limits    Outcome Measures:     [facility] Basic Mobility  Turning from your back to your side while in a flat bed without using bedrails: Total  Moving from lying on your back to sitting on the side of a flat bed without using bedrails: Total  Moving to and from bed to chair (including a wheelchair): Total  Standing up from a chair using your arms (e.g. wheelchair or bedside chair): Total  To walk in hospital room: A little  Climbing 3-5 steps with railing: Total  Basic Mobility - Total Score: 8                                                             Goals:  Encounter Problems       Encounter Problems (Active)       Pain - Adult          To improve strength, balance, understanding of abdominal precautions to achieve highest level of function:        Pt will independently verbalize understanding of and implement abdominal precautions during supine <> sit with MIN A of 1.       Start:  08/28/24    Expected End:  09/11/24            Patient will sit <> stand and ambulate with  feet with CGA, completing turns without lifting device.       Start:  08/28/24    Expected End:  09/11/24            Patient will participate in dynamic stand balance activities including standing exercise with UE support as needed for 3 minutes with CGA for safety.       Start:  08/28/24    Expected End:   09/11/24                 Education Documentation  Body Mechanics, taught by Awa Morin, PT at 8/28/2024  2:37 PM.  Learner: Family, Patient  Readiness: Acceptance  Method: Explanation  Response: Verbalizes Understanding, Needs Reinforcement    Mobility Training, taught by Awa Morin, PT at 8/28/2024  2:37 PM.  Learner: Family, Patient  Readiness: Acceptance  Method: Explanation  Response: Verbalizes Understanding, Needs Reinforcement    Education Comments  No comments found.

## 2024-08-28 NOTE — CARE PLAN
Problem: Skin  Goal: Decreased wound size/increased tissue granulation at next dressing change  Outcome: Progressing  Flowsheets (Taken 8/28/2024 0744)  Decreased wound size/increased tissue granulation at next dressing change:   Promote sleep for wound healing   Protective dressings over bony prominences  Goal: Participates in plan/prevention/treatment measures  Outcome: Progressing  Flowsheets (Taken 8/28/2024 0744)  Participates in plan/prevention/treatment measures:   Discuss with provider PT/OT consult   Elevate heels   Increase activity/out of bed for meals  Goal: Prevent/manage excess moisture  Outcome: Progressing  Flowsheets (Taken 8/28/2024 0744)  Prevent/manage excess moisture: Monitor for/manage infection if present  Goal: Prevent/minimize sheer/friction injuries  Outcome: Progressing  Flowsheets (Taken 8/28/2024 0744)  Prevent/minimize sheer/friction injuries:   Increase activity/out of bed for meals   HOB 30 degrees or less  Goal: Promote/optimize nutrition  Outcome: Progressing  Flowsheets (Taken 8/28/2024 0744)  Promote/optimize nutrition:   Monitor/record intake including meals   Consume > 50% meals/supplements  Goal: Promote skin healing  Outcome: Progressing  Flowsheets (Taken 8/28/2024 0744)  Promote skin healing: Assess skin/pad under line(s)/device(s)     Problem: Fall/Injury  Goal: Not fall by end of shift  Outcome: Progressing  Goal: Be free from injury by end of the shift  Outcome: Progressing  Goal: Verbalize understanding of personal risk factors for fall in the hospital  Outcome: Progressing  Goal: Verbalize understanding of risk factor reduction measures to prevent injury from fall in the home  Outcome: Progressing  Goal: Use assistive devices by end of the shift  Outcome: Progressing  Goal: Pace activities to prevent fatigue by end of the shift  Outcome: Progressing     Problem: Pain  Goal: Takes deep breaths with improved pain control throughout the shift  Outcome: Progressing  Goal:  Turns in bed with improved pain control throughout the shift  Outcome: Progressing  Goal: Walks with improved pain control throughout the shift  Outcome: Progressing  Goal: Performs ADL's with improved pain control throughout shift  Outcome: Progressing  Goal: Participates in PT with improved pain control throughout the shift  Outcome: Progressing  Goal: Free from opioid side effects throughout the shift  Outcome: Progressing  Goal: Free from acute confusion related to pain meds throughout the shift  Outcome: Progressing     Problem: Pain - Adult  Goal: Verbalizes/displays adequate comfort level or baseline comfort level  Outcome: Progressing  Flowsheets (Taken 8/28/2024 9079)  Verbalizes/displays adequate comfort level or baseline comfort level:   Implement non-pharmacological measures as appropriate and evaluate response   Administer analgesics based on type and severity of pain and evaluate response   Consider cultural and social influences on pain and pain management   Assess pain using appropriate pain scale     Problem: Safety - Adult  Goal: Free from fall injury  Outcome: Progressing     Problem: Discharge Planning  Goal: Discharge to home or other facility with appropriate resources  Outcome: Progressing     Problem: Chronic Conditions and Co-morbidities  Goal: Patient's chronic conditions and co-morbidity symptoms are monitored and maintained or improved  Outcome: Progressing

## 2024-08-28 NOTE — DISCHARGE INSTRUCTIONS
"    DISCHARGE INSTRUCTIONS    Patient: Mane Nath  Surgery Date: 8/26/2024    Age: 64 y.o.   Gender: male  Attending: Sandra Jesus MD    MRN: [id number]  OR Location: [facility]    PCP: Thalia Smith MD           SURGERY INFORMATION   Procedure performed: Procedure(s):  partial Laparoscopic cholecystectomy convert to open   Post-Op diagnosis: Post-op Diagnosis     * Biliary dyskinesia [K82.8]   Surgeon:    * Sandra Jesus - Primary     ACTIVITY RESTRICTIONS   1.  Do not engage in sports, heavy work or lifting until cleared by Dr. Jesus.    2.  Do not lift/pull/push more than 10 pounds for 4 weeks.   3.  Do not do repetitive bending over/picking up objects off the floor, as this puts strain on your incisions on your abdomen.  4.  You may drive when off of narcotic pain medication.  5.  Continue wearing the compression stockings (MARY JANE hose) until you are walking at least 3 times per day.    BATHING   You may shower starting the day after your surgery.  Leave the gauze dressing off during your shower.  You may safety pin the bulb of the drain to a \"necklace\" while in the shower.    WOUND CARE / DRAIN CARE   Remove the gauze/tape dressing before your shower.    2.   Daily, place a dry gauze/tape dressing over the staples.  When there is no drainage for 24 hours, may leave the dressing off.  3.   Apply ice to your surgical area for 20 minutes 3 times a day to help with pain and swelling.  4.   You may also use a heating pad to your abdomen to help with pain, as a heating pad will reduce abdominal spasms.  5.  Empty and record the drain output twice a day.  You may empty the drain more often if needed.  Keep a record of this drain output, and bring the record to the appointment with Dr. Jesus.  6.  Daily, clean around the drain insertion site with soap and water.  After drying, place a small amount of bacitracin/Neosporin around the drain insertion site and cover with gauze/tape dressing.  7.  Safety " pin the bulb of the drain to your clothing, or place in a pocket so that it is not pulling at the skin level.    MEDICATIONS   A narcotic pain medication has been prescribed.  Use this medication only AS NEEDED for severe pain.  2.   You may use Tylenol, or ibuprofen, in addition to, or instead of, your narcotic pain medication.  3.   Resume all home medications unless previously discussed with your surgeon. Blood thinners can be restarted the day after your surgery unless there is significant bleeding.    DIET   Diet as tolerated. Stay on a low grease, low-fat diet for 2 weeks.  For the first 24 hours after surgery, it is recommended that you eat light meals.  Some nausea and vomiting is common for the first 24 hours after surgery.  Drink plenty of fluids.  Minimize your use of caffeinated beverages.    CALLL YOUR SURGEON IF:   Any evidence of infection at your sugical site which can include redness or drainage. Some clear or pinkish drainage is normal for a few days following surgery.  2.   Excessive bleeding from your surgical site. If there is a small amount of bleeding, apply pressure for 20 minutes, then recheck the wound. If the bleeding does not stop, please call your surgeon's office.  3.   Some nausea and vomiting is expected for the first 24 hours after surgery. If you are unable to keep down fluids, or the nausea/vomiting continues beyond 24 hours.  4.   If you are unable to urinate within 8-12 hours after discharge from the hospital.  5.   A low-grade fever after surgery is normal. Notify the office if your temperature goes above 101 degrees.  6.   Any other concerns or questions you have regarding your surgery.      Sandra Jesus MD, FACS  [facility]  [address] [place] [address];   [address]  [place]  [address] [phone]

## 2024-08-28 NOTE — PROGRESS NOTES
08/28/24 1227   Discharge Planning   Home or Post Acute Services Post acute facilities (Rehab/SNF/etc)   Type of Post Acute Facility Services Skilled nursing   Expected Discharge Disposition SNF   Does the patient need discharge transport arranged? Yes   RoundTrip coordination needed? Yes   Patient Choice   Provider Choice list and CMS website (https://medicare.gov/care-compare#search) for post-acute Quality and Resource Measure Data were provided and reviewed with: Patient;Family     PT/OT rec for acute but pt does not meet criteria for that LOC.  I met with pt and he is agreeable to SNF and [place] of choice list was offered by creating list in [facility] in patient's preferred geographical area and in network with insurance.  Patient's preference is: [facility].  [facility] asked to send referral.    1548    [facility] has accepted pt.   precert team asked to start auth.

## 2024-08-28 NOTE — CARE PLAN
Problem: Skin  Goal: Participates in plan/prevention/treatment measures  8/28/2024 0601 by Lin Wilburn RN  Flowsheets (Taken 8/28/2024 0601)  Participates in plan/prevention/treatment measures:   Discuss with provider PT/OT consult   Increase activity/out of bed for meals   Elevate heels  8/28/2024 0600 by Lin Wilburn RN  Outcome: Progressing   The patient's goals for the shift include      The clinical goals for the shift include monitor pain

## 2024-08-28 NOTE — PROGRESS NOTES
Occupational Therapy    Evaluation    Patient Name: Mane Nath  MRN: [id number]  Today's Date: 8/28/2024  Time Calculation  Start Time: 1049  Stop Time: 1109  Time Calculation (min): 20 min          08/28/24 1049   Inpatient Plan   Equipment Recommended upon Discharge   (TBD)   Treatment Interventions ADL retraining;Functional transfer training;UE strengthening/ROM;Endurance training;Patient/family training;Equipment evaluation/education;Neuromuscular reeducation;Compensatory technique education   OT Frequency 3 times per week   OT - OK to Discharge Yes  (ok to dc to next level of care once medically stable)   OT Discharge Recommendations High intensity level of continued care   OT Recommended Transfer Status Minimal assist;Assist of 2          Assessment:  OT Assessment: OT eval completed. Pt. presents with impaired balance, impaired endurance and generalized weakness. Current level of assist requires CGA to Mod A for mobility. SBA to Max A for ADLs. Pt would benefit from further OT to ensure safe return to PLOF  Prognosis: Good  Barriers to Discharge: Inaccessible home environment  Evaluation/Treatment Tolerance: Patient limited by fatigue  End of Session Communication: Bedside nurse, Care Coordinator  End of Session Patient Position: Bed, 3 rail up, Alarm on (call light within reach)  OT Assessment Results: Decreased ADL status, Decreased upper extremity strength, Decreased safe judgment during ADL, Decreased endurance, Decreased functional mobility, Decreased IADLs  Prognosis: Good  Barriers to Discharge: Inaccessible home environment  Evaluation/Treatment Tolerance: Patient limited by fatigue  Strengths: Support of extended family/friends  Barriers to Participation: Housing layout, Insight into problems, Comorbidities  Plan:  Treatment Interventions: ADL retraining, Functional transfer training, UE strengthening/ROM, Endurance training, Patient/family training, Equipment evaluation/education, Neuromuscular  reeducation, Compensatory technique education  OT Frequency: 3 times per week  OT Discharge Recommendations: High intensity level of continued care  Equipment Recommended upon Discharge:  (TBD)  OT Recommended Transfer Status: Minimal assist, Assist of 2  OT - OK to Discharge: Yes (ok to dc to next level of care once medically stable)  Treatment Interventions: ADL retraining, Functional transfer training, UE strengthening/ROM, Endurance training, Patient/family training, Equipment evaluation/education, Neuromuscular reeducation, Compensatory technique education    Subjective   Current Problem:  1. Calculus of gallbladder with acute on chronic cholecystitis with obstruction  HYDROcodone-acetaminophen (Norco) 7.5-325 mg tablet      2. Biliary dyskinesia  Surgical Pathology Exam    Surgical Pathology Exam          Past Medical History:   Diagnosis Date    Diabetes (Multi)     HTN (hypertension)     Personal history of other diseases of the musculoskeletal system and connective tissue     History of gout    Personal history of other mental and behavioral disorders     History of depression    Type 2 diabetes mellitus (Multi)      Past Surgical History:   Procedure Laterality Date    CHOLECYSTECTOMY N/A 08/26/2024    Laparoscopic converted to open partial cholecystectomy and drain placement    NECK SURGERY  02/01/2018    Neck Surgery    OTHER SURGICAL HISTORY  02/01/2018    Surgery Excision Lipoma    OTHER SURGICAL HISTORY  04/25/2022    Colonoscopy       General:  General  Reason for Referral: Impaired ADLs and Functional mobility - 63 y/o male  presenting with Calculus of gallbladder with acute on chronic cholecystitis with obstruction. 8/26/2024 for elective surgery for biliary dyskinesia.  He was taken to the operating room where he was found to have severe chronic cholecystitis requiring an open partial cholecystectomy and drain placement.  He was found to have gallstones (DX:Biliary dyskinesia; Severe chronic  "cholecystitis with cholelithiasis s/p Procedures  Laparoscopic converted to open partial cholecystectomy and drain placement)  Referred By: Sandra Jesus (chart reviewed + referral received)  Past Medical History Relevant to Rehab: PMH: Contusion of right knee, HTN,chronic cholecystitis , falls  Family/Caregiver Present: Yes  Caregiver Feedback: sibling present, left during the mobility portion of session. Sisters very supportive and come over to the pt home as needed. Family states pt has a h/o of falls and \"shaky\" at baseline. Pt has a medical alert however doesnt always wear per family  Co-Treatment: PT  Co-Treatment Reason: Co-eval to maximize safety with mobility while focusing on OT specific goals  Prior to Session Communication: Bedside nurse (RN approved therapy)  Patient Position Received: Bed, 3 rail up, Alarm on (Alert, Gowned, IV, drain and abdominal incision cover by dressing right quadrant, SCD's on)  General Comment: Pt. seen in room 3316. Pt agreeable to PT/OT.      Precautions:  Medical Precautions: Fall precautions, Abdominal precautions    Vital Sign (Past 2hrs)        Date/Time Vitals Session Patient Position Pulse Resp SpO2 BP MAP (mmHg)    08/28/24 1332 --  --  99  19  91 %  137/83  101                         Pain:  Pain Assessment  Pain Assessment: 0-10  0-10 (Numeric) Pain Score: 6  Pain Type: Acute pain, Surgical pain  Pain Location: Abdomen  Pain Orientation: Right  Pain Interventions: Repositioned, Rest    Objective   Cognition:  Overall Cognitive Status: Within Functional Limits  Orientation Level: Disoriented to situation (cues for safety)  Safety/Judgement: Exceptions to WFL  Complex Functional Tasks: Minimal  Novel Situations: Minimal  Routine Tasks: Minimal  Unable to Self-Monitor and Self-Correct Consistently: Minimal  Insight: Mild (cues for carryover of abdominal precautions + safety)           Home Living:  Type of Home: Mobile home  Lives With: Alone  Home Layout: One " level  Home Access: Stairs to enter with rails  Entrance Stairs-Number of Steps: 5  Bathroom Shower/Tub: Tub/shower unit  Prior Function:  Level of [place]: Independent with ADLs and functional transfers, Independent with homemaking with ambulation  Receives Help From: Family (sibling comes over as needed)  ADL Assistance: Independent  Homemaking Assistance: Independent  Prior Function Comments: frequent Falls at home  IADL History:  IADL Comments: siblings provide all transportation.  ADL:  Eating Assistance: Stand by (anticipate w/ set up)  Grooming Assistance: Stand by (anticipate seated position)  Bathing Assistance: Moderate (anticipate seated position)  UE Dressing Assistance: Minimal (anticipate seated position)  LE Dressing Assistance: Maximal (seated position threading pants and standing to pull pants up over hips)  LE Dressing Deficit: Increased time to complete, Steadying, Requires assistive device for steadying, Verbal cueing, Supervision/safety, Thread RLE into pants, Thread LLE into pants, Pull up over hips  Toileting Assistance with Device: Moderate (anticipate for clothing mgmt  + hygiene 2/2 to abdominal precautions and weakness)  Activity Tolerance:  Endurance: Tolerates 10 - 20 min exercise with multiple rests  Bed Mobility/Transfers: Bed Mobility  Bed Mobility: Yes  Bed Mobility 1  Bed Mobility 1: Supine to sitting, Log roll, Sitting to supine  Level of Assistance 1: Minimum assistance, Moderate assistance (Min to Mod  +2 cues for carryover of log roll technique)    Transfers  Transfer: Yes  Transfer 1  Transfer From 1: Bed to, Stand to, Sit to  Technique 1: Sit to stand, Stand to sit  Transfer Level of Assistance 1: Moderate verbal cues, +2, Minimum assistance      Functional Mobility:  Functional Mobility  Functional Mobility Performed:  (Pt performed short distance amb with FWW with CGA  cues for walker mgmt + safety)  Sitting Balance:     Standing Balance:  Static Standing  Balance  Static Standing-Balance Support: Bilateral upper extremity supported  Static Standing-Level of Assistance: Minimum assistance   Modalities:     Vision:Vision - Basic Assessment  Current Vision: No visual deficits  Sensation:  Light Touch: No apparent deficits  Strength:  Strength Comments: No Formal MMT applied. Appears at 4 (-)/5 grossly throughout BUE  Perception:  Inattention/Neglect: Appears intact  Coordination:  Coordination Comment: Tremoulous UE intermittently   Hand Function:  Gross Grasp: Functional  Coordination: Functional  Extremities: RUE   RUE : Within Functional Limits and LUE   LUE: Within Functional Limits        Outcome Measures:[facility] Daily Activity  Putting on and taking off regular lower body clothing: A lot  Bathing (including washing, rinsing, drying): A lot  Putting on and taking off regular upper body clothing: A little  Toileting, which includes using toilet, bedpan or urinal: A lot  Taking care of personal grooming such as brushing teeth: A little  Eating Meals: A little  Daily Activity - Total Score: 15        Education Documentation  Body Mechanics, taught by Faby Amaya OT at 8/28/2024  1:59 PM.  Learner: Patient  Readiness: Acceptance  Method: Demonstration  Response: Needs Reinforcement    Precautions, taught by Faby Amaya OT at 8/28/2024  1:59 PM.  Learner: Patient  Readiness: Acceptance  Method: Demonstration  Response: Needs Reinforcement    Education Comments  No comments found.        OP EDUCATION:       Goals:  Encounter Problems       Encounter Problems (Active)       ADLs       Patient with complete lower body dressing with minimal assist  level of assistance donning and doffing all LE clothes  with PRN adaptive equipment while edge of bed        Start:  08/28/24    Expected End:  09/04/24               MOBILITY       Patient will perform Functional mobility mod  Household distances/Community Distances with stand by assist level of assistance and front wheeled  walker in order to improve safety and functional mobility.       Start:  08/28/24    Expected End:  09/04/24               TRANSFERS       Patient will complete sit to stand transfer with CGA front wheeled walker in order to improve safety and prepare for out of bed mobility.       Start:  08/28/24    Expected End:  09/04/24

## 2024-08-29 ENCOUNTER — APPOINTMENT (OUTPATIENT)
Dept: CARDIOLOGY | Facility: HOSPITAL | Age: 64
End: 2024-08-29
Payer: COMMERCIAL

## 2024-08-29 VITALS
HEART RATE: 101 BPM | HEIGHT: 70 IN | DIASTOLIC BLOOD PRESSURE: 88 MMHG | SYSTOLIC BLOOD PRESSURE: 139 MMHG | TEMPERATURE: 97.8 F | RESPIRATION RATE: 16 BRPM | WEIGHT: 211.2 LBS | BODY MASS INDEX: 30.24 KG/M2 | OXYGEN SATURATION: 96 %

## 2024-08-29 LAB
ANION GAP SERPL CALC-SCNC: 12 MMOL/L (ref 10–20)
AORTIC VALVE MEAN GRADIENT: 5.9 MMHG
AORTIC VALVE PEAK VELOCITY: 1.58 M/S
APTT PPP: 45 SECONDS (ref 27–38)
AV PEAK GRADIENT: 10 MMHG
AVA (PEAK VEL): 2.72 CM2
AVA (VTI): 2.68 CM2
BASOPHILS # BLD AUTO: 0.02 X10*3/UL (ref 0–0.1)
BASOPHILS NFR BLD AUTO: 0.3 %
BUN SERPL-MCNC: 25 MG/DL (ref 6–23)
CALCIUM SERPL-MCNC: 8.7 MG/DL (ref 8.6–10.3)
CARDIAC TROPONIN I PNL SERPL HS: 8 NG/L (ref 0–20)
CARDIAC TROPONIN I PNL SERPL HS: 8 NG/L (ref 0–20)
CHLORIDE SERPL-SCNC: 103 MMOL/L (ref 98–107)
CO2 SERPL-SCNC: 24 MMOL/L (ref 21–32)
CREAT SERPL-MCNC: 0.79 MG/DL (ref 0.5–1.3)
EGFRCR SERPLBLD CKD-EPI 2021: >90 ML/MIN/1.73M*2
EJECTION FRACTION APICAL 4 CHAMBER: 67.9
EJECTION FRACTION: 70 %
EOSINOPHIL # BLD AUTO: 0.06 X10*3/UL (ref 0–0.7)
EOSINOPHIL NFR BLD AUTO: 0.8 %
ERYTHROCYTE [DISTWIDTH] IN BLOOD BY AUTOMATED COUNT: 14.8 % (ref 11.5–14.5)
GLUCOSE BLD MANUAL STRIP-MCNC: 146 MG/DL (ref 74–99)
GLUCOSE BLD MANUAL STRIP-MCNC: 207 MG/DL (ref 74–99)
GLUCOSE BLD MANUAL STRIP-MCNC: 210 MG/DL (ref 74–99)
GLUCOSE BLD MANUAL STRIP-MCNC: 219 MG/DL (ref 74–99)
GLUCOSE SERPL-MCNC: 188 MG/DL (ref 74–99)
HCT VFR BLD AUTO: 33.3 % (ref 41–52)
HGB BLD-MCNC: 11.5 G/DL (ref 13.5–17.5)
IMM GRANULOCYTES # BLD AUTO: 0.07 X10*3/UL (ref 0–0.7)
IMM GRANULOCYTES NFR BLD AUTO: 0.9 % (ref 0–0.9)
INR PPP: 1.1 (ref 0.9–1.1)
LEFT ATRIUM VOLUME AREA LENGTH INDEX BSA: 13.5 ML/M2
LEFT VENTRICLE INTERNAL DIMENSION DIASTOLE: 3.74 CM (ref 3.5–6)
LEFT VENTRICULAR OUTFLOW TRACT DIAMETER: 1.99 CM
LV EJECTION FRACTION BIPLANE: 70 %
LYMPHOCYTES # BLD AUTO: 2.14 X10*3/UL (ref 1.2–4.8)
LYMPHOCYTES NFR BLD AUTO: 28.6 %
MAGNESIUM SERPL-MCNC: 2.17 MG/DL (ref 1.6–2.4)
MCH RBC QN AUTO: 32.7 PG (ref 26–34)
MCHC RBC AUTO-ENTMCNC: 34.5 G/DL (ref 32–36)
MCV RBC AUTO: 95 FL (ref 80–100)
MITRAL VALVE E/A RATIO: 0.85
MITRAL VALVE E/E' RATIO: 8.38
MONOCYTES # BLD AUTO: 0.72 X10*3/UL (ref 0.1–1)
MONOCYTES NFR BLD AUTO: 9.6 %
NEUTROPHILS # BLD AUTO: 4.47 X10*3/UL (ref 1.2–7.7)
NEUTROPHILS NFR BLD AUTO: 59.8 %
NRBC BLD-RTO: 0 /100 WBCS (ref 0–0)
PLATELET # BLD AUTO: 132 X10*3/UL (ref 150–450)
POTASSIUM SERPL-SCNC: 4 MMOL/L (ref 3.5–5.3)
PROTHROMBIN TIME: 12.8 SECONDS (ref 9.8–12.8)
RBC # BLD AUTO: 3.52 X10*6/UL (ref 4.5–5.9)
RIGHT VENTRICLE FREE WALL PEAK S': 24.19 CM/S
RIGHT VENTRICLE PEAK SYSTOLIC PRESSURE: 14.6 MMHG
SODIUM SERPL-SCNC: 135 MMOL/L (ref 136–145)
TRICUSPID ANNULAR PLANE SYSTOLIC EXCURSION: 1.4 CM
WBC # BLD AUTO: 7.5 X10*3/UL (ref 4.4–11.3)

## 2024-08-29 PROCEDURE — 2500000002 HC RX 250 W HCPCS SELF ADMINISTERED DRUGS (ALT 637 FOR MEDICARE OP, ALT 636 FOR OP/ED): Performed by: SURGERY

## 2024-08-29 PROCEDURE — 84484 ASSAY OF TROPONIN QUANT: CPT

## 2024-08-29 PROCEDURE — 97116 GAIT TRAINING THERAPY: CPT | Mod: GP,CQ

## 2024-08-29 PROCEDURE — 97530 THERAPEUTIC ACTIVITIES: CPT | Mod: GO,CO

## 2024-08-29 PROCEDURE — 97535 SELF CARE MNGMENT TRAINING: CPT | Mod: GO,CO

## 2024-08-29 PROCEDURE — 80048 BASIC METABOLIC PNL TOTAL CA: CPT

## 2024-08-29 PROCEDURE — 36415 COLL VENOUS BLD VENIPUNCTURE: CPT

## 2024-08-29 PROCEDURE — 99024 POSTOP FOLLOW-UP VISIT: CPT | Performed by: SURGERY

## 2024-08-29 PROCEDURE — 83735 ASSAY OF MAGNESIUM: CPT

## 2024-08-29 PROCEDURE — 85025 COMPLETE CBC W/AUTO DIFF WBC: CPT

## 2024-08-29 PROCEDURE — 93306 TTE W/DOPPLER COMPLETE: CPT

## 2024-08-29 PROCEDURE — 99254 IP/OBS CNSLTJ NEW/EST MOD 60: CPT | Performed by: INTERNAL MEDICINE

## 2024-08-29 PROCEDURE — 2500000001 HC RX 250 WO HCPCS SELF ADMINISTERED DRUGS (ALT 637 FOR MEDICARE OP): Performed by: SURGERY

## 2024-08-29 PROCEDURE — 2500000004 HC RX 250 GENERAL PHARMACY W/ HCPCS (ALT 636 FOR OP/ED): Performed by: SURGERY

## 2024-08-29 PROCEDURE — 2500000002 HC RX 250 W HCPCS SELF ADMINISTERED DRUGS (ALT 637 FOR MEDICARE OP, ALT 636 FOR OP/ED)

## 2024-08-29 PROCEDURE — 2500000004 HC RX 250 GENERAL PHARMACY W/ HCPCS (ALT 636 FOR OP/ED)

## 2024-08-29 PROCEDURE — 93306 TTE W/DOPPLER COMPLETE: CPT | Performed by: INTERNAL MEDICINE

## 2024-08-29 PROCEDURE — 97110 THERAPEUTIC EXERCISES: CPT | Mod: GP,CQ

## 2024-08-29 PROCEDURE — 82947 ASSAY GLUCOSE BLOOD QUANT: CPT

## 2024-08-29 PROCEDURE — 85610 PROTHROMBIN TIME: CPT

## 2024-08-29 RX ORDER — HYDROCODONE BITARTRATE AND ACETAMINOPHEN 7.5; 325 MG/1; MG/1
1 TABLET ORAL EVERY 6 HOURS PRN
Qty: 10 TABLET | Refills: 0 | Status: SHIPPED | OUTPATIENT
Start: 2024-08-29 | End: 2024-09-06 | Stop reason: WASHOUT

## 2024-08-29 RX ORDER — ENOXAPARIN SODIUM 100 MG/ML
30 INJECTION SUBCUTANEOUS DAILY
Status: DISCONTINUED | OUTPATIENT
Start: 2024-08-29 | End: 2024-08-30 | Stop reason: HOSPADM

## 2024-08-29 RX ORDER — HYDROCODONE BITARTRATE AND ACETAMINOPHEN 7.5; 325 MG/1; MG/1
1 TABLET ORAL EVERY 6 HOURS PRN
Qty: 10 TABLET | Refills: 0 | Status: SHIPPED | OUTPATIENT
Start: 2024-08-29 | End: 2024-08-29

## 2024-08-29 RX ORDER — BACITRACIN ZINC 500 UNIT/G
OINTMENT (GRAM) TOPICAL DAILY
Qty: 28 G | Refills: 0 | Status: SHIPPED | OUTPATIENT
Start: 2024-08-30

## 2024-08-29 RX ORDER — METOPROLOL SUCCINATE 50 MG/1
75 TABLET, EXTENDED RELEASE ORAL DAILY
Qty: 45 TABLET | Refills: 1 | Status: SHIPPED | OUTPATIENT
Start: 2024-08-29

## 2024-08-29 ASSESSMENT — COGNITIVE AND FUNCTIONAL STATUS - GENERAL
CLIMB 3 TO 5 STEPS WITH RAILING: A LOT
STANDING UP FROM CHAIR USING ARMS: A LITTLE
HELP NEEDED FOR BATHING: A LOT
MOVING FROM LYING ON BACK TO SITTING ON SIDE OF FLAT BED WITH BEDRAILS: A LITTLE
WALKING IN HOSPITAL ROOM: A LOT
TOILETING: A LOT
MOVING FROM LYING ON BACK TO SITTING ON SIDE OF FLAT BED WITH BEDRAILS: A LITTLE
TURNING FROM BACK TO SIDE WHILE IN FLAT BAD: A LITTLE
MOBILITY SCORE: 14
WALKING IN HOSPITAL ROOM: A LOT
WALKING IN HOSPITAL ROOM: A LITTLE
HELP NEEDED FOR BATHING: A LITTLE
DRESSING REGULAR UPPER BODY CLOTHING: A LITTLE
TURNING FROM BACK TO SIDE WHILE IN FLAT BAD: A LITTLE
STANDING UP FROM CHAIR USING ARMS: A LITTLE
TOILETING: A LOT
DRESSING REGULAR LOWER BODY CLOTHING: A LOT
DRESSING REGULAR LOWER BODY CLOTHING: A LOT
HELP NEEDED FOR BATHING: A LITTLE
STANDING UP FROM CHAIR USING ARMS: A LITTLE
CLIMB 3 TO 5 STEPS WITH RAILING: A LOT
MOVING TO AND FROM BED TO CHAIR: A LITTLE
DRESSING REGULAR LOWER BODY CLOTHING: A LOT
MOVING TO AND FROM BED TO CHAIR: A LITTLE
DRESSING REGULAR UPPER BODY CLOTHING: A LITTLE
EATING MEALS: A LITTLE
TURNING FROM BACK TO SIDE WHILE IN FLAT BAD: A LOT
DAILY ACTIVITIY SCORE: 15
PERSONAL GROOMING: A LITTLE
DRESSING REGULAR UPPER BODY CLOTHING: A LITTLE
TOILETING: A LOT
MOVING TO AND FROM BED TO CHAIR: A LOT
MOVING FROM LYING ON BACK TO SITTING ON SIDE OF FLAT BED WITH BEDRAILS: A LOT
CLIMB 3 TO 5 STEPS WITH RAILING: A LOT
DAILY ACTIVITIY SCORE: 18
MOBILITY SCORE: 16

## 2024-08-29 ASSESSMENT — PAIN - FUNCTIONAL ASSESSMENT
PAIN_FUNCTIONAL_ASSESSMENT: 0-10

## 2024-08-29 ASSESSMENT — PAIN SCALES - GENERAL
PAINLEVEL_OUTOF10: 4
PAINLEVEL_OUTOF10: 5 - MODERATE PAIN
PAINLEVEL_OUTOF10: 3
PAINLEVEL_OUTOF10: 5 - MODERATE PAIN
PAINLEVEL_OUTOF10: 0 - NO PAIN
PAINLEVEL_OUTOF10: 0 - NO PAIN
PAINLEVEL_OUTOF10: 8

## 2024-08-29 ASSESSMENT — ACTIVITIES OF DAILY LIVING (ADL): HOME_MANAGEMENT_TIME_ENTRY: 16

## 2024-08-29 ASSESSMENT — PAIN DESCRIPTION - LOCATION: LOCATION: ABDOMEN

## 2024-08-29 NOTE — NURSING NOTE
Called report to Yasmin at the [facility]. Discharge paper work, ambulance form, and face sheet in chart ready for . Scheduled or 19:30. Pt and family aware

## 2024-08-29 NOTE — CARE PLAN
Problem: Skin  Goal: Decreased wound size/increased tissue granulation at next dressing change  Outcome: Progressing  Flowsheets (Taken 8/29/2024 0730)  Decreased wound size/increased tissue granulation at next dressing change: Promote sleep for wound healing  Goal: Participates in plan/prevention/treatment measures  Outcome: Progressing  Flowsheets (Taken 8/29/2024 0730)  Participates in plan/prevention/treatment measures:   Elevate heels   Increase activity/out of bed for meals  Goal: Prevent/manage excess moisture  Outcome: Progressing  Flowsheets (Taken 8/29/2024 0730)  Prevent/manage excess moisture:   Monitor for/manage infection if present   Follow provider orders for dressing changes  Goal: Prevent/minimize sheer/friction injuries  Outcome: Progressing  Flowsheets (Taken 8/29/2024 0730)  Prevent/minimize sheer/friction injuries:   Increase activity/out of bed for meals   Turn/reposition every 2 hours/use positioning/transfer devices  Goal: Promote/optimize nutrition  Outcome: Progressing  Flowsheets (Taken 8/29/2024 0730)  Promote/optimize nutrition:   Consume > 50% meals/supplements   Monitor/record intake including meals  Goal: Promote skin healing  Outcome: Progressing  Flowsheets (Taken 8/29/2024 0730)  Promote skin healing: Assess skin/pad under line(s)/device(s)     Problem: Fall/Injury  Goal: Not fall by end of shift  Outcome: Progressing  Goal: Be free from injury by end of the shift  Outcome: Progressing  Goal: Verbalize understanding of personal risk factors for fall in the hospital  Outcome: Progressing  Goal: Verbalize understanding of risk factor reduction measures to prevent injury from fall in the home  Outcome: Progressing  Goal: Use assistive devices by end of the shift  Outcome: Progressing  Goal: Pace activities to prevent fatigue by end of the shift  Outcome: Progressing     Problem: Pain  Goal: Takes deep breaths with improved pain control throughout the shift  Outcome:  Progressing  Goal: Turns in bed with improved pain control throughout the shift  Outcome: Progressing  Goal: Walks with improved pain control throughout the shift  Outcome: Progressing  Goal: Performs ADL's with improved pain control throughout shift  Outcome: Progressing  Goal: Participates in PT with improved pain control throughout the shift  Outcome: Progressing  Goal: Free from opioid side effects throughout the shift  Outcome: Progressing  Goal: Free from acute confusion related to pain meds throughout the shift  Outcome: Progressing     Problem: Pain - Adult  Goal: Verbalizes/displays adequate comfort level or baseline comfort level  Outcome: Progressing  Flowsheets (Taken 8/29/2024 0330)  Verbalizes/displays adequate comfort level or baseline comfort level:   Assess pain using appropriate pain scale   Administer analgesics based on type and severity of pain and evaluate response   Implement non-pharmacological measures as appropriate and evaluate response   Consider cultural and social influences on pain and pain management     Problem: Safety - Adult  Goal: Free from fall injury  Outcome: Progressing     Problem: Discharge Planning  Goal: Discharge to home or other facility with appropriate resources  Outcome: Progressing     Problem: Chronic Conditions and Co-morbidities  Goal: Patient's chronic conditions and co-morbidity symptoms are monitored and maintained or improved  Outcome: Progressing

## 2024-08-29 NOTE — CONSULTS
Inpatient consult to Cardiology  Consult performed by: Cecily Soto MD  Consult ordered by: Sandra Jesus MD  Reason for consult: palpitations        History Of Present Illness:    Mane Nath is a 64 y.o. male admitted post lap blayne.  H/o HTN, orthostatic dizziness, DM2, Gout, Unsteady gait, Benign tremor, Depression.    The patient developed abd pain after eating several corn dogs for dinner.  Eval included RUQ ultrasound and CT a/p which both demonstrated slight dilatation of the gallbladder, but no gallstones, and no secondary evidence of acute cholecystitis.     He was given antibiotics and pain medication.  He then underwent a HIDA scan - GB ejection fraction was 24% and cholecystectomy was recommended.  We are consulted for palpitations post op, which started evening of 8/28/24.  Last night he developed intermittent palpitations, not prolonged.  He has chronic dizziness which was unchanged.  He also c/o a brief pain under the left armpit, but no chest pain pressure.  EKG's and troponins were obtained.    No EKG in MUSE system.  EKG's in paper chart personally reviewed:  EKG 8/28/24 8:09 PM:  SR rate 93, PRWP cannot rule out anterior infarct age undetermined, LVH, nonspecific repolarization abnl in the anterolateral leads (prob related to LVH)  EKG 8/28/24 8:10 PM:  SR rate 96, left axis deviation, PRWP cannot rule out anterior infarct age undetermined, LVH  HSTI 7 > 8 > 8.    I personally reviewed the telemetry, which demonstrates NSR with HR's in the 90's, occasional PAC's, and EKG artifact (possibly one episode of NSVT 3-5 beats, but favor artifact).    ROS:  The remainder of the review of systems was obtained, as was negative as pertains to the chief complaint.    CV testing reviewed:  Cardiac Stress Test 8/9/23:Adequate level of stress achieved.  2. No ECG changes from baseline.  3. There were no stress-induced wall motion abnormalities. This is a negative stress echo test for ischemia    "  NM Cardiac Stress 5/13/22: Normal stress myocardial perfusion imaging in response to  pharmacologic stress. Mild apical attenuation present, likely soft  tissue. Patient could not participate in prone imaging.  2. Well-maintained left ventricular function.     TTE 5/6/22:The left ventricular systolic function is normal with a 55-60% estimated ejection fraction.  2. There is moderate concentric left ventricular hypertrophy.        Last Recorded Vitals:  Vitals:    08/29/24 0430 08/29/24 0827 08/29/24 0945 08/29/24 0948   BP: 131/81 121/69 126/82 132/84   BP Location: Left arm  Left arm    Patient Position: Lying  Lying Sitting   Pulse: 86 97 91    Resp: 18  18    Temp: 36.5 °C (97.7 °F)  36.1 °C (97 °F)    TempSrc: Temporal  Temporal    SpO2: 95%  94% 94%   Weight:       Height:           Last Labs:  CBC - 8/29/2024:  3:14 AM  7.5 11.5 132    33.3      CMP - 8/29/2024:  3:14 AM  8.7 6.7 22 --- 1.3   3.1 4.0 22 97      PTT - 8/29/2024:  3:14 AM  1.1   12.8 45     Troponin I, High Sensitivity   Date/Time Value Ref Range Status   08/29/2024 11:24 AM 8 0 - 20 ng/L Final   08/29/2024 03:14 AM 8 0 - 20 ng/L Final   08/28/2024 08:38 PM 7 0 - 20 ng/L Final     BNP   Date/Time Value Ref Range Status   07/29/2024 11:29 PM 13 0 - 99 pg/mL Final   12/22/2023 11:59 AM 7 0 - 99 pg/mL Final      Last I/O:  I/O last 3 completed shifts:  In: 1551 (16.2 mL/kg) [P.O.:1551]  Out: 2540 (26.5 mL/kg) [Urine:2450 (0.7 mL/kg/hr); Drains:90]  Weight: 95.8 kg     Past Cardiology Tests (Last 3 Years):  EKG:  ECG 12 lead 07/29/2024      ECG 12 Lead       ECG 12 lead 06/04/2024      ECG 12 Lead 03/04/2024      ECG 12 lead 02/25/2024      ECG 12 Lead 12/22/2023    Echo:  No results found for this or any previous visit from the past 1095 days.    Ejection Fractions:  No results found for: \"EF\"  Cath:  No results found for this or any previous visit from the past 1095 days.    Stress Test:  Nuclear Stress Test 05/13/2022    Cardiac Imaging:  No " results found for this or any previous visit from the past 1095 days.      Past Medical History:  He has a past medical history of Diabetes (Multi), HTN (hypertension), Personal history of other diseases of the musculoskeletal system and connective tissue, Personal history of other mental and behavioral disorders, and Type 2 diabetes mellitus (Multi).    Past Surgical History:  He has a past surgical history that includes Neck surgery (02/01/2018); Other surgical history (02/01/2018); Other surgical history (04/25/2022); and Cholecystectomy (N/A, 08/26/2024).      Social History:  He reports that he has quit smoking. His smoking use included cigarettes. He has never used smokeless tobacco. He reports that he does not drink alcohol and does not use drugs.    Family History:  Family History   Problem Relation Name Age of Onset    Dementia Mother      Heart attack Father      Cholecystitis Sister          x3    Cancer Father's Sister      Prostate cancer Father's Brother      Cancer Paternal Grandmother      Diabetes Paternal Grandfather          Allergies:  Latex    Inpatient Medications:  Scheduled medications   Medication Dose Route Frequency    acetaminophen  650 mg oral q6h    Or    acetaminophen  650 mg nasogastric tube q6h    Or    acetaminophen  650 mg rectal q6h    amLODIPine  5 mg oral Daily    ARIPiprazole  30 mg oral Nightly    atorvastatin  80 mg oral Daily    bacitracin   Topical Daily    busPIRone  10 mg oral BID    dapagliflozin propanediol  10 mg oral Daily    donepezil  5 mg oral Daily    enoxaparin  30 mg subcutaneous Daily    fenofibrate  160 mg oral Daily    gabapentin  400 mg oral q8h THU    insulin lispro  0-10 Units subcutaneous Before meals & nightly    metFORMIN  500 mg oral BID after meals    metoprolol succinate XL  50 mg oral Daily    pantoprazole  40 mg oral Daily before breakfast    polyethylene glycol  17 g oral Daily    venlafaxine XR  150 mg oral Daily with breakfast     PRN  medications   Medication    albuterol    dextrose    dextrose    glucagon    glucagon    HYDROcodone-acetaminophen    morphine    naloxone    ondansetron ODT    Or    ondansetron     Continuous Medications   Medication Dose Last Rate     Outpatient Medications:  Current Outpatient Medications   Medication Instructions    albuterol 90 mcg/actuation inhaler Inhale 2 puffs every 6 hours if needed.    amLODIPine (Norvasc) 5 mg tablet oral, Daily    ARIPiprazole (ABILIFY) 30 mg, oral, Nightly    atorvastatin (Lipitor) 80 mg tablet 1 tablet, oral, Daily (0630)    busPIRone (Buspar) 10 mg tablet take 2 tablets by mouth twice a day as directed    dapagliflozin propanediol (FARXIGA) 10 mg, oral, Daily    diclofenac sodium (Voltaren Arthritis Pain) 1 % gel Topical, Use as directed.    docusate sodium (Colace) 100 mg capsule TAKE ONE (1) CAPSULE BY MOUTH ONCE DAILY AS NEEDED. **(COLACE 100 MG CAP EQUIV)**    donepezil (ARICEPT) 5 mg, oral, Nightly    gabapentin (Neurontin) 400 mg capsule take 1 capsule by mouth twice a day THEN TAKE 2 CAPSULES AT BEDTIME    HYDROcodone-acetaminophen (Norco) 7.5-325 mg tablet 1 tablet, oral, Every 6 hours PRN    metFORMIN (Glucophage) 1,000 mg tablet 1 tablet, oral, 2 times daily, With breakfast and dinner     metoprolol succinate XL (TOPROL-XL) 50 mg, oral, Daily    omeprazole (PRILOSEC) 40 mg, oral, Do not crush or chew.    polyethylene glycol (MIRALAX) 17 g, oral, Daily    simethicone (Mylicon) 80 mg chewable tablet 1 tablet, oral, 3 times daily before meals, And at bedtime prn    traZODone (Desyrel) 100 mg tablet take 1 to 3 tablet by mouth at bedtime if needed    Trulicity 3 mg/0.5 mL pen injector Inject 4.5 mg as directed 1 (one) time per week.    venlafaxine XR (Effexor-XR) 150 mg 24 hr capsule take 2 capsules by mouth once daily as directed       Physical Exam:  Physical Exam  HENT:      Head: Normocephalic.      Mouth/Throat:      Mouth: Mucous membranes are moist.   Eyes:       Extraocular Movements: Extraocular movements intact.   Cardiovascular:      Rate and Rhythm: Normal rate and regular rhythm.   Pulmonary:      Effort: Pulmonary effort is normal.      Breath sounds: Normal breath sounds.   Abdominal:      Comments: postop   Musculoskeletal:      Cervical back: Neck supple.   Skin:     General: Skin is warm.   Neurological:      General: No focal deficit present.      Mental Status: He is alert and oriented to person, place, and time.   Psychiatric:         Mood and Affect: Mood normal.            Assessment/Plan   Postop blayne  Palpitations  HTN  DL    Palpitations:   We are consulted for palpitations post op, which started evening of 8/28/24.    No EKG in MUSE system.  EKG's in paper chart personally reviewed:  EKG 8/28/24 8:09 PM:  SR rate 93, PRWP cannot rule out anterior infarct age undetermined, LVH, nonspecific repolarization abnl in the anterolateral leads (prob related to LVH)  EKG 8/28/24 8:10 PM:  SR rate 96, left axis deviation, PRWP cannot rule out anterior infarct age undetermined, LVH  HSTI 7 > 8 > 8.  I personally reviewed the telemetry, which demonstrates NSR with HR's in the 90's, occasional PAC's, and EKG artifact (possibly one episode of NSVT 3-5 beats, but favor artifact).  K/Mg this morning checked and were nl  Mild anemia.    Sx may be from borderline high sinus rate (90's) and intermittent PAC's.    Recs:  -follow up TTE and compare with old, if no significant change in EF and no rWMA, then would recommend no further CV imaging at this time  -increase metop succinate XL to 75mg daily  -continue remainder of CV/HTN meds  -routine postop care  -he should follow up with [facility] in office post discharge    Peripheral IV 08/26/24 20 G Right Forearm (Active)   Site Assessment Clean;Dry;Intact 08/29/24 0849   Dressing Type Transparent 08/29/24 0849   Line Status Flushed;Saline locked 08/29/24 0849   Dressing Status Clean;Dry 08/29/24 0849   Number of days: 3        Code Status:  Full Code    Cecily Soto MD

## 2024-08-29 NOTE — DISCHARGE SUMMARY
Discharge Diagnosis  Calculus of gallbladder with acute on chronic cholecystitis with obstruction    Issues Requiring Follow-Up  Routine postop and drain care  Metoprolol increase    Test Results Pending At Discharge  Pending Labs       Order Current Status    Surgical Pathology Exam In process            Hospital Course   He had presented to the hospital on 8/26/2024 for elective surgery for biliary dyskinesia. He was taken to the operating room where he was found to have severe chronic cholecystitis requiring an open partial cholecystectomy and drain placement. He was found to have gallstones. He also had a significant nodularity to his liver most consistent with cirrhosis. A liver biopsy was performed. Postoperatively, he was sent to the surgical floor. He was monitored for 48 hours postoperatively. He was tolerating a diet without any nausea or vomiting. His drain output was mostly bloody drainage. Rechecks of his hemoglobin showed a stable hemoglobin level. He remained hemodynamically stable. The packing was pulled out between his staples on postoperative day #2. Wound care and drain care was taught to family. On the evening of 8/28 the patient experienced chest pain and palpitations. Cardiology was consulted who felt that his EKG was consistent with preop. His metoprolol was increased to 75mg. He had a repeat echo which was without acute concern. After receiving optimal therapy in the hospital, he was discharged home in stable condition.     Pertinent Physical Exam At Time of Discharge  General: NAD, awake and alert  HEENT: Normocephalic, atraumatic; neck motion limited due to pain  Neuro: A&O x 3, grossly intact  Cardio: Regular rate and rhythm  Respiratory: Unlabored breathing on RA  Abdomen: Soft, nondistended, appropriate tenderness without rebound or guarding. LAUREN drain with 20 ccs serosanguinous output; incision clean/dry/intact covered with ABDs  MSK: FRANCES, normal ROM  Extremities: Normal extremities, no  edema or cyanosis  Skin: Warm and dry, no jaundice    Home Medications     Medication List      START taking these medications     bacitracin 500 unit/gram ointment; Apply topically once daily.; Start   taking on: August 30, 2024   HYDROcodone-acetaminophen 7.5-325 mg tablet; Commonly known as: Norco;   Take 1 tablet by mouth every 6 hours if needed for severe pain (7 - 10).     CHANGE how you take these medications     metoprolol succinate XL 50 mg 24 hr tablet; Commonly known as:   Toprol-XL; Take 1.5 tablets (75 mg) by mouth once daily.; What changed:   how much to take     CONTINUE taking these medications     albuterol 90 mcg/actuation inhaler   amLODIPine 5 mg tablet; Commonly known as: Norvasc   ARIPiprazole 30 mg tablet; Commonly known as: Abilify   atorvastatin 80 mg tablet; Commonly known as: Lipitor   busPIRone 10 mg tablet; Commonly known as: Buspar   dapagliflozin propanediol 10 mg; Commonly known as: Farxiga   docusate sodium 100 mg capsule; Commonly known as: Colace   donepezil 5 mg tablet; Commonly known as: Aricept   gabapentin 400 mg capsule; Commonly known as: Neurontin   metFORMIN 1,000 mg tablet; Commonly known as: Glucophage   Miralax 17 gram/dose powder; Generic drug: polyethylene glycol   omeprazole 40 mg DR capsule; Commonly known as: PriLOSEC   simethicone 80 mg chewable tablet; Commonly known as: Mylicon   traZODone 100 mg tablet; Commonly known as: Desyrel   Trulicity 3 mg/0.5 mL pen injector; Generic drug: dulaglutide   venlafaxine  mg 24 hr capsule; Commonly known as: Effexor-XR   Voltaren Arthritis Pain 1 % gel; Generic drug: diclofenac sodium       Outpatient Follow-Up  Future Appointments   Date Time Provider Department Center   9/6/2024  9:00 AM Sandra Jesus MD [id number] [facility]   1/3/2025 10:30 AM KRISTEN Grissom-CNP [id number] [facility]       Rodrigo Pisano DO

## 2024-08-29 NOTE — SIGNIFICANT EVENT
Paged to evaluate patient at bedside for chest palpitations with left axillary pain. Patient reporting feeling like his heart is skipping beats and a 15 minute interval of left axillary pain which resolved. Patient otherwise without symptoms and reports his postoperative pain unchanged. ST- elevation changes on EKG concerning for MI. Discussed with cardiology - stated to be LVH and present on previous EKG.     Ordered BMP, mag, phos, and troponin. Troponin resulted at 7 and electrolytes overall unremarkable.     Cardiology recommendations per Dr. Vergara:  - EKG showing LVH - present on previous EKG  - Q6HR troponins  - Echocardiogram in AM  - Cardiology consult in AM    Patient discussed with Dr. Jesus and Dr. Jai Barry MD  PGY 2 General Surgery

## 2024-08-29 NOTE — PROGRESS NOTES
Physical Therapy    Physical Therapy Treatment    Patient Name: Mane Nath  MRN: [id number]  Today's Date: 8/29/2024  Time Calculation  Start Time: 0858  Stop Time: 0921  Time Calculation (min): 23 min         Assessment/Plan   PT Assessment  End of Session Communication: Bedside nurse, PCT/NA/OLGA  Assessment Comment: patient is very anxious and requries  encouragment for  increased activity . verbal cues for safety  End of Session Patient Position: Bed, 3 rail up, Alarm on  PT Plan  Inpatient/Swing Bed or Outpatient: Inpatient  PT Plan  Treatment/Interventions: Bed mobility, Transfer training, Gait training, Balance training, Strengthening, Therapeutic exercise, Therapeutic activity  PT Plan: Ongoing PT  PT Frequency: 5 times per week  PT Discharge Recommendations: High intensity level of continued care  Equipment Recommended upon Discharge:  (TBD. May benefit from WW.)  PT Recommended Transfer Status: Assist x1  PT - OK to Discharge: Yes (To next level of care when medically cleared.)      General Visit Information:   PT  Visit  PT Received On: 08/29/24  Response to Previous Treatment: Patient reporting fatigue but able to participate.  General  Prior to Session Communication: Bedside nurse, PCT/NA/OLGA  Patient Position Received: Bed, 3 rail up, Alarm on  General Comment:  Subjective   Precautions:       Vital Signs (Past 2hrs)        Date/Time Vitals Session Patient Position Pulse Resp SpO2 BP MAP (mmHg)    08/29/24 0827 --  --  97  --  --  121/69  87     08/29/24 0945 --  --  91  18  94 %  126/82  96     08/29/24 0948 --  --  --  --  94 %  132/84  100                         Objective   Pain:  Pain Assessment  Pain Assessment: 0-10  0-10 (Numeric) Pain Score: 0 - No pain (no complaints of pain)  Cognition:  Cognition  Orientation Level: Oriented X4      Activity Tolerance:  Activity Tolerance  Endurance: Tolerates 10 - 20 min exercise with multiple rests  Treatments:     patient performed supine to sit with  min assist. static sitting balance at EOB  x 9 mins while performing LE ankle pumps LAQ, marches 15 reps each. sit to stand with min assist, gait training with FWW 60 feet with min assist stand to sit with min assist.on commode,sit to stand with min assist from commode, min assist for cleaning. gait training with fWw 10 feet to bed sit to supine with min assist  call light  on.    Outcome Measures:  [facility] Basic Mobility  Turning from your back to your side while in a flat bed without using bedrails: A lot  Moving from lying on your back to sitting on the side of a flat bed without using bedrails: A lot  Moving to and from bed to chair (including a wheelchair): A lot  Standing up from a chair using your arms (e.g. wheelchair or bedside chair): A little  To walk in hospital room: A little  Climbing 3-5 steps with railing: A lot  Basic Mobility - Total Score: 14    Education Documentation  Body Mechanics, taught by Vangie Larry PTA at 8/29/2024 10:06 AM.  Learner: Patient  Readiness: Acceptance  Method: Explanation  Response: Verbalizes Understanding    Mobility Training, taught by Vangie Larry PTA at 8/29/2024 10:06 AM.  Learner: Patient  Readiness: Acceptance  Method: Explanation  Response: Verbalizes Understanding    Education Comments  No comments found.        OP EDUCATION:       Encounter Problems       Encounter Problems (Active)       Pain - Adult          To improve strength, balance, understanding of abdominal precautions to achieve highest level of function:        Pt will independently verbalize understanding of and implement abdominal precautions during supine <> sit with MIN A of 1. (Progressing)       Start:  08/28/24    Expected End:  09/11/24            Patient will sit <> stand and ambulate with  feet with CGA, completing turns without lifting device. (Progressing)       Start:  08/28/24    Expected End:  09/11/24            Patient will participate in dynamic stand balance activities  including standing exercise with UE support as needed for 3 minutes with CGA for safety. (Progressing)       Start:  08/28/24    Expected End:  09/11/24

## 2024-08-29 NOTE — CARE PLAN
Problem: Skin  Goal: Decreased wound size/increased tissue granulation at next dressing change  Outcome: Progressing  Goal: Participates in plan/prevention/treatment measures  Outcome: Progressing  Goal: Prevent/manage excess moisture  Outcome: Progressing  Goal: Prevent/minimize sheer/friction injuries  Outcome: Progressing  Goal: Promote/optimize nutrition  Outcome: Progressing  Goal: Promote skin healing  Outcome: Progressing     Problem: Fall/Injury  Goal: Not fall by end of shift  Outcome: Progressing  Goal: Be free from injury by end of the shift  Outcome: Progressing  Goal: Verbalize understanding of personal risk factors for fall in the hospital  Outcome: Progressing  Goal: Verbalize understanding of risk factor reduction measures to prevent injury from fall in the home  Outcome: Progressing  Goal: Use assistive devices by end of the shift  Outcome: Progressing  Goal: Pace activities to prevent fatigue by end of the shift  Outcome: Progressing     Problem: Pain  Goal: Takes deep breaths with improved pain control throughout the shift  Outcome: Progressing  Goal: Turns in bed with improved pain control throughout the shift  Outcome: Progressing  Goal: Walks with improved pain control throughout the shift  Outcome: Progressing  Goal: Performs ADL's with improved pain control throughout shift  Outcome: Progressing  Goal: Participates in PT with improved pain control throughout the shift  Outcome: Progressing  Goal: Free from opioid side effects throughout the shift  Outcome: Progressing  Goal: Free from acute confusion related to pain meds throughout the shift  Outcome: Progressing     Problem: Pain - Adult  Goal: Verbalizes/displays adequate comfort level or baseline comfort level  Outcome: Progressing     Problem: Safety - Adult  Goal: Free from fall injury  Outcome: Progressing     Problem: Discharge Planning  Goal: Discharge to home or other facility with appropriate resources  Outcome: Progressing

## 2024-08-29 NOTE — PROGRESS NOTES
"Mane Nath is a 64 y.o. male on day 2 of admission presenting with Calculus of gallbladder with acute on chronic cholecystitis with obstruction.    Subjective   Patient with palpitations overnight and left axillary pain - cardiac workup initiated. Reports he did not sleep well and is having pain now in his neck. Denies any nausea, current chest pain, lightheadedness, or dysuria. Continues to pass flatus and had a bowel movement.        Objective     General: NAD, awake and alert  HEENT: Normocephalic, atraumatic; neck motion limited due to pain  Neuro: A&O x 3, grossly intact  Cardio: Regular rate and rhythm  Respiratory: Unlabored breathing on RA  Abdomen: Soft, nondistended, appropriate tenderness without rebound or guarding. LAUREN drain with 20 ccs serosanguinous output; incision clean/dry/intact covered with ABDs  MSK: FRANCES, normal ROM  Extremities: Normal extremities, no edema or cyanosis  Skin: Warm and dry, no jaundice      Last Recorded Vitals  Blood pressure 121/69, pulse 97, temperature 36.5 °C (97.7 °F), temperature source Temporal, resp. rate 18, height 1.778 m (5' 10\"), weight 95.8 kg (211 lb 3.2 oz), SpO2 95%.  Intake/Output last 3 Shifts:  I/O last 3 completed shifts:  In: 1551 (16.2 mL/kg) [P.O.:1551]  Out: 2540 (26.5 mL/kg) [Urine:2450 (0.7 mL/kg/hr); Drains:90]  Weight: 95.8 kg     Relevant Results      Assessment/Plan   Assessment & Plan  Calculus of gallbladder with acute on chronic cholecystitis with obstruction    Difficult intubation    Cirrhosis (Multi)    Mr. Mane Nath is a 63 y/o male s/p lap converted to open cholecystectomy    Plan:  - Cardiology consult for chest palpitations overnight  - Echo pending  - Trend troponins Q6HR per cardiology  - INR 1.1 on coags this AM - okay to start DVT PPX Lovenox 30 mg daily  - Okay to discharge to SNF per surgery; discharge pending cardiology recommendations and need for further evaluation/monitoring    Patient discussed with attending, Dr." Edin Barry MD  PGY 2 General Surgery

## 2024-08-29 NOTE — PROGRESS NOTES
Auth has been received.  Dr. Odom notified via secure chat which indicated she was in OR.  I met with pt and let him know that once MD gets out of OR the DC order will go in and will get him over to [place] today.  Facility also has been made aware.      1630  Pt is discharged.  Transportation has been arranged via [facility] for 1930.  EDENILSON Trevino has been notified and provided phone number for report.  I also called pts sister to let her know.  Pt had been made aware when transport was being set up and RN will let him know final time.

## 2024-08-29 NOTE — PROGRESS NOTES
Occupational Therapy    OT Treatment    Patient Name: Mane Nath  MRN: [id number]  Today's Date: 8/29/2024  Time Calculation  Start Time: 0944  Stop Time: 1015  Time Calculation (min): 31 min        Assessment:  End of Session Communication: Bedside nurse, PCT/NA/OLGA  End of Session Patient Position: Up in chair, Alarm on     Plan:  Treatment Interventions: ADL retraining, Functional transfer training, UE strengthening/ROM, Endurance training, Patient/family training, Equipment evaluation/education, Neuromuscular reeducation, Compensatory technique education      Subjective   Previous Visit Info:  OT Last Visit  OT Received On: 08/29/24  General:  General  Prior to Session Communication: Bedside nurse, PCT/JESS/OLGA  Patient Position Received: Bed, 3 rail up, Alarm on  General Comment: pt. reports he is dizzy upon sitting bp monitored and remained about the same with supine to sit. Pt. is MIN A for transfers cueing for hand placment. MOD A for bathing and UB dressing  MIN A . Pt. left up in chair noted patient diaphoretic notified nursing Ok to leave patient up in chair.            Pain:  Pain Assessment  Pain Assessment: 0-10  0-10 (Numeric) Pain Score: 5 - Moderate pain  Pain Type: Surgical pain  Pain Location: Abdomen (nursing giving medication)    Objective    Cognition:  Cognition  Orientation Level: Oriented X4  Coordination:     Activities of Daily Living: Grooming  Grooming Level of Assistance: Setup  Grooming Comments: wash face.    UE Bathing  UE Bathing Level of Assistance: Moderate assistance  UE Bathing Where Assessed:  (chair)         UE Dressing  UE Dressing Level of Assistance: Minimum assistance  UE Dressing Where Assessed: Chair  UE Dressing Comments: gown    LE Dressing  LE Dressing: No       Functional Standing Tolerance:  Time: 1 min  Activity: during transfer  Functional Standing Tolerance Comments: SABINA for balance  Bed Mobility/Transfers: Bed Mobility  Bed Mobility: Yes  Bed Mobility 1  Bed  PT Daily Note-Current


Subjective


Patient is in bed.  Agrees to PT.





Mental Status


Patient Orientation:  Confused


Attachments:  IV





Transfers


SCALE: Activities may be completed with or without assistive devices.





6-Indepedent-patient completes the activity by him/herself with no assistance 

from a helper.


5-Set-up or Clean-up Assistance-helper sets up or cleans up; patient completes 

activity. Salome assists only prior to or  


    following the activity.


4-Supervision or Touching Assistance-helper provides verbal cues and/or 

touching/steadying and/or contact guard assistance as patient completes 

activity. Assistance may be provided   


    throughout the activity or intermittently.


3-Partial/Moderate Assistance-helper does LESS THAN HALF the effort. Salome 

lifts, holds or supports trunk or limbs, but provides less than half the effort.


2-Substantial/Maximal Assistance-helper does MORE THAN HALF the effort. Salome 

lifts or holds trunk or limbs and provides more than half the effort.


3-Ksarfchya-rhodiy does ALL the effort. Patient does none of the effort to 

complete the activity. Or, the assistance of 2 or more helpers is required for 

the patient to complete the  


    activity.


If activity was not attempted, code reason:


7-Patient Refused.


9-Not Applicable-not attempted and the patient did not perform the activity 

before the current illness, exacerbation or injury.


10-Not Attempted due to Environmental Limitations-(lack of equipment, weather 

restraints, etc.).


88-Not Attempted due to Medical Conditions or Safety Concerns.


Lying to Sitting/Side of Bed(Q:  2


Sit to Stand (QC):  2 (severely retropulsive)


Chair/Bed-to-Chair Xfer(QC):  2 (severely retropulsive)


very ataxic with all mobility





Exercises


Seated Therapy Exercises:  Ankle pumps, Long arc quads, Hip flexion


Seated Reps:  10 (3 sets)





Assessment


Patient remains up in recliner with OT present. Patient continues to be 

retropulsive with all mobility requiring max assist.  Unable to safely take 

multiple steps due to this.  Increase activity as tolerated by patient.





PT Long Term Goals


Long Term Goals


PT Long Term Goals Time Frame:  Ammon 15, 2022


Roll Left & Right (QC):  4


Sit to Lying (QC):  3 (Lisa)


Lying-Sitting on Side/Bed(QC):  3 (Lisa)


Sit to Stand (QC):  3 (Lisa)


Chair/Bed-to-Chair Xfer(QC):  3 (Lisa)


Walk 10 feet (QC):  3 (Lisa)





PT Plan


Treatment/Plan


Treatment Plan:  Continue Plan of Care


Treatment Plan:  Bed Mobility, Education, Functional Activity Yanni, Functional 

Strength, Gait, Safety, Therapeutic Exercise, Transfers


Treatment Duration:  Ammon 15, 2022


Frequency:  6 times per week


Estimated Hrs Per Day:  .25 hour per day


Patient and/or Family Agrees t:  Yes





Time/GCodes


Time In:  916


Time Out:  926


Total Billed Treatment Time:  10


Total Billed Treatment


1 visit


EX 10 min











MARICEL WILSON PT               Jun 9, 2022 09:43 Mobility 1: Rolling right  Level of Assistance 1: Minimum assistance, Minimal tactile cues, Minimal verbal cues  Bed Mobility 2  Bed Mobility  2: Supine to sitting  Level of Assistance 2: Minimum assistance, Minimal verbal cues, Minimal tactile cues  Bed Mobility Comments 2: cueing for hand placement and log roll tech.    Transfers  Transfer: Yes  Transfer 1  Transfer From 1: Bed to  Transfer to 1: Stand  Technique 1: Sit to stand, Stand to sit  Transfer Device 1: Walker  Transfer Level of Assistance 1: Minimum assistance  Trials/Comments 1: cueing for hand placement  Transfers 2  Transfer From 2: Stand to  Transfer to 2: Chair with arms  Technique 2: Stand pivot  Transfer Device 2: Walker  Transfer Level of Assistance 2: Minimum assistance, Minimal verbal cues, Minimal tactile cues            Outcome Measures:[facility] Daily Activity  Putting on and taking off regular lower body clothing: A lot  Bathing (including washing, rinsing, drying): A lot  Putting on and taking off regular upper body clothing: A little  Toileting, which includes using toilet, bedpan or urinal: A lot  Taking care of personal grooming such as brushing teeth: A little  Eating Meals: A little  Daily Activity - Total Score: 15        Education Documentation  Body Mechanics, taught by KEN Reeder at 8/29/2024 11:16 AM.  Learner: Patient  Readiness: Acceptance  Method: Explanation  Response: Verbalizes Understanding, Needs Reinforcement, Demonstrated Understanding    Precautions, taught by KEN Reeder at 8/29/2024 11:16 AM.  Learner: Patient  Readiness: Acceptance  Method: Explanation  Response: Verbalizes Understanding, Needs Reinforcement, Demonstrated Understanding    ADL Training, taught by KEN Reeder at 8/29/2024 11:16 AM.  Learner: Patient  Readiness: Acceptance  Method: Explanation  Response: Verbalizes Understanding, Needs Reinforcement, Demonstrated Understanding    Education Comments  No comments  found.        OP EDUCATION:       Goals:  Encounter Problems       Encounter Problems (Active)       ADLs       Patient with complete lower body dressing with minimal assist  level of assistance donning and doffing all LE clothes  with PRN adaptive equipment while edge of bed  (Not Progressing)       Start:  08/28/24    Expected End:  09/04/24               MOBILITY       Patient will perform Functional mobility mod  Household distances/Community Distances with stand by assist level of assistance and front wheeled walker in order to improve safety and functional mobility. (Progressing)       Start:  08/28/24    Expected End:  09/04/24               TRANSFERS       Patient will complete sit to stand transfer with CGA front wheeled walker in order to improve safety and prepare for out of bed mobility. (Progressing)       Start:  08/28/24    Expected End:  09/04/24

## 2024-09-03 ENCOUNTER — LAB REQUISITION (OUTPATIENT)
Dept: LAB | Facility: HOSPITAL | Age: 64
End: 2024-09-03
Payer: COMMERCIAL

## 2024-09-03 DIAGNOSIS — K80.65 CALCULUS OF GALLBLADDER AND BILE DUCT WITH CHRONIC CHOLECYSTITIS WITH OBSTRUCTION: ICD-10-CM

## 2024-09-03 LAB
ANION GAP SERPL CALC-SCNC: 10 MMOL/L (ref 10–20)
BUN SERPL-MCNC: 18 MG/DL (ref 6–23)
CALCIUM SERPL-MCNC: 7.6 MG/DL (ref 8.6–10.3)
CHLORIDE SERPL-SCNC: 106 MMOL/L (ref 98–107)
CO2 SERPL-SCNC: 27 MMOL/L (ref 21–32)
CREAT SERPL-MCNC: 0.83 MG/DL (ref 0.5–1.3)
EGFRCR SERPLBLD CKD-EPI 2021: >90 ML/MIN/1.73M*2
ERYTHROCYTE [DISTWIDTH] IN BLOOD BY AUTOMATED COUNT: 15.7 % (ref 11.5–14.5)
GLUCOSE SERPL-MCNC: 122 MG/DL (ref 74–99)
HCT VFR BLD AUTO: 29.8 % (ref 41–52)
HGB BLD-MCNC: 10.3 G/DL (ref 13.5–17.5)
MCH RBC QN AUTO: 32.9 PG (ref 26–34)
MCHC RBC AUTO-ENTMCNC: 34.6 G/DL (ref 32–36)
MCV RBC AUTO: 95 FL (ref 80–100)
NRBC BLD-RTO: 0 /100 WBCS (ref 0–0)
PLATELET # BLD AUTO: 146 X10*3/UL (ref 150–450)
POTASSIUM SERPL-SCNC: 4.1 MMOL/L (ref 3.5–5.3)
RBC # BLD AUTO: 3.13 X10*6/UL (ref 4.5–5.9)
SODIUM SERPL-SCNC: 139 MMOL/L (ref 136–145)
WBC # BLD AUTO: 6 X10*3/UL (ref 4.4–11.3)

## 2024-09-03 PROCEDURE — 36415 COLL VENOUS BLD VENIPUNCTURE: CPT | Performed by: INTERNAL MEDICINE

## 2024-09-03 PROCEDURE — 85027 COMPLETE CBC AUTOMATED: CPT | Mod: OUT | Performed by: INTERNAL MEDICINE

## 2024-09-03 PROCEDURE — 80048 BASIC METABOLIC PNL TOTAL CA: CPT | Mod: OUT | Performed by: INTERNAL MEDICINE

## 2024-09-05 LAB
LABORATORY COMMENT REPORT: NORMAL
PATH REPORT.FINAL DX SPEC: NORMAL
PATH REPORT.GROSS SPEC: NORMAL
PATH REPORT.RELEVANT HX SPEC: NORMAL
PATH REPORT.TOTAL CANCER: NORMAL

## 2024-09-06 ENCOUNTER — APPOINTMENT (OUTPATIENT)
Dept: SURGERY | Facility: CLINIC | Age: 64
End: 2024-09-06
Payer: COMMERCIAL

## 2024-09-06 ENCOUNTER — OFFICE VISIT (OUTPATIENT)
Dept: SURGERY | Facility: CLINIC | Age: 64
End: 2024-09-06
Payer: COMMERCIAL

## 2024-09-06 VITALS
SYSTOLIC BLOOD PRESSURE: 107 MMHG | WEIGHT: 188.2 LBS | HEIGHT: 70 IN | BODY MASS INDEX: 26.94 KG/M2 | OXYGEN SATURATION: 97 % | DIASTOLIC BLOOD PRESSURE: 67 MMHG | HEART RATE: 91 BPM

## 2024-09-06 DIAGNOSIS — K76.0 FATTY LIVER: ICD-10-CM

## 2024-09-06 DIAGNOSIS — K80.13 CALCULUS OF GALLBLADDER WITH ACUTE ON CHRONIC CHOLECYSTITIS WITH OBSTRUCTION: Primary | ICD-10-CM

## 2024-09-06 PROBLEM — R10.11 RIGHT UPPER QUADRANT ABDOMINAL PAIN: Status: RESOLVED | Noted: 2024-06-11 | Resolved: 2024-09-06

## 2024-09-06 PROBLEM — K74.60 CIRRHOSIS (MULTI): Status: RESOLVED | Noted: 2024-08-28 | Resolved: 2024-09-06

## 2024-09-06 PROCEDURE — 99024 POSTOP FOLLOW-UP VISIT: CPT | Performed by: SURGERY

## 2024-09-06 PROCEDURE — 3008F BODY MASS INDEX DOCD: CPT | Performed by: SURGERY

## 2024-09-06 PROCEDURE — 3074F SYST BP LT 130 MM HG: CPT | Performed by: SURGERY

## 2024-09-06 PROCEDURE — 1036F TOBACCO NON-USER: CPT | Performed by: SURGERY

## 2024-09-06 PROCEDURE — 3078F DIAST BP <80 MM HG: CPT | Performed by: SURGERY

## 2024-09-06 ASSESSMENT — ENCOUNTER SYMPTOMS
FLANK PAIN: 0
WOUND: 0
ARTHRALGIAS: 1
PALPITATIONS: 1
HEMATURIA: 0
DYSURIA: 0
VOMITING: 0
EYE PAIN: 0
WEAKNESS: 1
POLYPHAGIA: 0
FATIGUE: 0
CONSTIPATION: 1
FREQUENCY: 0
SHORTNESS OF BREATH: 1
DIARRHEA: 0
BRUISES/BLEEDS EASILY: 0
ABDOMINAL PAIN: 1
SPEECH DIFFICULTY: 0
CHILLS: 0
AGITATION: 0
FEVER: 0
MYALGIAS: 1
CONFUSION: 1
COUGH: 0
NAUSEA: 0
HEADACHES: 0
EYE REDNESS: 0

## 2024-09-06 NOTE — PROGRESS NOTES
GENERAL SURGERY OFFICE NOTE    Patient: Mane Nath    Age: 64 y.o.   Gender: male    MRN: [id number]    PCP: Thalia Smith MD        SUBJECTIVE     Chief Complaint  Follow-up (Patient is here for a 2 week post op partial laparoscopic cholecystectomy converted to open. Patient states that he is having a little pain. Patient states that the facility has been keeping track of his drain output. )       JULY Gilliam returns to the office for 12-day postop check after undergoing a laparoscopic converted to open partial cholecystectomy surgery.  At the time of his surgery, he was found to have severe chronic cholecystitis which required conversion to an open procedure.  Due to the inflammation, the cystic duct could never be identified, and a partial cholecystectomy was performed.  A liver biopsy was also performed due to concerns for possible early cirrhosis.  He did have a draining placed intraoperatively.  After surgery, he recovered well, but elected to go to a skilled nursing facility for physical rehab and strength endurance.  He states he is up to ambulating with a walker.  He is eating and drinking well without any nausea or vomiting, but states he does not like the food at the facility.  He is not having any diarrhea.  No fevers.  The facility did not send a record of the drain output, but when called, they reported less than 15 cc/day.  The drain output is a thin bloody fluid.  He is currently not taking anything but Tylenol for pain.  No drainage out of his incisions.    ROS  Review of Systems   Constitutional:  Negative for chills, fatigue and fever.   HENT:  Positive for hearing loss. Negative for congestion and ear pain.    Eyes:  Negative for pain and redness.   Respiratory:  Positive for shortness of breath. Negative for cough.    Cardiovascular:  Positive for palpitations. Negative for chest pain and leg swelling.   Gastrointestinal:  Positive for abdominal pain and constipation. Negative for  diarrhea, nausea and vomiting.   Endocrine: Negative for polyphagia.   Genitourinary:  Negative for dysuria, flank pain, frequency and hematuria.   Musculoskeletal:  Positive for arthralgias and myalgias.   Skin:  Negative for rash and wound.   Allergic/Immunologic: Negative for immunocompromised state.   Neurological:  Positive for weakness. Negative for speech difficulty and headaches.   Hematological:  Does not bruise/bleed easily.   Psychiatric/Behavioral:  Positive for confusion. Negative for agitation.           HISTORY     Past Medical History:   Diagnosis Date    Cirrhosis (Multi) 08/28/2024    Diabetes (Multi)     HTN (hypertension)     Personal history of other diseases of the musculoskeletal system and connective tissue     History of gout    Personal history of other mental and behavioral disorders     History of depression    Right upper quadrant abdominal pain 06/11/2024    Type 2 diabetes mellitus (Multi)         Past Surgical History:   Procedure Laterality Date    CHOLECYSTECTOMY N/A 08/26/2024    Laparoscopic converted to open partial cholecystectomy and drain placement    NECK SURGERY  02/01/2018    Neck Surgery    OTHER SURGICAL HISTORY  02/01/2018    Surgery Excision Lipoma    OTHER SURGICAL HISTORY  04/25/2022    Colonoscopy        Family History   Problem Relation Name Age of Onset    Dementia Mother      Heart attack Father      Cholecystitis Sister          x3    Cancer Father's Sister      Prostate cancer Father's Brother      Cancer Paternal Grandmother      Diabetes Paternal Grandfather          Allergies   Allergen Reactions    Latex Rash and Unknown        Social History     Tobacco Use   Smoking Status Former    Types: Cigarettes   Smokeless Tobacco Never        Social History     Substance and Sexual Activity   Alcohol Use Never        HOME MEDICATIONS  Current Outpatient Medications   Medication Instructions    albuterol 90 mcg/actuation inhaler Inhale 2 puffs every 6 hours if  "needed.    amLODIPine (Norvasc) 5 mg tablet oral, Daily    ARIPiprazole (ABILIFY) 30 mg, oral, Nightly    atorvastatin (Lipitor) 80 mg tablet 1 tablet, oral, Daily (0630)    bacitracin 500 unit/gram ointment Topical, Daily    busPIRone (Buspar) 10 mg tablet take 2 tablets by mouth twice a day as directed    dapagliflozin propanediol (FARXIGA) 10 mg, oral, Daily    diclofenac sodium (Voltaren Arthritis Pain) 1 % gel Topical, Use as directed.    docusate sodium (Colace) 100 mg capsule TAKE ONE (1) CAPSULE BY MOUTH ONCE DAILY AS NEEDED. **(COLACE 100 MG CAP EQUIV)**    donepezil (ARICEPT) 5 mg, oral, Nightly    gabapentin (Neurontin) 400 mg capsule take 1 capsule by mouth twice a day THEN TAKE 2 CAPSULES AT BEDTIME    metFORMIN (Glucophage) 1,000 mg tablet 1 tablet, oral, 2 times daily, With breakfast and dinner     metoprolol succinate XL (TOPROL-XL) 75 mg, oral, Daily    omeprazole (PRILOSEC) 40 mg, oral, Do not crush or chew.    polyethylene glycol (MIRALAX) 17 g, oral, Daily    simethicone (Mylicon) 80 mg chewable tablet 1 tablet, oral, 3 times daily before meals, And at bedtime prn    traZODone (Desyrel) 100 mg tablet take 1 to 3 tablet by mouth at bedtime if needed    Trulicity 3 mg/0.5 mL pen injector Inject 4.5 mg as directed 1 (one) time per week.    venlafaxine XR (Effexor-XR) 150 mg 24 hr capsule take 2 capsules by mouth once daily as directed          OBJECTIVE   Last Recorded Vitals.  Blood pressure 107/67, pulse 91, height 1.778 m (5' 10\"), weight 85.4 kg (188 lb 3.2 oz), SpO2 97%.     PHYSICAL EXAM  Physical Exam   General: Well-developed, well-nourished and in no acute distress.  Head: Normocephalic. Atraumatic.  Neck/thyroid: Neck is supple.   Eyes: Pupils equal round and reactive to light. Conjunctiva normal.  ENMT: No masses or deformity of external nose. External ears without masses.  Respiratory/Chest:  Normal respiratory effort.  Cardiovascular: Regular rate and rhythm.   Abdomen: Abdomen rounded " secondary to body habitus.  Staples were removed from the right upper quadrant oblique incision and periumbilical region.  The wounds remained intact, and Steri-Strips were put in place.  The fluid in the drain bulb was thin, bloody.  The drain was removed and Steri-Strips/gauze/tape dressing were put in place.  Abdominal exam is benign.  Musculoskeletal: Joints and limbs are grossly normal.  Generalized weakness without focal deficit.  Arrived in wheelchair, but required very minimal assistance to get up on examination table.  Neuro: Oriented to person, place and time. No obvious neurological deficit.   Psych: Normal mood and affect.    RESULTS   Labs  Surgical Pathology Exam: [id number]  Order: [id number]   Collected 8/26/2024 09:14       Status: Final result       Visible to patient: Yes (not seen)       Dx: Biliary dyskinesia    0 Result Notes       Component  Resulting Agency   FINAL DIAGNOSIS   A. Gallbladder, Cholecystectomy:   -- Gallbladder with minimal chronic inflammation and cholelithiasis.     B. Liver, Right, Biopsy:   - Mild steatosis (~20%) with no definite ballooning of hepatocytes.   - Subcapsular fibrosis and bridging fibrosis with focal nodularity (Trichrome stain).   - Mild portal and septal inflammation (predominantly lymphocytes).  - Mild stainable iron (iron stain).   - No evidence of alpha-1-antitrypsin deficiency on PAS-D stain.   - No malignancy identified.   - See note.    Note: The patient’s history of elevated BMI, hypertension, and hypercholesterolemia is noted. Although subcapsular bridging fibrosis with focal nodularity is present on Trichrome stain, due to its subcapsular nature, the degree of fibrosis may not accurately represent the background liver parenchyma. No malignancy is identified.    Selected slides have been additionally reviewed by Dr. Madeleine Cervantes, who concurred with the above diagnosis.         Electronically signed by Nickie Rocha MD PhD on 9/5/2024 at [address] [place] [address]             ASSESSMENT / PLAN   Diagnoses and all orders for this visit:  Calculus of gallbladder with acute on chronic cholecystitis with obstruction  Fatty liver        Plan  1.  Local wound care was reviewed and printed out for the patient to take back to his facility.  2.  Since he did require an open procedure, he is still restricted to no lifting more than 10 pounds for a total of 4 weeks postoperatively.  3.  Although only a partial cholecystectomy could be performed, his drain output was not bilious.  Drain was removed.  4.  Return to the office in 2 weeks for routine wound check.  5.  He may use any over-the-counter medications to help with his constipation.  6.  He does have a fatty liver, but LFTs were within normal limits.  Recommended ongoing weight loss.  He did have a nodular almost cirrhotic like appearance to his liver at the time of surgery.  A biopsy of the liver was performed which showed a 20% steatosis.        Sandra Jesus MD, FACS  [facility]  [address] [place] [address];   [facility]  [place]  [address] [phone]

## 2024-09-06 NOTE — PATIENT INSTRUCTIONS
1.  You may shower starting 9/7/2024.  No tub bath.  Shower with the Steri-Strips in place.  Pat dry the Steri-Strips after your shower.  Allow the Steri-Strips to fall off on their own.  2.  Dry gauze/tape dressing to the old drain site (on top of the Steri-Strips) once a day until there is no drainage for 24 hours.  3.  No lifting more than 10 pounds for 2 more weeks.  4.  You are encouraged to drink 2-3 protein drinks (Glucerna) per day to optimize your nutrition.  5.  Return to Dr. Jesus's office in 2 weeks for wound check.

## 2024-09-16 ENCOUNTER — DOCUMENTATION (OUTPATIENT)
Dept: HOME HEALTH SERVICES | Facility: HOME HEALTH | Age: 64
End: 2024-09-16
Payer: COMMERCIAL

## 2024-09-16 ENCOUNTER — HOME HEALTH ADMISSION (OUTPATIENT)
Dept: HOME HEALTH SERVICES | Facility: HOME HEALTH | Age: 64
End: 2024-09-16
Payer: COMMERCIAL

## 2024-09-16 NOTE — HH CARE COORDINATION
Home Care received a Referral for Nursing, Physical Therapy, Occupational Therapy, and Speech Language Pathology. We have processed the referral for a Start of Care on 9/18-9/19/24.     If you have any questions or concerns, please feel free to contact us at [phone]. Follow the prompts, enter your five digit zip code, and you will be directed to your care team on [facility].

## 2024-09-17 DIAGNOSIS — K80.13 CALCULUS OF GALLBLADDER WITH ACUTE ON CHRONIC CHOLECYSTITIS WITH OBSTRUCTION: ICD-10-CM

## 2024-09-19 RX ORDER — METOPROLOL SUCCINATE 50 MG/1
75 TABLET, EXTENDED RELEASE ORAL DAILY
Qty: 135 TABLET | Refills: 2 | Status: SHIPPED | OUTPATIENT
Start: 2024-09-19

## 2024-09-20 ENCOUNTER — HOME CARE VISIT (OUTPATIENT)
Dept: HOME HEALTH SERVICES | Facility: HOME HEALTH | Age: 64
End: 2024-09-20

## 2024-09-20 ENCOUNTER — APPOINTMENT (OUTPATIENT)
Dept: SURGERY | Facility: CLINIC | Age: 64
End: 2024-09-20
Payer: COMMERCIAL

## 2024-09-20 VITALS
HEART RATE: 97 BPM | DIASTOLIC BLOOD PRESSURE: 83 MMHG | HEIGHT: 70 IN | BODY MASS INDEX: 26.83 KG/M2 | OXYGEN SATURATION: 98 % | SYSTOLIC BLOOD PRESSURE: 145 MMHG | WEIGHT: 187.4 LBS

## 2024-09-20 DIAGNOSIS — K80.13 CALCULUS OF GALLBLADDER WITH ACUTE ON CHRONIC CHOLECYSTITIS WITH OBSTRUCTION: Primary | ICD-10-CM

## 2024-09-20 DIAGNOSIS — K59.00 CONSTIPATION, UNSPECIFIED CONSTIPATION TYPE: ICD-10-CM

## 2024-09-20 DIAGNOSIS — K76.0 FATTY LIVER: ICD-10-CM

## 2024-09-20 PROCEDURE — 3008F BODY MASS INDEX DOCD: CPT | Performed by: SURGERY

## 2024-09-20 PROCEDURE — 3079F DIAST BP 80-89 MM HG: CPT | Performed by: SURGERY

## 2024-09-20 PROCEDURE — 3077F SYST BP >= 140 MM HG: CPT | Performed by: SURGERY

## 2024-09-20 PROCEDURE — 1036F TOBACCO NON-USER: CPT | Performed by: SURGERY

## 2024-09-20 PROCEDURE — 99024 POSTOP FOLLOW-UP VISIT: CPT | Performed by: SURGERY

## 2024-09-20 ASSESSMENT — ENCOUNTER SYMPTOMS
NAUSEA: 0
CHILLS: 0
CONFUSION: 1
EYE PAIN: 0
COUGH: 0
FATIGUE: 0
MYALGIAS: 1
EYE REDNESS: 0
HEMATURIA: 0
SHORTNESS OF BREATH: 1
FREQUENCY: 0
CONSTIPATION: 1
WEAKNESS: 1
ABDOMINAL PAIN: 1
SPEECH DIFFICULTY: 0
VOMITING: 0
DIARRHEA: 0
FLANK PAIN: 0
PALPITATIONS: 1
ARTHRALGIAS: 1
AGITATION: 0
HEADACHES: 0
BRUISES/BLEEDS EASILY: 0
FEVER: 0
WOUND: 0
POLYPHAGIA: 0
DYSURIA: 0

## 2024-09-20 NOTE — PROGRESS NOTES
GENERAL SURGERY OFFICE NOTE    Patient: Mane Nath    Age: 64 y.o.   Gender: male    MRN: [id number]    PCP: Thalia Smith MD        SUBJECTIVE     Chief Complaint  Follow-up (Patient is here for a 2 week follow up wound check. Patient states that he has pain when coughing.)       JULY Gilliam returns to the office for 4-week postop check after undergoing a laparoscopic converted to open partial cholecystectomy surgery.  His abdominal drain was removed at his last office visit.  He has not noticed any increased abdominal pain, and has not had any drainage from the old drain site.  He complains of some incisional pain when he coughs, but otherwise he takes Naprosyn for his pain occasionally.  He was doing well enough that he was discharged from the skilled nursing facility 3 days ago.  He is back at home with his brother.  He continues to walk with a walker, but does feel that his strength is improving.  He is trying to take 2 Glucerna protein drinks per day.  He does not feel that his appetite is quite back to normal, but he is not having any nausea or vomiting.  No diarrhea.  He is not having to use the MiraLAX for his chronic constipation at this point.  He does have a follow-up appointment with his PCP on Monday.  Recently had a CBC which was stable.  No fevers.    ROS  Review of Systems   Constitutional:  Negative for chills, fatigue and fever.   HENT:  Positive for hearing loss. Negative for congestion and ear pain.    Eyes:  Negative for pain and redness.   Respiratory:  Positive for shortness of breath. Negative for cough.    Cardiovascular:  Positive for palpitations. Negative for chest pain and leg swelling.   Gastrointestinal:  Positive for abdominal pain and constipation. Negative for diarrhea, nausea and vomiting.   Endocrine: Negative for polyphagia.   Genitourinary:  Negative for dysuria, flank pain, frequency and hematuria.   Musculoskeletal:  Positive for arthralgias and myalgias.   Skin:   Negative for rash and wound.   Allergic/Immunologic: Negative for immunocompromised state.   Neurological:  Positive for weakness. Negative for speech difficulty and headaches.   Hematological:  Does not bruise/bleed easily.   Psychiatric/Behavioral:  Positive for confusion. Negative for agitation.           HISTORY     Past Medical History:   Diagnosis Date    Cirrhosis (Multi) 08/28/2024    Diabetes (Multi)     HTN (hypertension)     Personal history of other diseases of the musculoskeletal system and connective tissue     History of gout    Personal history of other mental and behavioral disorders     History of depression    Right upper quadrant abdominal pain 06/11/2024    Type 2 diabetes mellitus (Multi)         Past Surgical History:   Procedure Laterality Date    CHOLECYSTECTOMY N/A 08/26/2024    Laparoscopic converted to open partial cholecystectomy and drain placement    NECK SURGERY  02/01/2018    Neck Surgery    OTHER SURGICAL HISTORY  02/01/2018    Surgery Excision Lipoma    OTHER SURGICAL HISTORY  04/25/2022    Colonoscopy        Family History   Problem Relation Name Age of Onset    Dementia Mother      Heart attack Father      Cholecystitis Sister          x3    Cancer Father's Sister      Prostate cancer Father's Brother      Cancer Paternal Grandmother      Diabetes Paternal Grandfather          Allergies   Allergen Reactions    Latex Rash and Unknown        Social History     Tobacco Use   Smoking Status Former    Types: Cigarettes   Smokeless Tobacco Never        Social History     Substance and Sexual Activity   Alcohol Use Never        HOME MEDICATIONS  Current Outpatient Medications   Medication Instructions    albuterol 90 mcg/actuation inhaler Inhale 2 puffs every 6 hours if needed.    amLODIPine (Norvasc) 5 mg tablet oral, Daily    ARIPiprazole (ABILIFY) 30 mg, oral, Nightly    atorvastatin (Lipitor) 80 mg tablet 1 tablet, oral, Daily (0630)    bacitracin 500 unit/gram ointment Topical,  "Daily    busPIRone (Buspar) 10 mg tablet take 2 tablets by mouth twice a day as directed    dapagliflozin propanediol (FARXIGA) 10 mg, oral, Daily    diclofenac sodium (Voltaren Arthritis Pain) 1 % gel Topical, Use as directed.    docusate sodium (Colace) 100 mg capsule TAKE ONE (1) CAPSULE BY MOUTH ONCE DAILY AS NEEDED. **(COLACE 100 MG CAP EQUIV)**    donepezil (ARICEPT) 5 mg, oral, Nightly    gabapentin (Neurontin) 400 mg capsule take 1 capsule by mouth twice a day THEN TAKE 2 CAPSULES AT BEDTIME    metFORMIN (Glucophage) 1,000 mg tablet 1 tablet, oral, 2 times daily, With breakfast and dinner     metoprolol succinate XL (TOPROL-XL) 75 mg, oral, Daily    omeprazole (PRILOSEC) 40 mg, oral, Do not crush or chew.    polyethylene glycol (MIRALAX) 17 g, oral, Daily    simethicone (Mylicon) 80 mg chewable tablet 1 tablet, oral, 3 times daily before meals, And at bedtime prn    traZODone (Desyrel) 100 mg tablet take 1 to 3 tablet by mouth at bedtime if needed    Trulicity 3 mg/0.5 mL pen injector Inject 4.5 mg as directed 1 (one) time per week.    venlafaxine XR (Effexor-XR) 150 mg 24 hr capsule take 2 capsules by mouth once daily as directed          OBJECTIVE   Last Recorded Vitals.  Blood pressure 145/83, pulse 97, height 1.778 m (5' 10\"), weight 85 kg (187 lb 6.4 oz), SpO2 98%.     PHYSICAL EXAM  Physical Exam   General: Well-developed, well-nourished and in no acute distress.  Head: Normocephalic. Atraumatic.  Neck/thyroid: Neck is supple.   Eyes: Pupils equal round and reactive to light. Conjunctiva normal.  ENMT: No masses or deformity of external nose. External ears without masses.  Respiratory/Chest:  Normal respiratory effort.  Cardiovascular: Regular rate and rhythm.   Abdomen: Abdomen rounded secondary to body habitus.  Right upper quadrant and periumbilical incision are healing well without any evidence of infection or hernia.  Old drain site is well-healed without any drainage or bleeding.  Abdominal exam " benign.  Musculoskeletal: Joints and limbs are grossly normal.  Generalized weakness without focal deficit.  Arrived in wheelchair, but required very minimal assistance to get up on examination table.  Neuro: Oriented to person, place and time. No obvious neurological deficit.   Psych: Normal mood and affect.    RESULTS   Labs  CBC  Order: [id number]   Status: Final result       Visible to patient: Yes (not seen)       Dx: Calculus of gallbladder and bile duct...    0 Result Notes            Component  Ref Range & Units 4 d ago  (9/16/24) 2 wk ago  (9/3/24) 3 wk ago  (8/29/24) 3 wk ago  (8/27/24) 3 wk ago  (8/27/24) 1 mo ago  (7/29/24) 3 mo ago  (6/4/24)   WBC  4.4 - 11.3 x10*3/uL 7.3 6.0 7.5 11.2 13.6 High  3.8 Low  6.5   nRBC  0.0 - 0.0 /100 WBCs 0.0 0.0 0.0 0.0 0.0 0.0 0.0   RBC  4.50 - 5.90 x10*6/uL 3.43 Low  3.13 Low  3.52 Low  4.01 Low  4.07 Low  3.68 Low  3.86 Low    Hemoglobin  13.5 - 17.5 g/dL 10.8 Low  10.3 Low  11.5 Low  13.3 Low  13.4 Low  12.1 Low  13.1 Low    Hematocrit  41.0 - 52.0 % 32.5 Low  29.8 Low  33.3 Low  38.1 Low  39.1 Low  33.5 Low  35.3 Low    MCV  80 - 100 fL 95 95 95 95 96 91 92   MCH  26.0 - 34.0 pg 31.5 32.9 32.7 33.2 32.9 32.9 33.9   MCHC  32.0 - 36.0 g/dL 33.2 34.6 34.5 34.9 34.3 36.1 High  37.1 High    RDW  11.5 - 14.5 % 15.9 High  15.7 High  14.8 High  15.0 High  15.0 High  13.4 14.6 High    Platelets  150 - 450 x10*3/uL 201 146 Low  132 Low  144 Low  186 97 Low  114 Low    Resulting Agency [facility]        Coagulation Screen  Order: [id number]   Status: Final result       Visible to patient: Yes (not seen)    0 Result Notes        Component  Ref Range & Units 3 wk ago 1 mo ago 9 mo ago   Protime  9.8 - 12.8 seconds 12.8 13.5 High  13.1 High    INR  0.9 - 1.1 1.1 1.2 High  1.2 High    aPTT  27 - 38 seconds 45 High      Resulting Agency [facility]          Surgical Pathology Exam: [id number]  Order: [id number]   Collected 8/26/2024 09:14        Status: Final result       Visible to patient: Yes (not seen)       Dx: Biliary dyskinesia    0 Result Notes       Component  Resulting Agency   FINAL DIAGNOSIS   A. Gallbladder, Cholecystectomy:   -- Gallbladder with minimal chronic inflammation and cholelithiasis.     B. Liver, Right, Biopsy:   - Mild steatosis (~20%) with no definite ballooning of hepatocytes.   - Subcapsular fibrosis and bridging fibrosis with focal nodularity (Trichrome stain).   - Mild portal and septal inflammation (predominantly lymphocytes).  - Mild stainable iron (iron stain).   - No evidence of alpha-1-antitrypsin deficiency on PAS-D stain.   - No malignancy identified.   - See note.    Note: The patient’s history of elevated BMI, hypertension, and hypercholesterolemia is noted. Although subcapsular bridging fibrosis with focal nodularity is present on Trichrome stain, due to its subcapsular nature, the degree of fibrosis may not accurately represent the background liver parenchyma. No malignancy is identified.    Selected slides have been additionally reviewed by Dr. Madeleine Cervantes, who concurred with the above diagnosis.         Electronically signed by Nickie Rocha MD PhD on 9/5/2024 at 1246      [facility]            ASSESSMENT / PLAN   Diagnoses and all orders for this visit:  Calculus of gallbladder with acute on chronic cholecystitis with obstruction  Fatty liver  Constipation, unspecified constipation type          Plan  1.  Local wound care was reviewed with the patient and his sister.  2.  He is healing well from a surgical standpoint.  He may gradually increase his physical activity as tolerated.  3.  Patient noted that he is home from the skilled nursing facility, and is anticipating home physical therapy to start within the next week or so.  4.  He is still encouraged to take 2 Glucerna drinks per day to optimize his nutrition until his appetite returns to baseline.  5.  He does have a fatty liver, but LFTs were within normal limits.   He did have a nodular almost cirrhotic like appearance to his liver at the time of surgery.  A biopsy of the liver was performed which showed a 20% steatosis. Recommended ongoing weight loss.  6.  He is referred back to his primary care physician for all further medical care, but may be referred back to my office for any further surgical needs.        Sandra Jesus MD, FACS  [facility]  [address] [place] [address];   [address] [address]  [place]  [address] [phone]

## 2024-09-20 NOTE — PATIENT INSTRUCTIONS
1.  You are encouraged to drink 2-3 protein drinks (Glucerna) per day to optimize your nutrition.  2.  You may gradually increase your physical activity as tolerated.  Avoid any activity that causes pain at your incision site for 3 months following your surgery.  3.  Okay to shower or take tub bath.  4.  You may apply lotion to any of your incisions to help with dryness or itching.  5.  If you have any other questions or concerns regarding your gallbladder surgery, please call Dr. Jesus's office.  [phone]

## 2024-09-23 ENCOUNTER — HOME CARE VISIT (OUTPATIENT)
Dept: HOME HEALTH SERVICES | Facility: HOME HEALTH | Age: 64
End: 2024-09-23
Payer: COMMERCIAL

## 2024-09-23 VITALS
HEART RATE: 90 BPM | BODY MASS INDEX: 26.83 KG/M2 | WEIGHT: 187 LBS | SYSTOLIC BLOOD PRESSURE: 140 MMHG | TEMPERATURE: 97.8 F | OXYGEN SATURATION: 98 % | RESPIRATION RATE: 16 BRPM | DIASTOLIC BLOOD PRESSURE: 80 MMHG

## 2024-09-23 PROCEDURE — G0151 HHCP-SERV OF PT,EA 15 MIN: HCPCS

## 2024-09-23 SDOH — HEALTH STABILITY: PHYSICAL HEALTH: EXERCISE COMMENTS: INITIATED BLE AROM HEP, HEP SHEETS PROVIDED.

## 2024-09-23 SDOH — ECONOMIC STABILITY: HOUSING INSECURITY
HOME SAFETY: UH BOOKLET REVIEWED. CONSENT SIGNED. EEP REVIEWED.PT SHOWN CODE TO SCAN FOR HOME CARE NATIONAL PATIENT SAFETY GOALS ON FRONT OF UHHC FOLDER.

## 2024-09-23 ASSESSMENT — ACTIVITIES OF DAILY LIVING (ADL)
AMBULATION ASSISTANCE ON FLAT SURFACES: 1
AMBULATION ASSISTANCE: STAND BY ASSIST
OASIS_M1830: 05
AMBULATION ASSISTANCE: 1
ENTERING_EXITING_HOME: MINIMUM ASSIST
AMBULATION_DISTANCE/DURATION_TOLERATED: 20FT

## 2024-09-23 ASSESSMENT — ENCOUNTER SYMPTOMS
MUSCLE WEAKNESS: 1
DENIES PAIN: 1
PERSON REPORTING PAIN: PATIENT
HYPERTENSION: 1

## 2024-09-24 ENCOUNTER — HOME CARE VISIT (OUTPATIENT)
Dept: HOME HEALTH SERVICES | Facility: HOME HEALTH | Age: 64
End: 2024-09-24
Payer: COMMERCIAL

## 2024-09-24 VITALS — TEMPERATURE: 96.8 F | OXYGEN SATURATION: 98 % | HEART RATE: 75 BPM

## 2024-09-24 PROCEDURE — G0152 HHCP-SERV OF OT,EA 15 MIN: HCPCS

## 2024-09-24 ASSESSMENT — ENCOUNTER SYMPTOMS
PAIN LOCATION - PAIN FREQUENCY: INTERMITTENT
HIGHEST PAIN SEVERITY IN PAST 24 HOURS: 7/10
PAIN LOCATION - EXACERBATING FACTORS: MOVEMENT
PERSON REPORTING PAIN: PATIENT
PAIN LOCATION - PAIN DURATION: DAILY
LOWEST PAIN SEVERITY IN PAST 24 HOURS: 0/10
PAIN LOCATION: BACK
PAIN LOCATION - PAIN SEVERITY: 3/10
PAIN: 1
PAIN LOCATION - PAIN QUALITY: ACHING

## 2024-09-24 ASSESSMENT — ACTIVITIES OF DAILY LIVING (ADL)
SPONGING_UB_CURRENT_FUNCTION: CONTACT GUARD ASSIST
FEEDING_WITHIN_DEFINED_LIMITS: 1
GROOMING_WITHIN_DEFINED_LIMITS: 1
SPONGING_LB_CURRENT_FUNCTION: CONTACT GUARD ASSIST

## 2024-09-27 ENCOUNTER — HOME CARE VISIT (OUTPATIENT)
Dept: HOME HEALTH SERVICES | Facility: HOME HEALTH | Age: 64
End: 2024-09-27
Payer: COMMERCIAL

## 2024-09-27 ENCOUNTER — OFFICE VISIT (OUTPATIENT)
Dept: WOUND CARE | Facility: CLINIC | Age: 64
End: 2024-09-27
Payer: COMMERCIAL

## 2024-09-27 VITALS
SYSTOLIC BLOOD PRESSURE: 126 MMHG | RESPIRATION RATE: 17 BRPM | HEART RATE: 78 BPM | TEMPERATURE: 97.9 F | OXYGEN SATURATION: 98 % | DIASTOLIC BLOOD PRESSURE: 61 MMHG

## 2024-09-27 PROCEDURE — G0157 HHC PT ASSISTANT EA 15: HCPCS | Mod: CQ

## 2024-09-27 PROCEDURE — 99213 OFFICE O/P EST LOW 20 MIN: CPT

## 2024-09-27 SDOH — HEALTH STABILITY: PHYSICAL HEALTH: EXERCISE TYPE: B LE STRENGTHENING THER EX, ENDURANCE TRAINING

## 2024-09-27 SDOH — HEALTH STABILITY: PHYSICAL HEALTH
EXERCISE COMMENTS: 1 SET OF 10 EACH, BLUE THERABAND  SEATED THER EX: MARCHES, LEG EXT, LAQ, TBAND HIP ABD, BALL SQUEEZES HIP ADD  STANDING THER EX: MARCHES, HEEL/TOE RAISES, HIP ABD, HIP FLEX/EXT, MINI SQUATS , HAM CURLS

## 2024-09-27 ASSESSMENT — ENCOUNTER SYMPTOMS
HIGHEST PAIN SEVERITY IN PAST 24 HOURS: 6/10
OCCASIONAL FEELINGS OF UNSTEADINESS: 1
PAIN LOCATION - PAIN SEVERITY: 2/10
PAIN LOCATION - EXACERBATING FACTORS: TIME OF DAY
LOWEST PAIN SEVERITY IN PAST 24 HOURS: 2/10
PERSON REPORTING PAIN: PATIENT
MUSCLE WEAKNESS: 1
SUBJECTIVE PAIN PROGRESSION: UNCHANGED
PAIN LOCATION: ABDOMEN
PAIN SEVERITY GOAL: 0/10
PAIN LOCATION - RELIEVING FACTORS: TIME OF DAY
PAIN LOCATION - PAIN FREQUENCY: INTERMITTENT
PAIN: 1
PAIN LOCATION - PAIN QUALITY: ACHING

## 2024-09-27 ASSESSMENT — ACTIVITIES OF DAILY LIVING (ADL)
AMBULATION ASSISTANCE: STAND BY ASSIST
BATHING EQUIPMENT USED: SHOWER CHAIR
AMBULATION ASSISTANCE ON FLAT SURFACES: 1
AMBULATION_DISTANCE/DURATION_TOLERATED: 65 FT
BATHING ASSESSED: 1
AMBULATION ASSISTANCE: 1
BATHING_CURRENT_FUNCTION: STAND BY ASSIST

## 2024-09-30 ENCOUNTER — HOME CARE VISIT (OUTPATIENT)
Dept: HOME HEALTH SERVICES | Facility: HOME HEALTH | Age: 64
End: 2024-09-30
Payer: COMMERCIAL

## 2024-09-30 VITALS
SYSTOLIC BLOOD PRESSURE: 128 MMHG | OXYGEN SATURATION: 99 % | RESPIRATION RATE: 17 BRPM | TEMPERATURE: 97.7 F | HEART RATE: 76 BPM | DIASTOLIC BLOOD PRESSURE: 63 MMHG

## 2024-09-30 PROCEDURE — G0157 HHC PT ASSISTANT EA 15: HCPCS | Mod: CQ

## 2024-09-30 SDOH — HEALTH STABILITY: PHYSICAL HEALTH: EXERCISE TYPE: B LE STRENGTHENING THER EX, ENDURANCE TRAINING

## 2024-09-30 SDOH — HEALTH STABILITY: PHYSICAL HEALTH
EXERCISE COMMENTS: 1 SET OF 15 EACH, BLUE THERABAND  SEATED THER EX: MARCHES, LEG EXT, LAQ, TBAND HIP ABD, BALL SQUEEZES HIP ADD  STANDING THER EX: MARCHES, HEEL/TOE RAISES, HIP ABD, HIP FLEX/EXT, MINI SQUATS , HAM CURLS

## 2024-09-30 ASSESSMENT — ENCOUNTER SYMPTOMS
ARTHRALGIAS: 1
PAIN SEVERITY GOAL: 0/10
PAIN LOCATION: LEFT LEG
PERSON REPORTING PAIN: PATIENT
PAIN LOCATION - PAIN SEVERITY: 2/10
PAIN LOCATION - PAIN FREQUENCY: INTERMITTENT
PAIN LOCATION: RIGHT LEG
PAIN LOCATION - EXACERBATING FACTORS: TIME OF DAY
PAIN LOCATION - RELIEVING FACTORS: TIME OF DAY
PAIN LOCATION - RELIEVING FACTORS: TIME OF DAY
OCCASIONAL FEELINGS OF UNSTEADINESS: 1
PAIN LOCATION - PAIN QUALITY: ACHING
PAIN: 1
PAIN LOCATION - PAIN QUALITY: ACHING
PAIN LOCATION - EXACERBATING FACTORS: TIME OF DAY,
PAIN LOCATION - PAIN SEVERITY: 2/10
PAIN LOCATION - PAIN FREQUENCY: INTERMITTENT
LOWEST PAIN SEVERITY IN PAST 24 HOURS: 2/10
MUSCLE WEAKNESS: 1
HIGHEST PAIN SEVERITY IN PAST 24 HOURS: 6/10
SUBJECTIVE PAIN PROGRESSION: UNCHANGED

## 2024-09-30 ASSESSMENT — ACTIVITIES OF DAILY LIVING (ADL)
AMBULATION ASSISTANCE ON FLAT SURFACES: 1
BATHING ASSESSED: 1
AMBULATION_DISTANCE/DURATION_TOLERATED: 75 FT
BATHING_CURRENT_FUNCTION: STAND BY ASSIST
BATHING EQUIPMENT USED: SHOWER CHAIR

## 2024-10-02 ENCOUNTER — HOME CARE VISIT (OUTPATIENT)
Dept: HOME HEALTH SERVICES | Facility: HOME HEALTH | Age: 64
End: 2024-10-02
Payer: COMMERCIAL

## 2024-10-02 VITALS
DIASTOLIC BLOOD PRESSURE: 64 MMHG | TEMPERATURE: 98.1 F | HEART RATE: 90 BPM | OXYGEN SATURATION: 99 % | RESPIRATION RATE: 17 BRPM | SYSTOLIC BLOOD PRESSURE: 122 MMHG

## 2024-10-02 VITALS
HEART RATE: 90 BPM | DIASTOLIC BLOOD PRESSURE: 64 MMHG | SYSTOLIC BLOOD PRESSURE: 122 MMHG | TEMPERATURE: 98.1 F | OXYGEN SATURATION: 99 %

## 2024-10-02 PROCEDURE — G0157 HHC PT ASSISTANT EA 15: HCPCS | Mod: CQ

## 2024-10-02 PROCEDURE — G0152 HHCP-SERV OF OT,EA 15 MIN: HCPCS

## 2024-10-02 SDOH — HEALTH STABILITY: PHYSICAL HEALTH: EXERCISE TYPE: B LE STRENGTHENING THER EX, ENDURANCE TRAINING

## 2024-10-02 ASSESSMENT — ACTIVITIES OF DAILY LIVING (ADL)
AMBULATION_DISTANCE/DURATION_TOLERATED: 100 FT
AMBULATION ASSISTANCE: STAND BY ASSIST
AMBULATION ASSISTANCE: 1
AMBULATION ASSISTANCE ON FLAT SURFACES: 1

## 2024-10-02 ASSESSMENT — ENCOUNTER SYMPTOMS
DENIES PAIN: 1
ARTHRALGIAS: 1
PERSON REPORTING PAIN: PATIENT
DENIES PAIN: 1
MUSCLE WEAKNESS: 1
OCCASIONAL FEELINGS OF UNSTEADINESS: 1
PERSON REPORTING PAIN: PATIENT

## 2024-10-03 ENCOUNTER — HOME CARE VISIT (OUTPATIENT)
Dept: HOME HEALTH SERVICES | Facility: HOME HEALTH | Age: 64
End: 2024-10-03
Payer: COMMERCIAL

## 2024-10-03 PROCEDURE — G0153 HHCP-SVS OF S/L PATH,EA 15MN: HCPCS

## 2024-10-04 ENCOUNTER — APPOINTMENT (OUTPATIENT)
Dept: WOUND CARE | Facility: CLINIC | Age: 64
End: 2024-10-04
Payer: COMMERCIAL

## 2024-10-07 ENCOUNTER — HOME CARE VISIT (OUTPATIENT)
Dept: HOME HEALTH SERVICES | Facility: HOME HEALTH | Age: 64
End: 2024-10-07
Payer: COMMERCIAL

## 2024-10-07 PROCEDURE — G0153 HHCP-SVS OF S/L PATH,EA 15MN: HCPCS

## 2024-10-07 NOTE — PROGRESS NOTES
"Primary Cardiologist: Dr. Soto    Chief Complaint:   Hospital Follow-up     History Of Present Illness:    Mane Nath is a 64 y.o. male with past medical history of HTN, orthostatic dizziness, palpitations, DM2, Gout, Unsteady gait, Benign tremor, Depression who presents for hospital follow up.     8/26/24-8/29/24: Patient s/p open blayne, cardiology was consulted for intermittent palpitations post-op. No significant arrhythmia on monitor review, echo done and metoprolol was increased to 75 mg daily.     Today, Mane Nath is feeling well, state he has been recovering from his gall bladder surgery. Reports that he had episode of left upper chest \"strain\" while sitting the other day, lasted less than a minute, no other associated symptoms. Denies any further palpitations, no shortness of breath, occasional postural lightheadedness.    Last Recorded Vitals:  Visit Vitals  /72   Pulse 75   Ht 1.778 m (5' 10\")   Wt 84.8 kg (187 lb)   SpO2 97%   BMI 26.83 kg/m²   Smoking Status Former   BSA 2.05 m²        Past Medical History:  He has a past medical history of Cirrhosis (Multi) (08/28/2024), Diabetes (Multi), HTN (hypertension), Personal history of other diseases of the musculoskeletal system and connective tissue, Personal history of other mental and behavioral disorders, Right upper quadrant abdominal pain (06/11/2024), and Type 2 diabetes mellitus.    Past Surgical History:  He has a past surgical history that includes Neck surgery (02/01/2018); Other surgical history (02/01/2018); Other surgical history (04/25/2022); and Cholecystectomy (N/A, 08/26/2024).      Social History:  He reports that he has quit smoking. His smoking use included cigarettes. He has never used smokeless tobacco. He reports that he does not drink alcohol and does not use drugs.    Family History:  Family History   Problem Relation Name Age of Onset    Dementia Mother      Heart attack Father      Cholecystitis Sister          " "x3    Cancer Father's Sister      Prostate cancer Father's Brother      Cancer Paternal Grandmother      Diabetes Paternal Grandfather          Allergies:  Latex    Outpatient Medications:  Current Outpatient Medications   Medication Instructions    amLODIPine (Norvasc) 5 mg tablet oral, Daily    ARIPiprazole (ABILIFY) 30 mg, oral, Nightly    atorvastatin (Lipitor) 80 mg tablet 1 tablet, oral, Daily (0630)    busPIRone (Buspar) 10 mg tablet take 2 tablets by mouth twice a day as directed    dapagliflozin propanediol (FARXIGA) 10 mg, oral, Daily    diclofenac sodium (Voltaren Arthritis Pain) 1 % gel Topical, Use as directed.    docusate sodium (Colace) 100 mg capsule TAKE ONE (1) CAPSULE BY MOUTH ONCE DAILY AS NEEDED. **(COLACE 100 MG CAP EQUIV)**    donepezil (ARICEPT) 5 mg, oral, Nightly    gabapentin (Neurontin) 400 mg capsule take 1 capsule by mouth twice a day THEN TAKE 2 CAPSULES AT BEDTIME    metFORMIN (Glucophage) 1,000 mg tablet 1 tablet, oral, 2 times daily, With breakfast and dinner     metoprolol succinate XL (TOPROL-XL) 75 mg, oral, Daily    omeprazole (PRILOSEC) 40 mg, oral, Do not crush or chew.    polyethylene glycol (MIRALAX) 17 g, oral, Daily    simethicone (Mylicon) 80 mg chewable tablet 1 tablet, oral, 3 times daily before meals, And at bedtime prn    traZODone (Desyrel) 100 mg tablet take 1 to 3 tablet by mouth at bedtime if needed    Trulicity 3 mg/0.5 mL pen injector Inject 4.5 mg as directed 1 (one) time per week.    venlafaxine XR (Effexor-XR) 150 mg 24 hr capsule take 2 capsules by mouth once daily as directed         Review of Systems   Constitutional: Positive for malaise/fatigue (chronic from sleep schedule).   HENT: Negative.     Eyes: Negative.    Cardiovascular:  Positive for chest pain (A few days ago, felt a \"strain\" , lasted about a minute, does not feel he was exerting himself.). Negative for dyspnea on exertion, leg swelling, near-syncope, palpitations and syncope.   Respiratory:  " "Negative for shortness of breath.    Endocrine: Negative.    Hematologic/Lymphatic: Negative.    Skin: Negative.    Musculoskeletal: Negative.    Gastrointestinal: Negative.    Genitourinary: Negative.    Neurological:  Negative for dizziness and light-headedness.        Physical Exam  Vitals reviewed.   Constitutional:       Appearance: Normal appearance. He is obese.   HENT:      Head: Normocephalic.      Mouth/Throat:      Mouth: Mucous membranes are moist.   Cardiovascular:      Rate and Rhythm: Normal rate and regular rhythm.      Pulses: Normal pulses.      Heart sounds: Normal heart sounds.   Pulmonary:      Effort: Pulmonary effort is normal.      Breath sounds: Normal breath sounds. No wheezing, rhonchi or rales.   Abdominal:      General: Bowel sounds are normal. There is no distension.      Palpations: Abdomen is soft.      Tenderness: There is no abdominal tenderness.   Musculoskeletal:      Cervical back: Normal range of motion.      Right lower le+ Pitting Edema present.      Left lower le+ Pitting Edema present.   Skin:     General: Skin is warm and dry.   Neurological:      General: No focal deficit present.      Mental Status: He is alert and oriented to person, place, and time.   Psychiatric:         Mood and Affect: Mood normal.         Behavior: Behavior normal.           Last Labs:  CBC -  Lab Results   Component Value Date    WBC 7.3 2024    HGB 10.8 (L) 2024    HCT 32.5 (L) 2024    MCV 95 2024     2024       CMP -  Lab Results   Component Value Date    CALCIUM 7.6 (L) 2024    PHOS 3.1 2024    PROT 6.7 2024    ALBUMIN 4.0 2024    AST 22 2024    ALT 22 2024    ALKPHOS 97 2024    BILITOT 1.3 (H) 2024       LIPID PANEL -   No results found for: \"CHOL\", \"TRIG\", \"HDL\", \"CHHDL\", \"LDLF\", \"VLDL\", \"NHDL\"    RENAL FUNCTION PANEL -   Lab Results   Component Value Date    GLUCOSE 122 (H) 2024     " 09/03/2024    K 4.1 09/03/2024     09/03/2024    CO2 27 09/03/2024    ANIONGAP 10 09/03/2024    BUN 18 09/03/2024    CREATININE 0.83 09/03/2024    GFRMALE 57 (A) 09/23/2023    CALCIUM 7.6 (L) 09/03/2024    PHOS 3.1 08/28/2024    ALBUMIN 4.0 07/29/2024        Lab Results   Component Value Date    BNP 13 07/29/2024       Last Cardiology Tests:  ECG:  No results found for this or any previous visit from the past 1095 days.      Echo:  TTE 8/29/24:   1. The left ventricular systolic function is normal, with a Mcfarland's biplane calculated ejection fraction of 70%.   2. There is a mid cavity left ventricular obstruction. The resting gradient is 35.18 mmHg. The provoked gradient is 44.74 mmHg.   3. There is normal right ventricular global systolic function.    TTE 5/6/22:The left ventricular systolic function is normal with a 55-60% estimated ejection fraction.  2. There is moderate concentric left ventricular hypertrophy.       Ejection Fractions:  EF   Date/Time Value Ref Range Status   08/29/2024 02:58 PM 70 %        Cath:  No results found for this or any previous visit from the past 1095 days.      Stress Test:  Dobut stress echo 8/9/23:  1. Adequate level of stress achieved.  2. No ECG changes from baseline.  3. There were no stress-induced wall motion abnormalities. This is a negative stress echo test for ischemia.    NM Cardiac Stress 5/13/22: Normal stress myocardial perfusion imaging in response to  pharmacologic stress. Mild apical attenuation present, likely soft  tissue. Patient could not participate in prone imaging.  2. Well-maintained left ventricular function.  Cardiac Imaging:  No results found for this or any previous visit from the past 1095 days.        Lab review: I have personally reviewed the laboratory result(s)     Assessment/Plan     Mane Nath is a 64 y.o. male with past medical history of HTN, orthostatic dizziness, palpitations, DM2, Gout, Unsteady gait, Benign tremor, Depression  who presents for hospital follow up.     Palpitations  Recently seen in hospital for palpitations, no arrhythmia on telemetry, metoprolol was increased and an echocardiogram was ordered.   Today, denies any palpitations since leaving the hospital.   Continue on metoprolol XL 75 mg daily    LVOT obstruction  TTE 8/29/24: There is a mid cavity left ventricular obstruction. The resting gradient is 35.18 mmHg. The provoked gradient is 44.74 mmHg.   Today, patient denies shortness of breath, no palpitations.   Blood pressure is well controlled  Continue on metoprolol XL 75 mg daily    Hypertension  Today's /72  Well controlled     Orthostatic dizziness  Patient reports rare episode of postural lightheadedness  General recommendations given to stay well hydrated, get up slowly    Dyslipidemia  No lipid panel available for review    Type 2 DM  Management per PCP    KRISTEN Grissom-CNP

## 2024-10-08 ENCOUNTER — OFFICE VISIT (OUTPATIENT)
Dept: CARDIOLOGY | Facility: HOSPITAL | Age: 64
End: 2024-10-08
Payer: COMMERCIAL

## 2024-10-08 VITALS
BODY MASS INDEX: 26.77 KG/M2 | SYSTOLIC BLOOD PRESSURE: 112 MMHG | DIASTOLIC BLOOD PRESSURE: 72 MMHG | HEIGHT: 70 IN | HEART RATE: 75 BPM | OXYGEN SATURATION: 97 % | WEIGHT: 187 LBS

## 2024-10-08 DIAGNOSIS — R06.09 DYSPNEA ON EXERTION: ICD-10-CM

## 2024-10-08 DIAGNOSIS — I10 HYPERTENSION, UNSPECIFIED TYPE: Primary | ICD-10-CM

## 2024-10-08 DIAGNOSIS — R00.2 HEART PALPITATIONS: ICD-10-CM

## 2024-10-08 DIAGNOSIS — E78.00 HYPERCHOLESTEREMIA: ICD-10-CM

## 2024-10-08 PROCEDURE — 3074F SYST BP LT 130 MM HG: CPT | Performed by: CLINICAL NURSE SPECIALIST

## 2024-10-08 PROCEDURE — 3078F DIAST BP <80 MM HG: CPT | Performed by: CLINICAL NURSE SPECIALIST

## 2024-10-08 PROCEDURE — 99214 OFFICE O/P EST MOD 30 MIN: CPT | Performed by: CLINICAL NURSE SPECIALIST

## 2024-10-08 PROCEDURE — 1036F TOBACCO NON-USER: CPT | Performed by: CLINICAL NURSE SPECIALIST

## 2024-10-08 PROCEDURE — 3008F BODY MASS INDEX DOCD: CPT | Performed by: CLINICAL NURSE SPECIALIST

## 2024-10-08 ASSESSMENT — ENCOUNTER SYMPTOMS
HEMATOLOGIC/LYMPHATIC NEGATIVE: 1
DYSPNEA ON EXERTION: 0
PALPITATIONS: 0
MUSCULOSKELETAL NEGATIVE: 1
SHORTNESS OF BREATH: 0
ENDOCRINE NEGATIVE: 1
NEAR-SYNCOPE: 0
LIGHT-HEADEDNESS: 0
GASTROINTESTINAL NEGATIVE: 1
SYNCOPE: 0
DIZZINESS: 0
EYES NEGATIVE: 1

## 2024-10-08 NOTE — PATIENT INSTRUCTIONS
I will discuss with Dr. Soto your recent echo and if additional testing needs to be ordered.   Call our office with any new cardiac concerns  Continue current medications  Continue heart-healthy diet. A diet low in sodium, low in cholesterol, limiting red meats and eating whole foods.   Follow up with Nayely Bhagat in January as scheduled.

## 2024-10-09 ENCOUNTER — HOME CARE VISIT (OUTPATIENT)
Dept: HOME HEALTH SERVICES | Facility: HOME HEALTH | Age: 64
End: 2024-10-09
Payer: COMMERCIAL

## 2024-10-09 VITALS
SYSTOLIC BLOOD PRESSURE: 132 MMHG | RESPIRATION RATE: 17 BRPM | OXYGEN SATURATION: 98 % | HEART RATE: 77 BPM | TEMPERATURE: 97.8 F | DIASTOLIC BLOOD PRESSURE: 66 MMHG

## 2024-10-09 PROCEDURE — G0157 HHC PT ASSISTANT EA 15: HCPCS | Mod: CQ

## 2024-10-09 PROCEDURE — G0153 HHCP-SVS OF S/L PATH,EA 15MN: HCPCS

## 2024-10-09 SDOH — HEALTH STABILITY: PHYSICAL HEALTH: EXERCISE TYPE: B LE STRENGTHENING THER EX, ENDURANCE TRAINING

## 2024-10-09 ASSESSMENT — ENCOUNTER SYMPTOMS
SUBJECTIVE PAIN PROGRESSION: WAXING AND WANING
ARTHRALGIAS: 1
PAIN LOCATION - PAIN FREQUENCY: INTERMITTENT
PERSON REPORTING PAIN: PATIENT
LOWEST PAIN SEVERITY IN PAST 24 HOURS: 0/10
PAIN LOCATION - EXACERBATING FACTORS: TIME OF DAY, WEIGHT BEARING
PAIN LOCATION - PAIN SEVERITY: 6/10
HIGHEST PAIN SEVERITY IN PAST 24 HOURS: 6/10
PAIN LOCATION - PAIN QUALITY: ACHING
MUSCLE WEAKNESS: 1
PAIN: 1
OCCASIONAL FEELINGS OF UNSTEADINESS: 0
PAIN SEVERITY GOAL: 0/10
PAIN LOCATION: RIGHT KNEE

## 2024-10-09 ASSESSMENT — ACTIVITIES OF DAILY LIVING (ADL)
AMBULATION ASSISTANCE: STAND BY ASSIST
AMBULATION_DISTANCE/DURATION_TOLERATED: 120 FT
AMBULATION ASSISTANCE ON FLAT SURFACES: 1

## 2024-10-11 ENCOUNTER — HOME CARE VISIT (OUTPATIENT)
Dept: HOME HEALTH SERVICES | Facility: HOME HEALTH | Age: 64
End: 2024-10-11
Payer: COMMERCIAL

## 2024-10-11 PROCEDURE — G0157 HHC PT ASSISTANT EA 15: HCPCS | Mod: CQ

## 2024-10-11 SDOH — HEALTH STABILITY: PHYSICAL HEALTH: EXERCISE TYPE: B LE STRENGTHENING THER EX, ENDURANCE TRAINING

## 2024-10-11 ASSESSMENT — ENCOUNTER SYMPTOMS
MUSCLE WEAKNESS: 1
HIGHEST PAIN SEVERITY IN PAST 24 HOURS: 6/10
LOWEST PAIN SEVERITY IN PAST 24 HOURS: 0/10
PERSON REPORTING PAIN: PATIENT
OCCASIONAL FEELINGS OF UNSTEADINESS: 1
PAIN LOCATION - EXACERBATING FACTORS: WEIGHT BEARING
PAIN LOCATION - RELIEVING FACTORS: REST
PAIN: 1
PAIN SEVERITY GOAL: 0/10
PAIN LOCATION: RIGHT FOOT
SUBJECTIVE PAIN PROGRESSION: WAXING AND WANING
PAIN LOCATION - PAIN QUALITY: ACHING
PAIN LOCATION - PAIN SEVERITY: 5/10
PAIN LOCATION - PAIN FREQUENCY: INTERMITTENT

## 2024-10-11 ASSESSMENT — ACTIVITIES OF DAILY LIVING (ADL)
AMBULATION_DISTANCE/DURATION_TOLERATED: 135 FT
AMBULATION ASSISTANCE ON FLAT SURFACES: 1

## 2024-10-14 ENCOUNTER — HOME CARE VISIT (OUTPATIENT)
Dept: HOME HEALTH SERVICES | Facility: HOME HEALTH | Age: 64
End: 2024-10-14
Payer: COMMERCIAL

## 2024-10-14 VITALS
SYSTOLIC BLOOD PRESSURE: 122 MMHG | OXYGEN SATURATION: 99 % | TEMPERATURE: 97.9 F | RESPIRATION RATE: 17 BRPM | HEART RATE: 79 BPM | DIASTOLIC BLOOD PRESSURE: 66 MMHG

## 2024-10-14 PROCEDURE — G0157 HHC PT ASSISTANT EA 15: HCPCS | Mod: CQ

## 2024-10-14 PROCEDURE — G0153 HHCP-SVS OF S/L PATH,EA 15MN: HCPCS

## 2024-10-14 SDOH — HEALTH STABILITY: PHYSICAL HEALTH
EXERCISE COMMENTS: 1 SET OF 15 EACH, BLUE THERABAND  SEATED THER EX: MARCHES, LEG EXT, LAQ, TBAND HIP ABD, BALL SQUEEZES HIP ADD  STANDING THER EX: MARCHES, HEEL/TOE RAISES, HIP ABD, HIP FLEX/EXT, MINI SQUATS , HAM CURLS  BUE: BLUE THERABAND SHOULDER ABD/ADD,FLEX,EXT,

## 2024-10-14 SDOH — HEALTH STABILITY: PHYSICAL HEALTH: EXERCISE COMMENTS: IR/ER  BICEP CURLS, ROWS , 2# BALL BICEP CURLS, SHOULDER PRESS

## 2024-10-14 SDOH — HEALTH STABILITY: PHYSICAL HEALTH: EXERCISE TYPE: B LE STRENGTHENING THER EX , ENDURANCE TRAINING

## 2024-10-14 ASSESSMENT — ACTIVITIES OF DAILY LIVING (ADL)
AMBULATION ASSISTANCE ON FLAT SURFACES: 1
AMBULATION ASSISTANCE: STAND BY ASSIST

## 2024-10-14 ASSESSMENT — ENCOUNTER SYMPTOMS
PAIN LOCATION - EXACERBATING FACTORS: TIME OF DAY
PAIN LOCATION - PAIN SEVERITY: 5/10
SUBJECTIVE PAIN PROGRESSION: WAXING AND WANING
PAIN SEVERITY GOAL: 0/10
HIGHEST PAIN SEVERITY IN PAST 24 HOURS: 6/10
LOWEST PAIN SEVERITY IN PAST 24 HOURS: 4/10
MUSCLE WEAKNESS: 1
PAIN LOCATION - PAIN QUALITY: ACHING
ARTHRALGIAS: 1
PAIN LOCATION - RELIEVING FACTORS: TIME OF DAY
PAIN LOCATION - PAIN FREQUENCY: INTERMITTENT
PAIN: 1
PAIN LOCATION: LEFT KNEE
PERSON REPORTING PAIN: PATIENT
OCCASIONAL FEELINGS OF UNSTEADINESS: 1

## 2024-10-14 NOTE — Clinical Note
thank you  ----- Message -----  From: Rosario Law, PTA  Sent: 10/14/2024   8:12 PM EDT  To: Annia Minor, PT      Pt. doing great and has met or progressed towards all goals at this time.  Adequate for Discharge.

## 2024-10-16 ENCOUNTER — HOME CARE VISIT (OUTPATIENT)
Dept: HOME HEALTH SERVICES | Facility: HOME HEALTH | Age: 64
End: 2024-10-16
Payer: COMMERCIAL

## 2024-10-16 PROCEDURE — G0153 HHCP-SVS OF S/L PATH,EA 15MN: HCPCS

## 2024-10-18 ENCOUNTER — HOME CARE VISIT (OUTPATIENT)
Dept: HOME HEALTH SERVICES | Facility: HOME HEALTH | Age: 64
End: 2024-10-18
Payer: COMMERCIAL

## 2024-10-18 VITALS — OXYGEN SATURATION: 98 % | RESPIRATION RATE: 16 BRPM

## 2024-10-18 PROCEDURE — G0151 HHCP-SERV OF PT,EA 15 MIN: HCPCS

## 2024-10-18 SDOH — ECONOMIC STABILITY: HOUSING INSECURITY: HOME SAFETY: UH FOLDER REVIEWED

## 2024-10-18 SDOH — HEALTH STABILITY: PHYSICAL HEALTH: EXERCISE COMMENTS: HEP REVIEWED, NO QUESTIONS.

## 2024-10-18 ASSESSMENT — ACTIVITIES OF DAILY LIVING (ADL)
AMBULATION ASSISTANCE: 1
PHYSICAL TRANSFERS ASSESSED: 1
AMBULATION ASSISTANCE: INDEPENDENT
CURRENT_FUNCTION: INDEPENDENT

## 2024-10-18 ASSESSMENT — ENCOUNTER SYMPTOMS
PERSON REPORTING PAIN: PATIENT
DENIES PAIN: 1

## 2024-10-18 NOTE — HOME HEALTH
DISCHARGE SUMMARY:    DISCIPLINE: Physical Therapy  DATE OF DISCIPLINE DISCHARGE: 10/18/24  REASON FOR DISCHARGE: GOALS MET  Patient knows current exercises. No questions. Agreeable to continue independently.Discharging from physical therapy with goals met and health improved.  Patient is indep to [facility] with all home functional mobility. No questions regarding home exercise program.  Patient appears to be at functional baseline.PT eval, medication reconciliation, home functional mobility and safety training, therapeutic exercise, fall prevention education.

## 2024-10-21 ENCOUNTER — HOME CARE VISIT (OUTPATIENT)
Dept: HOME HEALTH SERVICES | Facility: HOME HEALTH | Age: 64
End: 2024-10-21
Payer: COMMERCIAL

## 2024-10-21 PROCEDURE — G0153 HHCP-SVS OF S/L PATH,EA 15MN: HCPCS

## 2024-10-23 ENCOUNTER — HOME CARE VISIT (OUTPATIENT)
Dept: HOME HEALTH SERVICES | Facility: HOME HEALTH | Age: 64
End: 2024-10-23
Payer: COMMERCIAL

## 2024-10-23 PROCEDURE — G0153 HHCP-SVS OF S/L PATH,EA 15MN: HCPCS

## 2024-10-28 ENCOUNTER — HOME CARE VISIT (OUTPATIENT)
Dept: HOME HEALTH SERVICES | Facility: HOME HEALTH | Age: 64
End: 2024-10-28
Payer: COMMERCIAL

## 2024-10-28 PROCEDURE — G0153 HHCP-SVS OF S/L PATH,EA 15MN: HCPCS

## 2024-10-30 ENCOUNTER — HOME CARE VISIT (OUTPATIENT)
Dept: HOME HEALTH SERVICES | Facility: HOME HEALTH | Age: 64
End: 2024-10-30
Payer: COMMERCIAL

## 2024-10-30 PROCEDURE — G0153 HHCP-SVS OF S/L PATH,EA 15MN: HCPCS

## 2024-11-04 ENCOUNTER — HOME CARE VISIT (OUTPATIENT)
Dept: HOME HEALTH SERVICES | Facility: HOME HEALTH | Age: 64
End: 2024-11-04
Payer: COMMERCIAL

## 2024-11-04 PROCEDURE — G0153 HHCP-SVS OF S/L PATH,EA 15MN: HCPCS

## 2024-11-06 ENCOUNTER — HOME CARE VISIT (OUTPATIENT)
Dept: HOME HEALTH SERVICES | Facility: HOME HEALTH | Age: 64
End: 2024-11-06
Payer: COMMERCIAL

## 2024-11-06 PROCEDURE — G0153 HHCP-SVS OF S/L PATH,EA 15MN: HCPCS

## 2024-11-11 ENCOUNTER — HOME CARE VISIT (OUTPATIENT)
Dept: HOME HEALTH SERVICES | Facility: HOME HEALTH | Age: 64
End: 2024-11-11
Payer: COMMERCIAL

## 2024-11-11 PROCEDURE — G0153 HHCP-SVS OF S/L PATH,EA 15MN: HCPCS

## 2024-11-13 ENCOUNTER — HOME CARE VISIT (OUTPATIENT)
Dept: HOME HEALTH SERVICES | Facility: HOME HEALTH | Age: 64
End: 2024-11-13
Payer: COMMERCIAL

## 2024-11-13 PROCEDURE — G0153 HHCP-SVS OF S/L PATH,EA 15MN: HCPCS

## 2024-11-18 ENCOUNTER — HOME CARE VISIT (OUTPATIENT)
Dept: HOME HEALTH SERVICES | Facility: HOME HEALTH | Age: 64
End: 2024-11-18
Payer: COMMERCIAL

## 2024-11-18 PROCEDURE — G0153 HHCP-SVS OF S/L PATH,EA 15MN: HCPCS

## 2024-11-20 ENCOUNTER — HOME CARE VISIT (OUTPATIENT)
Dept: HOME HEALTH SERVICES | Facility: HOME HEALTH | Age: 64
End: 2024-11-20
Payer: COMMERCIAL

## 2024-11-20 PROCEDURE — G0153 HHCP-SVS OF S/L PATH,EA 15MN: HCPCS

## 2024-11-20 ASSESSMENT — ACTIVITIES OF DAILY LIVING (ADL)
HOME_HEALTH_OASIS: 00
OASIS_M1830: 00

## 2024-12-26 PROBLEM — R09.02 HYPOXIA: Status: ACTIVE | Noted: 2024-12-26

## 2024-12-26 PROBLEM — R26.81 UNSTEADY GAIT: Status: ACTIVE | Noted: 2024-12-26

## 2024-12-26 PROBLEM — E11.42 DIABETIC POLYNEUROPATHY ASSOCIATED WITH TYPE 2 DIABETES MELLITUS (MULTI): Status: ACTIVE | Noted: 2022-10-07

## 2024-12-26 PROBLEM — R26.9 ABNORMAL GAIT: Status: ACTIVE | Noted: 2024-12-26

## 2024-12-26 PROBLEM — R50.9 FEVER: Status: ACTIVE | Noted: 2024-12-26

## 2024-12-26 PROBLEM — R06.02 SOB (SHORTNESS OF BREATH) ON EXERTION: Status: ACTIVE | Noted: 2024-12-26

## 2024-12-26 PROBLEM — R42 ORTHOSTATIC DIZZINESS: Status: ACTIVE | Noted: 2024-12-26

## 2024-12-26 PROBLEM — M25.619 DECREASED RANGE OF MOTION OF SHOULDER: Status: ACTIVE | Noted: 2024-12-26

## 2024-12-26 PROBLEM — R41.89 SUBJECTIVE MEMORY COMPLAINTS: Status: ACTIVE | Noted: 2024-12-26

## 2024-12-26 PROBLEM — Z79.85 LONG-TERM (CURRENT) USE OF INJECTABLE NON-INSULIN ANTIDIABETIC DRUGS: Status: ACTIVE | Noted: 2022-10-07

## 2024-12-26 PROBLEM — N39.0 URINARY TRACT INFECTION: Status: ACTIVE | Noted: 2024-12-26

## 2024-12-26 PROBLEM — G25.0 ESSENTIAL TREMOR: Status: ACTIVE | Noted: 2024-12-26

## 2024-12-26 PROBLEM — R33.9 RETENTION OF URINE: Status: ACTIVE | Noted: 2024-12-26

## 2024-12-26 PROBLEM — R00.2 PALPITATIONS: Status: ACTIVE | Noted: 2023-06-24

## 2024-12-26 PROBLEM — R20.0 NUMBNESS OF UPPER EXTREMITY: Status: ACTIVE | Noted: 2024-12-26

## 2024-12-26 PROBLEM — T78.40XA ALLERGIC REACTION: Status: ACTIVE | Noted: 2023-06-24

## 2024-12-26 PROBLEM — U07.1 DISEASE DUE TO SEVERE ACUTE RESPIRATORY SYNDROME CORONAVIRUS 2 (SARS-COV-2): Status: ACTIVE | Noted: 2024-12-26

## 2024-12-26 PROBLEM — G25.0 BENIGN ESSENTIAL TREMOR: Status: ACTIVE | Noted: 2024-12-26

## 2024-12-26 PROBLEM — L03.019 CELLULITIS OF FINGER: Status: ACTIVE | Noted: 2022-10-07

## 2024-12-26 PROBLEM — S39.012A LUMBOSACRAL STRAIN: Status: ACTIVE | Noted: 2022-12-11

## 2024-12-26 PROBLEM — U07.1 COVID-19: Status: ACTIVE | Noted: 2024-12-26

## 2024-12-26 PROBLEM — E78.5 DYSLIPIDEMIA: Status: ACTIVE | Noted: 2024-12-26

## 2024-12-26 PROBLEM — M25.511 RIGHT SHOULDER PAIN: Status: ACTIVE | Noted: 2024-12-26

## 2024-12-26 PROBLEM — M25.511 PAIN OF RIGHT SHOULDER REGION: Status: ACTIVE | Noted: 2023-08-09

## 2024-12-26 PROBLEM — I10 BENIGN ESSENTIAL HYPERTENSION: Status: ACTIVE | Noted: 2023-06-24

## 2024-12-26 PROBLEM — R09.89 CAROTID BRUIT: Status: ACTIVE | Noted: 2024-12-26

## 2024-12-26 PROBLEM — Z86.59 HISTORY OF DEPRESSION: Status: ACTIVE | Noted: 2023-06-24

## 2024-12-26 PROBLEM — R11.2 NAUSEA AND VOMITING: Status: ACTIVE | Noted: 2023-09-23

## 2024-12-26 PROBLEM — M75.01 ADHESIVE CAPSULITIS OF RIGHT SHOULDER: Status: ACTIVE | Noted: 2024-12-26

## 2025-01-03 ENCOUNTER — APPOINTMENT (OUTPATIENT)
Dept: CARDIOLOGY | Facility: CLINIC | Age: 65
End: 2025-01-03
Payer: COMMERCIAL

## 2025-01-06 ENCOUNTER — APPOINTMENT (OUTPATIENT)
Dept: RADIOLOGY | Facility: HOSPITAL | Age: 65
End: 2025-01-06
Payer: COMMERCIAL

## 2025-01-06 ENCOUNTER — HOSPITAL ENCOUNTER (OUTPATIENT)
Facility: HOSPITAL | Age: 65
Setting detail: OBSERVATION
LOS: 1 days | Discharge: HOME HEALTH | End: 2025-01-08
Attending: EMERGENCY MEDICINE | Admitting: INTERNAL MEDICINE
Payer: COMMERCIAL

## 2025-01-06 DIAGNOSIS — H53.2 DOUBLE VISION: Primary | ICD-10-CM

## 2025-01-06 DIAGNOSIS — R26.81 UNSTEADY GAIT: ICD-10-CM

## 2025-01-06 LAB
BASOPHILS # BLD AUTO: 0.01 X10*3/UL (ref 0–0.1)
BASOPHILS NFR BLD AUTO: 0.2 %
EOSINOPHIL # BLD AUTO: 0.07 X10*3/UL (ref 0–0.7)
EOSINOPHIL NFR BLD AUTO: 1.4 %
ERYTHROCYTE [DISTWIDTH] IN BLOOD BY AUTOMATED COUNT: 14.6 % (ref 11.5–14.5)
HCT VFR BLD AUTO: 35.6 % (ref 41–52)
HGB BLD-MCNC: 13 G/DL (ref 13.5–17.5)
IMM GRANULOCYTES # BLD AUTO: 0.02 X10*3/UL (ref 0–0.7)
IMM GRANULOCYTES NFR BLD AUTO: 0.4 % (ref 0–0.9)
INR PPP: 1.2 (ref 0.9–1.1)
LYMPHOCYTES # BLD AUTO: 2.15 X10*3/UL (ref 1.2–4.8)
LYMPHOCYTES NFR BLD AUTO: 43.5 %
MCH RBC QN AUTO: 32.7 PG (ref 26–34)
MCHC RBC AUTO-ENTMCNC: 36.5 G/DL (ref 32–36)
MCV RBC AUTO: 90 FL (ref 80–100)
MONOCYTES # BLD AUTO: 0.59 X10*3/UL (ref 0.1–1)
MONOCYTES NFR BLD AUTO: 11.9 %
NEUTROPHILS # BLD AUTO: 2.1 X10*3/UL (ref 1.2–7.7)
NEUTROPHILS NFR BLD AUTO: 42.6 %
NRBC BLD-RTO: 0 /100 WBCS (ref 0–0)
PLATELET # BLD AUTO: 106 X10*3/UL (ref 150–450)
PROTHROMBIN TIME: 13.4 SECONDS (ref 9.8–12.8)
RBC # BLD AUTO: 3.97 X10*6/UL (ref 4.5–5.9)
WBC # BLD AUTO: 4.9 X10*3/UL (ref 4.4–11.3)

## 2025-01-06 PROCEDURE — 84484 ASSAY OF TROPONIN QUANT: CPT | Performed by: EMERGENCY MEDICINE

## 2025-01-06 PROCEDURE — 80053 COMPREHEN METABOLIC PANEL: CPT | Performed by: EMERGENCY MEDICINE

## 2025-01-06 PROCEDURE — 85610 PROTHROMBIN TIME: CPT | Performed by: EMERGENCY MEDICINE

## 2025-01-06 PROCEDURE — 85025 COMPLETE CBC W/AUTO DIFF WBC: CPT | Performed by: EMERGENCY MEDICINE

## 2025-01-06 PROCEDURE — 83036 HEMOGLOBIN GLYCOSYLATED A1C: CPT | Mod: PORLAB | Performed by: STUDENT IN AN ORGANIZED HEALTH CARE EDUCATION/TRAINING PROGRAM

## 2025-01-06 PROCEDURE — 93005 ELECTROCARDIOGRAM TRACING: CPT

## 2025-01-06 PROCEDURE — 70450 CT HEAD/BRAIN W/O DYE: CPT

## 2025-01-06 PROCEDURE — 36415 COLL VENOUS BLD VENIPUNCTURE: CPT | Performed by: EMERGENCY MEDICINE

## 2025-01-06 PROCEDURE — 70450 CT HEAD/BRAIN W/O DYE: CPT | Performed by: STUDENT IN AN ORGANIZED HEALTH CARE EDUCATION/TRAINING PROGRAM

## 2025-01-06 PROCEDURE — 99285 EMERGENCY DEPT VISIT HI MDM: CPT | Mod: 25 | Performed by: EMERGENCY MEDICINE

## 2025-01-06 ASSESSMENT — LIFESTYLE VARIABLES
TOTAL SCORE: 0
HAVE PEOPLE ANNOYED YOU BY CRITICIZING YOUR DRINKING: NO
EVER FELT BAD OR GUILTY ABOUT YOUR DRINKING: NO
HAVE YOU EVER FELT YOU SHOULD CUT DOWN ON YOUR DRINKING: NO
EVER HAD A DRINK FIRST THING IN THE MORNING TO STEADY YOUR NERVES TO GET RID OF A HANGOVER: NO

## 2025-01-06 ASSESSMENT — PAIN SCALES - GENERAL: PAINLEVEL_OUTOF10: 4

## 2025-01-06 ASSESSMENT — PAIN DESCRIPTION - LOCATION: LOCATION: EYE

## 2025-01-06 ASSESSMENT — PAIN - FUNCTIONAL ASSESSMENT: PAIN_FUNCTIONAL_ASSESSMENT: 0-10

## 2025-01-06 ASSESSMENT — PAIN DESCRIPTION - PAIN TYPE: TYPE: ACUTE PAIN

## 2025-01-07 ENCOUNTER — APPOINTMENT (OUTPATIENT)
Dept: RADIOLOGY | Facility: HOSPITAL | Age: 65
End: 2025-01-07
Payer: COMMERCIAL

## 2025-01-07 ENCOUNTER — APPOINTMENT (OUTPATIENT)
Dept: CARDIOLOGY | Facility: HOSPITAL | Age: 65
End: 2025-01-07
Payer: COMMERCIAL

## 2025-01-07 PROBLEM — H53.2 DOUBLE VISION: Status: ACTIVE | Noted: 2025-01-07

## 2025-01-07 LAB
ALBUMIN SERPL BCP-MCNC: 4 G/DL (ref 3.4–5)
ALP SERPL-CCNC: 121 U/L (ref 33–136)
ALT SERPL W P-5'-P-CCNC: 29 U/L (ref 10–52)
ANION GAP SERPL CALC-SCNC: 10 MMOL/L (ref 10–20)
ANION GAP SERPL CALC-SCNC: 8 MMOL/L (ref 10–20)
AST SERPL W P-5'-P-CCNC: 24 U/L (ref 9–39)
ATRIAL RATE: 74 BPM
BILIRUB SERPL-MCNC: 0.8 MG/DL (ref 0–1.2)
BNP SERPL-MCNC: 8 PG/ML (ref 0–99)
BUN SERPL-MCNC: 17 MG/DL (ref 6–23)
BUN SERPL-MCNC: 19 MG/DL (ref 6–23)
CALCIUM SERPL-MCNC: 8.3 MG/DL (ref 8.6–10.3)
CALCIUM SERPL-MCNC: 8.5 MG/DL (ref 8.6–10.3)
CARDIAC TROPONIN I PNL SERPL HS: 5 NG/L (ref 0–20)
CHLORIDE SERPL-SCNC: 101 MMOL/L (ref 98–107)
CHLORIDE SERPL-SCNC: 103 MMOL/L (ref 98–107)
CHOLEST SERPL-MCNC: 143 MG/DL (ref 0–199)
CHOLESTEROL/HDL RATIO: 4.8
CO2 SERPL-SCNC: 25 MMOL/L (ref 21–32)
CO2 SERPL-SCNC: 27 MMOL/L (ref 21–32)
CREAT SERPL-MCNC: 0.94 MG/DL (ref 0.5–1.3)
CREAT SERPL-MCNC: 1.05 MG/DL (ref 0.5–1.3)
EGFRCR SERPLBLD CKD-EPI 2021: 80 ML/MIN/1.73M*2
EGFRCR SERPLBLD CKD-EPI 2021: >90 ML/MIN/1.73M*2
ERYTHROCYTE [DISTWIDTH] IN BLOOD BY AUTOMATED COUNT: 14.5 % (ref 11.5–14.5)
EST. AVERAGE GLUCOSE BLD GHB EST-MCNC: 140 MG/DL
GLUCOSE BLD MANUAL STRIP-MCNC: 117 MG/DL (ref 74–99)
GLUCOSE BLD MANUAL STRIP-MCNC: 153 MG/DL (ref 74–99)
GLUCOSE BLD MANUAL STRIP-MCNC: 185 MG/DL (ref 74–99)
GLUCOSE BLD MANUAL STRIP-MCNC: 185 MG/DL (ref 74–99)
GLUCOSE BLD MANUAL STRIP-MCNC: 192 MG/DL (ref 74–99)
GLUCOSE BLD MANUAL STRIP-MCNC: 199 MG/DL (ref 74–99)
GLUCOSE BLD MANUAL STRIP-MCNC: 201 MG/DL (ref 74–99)
GLUCOSE SERPL-MCNC: 197 MG/DL (ref 74–99)
GLUCOSE SERPL-MCNC: 237 MG/DL (ref 74–99)
HBA1C MFR BLD: 6.5 %
HCT VFR BLD AUTO: 35.4 % (ref 41–52)
HDLC SERPL-MCNC: 29.8 MG/DL
HGB BLD-MCNC: 12.7 G/DL (ref 13.5–17.5)
LDLC SERPL CALC-MCNC: 83 MG/DL
MCH RBC QN AUTO: 32.2 PG (ref 26–34)
MCHC RBC AUTO-ENTMCNC: 35.9 G/DL (ref 32–36)
MCV RBC AUTO: 90 FL (ref 80–100)
NON HDL CHOLESTEROL: 113 MG/DL (ref 0–149)
NRBC BLD-RTO: 0 /100 WBCS (ref 0–0)
P AXIS: 19 DEGREES
PLATELET # BLD AUTO: 99 X10*3/UL (ref 150–450)
POTASSIUM SERPL-SCNC: 4.2 MMOL/L (ref 3.5–5.3)
POTASSIUM SERPL-SCNC: 4.3 MMOL/L (ref 3.5–5.3)
PR INTERVAL: 169 MS
PROT SERPL-MCNC: 7.1 G/DL (ref 6.4–8.2)
Q ONSET: 249 MS
QRS COUNT: 11 BEATS
QRS DURATION: 96 MS
QT INTERVAL: 380 MS
QTC CALCULATION(BAZETT): 422 MS
QTC FREDERICIA: 407 MS
R AXIS: -25 DEGREES
RBC # BLD AUTO: 3.94 X10*6/UL (ref 4.5–5.9)
SODIUM SERPL-SCNC: 132 MMOL/L (ref 136–145)
SODIUM SERPL-SCNC: 134 MMOL/L (ref 136–145)
T AXIS: 26 DEGREES
T OFFSET: 439 MS
TRIGL SERPL-MCNC: 152 MG/DL (ref 0–149)
VENTRICULAR RATE: 74 BPM
VLDL: 30 MG/DL (ref 0–40)
WBC # BLD AUTO: 5 X10*3/UL (ref 4.4–11.3)

## 2025-01-07 PROCEDURE — 2500000001 HC RX 250 WO HCPCS SELF ADMINISTERED DRUGS (ALT 637 FOR MEDICARE OP): Performed by: STUDENT IN AN ORGANIZED HEALTH CARE EDUCATION/TRAINING PROGRAM

## 2025-01-07 PROCEDURE — G0378 HOSPITAL OBSERVATION PER HR: HCPCS

## 2025-01-07 PROCEDURE — 82947 ASSAY GLUCOSE BLOOD QUANT: CPT

## 2025-01-07 PROCEDURE — 36415 COLL VENOUS BLD VENIPUNCTURE: CPT | Performed by: STUDENT IN AN ORGANIZED HEALTH CARE EDUCATION/TRAINING PROGRAM

## 2025-01-07 PROCEDURE — 97535 SELF CARE MNGMENT TRAINING: CPT | Mod: GO

## 2025-01-07 PROCEDURE — 36415 COLL VENOUS BLD VENIPUNCTURE: CPT | Performed by: INTERNAL MEDICINE

## 2025-01-07 PROCEDURE — 99223 1ST HOSP IP/OBS HIGH 75: CPT | Performed by: STUDENT IN AN ORGANIZED HEALTH CARE EDUCATION/TRAINING PROGRAM

## 2025-01-07 PROCEDURE — 99233 SBSQ HOSP IP/OBS HIGH 50: CPT | Performed by: INTERNAL MEDICINE

## 2025-01-07 PROCEDURE — 87476 LYME DIS DNA AMP PROBE: CPT | Performed by: INTERNAL MEDICINE

## 2025-01-07 PROCEDURE — 96372 THER/PROPH/DIAG INJ SC/IM: CPT | Performed by: STUDENT IN AN ORGANIZED HEALTH CARE EDUCATION/TRAINING PROGRAM

## 2025-01-07 PROCEDURE — 83880 ASSAY OF NATRIURETIC PEPTIDE: CPT | Performed by: STUDENT IN AN ORGANIZED HEALTH CARE EDUCATION/TRAINING PROGRAM

## 2025-01-07 PROCEDURE — 85027 COMPLETE CBC AUTOMATED: CPT | Performed by: STUDENT IN AN ORGANIZED HEALTH CARE EDUCATION/TRAINING PROGRAM

## 2025-01-07 PROCEDURE — 99223 1ST HOSP IP/OBS HIGH 75: CPT | Performed by: PSYCHIATRY & NEUROLOGY

## 2025-01-07 PROCEDURE — 70496 CT ANGIOGRAPHY HEAD: CPT

## 2025-01-07 PROCEDURE — 97162 PT EVAL MOD COMPLEX 30 MIN: CPT | Mod: GP

## 2025-01-07 PROCEDURE — 2500000004 HC RX 250 GENERAL PHARMACY W/ HCPCS (ALT 636 FOR OP/ED): Performed by: STUDENT IN AN ORGANIZED HEALTH CARE EDUCATION/TRAINING PROGRAM

## 2025-01-07 PROCEDURE — 80061 LIPID PANEL: CPT | Performed by: STUDENT IN AN ORGANIZED HEALTH CARE EDUCATION/TRAINING PROGRAM

## 2025-01-07 PROCEDURE — 80048 BASIC METABOLIC PNL TOTAL CA: CPT | Performed by: STUDENT IN AN ORGANIZED HEALTH CARE EDUCATION/TRAINING PROGRAM

## 2025-01-07 PROCEDURE — 70498 CT ANGIOGRAPHY NECK: CPT | Performed by: RADIOLOGY

## 2025-01-07 PROCEDURE — 2500000002 HC RX 250 W HCPCS SELF ADMINISTERED DRUGS (ALT 637 FOR MEDICARE OP, ALT 636 FOR OP/ED): Performed by: STUDENT IN AN ORGANIZED HEALTH CARE EDUCATION/TRAINING PROGRAM

## 2025-01-07 PROCEDURE — 70496 CT ANGIOGRAPHY HEAD: CPT | Performed by: RADIOLOGY

## 2025-01-07 PROCEDURE — 2550000001 HC RX 255 CONTRASTS: Performed by: STUDENT IN AN ORGANIZED HEALTH CARE EDUCATION/TRAINING PROGRAM

## 2025-01-07 PROCEDURE — 2500000001 HC RX 250 WO HCPCS SELF ADMINISTERED DRUGS (ALT 637 FOR MEDICARE OP): Performed by: PSYCHIATRY & NEUROLOGY

## 2025-01-07 PROCEDURE — 97166 OT EVAL MOD COMPLEX 45 MIN: CPT | Mod: GO

## 2025-01-07 PROCEDURE — 97112 NEUROMUSCULAR REEDUCATION: CPT | Mod: GP

## 2025-01-07 RX ORDER — ONDANSETRON HYDROCHLORIDE 2 MG/ML
4 INJECTION, SOLUTION INTRAVENOUS EVERY 8 HOURS PRN
Status: DISCONTINUED | OUTPATIENT
Start: 2025-01-07 | End: 2025-01-08 | Stop reason: HOSPADM

## 2025-01-07 RX ORDER — ATORVASTATIN CALCIUM 40 MG/1
80 TABLET, FILM COATED ORAL
Status: DISCONTINUED | OUTPATIENT
Start: 2025-01-07 | End: 2025-01-08 | Stop reason: HOSPADM

## 2025-01-07 RX ORDER — TALC
3 POWDER (GRAM) TOPICAL NIGHTLY PRN
Status: DISCONTINUED | OUTPATIENT
Start: 2025-01-07 | End: 2025-01-08 | Stop reason: HOSPADM

## 2025-01-07 RX ORDER — GABAPENTIN 400 MG/1
400 CAPSULE ORAL 2 TIMES DAILY
Status: DISCONTINUED | OUTPATIENT
Start: 2025-01-07 | End: 2025-01-08 | Stop reason: HOSPADM

## 2025-01-07 RX ORDER — BISACODYL 5 MG
10 TABLET, DELAYED RELEASE (ENTERIC COATED) ORAL DAILY PRN
Status: DISCONTINUED | OUTPATIENT
Start: 2025-01-07 | End: 2025-01-08 | Stop reason: HOSPADM

## 2025-01-07 RX ORDER — BISACODYL 10 MG/1
10 SUPPOSITORY RECTAL DAILY PRN
Status: DISCONTINUED | OUTPATIENT
Start: 2025-01-07 | End: 2025-01-08 | Stop reason: HOSPADM

## 2025-01-07 RX ORDER — ONDANSETRON 4 MG/1
4 TABLET, FILM COATED ORAL EVERY 8 HOURS PRN
Status: DISCONTINUED | OUTPATIENT
Start: 2025-01-07 | End: 2025-01-08 | Stop reason: HOSPADM

## 2025-01-07 RX ORDER — VENLAFAXINE HYDROCHLORIDE 150 MG/1
150 CAPSULE, EXTENDED RELEASE ORAL
Status: DISCONTINUED | OUTPATIENT
Start: 2025-01-07 | End: 2025-01-08 | Stop reason: HOSPADM

## 2025-01-07 RX ORDER — AMLODIPINE BESYLATE 5 MG/1
5 TABLET ORAL DAILY
Status: DISCONTINUED | OUTPATIENT
Start: 2025-01-07 | End: 2025-01-08 | Stop reason: HOSPADM

## 2025-01-07 RX ORDER — PANTOPRAZOLE SODIUM 40 MG/1
40 TABLET, DELAYED RELEASE ORAL
Status: DISCONTINUED | OUTPATIENT
Start: 2025-01-07 | End: 2025-01-08 | Stop reason: HOSPADM

## 2025-01-07 RX ORDER — INSULIN LISPRO 100 [IU]/ML
0-5 INJECTION, SOLUTION INTRAVENOUS; SUBCUTANEOUS
Status: DISCONTINUED | OUTPATIENT
Start: 2025-01-07 | End: 2025-01-08 | Stop reason: HOSPADM

## 2025-01-07 RX ORDER — ACETAMINOPHEN 325 MG/1
650 TABLET ORAL EVERY 4 HOURS PRN
Status: DISCONTINUED | OUTPATIENT
Start: 2025-01-07 | End: 2025-01-08 | Stop reason: HOSPADM

## 2025-01-07 RX ORDER — ASPIRIN 81 MG/1
81 TABLET ORAL DAILY
Status: DISCONTINUED | OUTPATIENT
Start: 2025-01-07 | End: 2025-01-08 | Stop reason: HOSPADM

## 2025-01-07 RX ORDER — GUAIFENESIN 600 MG/1
600 TABLET, EXTENDED RELEASE ORAL EVERY 12 HOURS PRN
Status: DISCONTINUED | OUTPATIENT
Start: 2025-01-07 | End: 2025-01-08 | Stop reason: HOSPADM

## 2025-01-07 RX ORDER — DEXTROSE 50 % IN WATER (D50W) INTRAVENOUS SYRINGE
12.5
Status: DISCONTINUED | OUTPATIENT
Start: 2025-01-07 | End: 2025-01-08 | Stop reason: HOSPADM

## 2025-01-07 RX ORDER — PANTOPRAZOLE SODIUM 40 MG/10ML
40 INJECTION, POWDER, LYOPHILIZED, FOR SOLUTION INTRAVENOUS
Status: DISCONTINUED | OUTPATIENT
Start: 2025-01-07 | End: 2025-01-08 | Stop reason: HOSPADM

## 2025-01-07 RX ORDER — ENOXAPARIN SODIUM 100 MG/ML
40 INJECTION SUBCUTANEOUS EVERY 24 HOURS
Status: DISCONTINUED | OUTPATIENT
Start: 2025-01-07 | End: 2025-01-08 | Stop reason: HOSPADM

## 2025-01-07 RX ORDER — DONEPEZIL HYDROCHLORIDE 5 MG/1
5 TABLET, FILM COATED ORAL NIGHTLY
Status: DISCONTINUED | OUTPATIENT
Start: 2025-01-07 | End: 2025-01-08 | Stop reason: HOSPADM

## 2025-01-07 RX ORDER — DAPAGLIFLOZIN 5 MG/1
10 TABLET, FILM COATED ORAL DAILY
Status: DISCONTINUED | OUTPATIENT
Start: 2025-01-07 | End: 2025-01-08 | Stop reason: HOSPADM

## 2025-01-07 RX ORDER — DEXTROSE 50 % IN WATER (D50W) INTRAVENOUS SYRINGE
25
Status: DISCONTINUED | OUTPATIENT
Start: 2025-01-07 | End: 2025-01-08 | Stop reason: HOSPADM

## 2025-01-07 RX ORDER — ARIPIPRAZOLE 10 MG/1
30 TABLET ORAL NIGHTLY
Status: DISCONTINUED | OUTPATIENT
Start: 2025-01-07 | End: 2025-01-08 | Stop reason: HOSPADM

## 2025-01-07 RX ORDER — BUSPIRONE HYDROCHLORIDE 5 MG/1
10 TABLET ORAL 2 TIMES DAILY
Status: DISCONTINUED | OUTPATIENT
Start: 2025-01-07 | End: 2025-01-08 | Stop reason: HOSPADM

## 2025-01-07 RX ADMIN — ATORVASTATIN CALCIUM 80 MG: 40 TABLET, FILM COATED ORAL at 06:16

## 2025-01-07 RX ADMIN — ACETAMINOPHEN 650 MG: 325 TABLET ORAL at 16:41

## 2025-01-07 RX ADMIN — DAPAGLIFLOZIN 10 MG: 5 TABLET, FILM COATED ORAL at 09:03

## 2025-01-07 RX ADMIN — METOPROLOL SUCCINATE 75 MG: 50 TABLET, EXTENDED RELEASE ORAL at 08:54

## 2025-01-07 RX ADMIN — ARIPIPRAZOLE 30 MG: 10 TABLET ORAL at 21:59

## 2025-01-07 RX ADMIN — BUSPIRONE HYDROCHLORIDE 10 MG: 5 TABLET ORAL at 01:16

## 2025-01-07 RX ADMIN — ARIPIPRAZOLE 30 MG: 10 TABLET ORAL at 02:11

## 2025-01-07 RX ADMIN — INSULIN LISPRO 1 UNITS: 100 INJECTION, SOLUTION INTRAVENOUS; SUBCUTANEOUS at 09:03

## 2025-01-07 RX ADMIN — GABAPENTIN 400 MG: 400 CAPSULE ORAL at 21:59

## 2025-01-07 RX ADMIN — GABAPENTIN 400 MG: 400 CAPSULE ORAL at 08:54

## 2025-01-07 RX ADMIN — ASPIRIN 81 MG: 81 TABLET, COATED ORAL at 21:59

## 2025-01-07 RX ADMIN — INSULIN LISPRO 2 UNITS: 100 INJECTION, SOLUTION INTRAVENOUS; SUBCUTANEOUS at 10:26

## 2025-01-07 RX ADMIN — ENOXAPARIN SODIUM 40 MG: 40 INJECTION SUBCUTANEOUS at 01:17

## 2025-01-07 RX ADMIN — DONEPEZIL HYDROCHLORIDE 5 MG: 5 TABLET ORAL at 01:16

## 2025-01-07 RX ADMIN — AMLODIPINE BESYLATE 5 MG: 5 TABLET ORAL at 08:54

## 2025-01-07 RX ADMIN — IOHEXOL 50 ML: 350 INJECTION, SOLUTION INTRAVENOUS at 10:55

## 2025-01-07 RX ADMIN — BUSPIRONE HYDROCHLORIDE 10 MG: 5 TABLET ORAL at 21:59

## 2025-01-07 RX ADMIN — GABAPENTIN 400 MG: 400 CAPSULE ORAL at 01:16

## 2025-01-07 RX ADMIN — BUSPIRONE HYDROCHLORIDE 10 MG: 5 TABLET ORAL at 08:54

## 2025-01-07 RX ADMIN — PANTOPRAZOLE SODIUM 40 MG: 40 TABLET, DELAYED RELEASE ORAL at 06:16

## 2025-01-07 RX ADMIN — DONEPEZIL HYDROCHLORIDE 5 MG: 5 TABLET ORAL at 21:59

## 2025-01-07 RX ADMIN — VENLAFAXINE HYDROCHLORIDE 150 MG: 150 CAPSULE, EXTENDED RELEASE ORAL at 08:54

## 2025-01-07 SDOH — SOCIAL STABILITY: SOCIAL INSECURITY: WERE YOU ABLE TO COMPLETE ALL THE BEHAVIORAL HEALTH SCREENINGS?: YES

## 2025-01-07 SDOH — ECONOMIC STABILITY: FOOD INSECURITY: HOW HARD IS IT FOR YOU TO PAY FOR THE VERY BASICS LIKE FOOD, HOUSING, MEDICAL CARE, AND HEATING?: NOT VERY HARD

## 2025-01-07 SDOH — SOCIAL STABILITY: SOCIAL INSECURITY: WITHIN THE LAST YEAR, HAVE YOU BEEN HUMILIATED OR EMOTIONALLY ABUSED IN OTHER WAYS BY YOUR PARTNER OR EX-PARTNER?: NO

## 2025-01-07 SDOH — HEALTH STABILITY: MENTAL HEALTH: HOW OFTEN DO YOU HAVE A DRINK CONTAINING ALCOHOL?: NEVER

## 2025-01-07 SDOH — ECONOMIC STABILITY: INCOME INSECURITY: IN THE PAST 12 MONTHS HAS THE ELECTRIC, GAS, OIL, OR WATER COMPANY THREATENED TO SHUT OFF SERVICES IN YOUR HOME?: NO

## 2025-01-07 SDOH — HEALTH STABILITY: MENTAL HEALTH: HOW OFTEN DO YOU HAVE SIX OR MORE DRINKS ON ONE OCCASION?: NEVER

## 2025-01-07 SDOH — SOCIAL STABILITY: SOCIAL INSECURITY: ARE YOU OR HAVE YOU BEEN THREATENED OR ABUSED PHYSICALLY, EMOTIONALLY, OR SEXUALLY BY ANYONE?: NO

## 2025-01-07 SDOH — HEALTH STABILITY: PHYSICAL HEALTH
HOW OFTEN DO YOU NEED TO HAVE SOMEONE HELP YOU WHEN YOU READ INSTRUCTIONS, PAMPHLETS, OR OTHER WRITTEN MATERIAL FROM YOUR DOCTOR OR PHARMACY?: SOMETIMES

## 2025-01-07 SDOH — SOCIAL STABILITY: SOCIAL INSECURITY: DO YOU FEEL UNSAFE GOING BACK TO THE PLACE WHERE YOU ARE LIVING?: NO

## 2025-01-07 SDOH — SOCIAL STABILITY: SOCIAL INSECURITY: ARE YOU MARRIED, WIDOWED, DIVORCED, SEPARATED, NEVER MARRIED, OR LIVING WITH A PARTNER?: PATIENT UNABLE TO ANSWER

## 2025-01-07 SDOH — HEALTH STABILITY: MENTAL HEALTH
DO YOU FEEL STRESS - TENSE, RESTLESS, NERVOUS, OR ANXIOUS, OR UNABLE TO SLEEP AT NIGHT BECAUSE YOUR MIND IS TROUBLED ALL THE TIME - THESE DAYS?: NOT AT ALL

## 2025-01-07 SDOH — SOCIAL STABILITY: SOCIAL NETWORK
IN A TYPICAL WEEK, HOW MANY TIMES DO YOU TALK ON THE PHONE WITH FAMILY, FRIENDS, OR NEIGHBORS?: MORE THAN THREE TIMES A WEEK

## 2025-01-07 SDOH — SOCIAL STABILITY: SOCIAL INSECURITY
WITHIN THE LAST YEAR, HAVE YOU BEEN KICKED, HIT, SLAPPED, OR OTHERWISE PHYSICALLY HURT BY YOUR PARTNER OR EX-PARTNER?: NO

## 2025-01-07 SDOH — ECONOMIC STABILITY: HOUSING INSECURITY: IN THE PAST 12 MONTHS, HOW MANY TIMES HAVE YOU MOVED WHERE YOU WERE LIVING?: 0

## 2025-01-07 SDOH — ECONOMIC STABILITY: HOUSING INSECURITY: IN THE LAST 12 MONTHS, WAS THERE A TIME WHEN YOU WERE NOT ABLE TO PAY THE MORTGAGE OR RENT ON TIME?: NO

## 2025-01-07 SDOH — SOCIAL STABILITY: SOCIAL INSECURITY: HAVE YOU HAD ANY THOUGHTS OF HARMING ANYONE ELSE?: NO

## 2025-01-07 SDOH — ECONOMIC STABILITY: HOUSING INSECURITY: AT ANY TIME IN THE PAST 12 MONTHS, WERE YOU HOMELESS OR LIVING IN A SHELTER (INCLUDING NOW)?: NO

## 2025-01-07 SDOH — SOCIAL STABILITY: SOCIAL NETWORK: HOW OFTEN DO YOU ATTEND MEETINGS OF THE CLUBS OR ORGANIZATIONS YOU BELONG TO?: PATIENT UNABLE TO ANSWER

## 2025-01-07 SDOH — SOCIAL STABILITY: SOCIAL INSECURITY: DO YOU FEEL ANYONE HAS EXPLOITED OR TAKEN ADVANTAGE OF YOU FINANCIALLY OR OF YOUR PERSONAL PROPERTY?: NO

## 2025-01-07 SDOH — SOCIAL STABILITY: SOCIAL INSECURITY: HAS ANYONE EVER THREATENED TO HURT YOUR FAMILY OR YOUR PETS?: NO

## 2025-01-07 SDOH — SOCIAL STABILITY: SOCIAL INSECURITY: ARE THERE ANY APPARENT SIGNS OF INJURIES/BEHAVIORS THAT COULD BE RELATED TO ABUSE/NEGLECT?: NO

## 2025-01-07 SDOH — SOCIAL STABILITY: SOCIAL INSECURITY: WITHIN THE LAST YEAR, HAVE YOU BEEN AFRAID OF YOUR PARTNER OR EX-PARTNER?: NO

## 2025-01-07 SDOH — ECONOMIC STABILITY: FOOD INSECURITY: WITHIN THE PAST 12 MONTHS, THE FOOD YOU BOUGHT JUST DIDN'T LAST AND YOU DIDN'T HAVE MONEY TO GET MORE.: NEVER TRUE

## 2025-01-07 SDOH — SOCIAL STABILITY: SOCIAL INSECURITY: HAVE YOU HAD THOUGHTS OF HARMING ANYONE ELSE?: NO

## 2025-01-07 SDOH — SOCIAL STABILITY: SOCIAL INSECURITY: ABUSE: ADULT

## 2025-01-07 SDOH — ECONOMIC STABILITY: TRANSPORTATION INSECURITY: IN THE PAST 12 MONTHS, HAS LACK OF TRANSPORTATION KEPT YOU FROM MEDICAL APPOINTMENTS OR FROM GETTING MEDICATIONS?: NO

## 2025-01-07 SDOH — HEALTH STABILITY: MENTAL HEALTH: HOW MANY DRINKS CONTAINING ALCOHOL DO YOU HAVE ON A TYPICAL DAY WHEN YOU ARE DRINKING?: PATIENT DOES NOT DRINK

## 2025-01-07 SDOH — SOCIAL STABILITY: SOCIAL INSECURITY
WITHIN THE LAST YEAR, HAVE YOU BEEN RAPED OR FORCED TO HAVE ANY KIND OF SEXUAL ACTIVITY BY YOUR PARTNER OR EX-PARTNER?: NO

## 2025-01-07 SDOH — SOCIAL STABILITY: SOCIAL NETWORK: HOW OFTEN DO YOU ATTEND CHURCH OR RELIGIOUS SERVICES?: PATIENT UNABLE TO ANSWER

## 2025-01-07 SDOH — ECONOMIC STABILITY: FOOD INSECURITY: WITHIN THE PAST 12 MONTHS, YOU WORRIED THAT YOUR FOOD WOULD RUN OUT BEFORE YOU GOT THE MONEY TO BUY MORE.: NEVER TRUE

## 2025-01-07 SDOH — SOCIAL STABILITY: SOCIAL NETWORK
DO YOU BELONG TO ANY CLUBS OR ORGANIZATIONS SUCH AS CHURCH GROUPS, UNIONS, FRATERNAL OR ATHLETIC GROUPS, OR SCHOOL GROUPS?: PATIENT UNABLE TO ANSWER

## 2025-01-07 SDOH — SOCIAL STABILITY: SOCIAL INSECURITY: DOES ANYONE TRY TO KEEP YOU FROM HAVING/CONTACTING OTHER FRIENDS OR DOING THINGS OUTSIDE YOUR HOME?: NO

## 2025-01-07 SDOH — SOCIAL STABILITY: SOCIAL NETWORK: HOW OFTEN DO YOU GET TOGETHER WITH FRIENDS OR RELATIVES?: PATIENT UNABLE TO ANSWER

## 2025-01-07 ASSESSMENT — COGNITIVE AND FUNCTIONAL STATUS - GENERAL
STANDING UP FROM CHAIR USING ARMS: TOTAL
MOBILITY SCORE: 15
MOBILITY SCORE: 15
DRESSING REGULAR UPPER BODY CLOTHING: A LOT
DRESSING REGULAR UPPER BODY CLOTHING: A LITTLE
TOILETING: A LITTLE
TOILETING: A LITTLE
CLIMB 3 TO 5 STEPS WITH RAILING: TOTAL
WALKING IN HOSPITAL ROOM: A LOT
TOILETING: TOTAL
HELP NEEDED FOR BATHING: A LITTLE
CLIMB 3 TO 5 STEPS WITH RAILING: TOTAL
TOILETING: A LITTLE
TURNING FROM BACK TO SIDE WHILE IN FLAT BAD: A LITTLE
CLIMB 3 TO 5 STEPS WITH RAILING: TOTAL
MOVING TO AND FROM BED TO CHAIR: A LOT
STANDING UP FROM CHAIR USING ARMS: A LITTLE
TURNING FROM BACK TO SIDE WHILE IN FLAT BAD: A LITTLE
DRESSING REGULAR LOWER BODY CLOTHING: TOTAL
DAILY ACTIVITIY SCORE: 20
HELP NEEDED FOR BATHING: A LITTLE
EATING MEALS: A LITTLE
DAILY ACTIVITIY SCORE: 12
STANDING UP FROM CHAIR USING ARMS: A LITTLE
STANDING UP FROM CHAIR USING ARMS: A LITTLE
CLIMB 3 TO 5 STEPS WITH RAILING: TOTAL
MOBILITY SCORE: 10
TURNING FROM BACK TO SIDE WHILE IN FLAT BAD: A LITTLE
WALKING IN HOSPITAL ROOM: A LOT
MOVING FROM LYING ON BACK TO SITTING ON SIDE OF FLAT BED WITH BEDRAILS: A LITTLE
WALKING IN HOSPITAL ROOM: TOTAL
MOVING TO AND FROM BED TO CHAIR: TOTAL
HELP NEEDED FOR BATHING: A LOT
HELP NEEDED FOR BATHING: A LITTLE
DAILY ACTIVITIY SCORE: 20
DRESSING REGULAR UPPER BODY CLOTHING: A LITTLE
MOVING TO AND FROM BED TO CHAIR: A LOT
DRESSING REGULAR LOWER BODY CLOTHING: A LITTLE
PERSONAL GROOMING: A LITTLE
TURNING FROM BACK TO SIDE WHILE IN FLAT BAD: A LITTLE
DRESSING REGULAR LOWER BODY CLOTHING: A LITTLE
DRESSING REGULAR LOWER BODY CLOTHING: A LITTLE
PATIENT BASELINE BEDBOUND: NO
MOVING TO AND FROM BED TO CHAIR: A LOT
DRESSING REGULAR UPPER BODY CLOTHING: A LITTLE
WALKING IN HOSPITAL ROOM: A LOT

## 2025-01-07 ASSESSMENT — ENCOUNTER SYMPTOMS
FLANK PAIN: 0
PALPITATIONS: 0
SPEECH DIFFICULTY: 0
MYALGIAS: 0
VOMITING: 0
ACTIVITY CHANGE: 0
COUGH: 0
CHILLS: 0
FACIAL ASYMMETRY: 0
ABDOMINAL PAIN: 0
WHEEZING: 0
CHEST TIGHTNESS: 0
NUMBNESS: 0
RHINORRHEA: 0
APNEA: 0
CONFUSION: 0
BACK PAIN: 0
DIARRHEA: 0
JOINT SWELLING: 0
NERVOUS/ANXIOUS: 0
EYE DISCHARGE: 0
AGITATION: 0
COLOR CHANGE: 0
FREQUENCY: 0
PHOTOPHOBIA: 0
HEMATURIA: 0
DYSURIA: 0
HEADACHES: 0
EYE ITCHING: 1
DIAPHORESIS: 0
SHORTNESS OF BREATH: 0
SEIZURES: 0
SINUS PAIN: 0
WEAKNESS: 0
FEVER: 0
APPETITE CHANGE: 0
STRIDOR: 0
ARTHRALGIAS: 0
EYE PAIN: 1
CONSTIPATION: 0
WOUND: 0
FATIGUE: 0
SORE THROAT: 0
ABDOMINAL DISTENTION: 0
DECREASED CONCENTRATION: 0
EYE REDNESS: 0
DIFFICULTY URINATING: 0
LIGHT-HEADEDNESS: 0
NAUSEA: 0
DIZZINESS: 0

## 2025-01-07 ASSESSMENT — LIFESTYLE VARIABLES
HOW MANY STANDARD DRINKS CONTAINING ALCOHOL DO YOU HAVE ON A TYPICAL DAY: PATIENT DOES NOT DRINK
HOW OFTEN DO YOU HAVE A DRINK CONTAINING ALCOHOL: NEVER
SKIP TO QUESTIONS 9-10: 1
AUDIT-C TOTAL SCORE: 0
AUDIT-C TOTAL SCORE: 0
SUBSTANCE_ABUSE_PAST_12_MONTHS: NO
SKIP TO QUESTIONS 9-10: 1
HOW OFTEN DO YOU HAVE 6 OR MORE DRINKS ON ONE OCCASION: NEVER
AUDIT-C TOTAL SCORE: 0
PRESCIPTION_ABUSE_PAST_12_MONTHS: NO

## 2025-01-07 ASSESSMENT — ACTIVITIES OF DAILY LIVING (ADL)
BATHING_ASSISTANCE: MAXIMAL
ADL_ASSISTANCE: INDEPENDENT
ADEQUATE_TO_COMPLETE_ADL: YES
DRESSING YOURSELF: INDEPENDENT
FEEDING YOURSELF: INDEPENDENT
LACK_OF_TRANSPORTATION: NO
TOILETING: INDEPENDENT
HEARING - LEFT EAR: FUNCTIONAL
ASSISTIVE_DEVICE: CANE;WALKER
JUDGMENT_ADEQUATE_SAFELY_COMPLETE_DAILY_ACTIVITIES: YES
BATHING: INDEPENDENT
PATIENT'S MEMORY ADEQUATE TO SAFELY COMPLETE DAILY ACTIVITIES?: UNABLE TO ASSESS
LACK_OF_TRANSPORTATION: NO
GROOMING: INDEPENDENT
WALKS IN HOME: NEEDS ASSISTANCE
HOME_MANAGEMENT_TIME_ENTRY: 10
HEARING - RIGHT EAR: FUNCTIONAL

## 2025-01-07 ASSESSMENT — PATIENT HEALTH QUESTIONNAIRE - PHQ9
2. FEELING DOWN, DEPRESSED OR HOPELESS: SEVERAL DAYS
SUM OF ALL RESPONSES TO PHQ9 QUESTIONS 1 & 2: 2
1. LITTLE INTEREST OR PLEASURE IN DOING THINGS: SEVERAL DAYS

## 2025-01-07 ASSESSMENT — PAIN - FUNCTIONAL ASSESSMENT
PAIN_FUNCTIONAL_ASSESSMENT: 0-10

## 2025-01-07 ASSESSMENT — PAIN SCALES - GENERAL
PAINLEVEL_OUTOF10: 0 - NO PAIN
PAINLEVEL_OUTOF10: 0 - NO PAIN
PAINLEVEL_OUTOF10: 1
PAINLEVEL_OUTOF10: 0 - NO PAIN
PAINLEVEL_OUTOF10: 0 - NO PAIN

## 2025-01-07 ASSESSMENT — PAIN DESCRIPTION - LOCATION: LOCATION: EYE

## 2025-01-07 ASSESSMENT — PAIN DESCRIPTION - ORIENTATION: ORIENTATION: LEFT

## 2025-01-07 NOTE — ED PROVIDER NOTES
HPI   Chief Complaint   Patient presents with    Vision Problems       Patient presents the emergency department secondary to worsening double vision.  Patient has had double vision since around 31 December.  Patient went to see the ophthalmologist.  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  He was instructed to patch.  Tonight he noticed that he felt like the visual disturbance was worse.  He felt like the double images was farther apart.  He was concerned about this.  He also felt slightly unsteady so he presents to the emergency department to be evaluated.  He was told if the patching does not improve his symptoms he would need an MRI.  He has mild left-sided headache which has been present ever since this started on the 31st.  No chest pain or shortness of breath.  No abdominal pain.  No nausea or vomiting.  No change in bowel movements.  No numbness or tingling in the extremities other than chronic peripheral neuropathy from diabetes.  No difficulty with speech or other complaints.                          Gilson Coma Scale Score: 15                  Patient History   Past Medical History:   Diagnosis Date    Cirrhosis (Multi) 08/28/2024    Diabetes (Multi)     HTN (hypertension)     Personal history of other diseases of the musculoskeletal system and connective tissue     History of gout    Personal history of other mental and behavioral disorders     History of depression    Right upper quadrant abdominal pain 06/11/2024    Type 2 diabetes mellitus      Past Surgical History:   Procedure Laterality Date    CHOLECYSTECTOMY N/A 08/26/2024    Laparoscopic converted to open partial cholecystectomy and drain placement    NECK SURGERY  02/01/2018    Neck Surgery    OTHER SURGICAL HISTORY  02/01/2018    Surgery Excision Lipoma    OTHER SURGICAL HISTORY  04/25/2022    Colonoscopy     Family History   Problem Relation Name Age of Onset    Dementia Mother      Heart attack Father      Cholecystitis Sister           x3    Cancer Father's Sister      Prostate cancer Father's Brother      Cancer Paternal Grandmother      Diabetes Paternal Grandfather       Social History     Tobacco Use    Smoking status: Former     Types: Cigarettes    Smokeless tobacco: Never   Vaping Use    Vaping status: Never Used   Substance Use Topics    Alcohol use: Never    Drug use: Never       Physical Exam   ED Triage Vitals   Temp Pulse Resp BP   -- -- -- --      SpO2 Temp src Heart Rate Source Patient Position   -- -- -- --      BP Location FiO2 (%)     -- --       Physical Exam  Vitals and nursing note reviewed.   Constitutional:       Appearance: Normal appearance. He is not ill-appearing.   HENT:      Head: Normocephalic and atraumatic.      Nose: Nose normal.      Mouth/Throat:      Mouth: Mucous membranes are moist.   Eyes:      Pupils: Pupils are equal, round, and reactive to light.      Comments: Patient has a left 6th cranial nerve palsy and is unable to track due to that.   Cardiovascular:      Rate and Rhythm: Normal rate and regular rhythm.      Pulses: Normal pulses.      Heart sounds: Normal heart sounds.   Pulmonary:      Effort: Pulmonary effort is normal.      Breath sounds: Normal breath sounds.   Abdominal:      General: Abdomen is flat. Bowel sounds are normal.      Palpations: Abdomen is soft.      Tenderness: There is no abdominal tenderness. There is no guarding or rebound.   Musculoskeletal:         General: Normal range of motion.      Cervical back: Normal range of motion.   Skin:     General: Skin is warm and dry.      Capillary Refill: Capillary refill takes less than 2 seconds.   Neurological:      Mental Status: He is alert and oriented to person, place, and time.      Comments: Left 6th cranial nerve palsy.  Intact  strength bilaterally.  5 out of 5 plantar and dorsi flexion bilaterally.  Intact sensation to light touch peripherally other than chronic neuropathy in the lower extremities.   Psychiatric:          Mood and Affect: Mood normal.         Behavior: Behavior normal.       Labs Reviewed - No data to display  Pain Management Panel           No data to display              No orders to display     ED Course & MDM   Diagnoses as of 01/07/25 0411   Double vision       Medical Decision Making  Patient presents secondary to worsening double vision.  Patient symptoms have been going on for about a week at this point.  Patient is outside the window for tenecteplase or intervention if this is related to stroke.  CT head is obtained to evaluate for intracranial lesion.  Laboratory workup is performed to evaluate for metabolic disturbance.  EKG was obtained at 11:42 PM.  It is sinus rhythm rate of 74.  No acute ST elevation.  Some artifact limitation.  AL interval is 169 and QTc is 422.  This is interpreted by myself.  Laboratory workup overall is unremarkable.  No evidence of acute infectious process.  CT head shows chronic changes but no acute disease.  Patient at this time would warrant admission to the hospital for MRI and neurology consultation.  Patient is outside the window for tenecteplase or intervention.  Symptoms have been present for about a week at this point.  Patient is agreeable with this plan of care and admitted in stable condition.        Procedure  Procedures     Char Rodriguez MD  01/07/25 0411

## 2025-01-07 NOTE — PROGRESS NOTES
Physical Therapy    Physical Therapy Evaluation    Patient Name: Mane Nath  MRN: [id number]  Department: [address]  Room: [address] [place] [address]  Today's Date: 1/7/2025   Time Calculation  Start Time: 1442  Stop Time: 1511  Time Calculation (min): 29 min    Assessment/Plan   PT Assessment  PT Assessment Results: Decreased strength, Decreased endurance, Impaired balance, Decreased mobility, Decreased coordination, Decreased safety awareness, Impaired vision, Pain  Rehab Prognosis: Fair  Barriers to Discharge Home: Caregiver assistance, Cognition needs, Physical needs  Caregiver Assistance: Caregiver assistance needed per identified barriers - however, level of patient's required assistance exceeds assistance available at home  Cognition Needs: 24hr supervision for safety awareness needed, Medication and/or medical management daily assist needed, Insight of patient limited regarding functional ability/needs, Recollection or understanding of precautions/restrictions limited  Physical Needs: Stair navigation into home limited by function/safety, In-home setup navigation limited by function/safety, Ambulating household distances limited by function/safety, 24hr mobility assistance needed, 24hr ADL assistance needed, High falls risk due to function or environment  Evaluation/Treatment Tolerance: Patient limited by fatigue, Patient limited by pain  End of Session Communication: Bedside nurse, PCT/NA/CTA (MOBILTIY SAFETY STATUS)  Assessment Comment: PT W/ VISUAL CHANGES, INCREASED TREMORS, NEEDS MAX X2 FOR TRANSFERS,HIGH FALLRISK, RECOMMEND HIGH REHAB LEVEL & VISION THERAPY  End of Session Patient Position: Bed, 3 rail up, Alarm on (CALL LIGHT IN REACH)  IP OR SWING BED PT PLAN  Inpatient or Swing Bed: Inpatient  PT Plan  Treatment/Interventions: Bed mobility, Transfer training, Gait training, Strengthening, Endurance training  PT Plan: Ongoing PT  PT Frequency: 4 times per week  PT Discharge Recommendations: High intensity level  of continued care  Equipment Recommended upon Discharge:  (TBD)  PT Recommended Transfer Status: Assist x2  PT - OK to Discharge: Yes (WHEN MEDICALLY CLEARED)    Subjective   General Visit Information:  General  Reason for Referral: IMPAIRED MOBILITY  Referred By: TREVER  Past Medical History Relevant to Rehab: DOUBLE VISION, UNSTEADY ON HIS FEET, L HEADACHE; DX: DOUBLE VISION, L HEADACHE; HX:  RECENT 12/31/24 DX OF 6TH NERVE PALSY, CIRRHOSIS, DM, HTN, GOUT, DEPRESSION BIPOLAR, NECK SURG, ESSENTIAL TREMOR, NEUROPATHY  Co-Treatment: OT  Co-Treatment Reason: to optimize safety and mobility, while focusing on discipline specific goals  Patient Position Received: Bed, 3 rail up, Alarm on (ROOM 3305 ALERT, IV, L LENS ON GLASSES BLOCKED OUT,  AGREES TO THERAPY)  General Comment: REPORTS THAT JOSELO MOR USUALY ONLY IN LUE  Home Living:  Home Living  Home Living Comments: MOBILE HOME LIVES W/ BROTHER, SEVERAL STEPS TO ENTER/RAIL, INDEP AMB ADN ADL, HAS CANE & FWW, DOES CHORES, DROTHER OR SISTER TAKE TO APPTS; BROTHER WORKS NIGHTS  Prior Level of Function:     Precautions:  Precautions  Hearing/Visual Limitations: persistent double vision since 12/31. 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  Medical Precautions: Fall precautions               Objective   Pain:  Pain Assessment  0-10 (Numeric) Pain Score:  (L EYE PAIN, NO RATING GIVEN)  Pain Type: Acute pain  Cognition:  Cognition  Overall Cognitive Status: Within Functional Limits (ORIENTED X3)       Vision: Vision - Basic Assessment  Current Vision:  (pt wearing recommended eye patch on left eye. pt's brother used black electrical tape on safety goggles to make eye patch.)  Patient Visual Report:  (pt reports no visual deficits at baseline. reports new onset of visual deficits)   and Vision - Complex Assessment  Ocular Range of Motion: Restricted on the left (both eyes.)  Head Position: Upright, centered, looking straight ahead, not  leaning any direction  Tracking: Decreased smoothness of horizontal tracking, Decreased smoothness of vertical tracking, Decreased smoothness of eye movement to L superior field, Decreased smoothness of eye movement to L inferior field (R eye tracking mostly intact except decreased tracking & significant blurring/loss of vision when looking left of midline. L eye tracking is impaired in all planes. when tracking with bilateral eyes, left eye lags and is observable not as smooth as R eye)  Saccades: Decreased speed of pursuit between targets, Decreased speed of saccadic movement  Visual Fields:  (difficulty detecting stimulus on left side)  Diplopia Assessment: Disappears wtih one eye closed                  Activity Tolerance  Endurance: Decreased tolerance for upright activites    Sensation  Sensation Comment: PER CHART HAS NEUROPATHY    Strength  Strength Comments: ROM IN LEGS WFL, STRENGTH IN LEGS 3-/5 DECREASED MUSCULAR ENDURANCE  Strength  Strength Comments: ROM IN LEGS WFL, STRENGTH IN LEGS 3-/5 DECREASED MUSCULAR ENDURANCE           Coordination  Rapid Alternating Movements: Bradykinesia  Coordination Comment: TREMORS IN ALL 4 LIMBS LUE>RUE AT REST, NOTED MOD  IN GINNA LEGS BASILIA WHEN STRESSED OR IN STANDING, FULL BODY TREMORS W/ STANDING REQUIRING 2 A TO TRANSFER         Static Sitting Balance  Static Sitting-Comment/Number of Minutes: FAIR  Dynamic Sitting Balance  Dynamic Sitting-Comments: FAIR-    Static Standing Balance  Static Standing-Comment/Number of Minutes: POOR  Dynamic Standing Balance  Dynamic Standing-Comments: POOR  Functional Assessments:  ADL  ADL Comments: MAX X2 A TO STAND + 1 FOR DEP HYGIENE IN STANDING    Bed Mobility  Bed Mobility:  (SUPINE<.SIT CGA,USING RAILS, HOB 20 DEGREES, SITS EOB SBA TOTAL 5 MIN)    Transfers  Transfer:  (SIT<>STAND MAX X2 JD ARM,  MOD/SEVERE TREMORS IN LEGS FIRST STAND, STOOD 20 SEC FOR HYGIENE SAT DOWNON ON BED RESTED, STOOD  TO TRANSFER TO BSC, SAT ON BSC FOR  SEVERAL MIN THEN STOOD FOR TRANSFER BCK TO BEDMILD/MOD TREMORS IN LEGS FOR STANDS 2 & 3)    Ambulation/Gait Training  Ambulation/Gait Training Performed:  (MAX X2 A JD ARM FOR BED<>BSC TRANSFERS, 2 FT EACH TIME, SLIDES FEET ON FLOOR, TREMORS IN LIMBS AND TRUNK)  Extremity/Trunk Assessments:     Outcome Measures:  [facility] Basic Mobility  Turning from your back to your side while in a flat bed without using bedrails: A little  Moving from lying on your back to sitting on the side of a flat bed without using bedrails: A little  Moving to and from bed to chair (including a wheelchair): Total  Standing up from a chair using your arms (e.g. wheelchair or bedside chair): Total  To walk in hospital room: Total  Climbing 3-5 steps with railing: Total  Basic Mobility - Total Score: 10    Encounter Problems       Encounter Problems (Active)       Mobility       STG - Patient will ambulate (Not Progressing)       Start:  01/07/25    Expected End:  01/17/25       FWW MIN A X1 75 FT         Goal 1 (Not Progressing)       Start:  01/07/25    Expected End:  01/28/25       20 REPS RROM INCREASING STRENGTH, COORDINATION EX TO DECREASED TREMORS & ASSISTED BALANCE ACTIVITIES IMPROVING GAIT STABILITY            PT Transfers       STG - Patient will transfer sit to and from stand (Not Progressing)       Start:  01/07/25    Expected End:  01/14/25       FWW CGA USING PROPER TECHNIQUE                Education Documentation  Mobility Training, taught by Amy He, PT at 1/7/2025  4:12 PM.  Learner: Patient  Readiness: Acceptance  Method: Explanation  Response: Needs Reinforcement  Comment: ROLE OF IP REHAB IN RECOVERY, MOBILITY SAFETY    Education Comments  No comments found.

## 2025-01-07 NOTE — CARE PLAN
Problem: Mobility  Goal: STG - Patient will ambulate  Description: FWW MIN A X1 75 FT  Outcome: Not Progressing  Goal: Goal 1  Description: 20 REPS RROM INCREASING STRENGTH, COORDINATION EX TO DECREASED TREMORS & ASSISTED BALANCE ACTIVITIES IMPROVING GAIT STABILITY  Outcome: Not Progressing     Problem: PT Transfers  Goal: STG - Patient will transfer sit to and from stand  Description: FWW CGA USING PROPER TECHNIQUE  Outcome: Not Progressing

## 2025-01-07 NOTE — CARE PLAN
Problem: Pain - Adult  Goal: Verbalizes/displays adequate comfort level or baseline comfort level  Outcome: Progressing     Problem: Safety - Adult  Goal: Free from fall injury  Outcome: Progressing     Problem: Discharge Planning  Goal: Discharge to home or other facility with appropriate resources  Outcome: Progressing     Problem: Chronic Conditions and Co-morbidities  Goal: Patient's chronic conditions and co-morbidity symptoms are monitored and maintained or improved  Outcome: Progressing     Problem: Skin  Goal: Participates in plan/prevention/treatment measures  Outcome: Progressing  Flowsheets (Taken 1/7/2025 1350)  Participates in plan/prevention/treatment measures:   Discuss with provider PT/OT consult   Increase activity/out of bed for meals   Elevate heels  Goal: Prevent/manage excess moisture  Outcome: Progressing  Flowsheets (Taken 1/7/2025 1350)  Prevent/manage excess moisture:   Cleanse incontinence/protect with barrier cream   Moisturize dry skin   Use wicking fabric (obtain order)  Goal: Prevent/minimize sheer/friction injuries  Outcome: Progressing  Flowsheets (Taken 1/7/2025 1350)  Prevent/minimize sheer/friction injuries:   Complete micro-shifts as needed if patient unable. Adjust patient position to relieve pressure points, not a full turn   HOB 30 degrees or less   Increase activity/out of bed for meals  Goal: Promote/optimize nutrition  Outcome: Progressing  Flowsheets (Taken 1/7/2025 1350)  Promote/optimize nutrition:   Assist with feeding   Discuss with provider if NPO > 2 days   Offer water/supplements/favorite foods     Problem: Fall/Injury  Goal: Not fall by end of shift  Outcome: Progressing  Goal: Be free from injury by end of the shift  Outcome: Progressing  Goal: Verbalize understanding of personal risk factors for fall in the hospital  Outcome: Progressing  Goal: Verbalize understanding of risk factor reduction measures to prevent injury from fall in the home  Outcome:  Progressing  Goal: Use assistive devices by end of the shift  Outcome: Progressing  Goal: Pace activities to prevent fatigue by end of the shift  Outcome: Progressing     Problem: Pain  Goal: Takes deep breaths with improved pain control throughout the shift  Outcome: Progressing  Goal: Turns in bed with improved pain control throughout the shift  Outcome: Progressing  Goal: Walks with improved pain control throughout the shift  Outcome: Progressing  Goal: Performs ADL's with improved pain control throughout shift  Outcome: Progressing  Goal: Participates in PT with improved pain control throughout the shift  Outcome: Progressing  Goal: Free from opioid side effects throughout the shift  Outcome: Progressing  Goal: Free from acute confusion related to pain meds throughout the shift  Outcome: Progressing     Problem: Pain  Goal: LTG - Patient will manage pain with the appropriate technique/Intervention  Outcome: Progressing  Goal: LTG - Patient will demonstrate intervention for managing pain  Outcome: Progressing  Goal: STG - Patient will reduce or eliminate use of analgesics  Outcome: Progressing  Goal: STG - Pain is manageable through therapies  Outcome: Progressing  Goal: STG - Patient will verbalize an acceptable level of pain  Outcome: Progressing  Goal: STG - Patients pain is managed to allow active participation in daily activities  Outcome: Progressing  Goal: STG - Patient will increase activity level  Outcome: Progressing  Goal: STG - Patient verbalizes a reduction in pain level  Outcome: Progressing     Problem: Nutrition  Goal: Less than 5 days NPO/clear liquids  Outcome: Progressing  Goal: Oral intake greater than 50%  Outcome: Progressing  Goal: Oral intake greater 75%  Outcome: Progressing  Goal: Consume prescribed supplement  Outcome: Progressing  Goal: Adequate PO fluid intake  Outcome: Progressing  Goal: Nutrition support goals are met within 48 hrs  Outcome: Progressing  Goal: Nutrition support is  meeting 75% of nutrient needs  Outcome: Progressing  Goal: Tube feed tolerance  Outcome: Progressing  Goal: BG  mg/dL  Outcome: Progressing  Goal: Lab values WNL  Outcome: Progressing  Goal: Electrolytes WNL  Outcome: Progressing  Goal: Promote healing  Outcome: Progressing  Goal: Maintain stable weight  Outcome: Progressing  Goal: Reduce weight from edema/fluid  Outcome: Progressing  Goal: Gradual weight gain  Outcome: Progressing  Goal: Improve ostomy output  Outcome: Progressing     Problem: Diabetes  Goal: Achieve decreasing blood glucose levels by end of shift  Outcome: Progressing  Goal: Increase stability of blood glucose readings by end of shift  Outcome: Progressing  Goal: Decrease in ketones present in urine by end of shift  Outcome: Progressing  Goal: Maintain electrolyte levels within acceptable range throughout shift  Outcome: Progressing  Goal: Maintain glucose levels >70mg/dl to <250mg/dl throughout shift  Outcome: Progressing  Goal: No changes in neurological exam by end of shift  Outcome: Progressing  Goal: Learn about and adhere to nutrition recommendations by end of shift  Outcome: Progressing  Goal: Vital signs within normal range for age by end of shift  Outcome: Progressing  Goal: Increase self care and/or family involovement by end of shift  Outcome: Progressing  Goal: Receive DSME education by end of shift  Outcome: Progressing   The patient's goals for the shift include pt will remain safe this shift.    The clinical goals for the shift include pt will ask for assistance with ambulation this shift.

## 2025-01-07 NOTE — ED TRIAGE NOTES
Pt. to ED c/o of worsening double vision. Pt. 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 Pt. c/o of widening double vision as well as pressure behind/above his left eye. Pt. has a patch over his left eye. Stated that his ophthalmologist recommends getting an MRI if his sx keep getting worse.  Hx of type 2 diabetes.

## 2025-01-07 NOTE — CONSULTS
History Of Present Illness  Mane Nath is a 64 y.o. male left handed admitted to [facility] on 1/6/25 presenting with worsening double vision. Neurology consulted for above.    Last known well: 12/31/24  Had stroke symptoms resolved at time of presentation: No    Inpatient consult to Neurology  Consult performed by: Edouard Osuna MD  Consult ordered by: Robert Brown DO      Listed history of hypertension, dyslipidemia, diabetes, depression, bipolar disorder, and essential tremor.    Patient seen this morning in the ER, no family members around.    States he was in his usual state of health until 12/31/2024 when he started having some pain about the left eye.  He started to notice some difficulty with depth perception.  At some point, he went to his local eye doctor, and per note was diagnosed apparently as having left cranial nerve VI palsy, thought to be possibly related to diabetes.  He was asked to put an eye patch over the left eye, which he had been wearing.  On 1/6/2025, he thought that the double vision was worsening, and so he presented to [facility] ED.  In the ED, initial vital signs documented blood pressure 136/80, heart rate 83, afebrile.  Head CT without contrast done did not show any acute findings, except for some vague hypodensity in the subcortical white matter, possible small vessel ischemic disease.  Sodium 132, creatinine normal.  HbA1c was 6.5.  Patient admitted to the hospital, neurology consulted.  Overnight, I had requested admitting hospitalist to order MRI brain with and without contrast as well as either CTA or MRA.    When patient seen this morning, patient denies any blurring of vision. Says diplopia only with both eyes and goes away when seeing with only one eye (either eye). Diplopia worse when looking leftward. Patient has not had any prior events like this.  Patient denies any prior stroke.  No pets/animal exposure.  No recent illness.  No medication changes.    He denies normally  having headaches.  He denies any temporal pain or tenderness.    Patient states he does not take any aspirin at home.  He denies any history of GI bleed. No hx cancer.    Former smoker.  Denies alcohol or street drug use.      Review of Systems   Constitutional:  Negative for appetite change, chills and fever.   HENT:  Negative for ear pain and nosebleeds.    Eyes:  Negative for discharge and itching.   Respiratory:  Negative for choking and chest tightness.    Cardiovascular:  Negative for chest pain and palpitations.   Gastrointestinal:  Negative for abdominal distention, abdominal pain and nausea.   Endocrine: Negative for cold intolerance and heat intolerance.   Genitourinary:  Negative for difficulty urinating and dysuria.   Musculoskeletal:  Negative for gait problem and myalgias.   Neurological:  Negative for dizziness, seizures and numbness.     Past Medical History  Past Medical History:   Diagnosis Date    Cirrhosis (Multi) 08/28/2024    Diabetes (Multi)     HTN (hypertension)     Personal history of other diseases of the musculoskeletal system and connective tissue     History of gout    Personal history of other mental and behavioral disorders     History of depression    Right upper quadrant abdominal pain 06/11/2024    Type 2 diabetes mellitus        Surgical History  Past Surgical History:   Procedure Laterality Date    CHOLECYSTECTOMY N/A 08/26/2024    Laparoscopic converted to open partial cholecystectomy and drain placement    NECK SURGERY  02/01/2018    Neck Surgery    OTHER SURGICAL HISTORY  02/01/2018    Surgery Excision Lipoma    OTHER SURGICAL HISTORY  04/25/2022    Colonoscopy       Social History  Social History     Tobacco Use    Smoking status: Former     Types: Cigarettes    Smokeless tobacco: Never   Vaping Use    Vaping status: Never Used   Substance Use Topics    Alcohol use: Never    Drug use: Never       Home Meds  (Not in a hospital admission)      Allergies  Latex    Current  "Meds  Scheduled medications  amLODIPine, 5 mg, oral, Daily  ARIPiprazole, 30 mg, oral, Nightly  atorvastatin, 80 mg, oral, Daily  busPIRone, 10 mg, oral, BID  dapagliflozin propanediol, 10 mg, oral, Daily  donepezil, 5 mg, oral, Nightly  enoxaparin, 40 mg, subcutaneous, q24h  gabapentin, 400 mg, oral, BID  insulin lispro, 0-5 Units, subcutaneous, TID AC  metoprolol succinate XL, 75 mg, oral, Daily  pantoprazole, 40 mg, oral, Daily before breakfast   Or  pantoprazole, 40 mg, intravenous, Daily before breakfast  venlafaxine XR, 150 mg, oral, Daily with breakfast      Continuous medications     PRN medications  PRN medications: acetaminophen, bisacodyl, bisacodyl, dextrose, dextrose, glucagon, glucagon, guaiFENesin, melatonin, ondansetron **OR** ondansetron    Last Recorded Vitals  Blood pressure 139/86, pulse 77, temperature 37.1 °C (98.7 °F), temperature source Oral, resp. rate 18, height 1.778 m (5' 10\"), weight 95.3 kg (210 lb), SpO2 97%.    Awake, alert, oriented x3, in no distress  Well-nourished, in bed  No leg edema    Supple neck    Mental status exam as above, conversant   Fair fund of knowledge  Recent/remote memory fair  Fair attention span  Pupils round reactive to light, 3-4 mm, (-) RAPD   Fundoscopic examination was attempted but fundus was not visualized bilaterally   Full EOMs appear intact except L lateral rectus palsy, no nystagmus, no ptosis   VA both eyes (by Sumit): L eye 20/20 -1, R eye 20/20, able to see green/red both eyes  V1 to V3 sensation is intact   No facial droop   Hearing grossly intact   No dysarthria  Good shoulder shrug bilaterally   Tongue is midline     (+) mild intermittent tremor L hand    Motor strength is 5/5 on all extremities, tone/bulk normal   Reflexes 1+ on all 4 extremities except trace to absent on B ankles, downgoing toes bilaterally   Sensation is intact to light touch, vibration on all 4 extremities   Finger to nose test intact bilaterally   I did not have him stand " or walk    No tenderness to palpation L temple/L periorbital area           NIH Stroke Scale  1A. Level of Consciousness: Alert, Keenly Responsive  1B. Ask Month and Age: Both Questions Right  1C. Blink Eyes & Squeeze Hands: Performs Both Tasks  2. Best Gaze: Partial Gaze Palsy  3. Visual: No Visual Loss  4. Facial Palsy: Normal Symmetrical Movements  5A. Motor - Left Arm: No Drift  5B. Motor - Right Arm: No Drift  6A. Motor - Left Leg: No Drift  6B. Motor - Right Leg: No Drift  7. Limb Ataxia: Absent  8. Sensory Loss: Normal  9. Best Language: No Aphasia  10. Dysarthria: Normal  11. Extinction and Inattention: No Abnormality  NIH Stroke Scale: 1    Relevant Results  I have personally reviewed the following:    Labs  Results for orders placed or performed during the hospital encounter of 01/06/25 (from the past 24 hours)   CBC and Auto Differential   Result Value Ref Range    WBC 4.9 4.4 - 11.3 x10*3/uL    nRBC 0.0 0.0 - 0.0 /100 WBCs    RBC 3.97 (L) 4.50 - 5.90 x10*6/uL    Hemoglobin 13.0 (L) 13.5 - 17.5 g/dL    Hematocrit 35.6 (L) 41.0 - 52.0 %    MCV 90 80 - 100 fL    MCH 32.7 26.0 - 34.0 pg    MCHC 36.5 (H) 32.0 - 36.0 g/dL    RDW 14.6 (H) 11.5 - 14.5 %    Platelets 106 (L) 150 - 450 x10*3/uL    Neutrophils % 42.6 40.0 - 80.0 %    Immature Granulocytes %, Automated 0.4 0.0 - 0.9 %    Lymphocytes % 43.5 13.0 - 44.0 %    Monocytes % 11.9 2.0 - 10.0 %    Eosinophils % 1.4 0.0 - 6.0 %    Basophils % 0.2 0.0 - 2.0 %    Neutrophils Absolute 2.10 1.20 - 7.70 x10*3/uL    Immature Granulocytes Absolute, Automated 0.02 0.00 - 0.70 x10*3/uL    Lymphocytes Absolute 2.15 1.20 - 4.80 x10*3/uL    Monocytes Absolute 0.59 0.10 - 1.00 x10*3/uL    Eosinophils Absolute 0.07 0.00 - 0.70 x10*3/uL    Basophils Absolute 0.01 0.00 - 0.10 x10*3/uL   Comprehensive metabolic panel   Result Value Ref Range    Glucose 237 (H) 74 - 99 mg/dL    Sodium 132 (L) 136 - 145 mmol/L    Potassium 4.2 3.5 - 5.3 mmol/L    Chloride 101 98 - 107 mmol/L     Bicarbonate 25 21 - 32 mmol/L    Anion Gap 10 10 - 20 mmol/L    Urea Nitrogen 19 6 - 23 mg/dL    Creatinine 1.05 0.50 - 1.30 mg/dL    eGFR 80 >60 mL/min/1.73m*2    Calcium 8.5 (L) 8.6 - 10.3 mg/dL    Albumin 4.0 3.4 - 5.0 g/dL    Alkaline Phosphatase 121 33 - 136 U/L    Total Protein 7.1 6.4 - 8.2 g/dL    AST 24 9 - 39 U/L    Bilirubin, Total 0.8 0.0 - 1.2 mg/dL    ALT 29 10 - 52 U/L   Troponin I, High Sensitivity   Result Value Ref Range    Troponin I, High Sensitivity 5 0 - 20 ng/L   Protime-INR   Result Value Ref Range    Protime 13.4 (H) 9.8 - 12.8 seconds    INR 1.2 (H) 0.9 - 1.1   Hemoglobin A1c   Result Value Ref Range    Hemoglobin A1C 6.5 (H) See comment %    Estimated Average Glucose 140 Not Established mg/dL   ECG 12 lead   Result Value Ref Range    Ventricular Rate 74 BPM    Atrial Rate 74 BPM    IA Interval 169 ms    QRS Duration 96 ms    QT Interval 380 ms    QTC Calculation(Bazett) 422 ms    P Axis 19 degrees    R Axis -25 degrees    T Axis 26 degrees    QRS Count 11 beats    Q Onset 249 ms    T Offset 439 ms    QTC Fredericia 407 ms   POCT GLUCOSE   Result Value Ref Range    POCT Glucose 192 (H) 74 - 99 mg/dL   CBC   Result Value Ref Range    WBC 5.0 4.4 - 11.3 x10*3/uL    nRBC 0.0 0.0 - 0.0 /100 WBCs    RBC 3.94 (L) 4.50 - 5.90 x10*6/uL    Hemoglobin 12.7 (L) 13.5 - 17.5 g/dL    Hematocrit 35.4 (L) 41.0 - 52.0 %    MCV 90 80 - 100 fL    MCH 32.2 26.0 - 34.0 pg    MCHC 35.9 32.0 - 36.0 g/dL    RDW 14.5 11.5 - 14.5 %    Platelets 99 (L) 150 - 450 x10*3/uL   Basic metabolic panel   Result Value Ref Range    Glucose 197 (H) 74 - 99 mg/dL    Sodium 134 (L) 136 - 145 mmol/L    Potassium 4.3 3.5 - 5.3 mmol/L    Chloride 103 98 - 107 mmol/L    Bicarbonate 27 21 - 32 mmol/L    Anion Gap 8 (L) 10 - 20 mmol/L    Urea Nitrogen 17 6 - 23 mg/dL    Creatinine 0.94 0.50 - 1.30 mg/dL    eGFR >90 >60 mL/min/1.73m*2    Calcium 8.3 (L) 8.6 - 10.3 mg/dL   Lipid panel   Result Value Ref Range    Cholesterol 143 0 - 199  mg/dL    HDL-Cholesterol 29.8 mg/dL    Cholesterol/HDL Ratio 4.8     LDL Calculated 83 <=99 mg/dL    VLDL 30 0 - 40 mg/dL    Triglycerides 152 (H) 0 - 149 mg/dL    Non HDL Cholesterol 113 0 - 149 mg/dL   B-type Natriuretic Peptide   Result Value Ref Range    BNP 8 0 - 99 pg/mL   POCT GLUCOSE   Result Value Ref Range    POCT Glucose 199 (H) 74 - 99 mg/dL   POCT GLUCOSE   Result Value Ref Range    POCT Glucose 201 (H) 74 - 99 mg/dL       Imaging results  No MRI head results found for the past 12 months   CT head wo IV contrast    Result Date: 1/7/2025  Interpreted By:  Scooter Kelsey, STUDY: CT HEAD WO IV CONTRAST;  1/6/2025 11:58 pm   INDICATION: Signs/Symptoms:left 6th cranial nerve palsy.     COMPARISON: CT head dated 02/25/2024.   ACCESSION NUMBER(S): [id number]   ORDERING CLINICIAN: HERMINIO LEDBETTER   TECHNIQUE: Noncontrast axial CT scan of head was performed. Angled reformats in brain and bone windows were generated. The images were reviewed in bone, brain, blood and soft tissue windows.   FINDINGS: No hyperdense intracranial hemorrhage is present. There is no mass effect or midline shift identified.   Gray-white differentiation is intact, without evidence of CT apparent transcortical infarct. Subtle attenuation changes present in the periventricular and subcortical white matter of bilateral cerebral hemispheres are nonspecific, but favored to represent sequela of microvascular disease.   No abnormal ventricular dilatation is present. Basal cisterns are patent. No extra-axial fluid collections are identified.   Scalp soft tissues do not demonstrate any acute abnormality. No acute calvarial abnormality is present. Mastoid air cells and middle ear cavities are clear.   Visualized paranasal sinuses are unremarkable in appearance.       1.  No evidence of hemorrhage, CT apparent transcortical infarct, or other acute intracranial abnormality. 2. Subtle attenuation changes present in the periventricular and  "subcortical white matter of bilateral cerebral hemispheres are nonspecific, although favored to represent early sequela of microvascular disease.   MACRO: None   Signed by: Scooter Kelsey 1/7/2025 12:03 AM Dictation workstation:   [id number]    CT head wo IV contrast    Result Date: 2/25/2024  Interpreted By:  Blas Akins, STUDY: CT HEAD WO IV CONTRAST;  2/25/2024 3:29 pm   INDICATION: Signs/Symptoms:headache.   COMPARISON: Head CT 03/05/2020   ACCESSION NUMBER(S): [id number]   ORDERING CLINICIAN: MARIO HEIN   TECHNIQUE: Noncontrast axial CT scan of head was performed. Angled reformats in brain and bone windows were generated. The images were reviewed in bone, brain, blood and soft tissue windows.   FINDINGS: CSF Spaces: The ventricles, sulci and basal cisterns are within normal limits. There is no extraaxial fluid collection.   Parenchyma:  The grey-white differentiation is intact. There is no mass effect or midline shift.  There is no intracranial hemorrhage.   Calvarium: The calvarium is unremarkable.   Paranasal sinuses and mastoids: Visualized paranasal sinuses and mastoids are clear.       No evidence of acute cortical infarct or intracranial hemorrhage.       MACRO: None       Signed by: Blas Akins 2/25/2024 4:27 PM Dictation workstation:   [id number]         Assessment/Plan    L CN 6 palsy  Diplopia, worse when looking left  Exam suggests isolated L CN6 palsy  Not suggestive of LUCITA  Ddx: nerve infarct, less likely brainstem infarct  Unclear why pt has \"some pain\" above L eye--? Eye strain vs other    3.  Diabetes  4.  Hypertension  5.  Dyslipidemia    Recommendations:  Start ASA 81mg daily (with food)  Start high intensity statin  Do MRI brain wwo  Do CTA brain/neck w  5.   Control vascular risk factors  6.   Cardiac telemetry  7.   Consider OT eval  8.   Follow-up with ophthalmology outpatient    Discussed with pt. Message sent to Dr Mendez. All questions answered. Please call with " questions.    Edouard Osuna MD  1/7/2025  12:52 PM

## 2025-01-07 NOTE — H&P
[facility] - GENERAL MEDICINE HISTORY AND PHYSICAL    History Obtained From (Primary Source): Patient  Collateral History (Secondary Sources): D/w ED physician    History Of Present Illness (HPI):  Mane Nath is a 64 y.o. male with PMHx s/f HTN/HLD, DM2, depression/bipolar, essential tremor presenting with blurred vision. Pt states he started having double vision, especially when looking to the L on New Years Day (6 days ago). No inciting factors, it just came on suddenly. He saw an ophthalmologist on 1/3/25 who diagnosed him with a L 6th cranial nerve palsy and was instructed to patch. He has been using depth perception to estimate how bad his double vision was. Today he noticed things seemed much more apart than they were yesterday including his feet from his head, so he came to the ER. He had a mild L-sided headache and pain around his L eye that has been present since symptoms started. No hearing loss, tinnitus, facial droop, or speech changes noted. No chest pain shortness of breath, abdominal pain, nausea, vomiting, paresthesias, or focal weakness. He is a former smoker and has no history of strokes in the past. His ophthalmologist speculated his symptoms were from his diabetes.    ED Course (Summary - please note all labs, imaging studies, and interventions noted below have been personally reviewed and/or interpreted on day of admission):   Vitals on presentation: 98.7 F, 83 bpm, 18 rr, 136/80, 100% on RA  Labs: CMP glu 237,   Trop 5  INR 1.2  CBChg 13.0, plt 106  EKG: sinus rhythm at 74 bpm. No ST changes  Imaging: CT head - 1.  No evidence of hemorrhage, CT apparent transcortical infarct, or   other acute intracranial abnormality.   2. Subtle attenuation changes present in the periventricular and   subcortical white matter of bilateral cerebral hemispheres are   nonspecific, although favored to represent early sequela of   microvascular disease.   Interventions: Pt admitted for  further care.    12-point ROS reviewed and found to be negative aside from aforementioned positives in HPI and/or noted in dedicated ROS section below.     ED Course (From ED Provider):  Diagnoses as of 01/07/25 0058   Double vision     Relevant Results  Results for orders placed or performed during the hospital encounter of 01/06/25 (from the past 24 hours)   CBC and Auto Differential   Result Value Ref Range    WBC 4.9 4.4 - 11.3 x10*3/uL    nRBC 0.0 0.0 - 0.0 /100 WBCs    RBC 3.97 (L) 4.50 - 5.90 x10*6/uL    Hemoglobin 13.0 (L) 13.5 - 17.5 g/dL    Hematocrit 35.6 (L) 41.0 - 52.0 %    MCV 90 80 - 100 fL    MCH 32.7 26.0 - 34.0 pg    MCHC 36.5 (H) 32.0 - 36.0 g/dL    RDW 14.6 (H) 11.5 - 14.5 %    Platelets 106 (L) 150 - 450 x10*3/uL    Neutrophils % 42.6 40.0 - 80.0 %    Immature Granulocytes %, Automated 0.4 0.0 - 0.9 %    Lymphocytes % 43.5 13.0 - 44.0 %    Monocytes % 11.9 2.0 - 10.0 %    Eosinophils % 1.4 0.0 - 6.0 %    Basophils % 0.2 0.0 - 2.0 %    Neutrophils Absolute 2.10 1.20 - 7.70 x10*3/uL    Immature Granulocytes Absolute, Automated 0.02 0.00 - 0.70 x10*3/uL    Lymphocytes Absolute 2.15 1.20 - 4.80 x10*3/uL    Monocytes Absolute 0.59 0.10 - 1.00 x10*3/uL    Eosinophils Absolute 0.07 0.00 - 0.70 x10*3/uL    Basophils Absolute 0.01 0.00 - 0.10 x10*3/uL   Comprehensive metabolic panel   Result Value Ref Range    Glucose 237 (H) 74 - 99 mg/dL    Sodium 132 (L) 136 - 145 mmol/L    Potassium 4.2 3.5 - 5.3 mmol/L    Chloride 101 98 - 107 mmol/L    Bicarbonate 25 21 - 32 mmol/L    Anion Gap 10 10 - 20 mmol/L    Urea Nitrogen 19 6 - 23 mg/dL    Creatinine 1.05 0.50 - 1.30 mg/dL    eGFR 80 >60 mL/min/1.73m*2    Calcium 8.5 (L) 8.6 - 10.3 mg/dL    Albumin 4.0 3.4 - 5.0 g/dL    Alkaline Phosphatase 121 33 - 136 U/L    Total Protein 7.1 6.4 - 8.2 g/dL    AST 24 9 - 39 U/L    Bilirubin, Total 0.8 0.0 - 1.2 mg/dL    ALT 29 10 - 52 U/L   Troponin I, High Sensitivity   Result Value Ref Range    Troponin I, High  Sensitivity 5 0 - 20 ng/L   Protime-INR   Result Value Ref Range    Protime 13.4 (H) 9.8 - 12.8 seconds    INR 1.2 (H) 0.9 - 1.1      CT head wo IV contrast    Result Date: 1/7/2025  Interpreted By:  Scooetr Kelsey, STUDY: CT HEAD WO IV CONTRAST;  1/6/2025 11:58 pm   INDICATION: Signs/Symptoms:left 6th cranial nerve palsy.     COMPARISON: CT head dated 02/25/2024.   ACCESSION NUMBER(S): [id number]   ORDERING CLINICIAN: HERMINIO LEDBETTER   TECHNIQUE: Noncontrast axial CT scan of head was performed. Angled reformats in brain and bone windows were generated. The images were reviewed in bone, brain, blood and soft tissue windows.   FINDINGS: No hyperdense intracranial hemorrhage is present. There is no mass effect or midline shift identified.   Gray-white differentiation is intact, without evidence of CT apparent transcortical infarct. Subtle attenuation changes present in the periventricular and subcortical white matter of bilateral cerebral hemispheres are nonspecific, but favored to represent sequela of microvascular disease.   No abnormal ventricular dilatation is present. Basal cisterns are patent. No extra-axial fluid collections are identified.   Scalp soft tissues do not demonstrate any acute abnormality. No acute calvarial abnormality is present. Mastoid air cells and middle ear cavities are clear.   Visualized paranasal sinuses are unremarkable in appearance.       1.  No evidence of hemorrhage, CT apparent transcortical infarct, or other acute intracranial abnormality. 2. Subtle attenuation changes present in the periventricular and subcortical white matter of bilateral cerebral hemispheres are nonspecific, although favored to represent early sequela of microvascular disease.   MACRO: None   Signed by: Scooter Kelsey 1/7/2025 12:03 AM Dictation workstation:   [id number]    Scheduled medications:  amLODIPine, 5 mg, oral, Daily  ARIPiprazole, 30 mg, oral, Nightly  atorvastatin, 80 mg, oral,  Daily  busPIRone, 10 mg, oral, BID  dapagliflozin propanediol, 10 mg, oral, Daily  donepezil, 5 mg, oral, Nightly  enoxaparin, 40 mg, subcutaneous, q24h  gabapentin, 400 mg, oral, BID  metoprolol succinate XL, 75 mg, oral, Daily  pantoprazole, 40 mg, oral, Daily before breakfast   Or  pantoprazole, 40 mg, intravenous, Daily before breakfast  venlafaxine XR, 150 mg, oral, Daily with breakfast      Continuous medications:     PRN medications:  PRN medications: bisacodyl, bisacodyl, guaiFENesin, melatonin, ondansetron **OR** ondansetron     Past Medical History  He has a past medical history of Cirrhosis (Multi) (08/28/2024), Diabetes (Multi), HTN (hypertension), Personal history of other diseases of the musculoskeletal system and connective tissue, Personal history of other mental and behavioral disorders, Right upper quadrant abdominal pain (06/11/2024), and Type 2 diabetes mellitus.    Surgical History  He has a past surgical history that includes Neck surgery (02/01/2018); Other surgical history (02/01/2018); Other surgical history (04/25/2022); and Cholecystectomy (N/A, 08/26/2024).     Social History  He reports that he has quit smoking. His smoking use included cigarettes. He has never used smokeless tobacco. He reports that he does not drink alcohol and does not use drugs.    Family History  Family History   Problem Relation Name Age of Onset    Dementia Mother      Heart attack Father      Cholecystitis Sister          x3    Cancer Father's Sister      Prostate cancer Father's Brother      Cancer Paternal Grandmother      Diabetes Paternal Grandfather         Allergies  Latex    Code Status  Full Code     Review of Systems   Constitutional:  Negative for activity change, appetite change, chills, diaphoresis, fatigue and fever.   HENT:  Negative for congestion, ear pain, rhinorrhea, sinus pain and sore throat.    Eyes:  Positive for pain, itching and visual disturbance. Negative for photophobia, discharge and  redness.   Respiratory:  Negative for apnea, cough, chest tightness, shortness of breath, wheezing and stridor.    Cardiovascular:  Negative for chest pain, palpitations and leg swelling.   Gastrointestinal:  Negative for abdominal distention, abdominal pain, constipation, diarrhea, nausea and vomiting.   Genitourinary:  Negative for difficulty urinating, dysuria, flank pain, frequency, hematuria and urgency.   Musculoskeletal:  Negative for arthralgias, back pain, gait problem, joint swelling and myalgias.   Skin:  Negative for color change, pallor, rash and wound.   Neurological:  Negative for dizziness, seizures, syncope, facial asymmetry, speech difficulty, weakness, light-headedness, numbness and headaches.   Psychiatric/Behavioral:  Negative for agitation, behavioral problems, confusion and decreased concentration. The patient is not nervous/anxious.    All other systems reviewed and are negative.      Last Recorded Vitals  /80   Pulse 83   Temp 37.1 °C (98.7 °F) (Oral)   Resp 18   Wt 95.3 kg (210 lb)   SpO2 99%      Physical Exam:  Vital signs and nursing notes reviewed.   Constitutional: Pleasant and cooperative. Laying in bed in no acute distress. Conversant.   Skin: Warm and dry; no obvious lesions, rashes, pallor, or jaundice.   Eyes: EOMI. Anicteric sclera.   ENT: Mucous membranes moist; no obvious injury or deformity appreciated.   Head and Neck: Normocephalic, atraumatic. ROM preserved. Trachea midline. No appreciable JVD.   Respiratory: Nonlabored on RA. Lungs clear to auscultation bilaterally without obvious adventitious sounds. Chest rise is equal.  Cardiovascular: RRR. No gross murmur, gallop, or rub. Extremities are warm and well-perfused with good capillary refill (< 3 seconds). No chest wall tenderness.   GI: Abdomen soft, nontender, nondistended. No obvious organomegaly appreciated. Bowel sounds are present.  : No CVA tenderness.   MSK: No gross abnormalities appreciated. No  limitations to AROM/PROM appreciated.   Extremities: No cyanosis, edema, or clubbing evident. Neurovascularly intact.   Neuro: A&Ox3. CN 2-12 grossly intact. Able to respond to questions appropriately and clearly. L eye unable to deviate laterally.  Psych: Appropriate mood and behavior.    Assessment/Plan     64 y.o. male with PMHx s/f HTN/HLD, DM2, depression/bipolar, essential tremor presenting with blurred vision.    Plan:  Admit to floor on tele.    L six CN palsy, double vision:  MRI in AM  Neurology consult  No indication for ASA/Plavix currently given symptoms and timeline.  Check A1c, lipid panel, bnp  Continue home Lipitor 80 mg    DM2:  SSI while inpatient. Continue Farxiga  Accuchecks, hypoglycemic protocol  Check A1c    Depression/bipolar/memory loss: continue home psych meds, donepezil    HTN: continue home amlodipine, Toprol XL    Diabetic neuropathy: Continue home gabapentin    Diet: Consistent carb  DVT Prophylaxis: Lovenox SQ  Code Status: Full code  Case Discussed With: ED provider  Additional Sources Reviewed: Past admission notes    Anticipated Length of Stay (LOS): Patient will require 1-2 midnights for workup of stroke-like symptoms.     DO Nelson Vargas dictation software was used to dictate this note and thus there may be minor errors in translation/transcription including garbled speech or misspellings. Please contact for clarification if needed.

## 2025-01-07 NOTE — PROGRESS NOTES
Progress Note  HPI:       Mane Nath is a 64 y.o. male with PMHx s/f HTN/HLD, DM2, depression/bipolar, essential tremor presenting with blurred vision. Pt states he started having double vision, especially when looking to the L on New Years Day (6 days ago). No inciting factors, it just came on suddenly. He saw an ophthalmologist on 1/3/25 who diagnosed him with a L 6th cranial nerve palsy and was instructed to patch. He has been using depth perception to estimate how bad his double vision was. Today he noticed things seemed much more apart than they were yesterday including his feet from his head, so he came to the ER. He had a mild L-sided headache and pain around his L eye that has been present since symptoms started. No hearing loss, tinnitus, facial droop, or speech changes noted. No chest pain shortness of breath, abdominal pain, nausea, vomiting, paresthesias, or focal weakness. He is a former smoker and has no history of strokes in the past. His ophthalmologist speculated his symptoms were from his diabetes.     ED Course (Summary - please note all labs, imaging studies, and interventions noted below have been personally reviewed and/or interpreted on day of admission):   Vitals on presentation: 98.7 F, 83 bpm, 18 rr, 136/80, 100% on RA  Labs: CMP glu 237,   Trop 5  INR 1.2  CBChg 13.0, plt 106  EKG: sinus rhythm at 74 bpm. No ST changes  Imaging: CT head - 1.  No evidence of hemorrhage, CT apparent transcortical infarct, or   other acute intracranial abnormality.   2. Subtle attenuation changes present in the periventricular and   subcortical white matter of bilateral cerebral hemispheres are   nonspecific, although favored to represent early sequela of   microvascular disease.   Interventions: Pt admitted for further care.    1/7: Pt experiencing worsening vertical double vision, feels that images are getting farther apart. Pain in left eye and forehead, worse with left eye lateral  deviation, pain described as pressure-like that comes and goes. Still with left eye patch covering eye. Has a history of neuropathy in feet. Labs significant for hgb 12.7 and platelets 99, compared to 106 yesterday. Glu 192 -> 199 this AM, pt says home glucose checks average about 200, states he is compliant with medications. Patient to have MRI brain and CTA head and neck today.     Possibly discharge later today depending on findings.       Objective     Last Recorded Vitals  /86   Pulse 77   Temp 37.1 °C (98.7 °F) (Oral)   Resp 18   Wt 95.3 kg (210 lb)   SpO2 97%   Intake/Output last 3 Shifts:  No intake or output data in the 24 hours ending 01/07/25 0835    Admission Weight  Weight: 95.3 kg (210 lb) (01/06/25 2318)    Daily Weight  01/06/25 : 95.3 kg (210 lb)    Image Results  ECG 12 lead  Sinus rhythm  Borderline left axis deviation  Abnormal R-wave progression, early transition  Artifact in lead(s) II,aVR,V1,V6 and baseline wander in lead(s) V3,V4  CT head wo IV contrast  Narrative: Interpreted By:  Scooter Kelsey,   STUDY:  CT HEAD WO IV CONTRAST;  1/6/2025 11:58 pm      INDICATION:  Signs/Symptoms:left 6th cranial nerve palsy.          COMPARISON:  CT head dated 02/25/2024.      ACCESSION NUMBER(S):  [id number]      ORDERING CLINICIAN:  HERMINIO LEDBETTER      TECHNIQUE:  Noncontrast axial CT scan of head was performed. Angled reformats in  brain and bone windows were generated. The images were reviewed in  bone, brain, blood and soft tissue windows.      FINDINGS:  No hyperdense intracranial hemorrhage is present. There is no mass  effect or midline shift identified.      Gray-white differentiation is intact, without evidence of CT apparent  transcortical infarct. Subtle attenuation changes present in the  periventricular and subcortical white matter of bilateral cerebral  hemispheres are nonspecific, but favored to represent sequela of  microvascular disease.      No abnormal  ventricular dilatation is present. Basal cisterns are  patent. No extra-axial fluid collections are identified.      Scalp soft tissues do not demonstrate any acute abnormality. No acute  calvarial abnormality is present. Mastoid air cells and middle ear  cavities are clear.      Visualized paranasal sinuses are unremarkable in appearance.      Impression: 1.  No evidence of hemorrhage, CT apparent transcortical infarct, or  other acute intracranial abnormality.  2. Subtle attenuation changes present in the periventricular and  subcortical white matter of bilateral cerebral hemispheres are  nonspecific, although favored to represent early sequela of  microvascular disease.      MACRO:  None      Signed by: Scooter Kelsey 1/7/2025 12:03 AM  Dictation workstation:   [id number]      Physical Exam  General: Patient appears fatigued, in no apparent distress.     HEENT: Sclera w/o redness.    Neuro: AxO x 3. CN III, IV, VI, VII, X, XI, XII intact. CHET slowed but intact,  strength weak bilaterally.     Resp: Nonlabored on RA, CTAB.    CV: Normal rate and rhythm    Extremities: No cyanosis or edema evident.       Relevant Results  Results for orders placed or performed during the hospital encounter of 01/06/25 (from the past 24 hours)   CBC and Auto Differential   Result Value Ref Range    WBC 4.9 4.4 - 11.3 x10*3/uL    nRBC 0.0 0.0 - 0.0 /100 WBCs    RBC 3.97 (L) 4.50 - 5.90 x10*6/uL    Hemoglobin 13.0 (L) 13.5 - 17.5 g/dL    Hematocrit 35.6 (L) 41.0 - 52.0 %    MCV 90 80 - 100 fL    MCH 32.7 26.0 - 34.0 pg    MCHC 36.5 (H) 32.0 - 36.0 g/dL    RDW 14.6 (H) 11.5 - 14.5 %    Platelets 106 (L) 150 - 450 x10*3/uL    Neutrophils % 42.6 40.0 - 80.0 %    Immature Granulocytes %, Automated 0.4 0.0 - 0.9 %    Lymphocytes % 43.5 13.0 - 44.0 %    Monocytes % 11.9 2.0 - 10.0 %    Eosinophils % 1.4 0.0 - 6.0 %    Basophils % 0.2 0.0 - 2.0 %    Neutrophils Absolute 2.10 1.20 - 7.70 x10*3/uL    Immature Granulocytes Absolute,  Automated 0.02 0.00 - 0.70 x10*3/uL    Lymphocytes Absolute 2.15 1.20 - 4.80 x10*3/uL    Monocytes Absolute 0.59 0.10 - 1.00 x10*3/uL    Eosinophils Absolute 0.07 0.00 - 0.70 x10*3/uL    Basophils Absolute 0.01 0.00 - 0.10 x10*3/uL   Comprehensive metabolic panel   Result Value Ref Range    Glucose 237 (H) 74 - 99 mg/dL    Sodium 132 (L) 136 - 145 mmol/L    Potassium 4.2 3.5 - 5.3 mmol/L    Chloride 101 98 - 107 mmol/L    Bicarbonate 25 21 - 32 mmol/L    Anion Gap 10 10 - 20 mmol/L    Urea Nitrogen 19 6 - 23 mg/dL    Creatinine 1.05 0.50 - 1.30 mg/dL    eGFR 80 >60 mL/min/1.73m*2    Calcium 8.5 (L) 8.6 - 10.3 mg/dL    Albumin 4.0 3.4 - 5.0 g/dL    Alkaline Phosphatase 121 33 - 136 U/L    Total Protein 7.1 6.4 - 8.2 g/dL    AST 24 9 - 39 U/L    Bilirubin, Total 0.8 0.0 - 1.2 mg/dL    ALT 29 10 - 52 U/L   Troponin I, High Sensitivity   Result Value Ref Range    Troponin I, High Sensitivity 5 0 - 20 ng/L   Protime-INR   Result Value Ref Range    Protime 13.4 (H) 9.8 - 12.8 seconds    INR 1.2 (H) 0.9 - 1.1   ECG 12 lead   Result Value Ref Range    Ventricular Rate 74 BPM    Atrial Rate 74 BPM    NM Interval 169 ms    QRS Duration 96 ms    QT Interval 380 ms    QTC Calculation(Bazett) 422 ms    P Axis 19 degrees    R Axis -25 degrees    T Axis 26 degrees    QRS Count 11 beats    Q Onset 249 ms    T Offset 439 ms    QTC Fredericia 407 ms   POCT GLUCOSE   Result Value Ref Range    POCT Glucose 192 (H) 74 - 99 mg/dL   CBC   Result Value Ref Range    WBC 5.0 4.4 - 11.3 x10*3/uL    nRBC 0.0 0.0 - 0.0 /100 WBCs    RBC 3.94 (L) 4.50 - 5.90 x10*6/uL    Hemoglobin 12.7 (L) 13.5 - 17.5 g/dL    Hematocrit 35.4 (L) 41.0 - 52.0 %    MCV 90 80 - 100 fL    MCH 32.2 26.0 - 34.0 pg    MCHC 35.9 32.0 - 36.0 g/dL    RDW 14.5 11.5 - 14.5 %    Platelets 99 (L) 150 - 450 x10*3/uL   Basic metabolic panel   Result Value Ref Range    Glucose 197 (H) 74 - 99 mg/dL    Sodium 134 (L) 136 - 145 mmol/L    Potassium 4.3 3.5 - 5.3 mmol/L    Chloride  103 98 - 107 mmol/L    Bicarbonate 27 21 - 32 mmol/L    Anion Gap 8 (L) 10 - 20 mmol/L    Urea Nitrogen 17 6 - 23 mg/dL    Creatinine 0.94 0.50 - 1.30 mg/dL    eGFR >90 >60 mL/min/1.73m*2    Calcium 8.3 (L) 8.6 - 10.3 mg/dL   Lipid panel   Result Value Ref Range    Cholesterol 143 0 - 199 mg/dL    HDL-Cholesterol 29.8 mg/dL    Cholesterol/HDL Ratio 4.8     LDL Calculated 83 <=99 mg/dL    VLDL 30 0 - 40 mg/dL    Triglycerides 152 (H) 0 - 149 mg/dL    Non HDL Cholesterol 113 0 - 149 mg/dL   B-type Natriuretic Peptide   Result Value Ref Range    BNP 8 0 - 99 pg/mL   POCT GLUCOSE   Result Value Ref Range    POCT Glucose 199 (H) 74 - 99 mg/dL     ECG 12 lead    Result Date: 1/7/2025  Sinus rhythm Borderline left axis deviation Abnormal R-wave progression, early transition Artifact in lead(s) II,aVR,V1,V6 and baseline wander in lead(s) V3,V4    CT head wo IV contrast    Result Date: 1/7/2025  Interpreted By:  Scooter Kelsey, STUDY: CT HEAD WO IV CONTRAST;  1/6/2025 11:58 pm   INDICATION: Signs/Symptoms:left 6th cranial nerve palsy.     COMPARISON: CT head dated 02/25/2024.   ACCESSION NUMBER(S): [id number]   ORDERING CLINICIAN: HERMINIO LEDBETTER   TECHNIQUE: Noncontrast axial CT scan of head was performed. Angled reformats in brain and bone windows were generated. The images were reviewed in bone, brain, blood and soft tissue windows.   FINDINGS: No hyperdense intracranial hemorrhage is present. There is no mass effect or midline shift identified.   Gray-white differentiation is intact, without evidence of CT apparent transcortical infarct. Subtle attenuation changes present in the periventricular and subcortical white matter of bilateral cerebral hemispheres are nonspecific, but favored to represent sequela of microvascular disease.   No abnormal ventricular dilatation is present. Basal cisterns are patent. No extra-axial fluid collections are identified.   Scalp soft tissues do not demonstrate any acute  abnormality. No acute calvarial abnormality is present. Mastoid air cells and middle ear cavities are clear.   Visualized paranasal sinuses are unremarkable in appearance.       1.  No evidence of hemorrhage, CT apparent transcortical infarct, or other acute intracranial abnormality. 2. Subtle attenuation changes present in the periventricular and subcortical white matter of bilateral cerebral hemispheres are nonspecific, although favored to represent early sequela of microvascular disease.   MACRO: None   Signed by: Scooter Kelsey 1/7/2025 12:03 AM Dictation workstation:   [id number]       Assessment/Plan     Double vision  Ambulatory dysfunction  L six CN palsy, double vision:  DM2:  Depression/bipolar/memory loss:  HTN:   Diabetic neuropathy:   DVT prophylaxis-on subcu Lovenox    dapagliflozin propanediol, 10 mg, oral, Daily  gabapentin, 400 mg, oral, BID  insulin lispro, 0-5 Units, subcutaneous, TID AC  ARIPiprazole, 30 mg, oral, Nightly  busPIRone, 10 mg, oral, BID  donepezil, 5 mg, oral, Nightly  venlafaxine XR, 150 mg, oral, Daily with breakfast  enoxaparin, 40 mg, subcutaneous, q24h  atorvastatin, 80 mg, oral, Daily  amLODIPine, 5 mg, oral, Daily  metoprolol succinate XL, 75 mg, oral, Daily  pantoprazole, 40 mg, oral, Daily before breakfast   Or  pantoprazole, 40 mg, intravenous, Daily before breakfast  Acetaminophen 650 mg, prn  Bisacodyl EC 10 mg, prn  Dextrose 50% injection, prn   Glucagon injection 1 mg, prn  guaiFENesin 600 mg, prn   Melatonin 3mg, prn   Zofran 4 mg tablet or injection, prn      Check labs in AM.   Neuro consult    DVT Prophylaxis - Lovenox subcutaneous   Diet - Consistent carb

## 2025-01-07 NOTE — PROGRESS NOTES
Occupational Therapy  Evaluation    Patient Name: Mane Nath  MRN: [id number]  Today's Date: 1/7/2025  Time Calculation  Start Time: 1441  Stop Time: 1510  Time Calculation (min): 29 min    Current Problem:   1. Double vision        OT order: OT eval and treat   Referred by: Vanessa  Reason for referral: ADLs, safety assessment  Past medical history related to rehab:  has a past medical history of Cirrhosis (Multi) (08/28/2024), Diabetes (Multi), HTN (hypertension), Personal history of other diseases of the musculoskeletal system and connective tissue, Personal history of other mental and behavioral disorders, Right upper quadrant abdominal pain (06/11/2024), and Type 2 diabetes mellitus.  (baseline LUE essential tremor)    Precautions:   Hearing/Visual Limitations: persistent double vision since 12/31. 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  Medical Precautions: Fall precautions    ASSESSMENT  OT Assessment: OT eval completed. The patient is functioning below baseline for ADLs and mobility. can benefit from continued OT. Pt with Decreased ADL status, Decreased upper extremity strength, Decreased safe judgment during ADL, Decreased cognition, Decreased endurance, Decreased functional mobility, Decreased gross motor control, Decreased IADLs   01/07/25 1441   IP OT Assessment   Barriers to Discharge Home Caregiver assistance;Cognition needs;Physical needs   Caregiver Assistance Patient lives alone and/or does not have reliable caregiver assistance   Cognition Needs Cognition-related high falls risk   Physical Needs 24hr mobility assistance needed;24hr ADL assistance needed       PLAN  Frequency: 4 times per week  Treatment Interventions: ADL retraining, Functional transfer training, UE strengthening/ROM, Endurance training, Equipment evaluation/education, Neuromuscular reeducation, Patient/family training, Cognitive reorientation  Discharge Recommendations: High  intensity level of continued care  OT OK to discharge: Yes    GENERAL VISIT INFORMATION   Start of session communication: Bedside nurse  End of session communication: Bedside nurse  Family/caregiver present: No  Caregiver feedback:    Co-Treatment: PT  Reason for co-treatment: to optimize safety and mobility, while focusing on discipline specific goals   Position Pt Received:  Bed, 3 rail up, Alarm on  End of session position: Bed, 3 rail up, Alarm on    SUBJECTIVE  Home Living:  Type of Home: Mobile home  Lives With: Siblings (brother)  Home Adaptive Equipment: Walker rolling or standard, Cane  Home Layout: One level  Home Access: Stairs to enter with rails  Entrance Stairs-Number of Steps: a few     Prior Level of Function:  Receives Help From: Family (brother works nights)  ADL Assistance: Independent  Homemaking Assistance: Independent  Ambulatory Assistance: Independent (no AD)  Hand Dominance: Left  Prior Function Comments: (-) drives    IADL History:       Pain:  Assessment: 0-10  Score:    Type: Acute pain  Location: Eye  Interventions:    Response to pain interventions:      OBJECTIVE  Vital Signs:       Cognition:  Overall Cognitive Status: Within Functional Limits (orientedx3)             Current ADL function:   EATING:  Minimal     GROOMING: Moderate     BATHING: Maximal     UB DRESSING: Maximal     LB DRESSING: Total     TOILETING: Total    ADL comments:  Self care intervention provided. assisted patient to BS for toileting. pt demonstrated decreased balance and strength, making performance of self care challenging. OT instructed patient on safe techniques to transfer to BS. VC for hand placement. pt required total assistance for clothing management: doffing and donning of depends. total assistance for hygiene d/t shakiness and unsteadiness    Activity Tolerance:  Endurance: Decreased tolerance for upright activites    Bed Mobility/Transfers:   Bed Mobility  Bed Mobility: Yes  Bed Mobility 1  Bed  Mobility 1: Supine to sitting, Sitting to supine  Level of Assistance 1: Contact guard  Transfers  Transfer:  (sit<>stand max A x2; 3x from bedside)    Ambulation/Gait Training:  Functional Mobility  Functional Mobility Performed:  (pt performed functional mobility bed<>BSC with max A x2 hand held assistance. pt unsteady, increased full body tremulous movements. difficulty shifting weight and unsteady stepping)    Sitting Balance:  Static Sitting Balance  Static Sitting-Level of Assistance: Contact guard  Dynamic Sitting Balance  Dynamic Sitting-Level of Assistance: Contact guard    Standing Balance:  Static Standing Balance  Static Standing-Level of Assistance: Maximum assistance  Dynamic Standing Balance  Dynamic Standing-Level of Assistance: Maximum assistance    Vision: Vision - Basic Assessment  Current Vision:  (pt wearing recommended eye patch on left eye. pt's brother used black electrical tape on safety goggles to make eye patch.)  Patient Visual Report:  (pt reports no visual deficits at baseline. reports new onset of visual deficits)   and Vision - Complex Assessment  Ocular Range of Motion: Restricted on the left (both eyes.)  Head Position: Upright, centered, looking straight ahead, not leaning any direction  Tracking: Decreased smoothness of horizontal tracking, Decreased smoothness of vertical tracking, Decreased smoothness of eye movement to L superior field, Decreased smoothness of eye movement to L inferior field (R eye tracking mostly intact except decreased tracking & significant blurring/loss of vision when looking left of midline. L eye tracking is impaired in all planes. when tracking with bilateral eyes, left eye lags and is observable not as smooth as R eye)  Saccades: Decreased speed of pursuit between targets, Decreased speed of saccadic movement  Visual Fields:  (difficulty detecting stimulus on left side)  Diplopia Assessment: Disappears wtih one eye closed    Sensation:  Light Touch: No  apparent deficits  Sharp/Dull: No apparent deficits  Proprioception: No apparent deficits    Strength:  Strength Comments: RUE 4-/5, LUE 3+/5    Perception:  Inattention/Neglect: Appears intact    Coordination:  Movements are Fluid and Coordinated: Yes  Finger to Nose: Intact  Rapid Alternating Movements: Bradykinesia  Finger to Target: Intact  Coordination Comment: coordination appears mostly intact. pt did have increase in LUE and body tremors with standing and increased stress     Hand Function:  Hand Function  Gross Grasp: Functional    Extremities: RUE   RUE : Within Functional Limits and LUE   LUE: Within Functional Limits    Outcome Measures: [facility] Daily Activity   Putting on and taking off regular lower body clothing: Total  Bathing (including washing, rinsing, drying): A lot  Putting on and taking off regular upper body clothing: A lot  Toileting, which includes using toilet, bedpan or urinal: Total  Taking care of personal grooming such as brushing teeth: A little   Eating Meals: A little   Daily Activity - Total Score: 12    EDUCATION:     Education Documentation  ADL Training, taught by Ayana Worley OT at 1/7/2025  3:52 PM.  Learner: Patient  Readiness: Acceptance  Method: Explanation  Response: Needs Reinforcement    Education Comments  No comments found.        Goals:   Encounter Problems       Encounter Problems (Active)       ADLs       Patient with complete lower body dressing with minimal assist  level of assistance donning and doffing all LE clothes  with PRN adaptive equipment while supported sitting and standing (Progressing)       Start:  01/07/25    Expected End:  01/21/25            Patient will complete toileting including hygiene clothing management/hygiene with minimal assist  level of assistance and raised toilet seat and grab bars. (Progressing)       Start:  01/07/25    Expected End:  01/21/25               TRANSFERS       Patient will complete functional transfers with least  restrictive device with minimal assist  level of assistance. (Progressing)       Start:  01/07/25    Expected End:  01/21/25               VISION       Patient will complete 3 Visual Scanning Task task regarding all areas of activity with minimal assist  level of assistance. (Progressing)       Start:  01/07/25    Expected End:  01/21/25            Patient will scan a visually distracting environment to locate materials needed for 1 ADL Task and 1 IADL Task with  minimal assist  level of assistance or less for locating items on Left/Right. (Progressing)       Start:  01/07/25    Expected End:  01/21/25

## 2025-01-07 NOTE — PROGRESS NOTES
Mane Nath is a 64 y.o. male admitted for Double vision. Pharmacy reviewed the patient's [id number] medications and allergies for accuracy.    The list below reflects the PTA list prior to pharmacy medication history. A summary a changes to the PTA medication list has been listed below. Please review each medication in order reconciliation for additional clarification and justification.    Source of information:  PATIENT  Medications added:    Medications modified:  VOLTAREN ARTH PAIN APPLY TOPICALLY--------->PRN  TRULICITY 3MG/0.5ML INJECT 4.5MG EVERY SUNDAY    Medications to be removed:    Medications of concern:      Prior to Admission Medications   Prescriptions Last Dose Informant Patient Reported? Taking?   ARIPiprazole (Abilify) 30 mg tablet   Yes No   Sig: Take 1 tablet (30 mg) by mouth once daily at bedtime.   Trulicity 3 mg/0.5 mL pen injector   Yes No   Sig: Inject 4.5 mg as directed 1 (one) time per week.   amLODIPine (Norvasc) 5 mg tablet   Yes No   Sig: Take by mouth once daily.   atorvastatin (Lipitor) 80 mg tablet   Yes No   Sig: Take 1 tablet (80 mg) by mouth early in the morning..   busPIRone (Buspar) 10 mg tablet   Yes No   Sig: take 2 tablets by mouth twice a day as directed   dapagliflozin propanediol (Farxiga) 10 mg   Yes No   Sig: Take 1 tablet (10 mg) by mouth once daily.   diclofenac sodium (Voltaren Arthritis Pain) 1 % gel   Yes No   Sig: Apply topically. Use as directed.   docusate sodium (Colace) 100 mg capsule   Yes No   Sig: TAKE ONE (1) CAPSULE BY MOUTH ONCE DAILY AS NEEDED. **(COLACE 100 MG CAP EQUIV)**   donepezil (Aricept) 5 mg tablet   Yes No   Sig: Take 1 tablet (5 mg) by mouth once daily at bedtime.   gabapentin (Neurontin) 400 mg capsule   Yes No   Sig: take 1 capsule by mouth twice a day THEN TAKE 2 CAPSULES AT BEDTIME   hydrOXYzine pamoate (Vistaril) 25 mg capsule   Yes No   Sig: Take 1 capsule (25 mg) by mouth.   metFORMIN (Glucophage) 1,000 mg tablet   Yes No    Sig: Take 1 tablet (1,000 mg) by mouth 2 times a day. With breakfast and dinner   metoprolol succinate XL (Toprol-XL) 50 mg 24 hr tablet   No No   Sig: Take 1.5 tablets (75 mg) by mouth once daily.   omeprazole (PriLOSEC) 40 mg DR capsule   Yes No   Sig: Take 1 capsule (40 mg) by mouth. Do not crush or chew.   polyethylene glycol (Miralax) 17 gram/dose powder   Yes No   Sig: Mix 17 g of powder and drink once daily.   simethicone (Mylicon) 80 mg chewable tablet   Yes No   Sig: Chew 1 tablet (80 mg) 3 times a day before meals. And at bedtime prn   traZODone (Desyrel) 100 mg tablet   Yes No   Sig: take 1 to 3 tablet by mouth at bedtime if needed   venlafaxine XR (Effexor-XR) 150 mg 24 hr capsule   Yes No   Sig: take 2 capsules by mouth once daily as directed      Facility-Administered Medications: None       Nithya Cortes

## 2025-01-08 ENCOUNTER — APPOINTMENT (OUTPATIENT)
Dept: RADIOLOGY | Facility: HOSPITAL | Age: 65
End: 2025-01-08
Payer: COMMERCIAL

## 2025-01-08 VITALS
WEIGHT: 210 LBS | TEMPERATURE: 99.2 F | DIASTOLIC BLOOD PRESSURE: 89 MMHG | HEIGHT: 70 IN | BODY MASS INDEX: 30.06 KG/M2 | SYSTOLIC BLOOD PRESSURE: 139 MMHG | HEART RATE: 86 BPM | RESPIRATION RATE: 18 BRPM | OXYGEN SATURATION: 97 %

## 2025-01-08 LAB
ANION GAP SERPL CALC-SCNC: 12 MMOL/L (ref 10–20)
BASOPHILS # BLD AUTO: 0.02 X10*3/UL (ref 0–0.1)
BASOPHILS NFR BLD AUTO: 0.3 %
BUN SERPL-MCNC: 19 MG/DL (ref 6–23)
CALCIUM SERPL-MCNC: 9.5 MG/DL (ref 8.6–10.3)
CHLORIDE SERPL-SCNC: 106 MMOL/L (ref 98–107)
CO2 SERPL-SCNC: 25 MMOL/L (ref 21–32)
CREAT SERPL-MCNC: 0.97 MG/DL (ref 0.5–1.3)
EGFRCR SERPLBLD CKD-EPI 2021: 88 ML/MIN/1.73M*2
EOSINOPHIL # BLD AUTO: 0.05 X10*3/UL (ref 0–0.7)
EOSINOPHIL NFR BLD AUTO: 0.8 %
ERYTHROCYTE [DISTWIDTH] IN BLOOD BY AUTOMATED COUNT: 14.9 % (ref 11.5–14.5)
GLUCOSE BLD MANUAL STRIP-MCNC: 175 MG/DL (ref 74–99)
GLUCOSE BLD MANUAL STRIP-MCNC: 193 MG/DL (ref 74–99)
GLUCOSE BLD MANUAL STRIP-MCNC: 220 MG/DL (ref 74–99)
GLUCOSE SERPL-MCNC: 176 MG/DL (ref 74–99)
HCT VFR BLD AUTO: 40.2 % (ref 41–52)
HGB BLD-MCNC: 14.1 G/DL (ref 13.5–17.5)
IMM GRANULOCYTES # BLD AUTO: 0.03 X10*3/UL (ref 0–0.7)
IMM GRANULOCYTES NFR BLD AUTO: 0.5 % (ref 0–0.9)
LYMPHOCYTES # BLD AUTO: 2.18 X10*3/UL (ref 1.2–4.8)
LYMPHOCYTES NFR BLD AUTO: 34.6 %
MAGNESIUM SERPL-MCNC: 1.85 MG/DL (ref 1.6–2.4)
MCH RBC QN AUTO: 31.8 PG (ref 26–34)
MCHC RBC AUTO-ENTMCNC: 35.1 G/DL (ref 32–36)
MCV RBC AUTO: 91 FL (ref 80–100)
MONOCYTES # BLD AUTO: 0.5 X10*3/UL (ref 0.1–1)
MONOCYTES NFR BLD AUTO: 7.9 %
NEUTROPHILS # BLD AUTO: 3.52 X10*3/UL (ref 1.2–7.7)
NEUTROPHILS NFR BLD AUTO: 55.9 %
NRBC BLD-RTO: 0 /100 WBCS (ref 0–0)
PLATELET # BLD AUTO: 109 X10*3/UL (ref 150–450)
POTASSIUM SERPL-SCNC: 4.1 MMOL/L (ref 3.5–5.3)
RBC # BLD AUTO: 4.44 X10*6/UL (ref 4.5–5.9)
SODIUM SERPL-SCNC: 139 MMOL/L (ref 136–145)
WBC # BLD AUTO: 6.3 X10*3/UL (ref 4.4–11.3)

## 2025-01-08 PROCEDURE — 36415 COLL VENOUS BLD VENIPUNCTURE: CPT | Performed by: INTERNAL MEDICINE

## 2025-01-08 PROCEDURE — 97110 THERAPEUTIC EXERCISES: CPT | Mod: GP,CQ

## 2025-01-08 PROCEDURE — 99233 SBSQ HOSP IP/OBS HIGH 50: CPT | Performed by: NURSE PRACTITIONER

## 2025-01-08 PROCEDURE — 82947 ASSAY GLUCOSE BLOOD QUANT: CPT | Mod: 59

## 2025-01-08 PROCEDURE — 2500000001 HC RX 250 WO HCPCS SELF ADMINISTERED DRUGS (ALT 637 FOR MEDICARE OP): Performed by: STUDENT IN AN ORGANIZED HEALTH CARE EDUCATION/TRAINING PROGRAM

## 2025-01-08 PROCEDURE — 97116 GAIT TRAINING THERAPY: CPT | Mod: GP,CQ

## 2025-01-08 PROCEDURE — 2500000004 HC RX 250 GENERAL PHARMACY W/ HCPCS (ALT 636 FOR OP/ED): Performed by: INTERNAL MEDICINE

## 2025-01-08 PROCEDURE — 96372 THER/PROPH/DIAG INJ SC/IM: CPT | Performed by: STUDENT IN AN ORGANIZED HEALTH CARE EDUCATION/TRAINING PROGRAM

## 2025-01-08 PROCEDURE — 85025 COMPLETE CBC W/AUTO DIFF WBC: CPT | Performed by: INTERNAL MEDICINE

## 2025-01-08 PROCEDURE — A9575 INJ GADOTERATE MEGLUMI 0.1ML: HCPCS | Performed by: INTERNAL MEDICINE

## 2025-01-08 PROCEDURE — 99239 HOSP IP/OBS DSCHRG MGMT >30: CPT | Performed by: INTERNAL MEDICINE

## 2025-01-08 PROCEDURE — 2500000004 HC RX 250 GENERAL PHARMACY W/ HCPCS (ALT 636 FOR OP/ED): Performed by: STUDENT IN AN ORGANIZED HEALTH CARE EDUCATION/TRAINING PROGRAM

## 2025-01-08 PROCEDURE — 83735 ASSAY OF MAGNESIUM: CPT | Performed by: INTERNAL MEDICINE

## 2025-01-08 PROCEDURE — 2500000001 HC RX 250 WO HCPCS SELF ADMINISTERED DRUGS (ALT 637 FOR MEDICARE OP): Performed by: PSYCHIATRY & NEUROLOGY

## 2025-01-08 PROCEDURE — 97535 SELF CARE MNGMENT TRAINING: CPT | Mod: GO,CO

## 2025-01-08 PROCEDURE — G0378 HOSPITAL OBSERVATION PER HR: HCPCS

## 2025-01-08 PROCEDURE — 80048 BASIC METABOLIC PNL TOTAL CA: CPT | Performed by: INTERNAL MEDICINE

## 2025-01-08 PROCEDURE — 2550000001 HC RX 255 CONTRASTS: Performed by: INTERNAL MEDICINE

## 2025-01-08 PROCEDURE — 2500000002 HC RX 250 W HCPCS SELF ADMINISTERED DRUGS (ALT 637 FOR MEDICARE OP, ALT 636 FOR OP/ED): Performed by: STUDENT IN AN ORGANIZED HEALTH CARE EDUCATION/TRAINING PROGRAM

## 2025-01-08 PROCEDURE — 70553 MRI BRAIN STEM W/O & W/DYE: CPT

## 2025-01-08 PROCEDURE — 96374 THER/PROPH/DIAG INJ IV PUSH: CPT | Mod: 59

## 2025-01-08 RX ORDER — ACETAMINOPHEN 325 MG/1
650 TABLET ORAL EVERY 4 HOURS PRN
Qty: 30 TABLET | Refills: 0 | Status: SHIPPED | OUTPATIENT
Start: 2025-01-08

## 2025-01-08 RX ORDER — LORAZEPAM 2 MG/ML
1.25 INJECTION INTRAMUSCULAR
Status: COMPLETED | OUTPATIENT
Start: 2025-01-08 | End: 2025-01-08

## 2025-01-08 RX ORDER — ASPIRIN 81 MG/1
81 TABLET ORAL DAILY
Qty: 30 TABLET | Refills: 1 | Status: SHIPPED | OUTPATIENT
Start: 2025-01-08

## 2025-01-08 RX ORDER — GADOTERATE MEGLUMINE 376.9 MG/ML
0.2 INJECTION INTRAVENOUS
Status: COMPLETED | OUTPATIENT
Start: 2025-01-08 | End: 2025-01-08

## 2025-01-08 RX ADMIN — PANTOPRAZOLE SODIUM 40 MG: 40 TABLET, DELAYED RELEASE ORAL at 06:33

## 2025-01-08 RX ADMIN — INSULIN LISPRO 2 UNITS: 100 INJECTION, SOLUTION INTRAVENOUS; SUBCUTANEOUS at 11:35

## 2025-01-08 RX ADMIN — VENLAFAXINE HYDROCHLORIDE 150 MG: 150 CAPSULE, EXTENDED RELEASE ORAL at 08:09

## 2025-01-08 RX ADMIN — ATORVASTATIN CALCIUM 80 MG: 40 TABLET, FILM COATED ORAL at 06:34

## 2025-01-08 RX ADMIN — ENOXAPARIN SODIUM 40 MG: 40 INJECTION SUBCUTANEOUS at 06:34

## 2025-01-08 RX ADMIN — BUSPIRONE HYDROCHLORIDE 10 MG: 5 TABLET ORAL at 08:09

## 2025-01-08 RX ADMIN — INSULIN LISPRO 1 UNITS: 100 INJECTION, SOLUTION INTRAVENOUS; SUBCUTANEOUS at 08:09

## 2025-01-08 RX ADMIN — INSULIN LISPRO 1 UNITS: 100 INJECTION, SOLUTION INTRAVENOUS; SUBCUTANEOUS at 16:42

## 2025-01-08 RX ADMIN — METOPROLOL SUCCINATE 75 MG: 50 TABLET, EXTENDED RELEASE ORAL at 08:09

## 2025-01-08 RX ADMIN — ASPIRIN 81 MG: 81 TABLET, COATED ORAL at 08:09

## 2025-01-08 RX ADMIN — GADOTERATE MEGLUMINE 19 ML: 376.9 INJECTION, SOLUTION INTRAVENOUS at 15:13

## 2025-01-08 RX ADMIN — AMLODIPINE BESYLATE 5 MG: 5 TABLET ORAL at 08:09

## 2025-01-08 RX ADMIN — DAPAGLIFLOZIN 10 MG: 5 TABLET, FILM COATED ORAL at 08:08

## 2025-01-08 RX ADMIN — GABAPENTIN 400 MG: 400 CAPSULE ORAL at 08:09

## 2025-01-08 RX ADMIN — LORAZEPAM 1.25 MG: 2 INJECTION INTRAMUSCULAR; INTRAVENOUS at 13:53

## 2025-01-08 ASSESSMENT — COGNITIVE AND FUNCTIONAL STATUS - GENERAL
DAILY ACTIVITIY SCORE: 20
DRESSING REGULAR LOWER BODY CLOTHING: A LOT
DRESSING REGULAR UPPER BODY CLOTHING: A LITTLE
HELP NEEDED FOR BATHING: A LITTLE
TURNING FROM BACK TO SIDE WHILE IN FLAT BAD: A LITTLE
HELP NEEDED FOR BATHING: A LITTLE
CLIMB 3 TO 5 STEPS WITH RAILING: TOTAL
DRESSING REGULAR LOWER BODY CLOTHING: A LITTLE
MOVING TO AND FROM BED TO CHAIR: A LOT
CLIMB 3 TO 5 STEPS WITH RAILING: TOTAL
MOVING TO AND FROM BED TO CHAIR: A LOT
TOILETING: A LITTLE
TOILETING: A LOT
PERSONAL GROOMING: A LITTLE
TURNING FROM BACK TO SIDE WHILE IN FLAT BAD: A LITTLE
DRESSING REGULAR UPPER BODY CLOTHING: A LITTLE
EATING MEALS: A LITTLE
DRESSING REGULAR LOWER BODY CLOTHING: A LITTLE
DAILY ACTIVITIY SCORE: 20
STANDING UP FROM CHAIR USING ARMS: A LITTLE
STANDING UP FROM CHAIR USING ARMS: A LITTLE
MOBILITY SCORE: 15
WALKING IN HOSPITAL ROOM: A LOT
MOBILITY SCORE: 13
CLIMB 3 TO 5 STEPS WITH RAILING: TOTAL
TOILETING: A LITTLE
MOVING FROM LYING ON BACK TO SITTING ON SIDE OF FLAT BED WITH BEDRAILS: A LITTLE
DAILY ACTIVITIY SCORE: 15
WALKING IN HOSPITAL ROOM: A LOT
HELP NEEDED FOR BATHING: A LOT
DRESSING REGULAR UPPER BODY CLOTHING: A LITTLE
TURNING FROM BACK TO SIDE WHILE IN FLAT BAD: A LITTLE
MOBILITY SCORE: 15
STANDING UP FROM CHAIR USING ARMS: A LOT
MOVING TO AND FROM BED TO CHAIR: A LOT
WALKING IN HOSPITAL ROOM: A LOT

## 2025-01-08 ASSESSMENT — PAIN SCALES - GENERAL
PAINLEVEL_OUTOF10: 0 - NO PAIN
PAINLEVEL_OUTOF10: 0 - NO PAIN

## 2025-01-08 ASSESSMENT — ACTIVITIES OF DAILY LIVING (ADL): HOME_MANAGEMENT_TIME_ENTRY: 24

## 2025-01-08 ASSESSMENT — PAIN - FUNCTIONAL ASSESSMENT
PAIN_FUNCTIONAL_ASSESSMENT: 0-10
PAIN_FUNCTIONAL_ASSESSMENT: 0-10

## 2025-01-08 NOTE — PROGRESS NOTES
Progress Note    Mane Nath is a 64 y.o. male on Day 2 of admission presenting with blurred vision.  HPI:       Mane Nath is a 64 y.o. male with PMHx s/f HTN/HLD, DM2, depression/bipolar, essential tremor presenting with blurred vision. Pt states he started having double vision, especially when looking to the L on New Years Day (6 days ago). No inciting factors, it just came on suddenly. He saw an ophthalmologist on 1/3/25 who diagnosed him with a L 6th cranial nerve palsy and was instructed to patch. He has been using depth perception to estimate how bad his double vision was. Today he noticed things seemed much more apart than they were yesterday including his feet from his head, so he came to the ER. He had a mild L-sided headache and pain around his L eye that has been present since symptoms started. No hearing loss, tinnitus, facial droop, or speech changes noted. No chest pain shortness of breath, abdominal pain, nausea, vomiting, paresthesias, or focal weakness. He is a former smoker and has no history of strokes in the past. His ophthalmologist speculated his symptoms were from his diabetes.     ED Course (Summary - please note all labs, imaging studies, and interventions noted below have been personally reviewed and/or interpreted on day of admission):   Vitals on presentation: 98.7 F, 83 bpm, 18 rr, 136/80, 100% on RA  Labs: CMP glu 237,   Trop 5  INR 1.2  CBChg 13.0, plt 106  EKG: sinus rhythm at 74 bpm. No ST changes  Imaging: CT head - 1.  No evidence of hemorrhage, CT apparent transcortical infarct, or   other acute intracranial abnormality.   2. Subtle attenuation changes present in the periventricular and   subcortical white matter of bilateral cerebral hemispheres are   nonspecific, although favored to represent early sequela of   microvascular disease.   Interventions: Pt admitted for further care.    1/7: Pt experiencing worsening vertical double vision, feels that images  "are getting farther apart. Pain in left eye and forehead, worse with left eye lateral deviation, pain described as pressure-like that comes and goes. Still with left eye patch covering eye. Has a history of neuropathy in feet. Labs significant for hgb 12.7 and platelets 99, compared to 106 yesterday. Glu 192 -> 199 this AM, pt says home glucose checks average about 200, states he is compliant with medications. Patient to have MRI brain and CTA head and neck today.     Possibly discharge later today depending on findings.    1/8: Pt still with double vision and left eye pain, as well as decreased appetite. Denies any headaches or dizziness. Also having complaints of \"shakiness\" although he says this has been a long-standing problem, has a history of essential tremor. Plts increased to 109 this morning, Glu 117-175. Lyme PCR in process. Was supposed to have MRI yesterday however per patient, MRI had to be stopped d/t neck pain he was experiencing from the equipment placed behind his neck. Spoke to patient's nurse who stated MRI wasn't done d/t patient being tired and having a headache.        Objective     Last Recorded Vitals  /86   Pulse 77   Temp 37.1 °C (98.7 °F) (Oral)   Resp 18   Wt 95.3 kg (210 lb)   SpO2 97%   Intake/Output last 3 Shifts:  No intake or output data in the 24 hours ending 01/07/25 0835    Admission Weight  Weight: 95.3 kg (210 lb) (01/06/25 2318)    Daily Weight  01/06/25 : 95.3 kg (210 lb)    Image Results  ECG 12 lead  Sinus rhythm  Borderline left axis deviation  Abnormal R-wave progression, early transition  Artifact in lead(s) II,aVR,V1,V6 and baseline wander in lead(s) V3,V4  CT head wo IV contrast  Narrative: Interpreted By:  Scooter Kelsey,   STUDY:  CT HEAD WO IV CONTRAST;  1/6/2025 11:58 pm      INDICATION:  Signs/Symptoms:left 6th cranial nerve palsy.          COMPARISON:  CT head dated 02/25/2024.      ACCESSION NUMBER(S):  [id number]      ORDERING " CLINICIAN:  HERMINIO LEDBETTER      TECHNIQUE:  Noncontrast axial CT scan of head was performed. Angled reformats in  brain and bone windows were generated. The images were reviewed in  bone, brain, blood and soft tissue windows.      FINDINGS:  No hyperdense intracranial hemorrhage is present. There is no mass  effect or midline shift identified.      Gray-white differentiation is intact, without evidence of CT apparent  transcortical infarct. Subtle attenuation changes present in the  periventricular and subcortical white matter of bilateral cerebral  hemispheres are nonspecific, but favored to represent sequela of  microvascular disease.      No abnormal ventricular dilatation is present. Basal cisterns are  patent. No extra-axial fluid collections are identified.      Scalp soft tissues do not demonstrate any acute abnormality. No acute  calvarial abnormality is present. Mastoid air cells and middle ear  cavities are clear.      Visualized paranasal sinuses are unremarkable in appearance.      Impression: 1.  No evidence of hemorrhage, CT apparent transcortical infarct, or  other acute intracranial abnormality.  2. Subtle attenuation changes present in the periventricular and  subcortical white matter of bilateral cerebral hemispheres are  nonspecific, although favored to represent early sequela of  microvascular disease.      MACRO:  None      Signed by: Scooter Kelsey 1/7/2025 12:03 AM  Dictation workstation:   [id number]    CT angio head and neck w and wo IV contrast    Result Date: 1/7/2025  Interpreted By:  Awa Kruger and Ritchie Brandon STUDY: CT ANGIO HEAD AND NECK W AND WO IV CONTRAST;  1/7/2025 11:05 am   INDICATION: Signs/Symptoms:cranial nerve palsy.   COMPARISON: CT head 01/06/2025, 11:58 p.m.   ACCESSION NUMBER(S): [id number]   ORDERING CLINICIAN: RAÚL MORRIS   TECHNIQUE: 50 ML of Omnipaque 350 was administered intravenously and axial images of the head and neck were acquired.   Coronal, sagittal, and 3-D reconstructions were provided for review. 3D reconstructions were performed utilizing an independent workstation.   FINDINGS:     CTA HEAD FINDINGS:   Anterior circulation: The left ICA is diminutive in caliber relative to the right. There are scattered calcifications bilaterally. The A1 segment of the left NEAL is diminutive in caliber compared with the right. This is likely related to developmental variation. The proximal right NEAL and bilateral MCAs are patent. There is diminished visualization of M2 and more distal MCA branches on the left compared with right on the volumetric reformats. There is a minimal calcification of the carotid siphons bilaterally. Hypoplastic left posterior communicating artery.   Posterior circulation: The right vertebral artery is dominant. There is marked attenuation of flow related enhancement along the proximal V4 segment on the left with reconstitution of a small vessel extending to the vertebrobasilar junction. The basilar artery and proximal PCAs are patent.   CTA NECK FINDINGS:   The aortic arch and arch vessels are somewhat degraded by artifact. There are atherosclerotic changes of the proximal descending thoracic aorta. There is a three-vessel arch.   Carotid vessels:   The proximal common carotid arteries are degraded by artifact but demonstrate no measurable luminal narrowing. There is atherosclerotic plaque at the right carotid bifurcation and involving the proximal right ICA. Relative to the normal caliber of the vessel more distally luminal narrowing measures at most 20%. There is minimal atherosclerotic plaque on the left without measurable stenosis. However, the left ICA is diffusely diminutive in caliber relative to the right.   Vertebral vessels: The proximal right vertebral artery is degraded by artifact. It otherwise appears dominant and patent. There is lack of contrast opacification along the majority of the left vertebral artery which is  at best thread-like in appearance. There is reconstitution at the C1 level.   There are postoperative changes from anterior plate and screw fixation and interbody fusion from C5 through C7. There is loss of the normal cervical lordosis. There are multilevel degenerative changes of the cervical spine.       1. Nonvisualization of much of the left vertebral artery within the neck and extending intracranially is likely due in part to developmental variation. However, superimposed marked the decreased flow/at least partial occlusion is also suspected. MRI with diffusion-weighted imaging would be a much more sensitive means of assessing for acute ischemic injury.   2. Atherosclerotic changes of the carotid bifurcations and proximal ICAs, right greater than left with mild stenosis on the right. 3. Mild decreased caliber of the left ICA in the neck and head compared with right is favored to be related to developmental variation as is diminished caliber of the A1 segment of the left NEAL.   I personally reviewed the images/study and I agree with the findings as stated by resident Bogdan Maciel.   MACRO: None   Signed by: Awa Kruger 1/7/2025 11:32 AM Dictation workstation:   [id number]    Physical Exam  General: Patient awake, alert, cooperative.     HEENT: Sclera w/o redness.     Neuro: AxO x 3. L eye unable to deviate laterally.  strength intact bilaterally.     Resp: Nonlabored on RA, CTAB.    CV: Normal rate and rhythm, extremities warm and well-perfused.    Extremities: Skin warm and dry, no cyanosis or edema evident. UE and LE motor strength intact bilaterally.       Relevant Results  Results for orders placed or performed during the hospital encounter of 01/06/25 (from the past 24 hours)   POCT GLUCOSE   Result Value Ref Range    POCT Glucose 201 (H) 74 - 99 mg/dL   POCT GLUCOSE   Result Value Ref Range    POCT Glucose 153 (H) 74 - 99 mg/dL   POCT GLUCOSE   Result Value Ref Range    POCT Glucose 117 (H)  74 - 99 mg/dL   POCT GLUCOSE   Result Value Ref Range    POCT Glucose 185 (H) 74 - 99 mg/dL   POCT GLUCOSE   Result Value Ref Range    POCT Glucose 185 (H) 74 - 99 mg/dL   CBC and Auto Differential   Result Value Ref Range    WBC 6.3 4.4 - 11.3 x10*3/uL    nRBC 0.0 0.0 - 0.0 /100 WBCs    RBC 4.44 (L) 4.50 - 5.90 x10*6/uL    Hemoglobin 14.1 13.5 - 17.5 g/dL    Hematocrit 40.2 (L) 41.0 - 52.0 %    MCV 91 80 - 100 fL    MCH 31.8 26.0 - 34.0 pg    MCHC 35.1 32.0 - 36.0 g/dL    RDW 14.9 (H) 11.5 - 14.5 %    Platelets 109 (L) 150 - 450 x10*3/uL    Neutrophils % 55.9 40.0 - 80.0 %    Immature Granulocytes %, Automated 0.5 0.0 - 0.9 %    Lymphocytes % 34.6 13.0 - 44.0 %    Monocytes % 7.9 2.0 - 10.0 %    Eosinophils % 0.8 0.0 - 6.0 %    Basophils % 0.3 0.0 - 2.0 %    Neutrophils Absolute 3.52 1.20 - 7.70 x10*3/uL    Immature Granulocytes Absolute, Automated 0.03 0.00 - 0.70 x10*3/uL    Lymphocytes Absolute 2.18 1.20 - 4.80 x10*3/uL    Monocytes Absolute 0.50 0.10 - 1.00 x10*3/uL    Eosinophils Absolute 0.05 0.00 - 0.70 x10*3/uL    Basophils Absolute 0.02 0.00 - 0.10 x10*3/uL   POCT GLUCOSE   Result Value Ref Range    POCT Glucose 175 (H) 74 - 99 mg/dL       ECG 12 lead    Result Date: 1/7/2025  Sinus rhythm Borderline left axis deviation Abnormal R-wave progression, early transition Artifact in lead(s) II,aVR,V1,V6 and baseline wander in lead(s) V3,V4    CT head wo IV contrast    Result Date: 1/7/2025  Interpreted By:  Scooter Kelsey, STUDY: CT HEAD WO IV CONTRAST;  1/6/2025 11:58 pm   INDICATION: Signs/Symptoms:left 6th cranial nerve palsy.     COMPARISON: CT head dated 02/25/2024.   ACCESSION NUMBER(S): [id number]   ORDERING CLINICIAN: HERMINIO LEDBETTER   TECHNIQUE: Noncontrast axial CT scan of head was performed. Angled reformats in brain and bone windows were generated. The images were reviewed in bone, brain, blood and soft tissue windows.   FINDINGS: No hyperdense intracranial hemorrhage is present. There is  no mass effect or midline shift identified.   Gray-white differentiation is intact, without evidence of CT apparent transcortical infarct. Subtle attenuation changes present in the periventricular and subcortical white matter of bilateral cerebral hemispheres are nonspecific, but favored to represent sequela of microvascular disease.   No abnormal ventricular dilatation is present. Basal cisterns are patent. No extra-axial fluid collections are identified.   Scalp soft tissues do not demonstrate any acute abnormality. No acute calvarial abnormality is present. Mastoid air cells and middle ear cavities are clear.   Visualized paranasal sinuses are unremarkable in appearance.       1.  No evidence of hemorrhage, CT apparent transcortical infarct, or other acute intracranial abnormality. 2. Subtle attenuation changes present in the periventricular and subcortical white matter of bilateral cerebral hemispheres are nonspecific, although favored to represent early sequela of microvascular disease.   MACRO: None   Signed by: Scooter Kelsey 1/7/2025 12:03 AM Dictation workstation:   [id number]       Assessment/Plan     Double vision  Ambulatory dysfunction  L six CN palsy, double vision:  DM2:  Depression/bipolar/memory loss:  HTN:   Diabetic neuropathy:   DVT prophylaxis-on subcu Lovenox    dapagliflozin propanediol, 10 mg, oral, Daily  gabapentin, 400 mg, oral, BID  insulin lispro, 0-5 Units, subcutaneous, TID AC  ARIPiprazole, 30 mg, oral, Nightly  busPIRone, 10 mg, oral, BID  donepezil, 5 mg, oral, Nightly  venlafaxine XR, 150 mg, oral, Daily with breakfast  enoxaparin, 40 mg, subcutaneous, q24h  atorvastatin, 80 mg, oral, Daily  amLODIPine, 5 mg, oral, Daily  metoprolol succinate XL, 75 mg, oral, Daily  pantoprazole, 40 mg, oral, Daily before breakfast   Or  pantoprazole, 40 mg, intravenous, Daily before breakfast  Acetaminophen 650 mg, prn  Bisacodyl EC 10 mg, prn  Dextrose 50% injection, prn   Glucagon  injection 1 mg, prn  guaiFENesin 600 mg, prn   Melatonin 3mg, prn   Zofran 4 mg tablet or injection, prn   If MRI is negative likely will discharge today.   Neuro consult    DVT Prophylaxis - Lovenox subcutaneous   Diet - Consistent carb

## 2025-01-08 NOTE — PROGRESS NOTES
Occupational Therapy    OT Treatment    Patient Name: Mane Nath  MRN: [id number]  Department: [facility] MRI  Room: [address] [place], [place] [address]  Today's Date: 1/8/2025  Time Calculation  Start Time: 1336  Stop Time: 1400  Time Calculation (min): 24 min        Assessment:  OT Assessment: Patient completed transfers with walker and Min assist of 2. Limited session due to arrival of transport for MRI.  End of Session Communication: Bedside nurse  End of Session Patient Position: Bed, 3 rail up, Alarm off, caregiver present     Plan:  Treatment Interventions: ADL retraining, Functional transfer training, UE strengthening/ROM, Endurance training, Equipment evaluation/education, Neuromuscular reeducation, Patient/family training, Cognitive reorientation  OT Frequency: 4 times per week  OT Discharge Recommendations: High intensity level of continued care  OT - OK to Discharge: Yes  Treatment Interventions: ADL retraining, Functional transfer training, UE strengthening/ROM, Endurance training, Equipment evaluation/education, Neuromuscular reeducation, Patient/family training, Cognitive reorientation    Subjective   Previous Visit Info:  OT Last Visit  OT Received On: 01/08/25  General:  General  Patient Position Received: Bed, 3 rail up, Alarm on  Precautions:  Medical Precautions: Fall precautions    Vital Signs (Past 2hrs)        Date/Time Vitals Session Patient Position Pulse Resp SpO2 BP MAP (mmHg)    01/08/25 1328 --  --  75  18  97 %  137/74  95                         Pain:  Pain Assessment  0-10 (Numeric) Pain Score:  (reported 3/10 left eye pain)    Objective    Cognition:  Cognition  Overall Cognitive Status: Within Functional Limits     Activities of Daily Living: LE Dressing  LE Dressing: Yes  Adult Briefs Level of Assistance: Minimum assistance  LE Dressing Where Assessed: Bedside commode    Toileting  Toileting Level of Assistance: Moderate assistance  Where Assessed: Bedside commode     Bed Mobility/Transfers: Bed  Mobility  Bed Mobility: Yes  Bed Mobility 1  Bed Mobility 1: Supine to sitting, Sitting to supine  Level of Assistance 1: Minimum assistance    Transfers  Transfer: Yes  Transfer 1  Transfer From 1: Bed to  Transfer to 1: Commode-standard  Transfer Device 1: Walker  Transfer Level of Assistance 1: Minimum assistance, +2  Transfers 2  Transfer From 2: Commode-standard to  Transfer to 2: Chair with arms  Transfer Device 2: Walker  Transfer Level of Assistance 2: Minimum assistance, +2  Transfers 3  Technique 3: Sit to stand, Stand to sit  Transfer Device 3: Walker  Transfer Level of Assistance 3: Minimum assistance, +2    Outcome Measures:[facility] Daily Activity  Putting on and taking off regular lower body clothing: A lot  Bathing (including washing, rinsing, drying): A lot  Putting on and taking off regular upper body clothing: A little  Toileting, which includes using toilet, bedpan or urinal: A lot  Taking care of personal grooming such as brushing teeth: A little  Eating Meals: A little  Daily Activity - Total Score: 15    Education Documentation  ADL Training, taught by KEN Ny at 1/8/2025  2:27 PM.  Learner: Patient  Readiness: Acceptance  Method: Explanation  Response: Verbalizes Understanding, Needs Reinforcement    Education Comments  No comments found.           Goals:  Encounter Problems       Encounter Problems (Active)       ADLs       Patient with complete lower body dressing with minimal assist  level of assistance donning and doffing all LE clothes  with PRN adaptive equipment while supported sitting and standing (Progressing)       Start:  01/07/25    Expected End:  01/21/25            Patient will complete toileting including hygiene clothing management/hygiene with minimal assist  level of assistance and raised toilet seat and grab bars. (Progressing)       Start:  01/07/25    Expected End:  01/21/25               TRANSFERS       Patient will complete functional transfers with least  restrictive device with minimal assist  level of assistance. (Progressing)       Start:  01/07/25    Expected End:  01/21/25               VISION       Patient will complete 3 Visual Scanning Task task regarding all areas of activity with minimal assist  level of assistance. (Progressing)       Start:  01/07/25    Expected End:  01/21/25            Patient will scan a visually distracting environment to locate materials needed for 1 ADL Task and 1 IADL Task with  minimal assist  level of assistance or less for locating items on Left/Right. (Progressing)       Start:  01/07/25    Expected End:  01/21/25

## 2025-01-08 NOTE — CARE PLAN
The patient's goals for the shift include       Problem: Safety - Adult  Goal: Free from fall injury  Outcome: Progressing     Problem: Discharge Planning  Goal: Discharge to home or other facility with appropriate resources  Outcome: Progressing     Problem: Chronic Conditions and Co-morbidities  Goal: Patient's chronic conditions and co-morbidity symptoms are monitored and maintained or improved  Outcome: Progressing     Problem: Skin  Goal: Participates in plan/prevention/treatment measures  Outcome: Progressing  Flowsheets (Taken 1/8/2025 0716)  Participates in plan/prevention/treatment measures:   Discuss with provider PT/OT consult   Elevate heels  Goal: Prevent/manage excess moisture  Outcome: Progressing  Flowsheets (Taken 1/8/2025 0716)  Prevent/manage excess moisture:   Follow provider orders for dressing changes   Moisturize dry skin   Monitor for/manage infection if present  Goal: Prevent/minimize sheer/friction injuries  Outcome: Progressing  Flowsheets (Taken 1/8/2025 0716)  Prevent/minimize sheer/friction injuries:   HOB 30 degrees or less   Use pull sheet   Increase activity/out of bed for meals   Turn/reposition every 2 hours/use positioning/transfer devices  Goal: Promote/optimize nutrition  Outcome: Progressing  Flowsheets (Taken 1/8/2025 0716)  Promote/optimize nutrition:   Consume > 50% meals/supplements   Monitor/record intake including meals   Offer water/supplements/favorite foods     Problem: Fall/Injury  Goal: Not fall by end of shift  Outcome: Progressing  Goal: Be free from injury by end of the shift  Outcome: Progressing  Goal: Verbalize understanding of personal risk factors for fall in the hospital  Outcome: Progressing  Goal: Verbalize understanding of risk factor reduction measures to prevent injury from fall in the home  Outcome: Progressing  Goal: Use assistive devices by end of the shift  Outcome: Progressing  Goal: Pace activities to prevent fatigue by end of the shift  Outcome:  Progressing     Problem: Pain  Goal: Takes deep breaths with improved pain control throughout the shift  Outcome: Progressing  Goal: Turns in bed with improved pain control throughout the shift  Outcome: Progressing  Goal: Walks with improved pain control throughout the shift  Outcome: Progressing  Goal: Performs ADL's with improved pain control throughout shift  Outcome: Progressing  Goal: Participates in PT with improved pain control throughout the shift  Outcome: Progressing  Goal: Free from opioid side effects throughout the shift  Outcome: Progressing  Goal: Free from acute confusion related to pain meds throughout the shift  Outcome: Progressing     Problem: Nutrition  Goal: Less than 5 days NPO/clear liquids  Outcome: Progressing  Goal: Oral intake greater than 50%  Outcome: Progressing  Goal: Oral intake greater 75%  Outcome: Progressing  Goal: Consume prescribed supplement  Outcome: Progressing  Goal: Adequate PO fluid intake  Outcome: Progressing  Goal: Nutrition support goals are met within 48 hrs  Outcome: Progressing  Goal: Nutrition support is meeting 75% of nutrient needs  Outcome: Progressing  Goal: Tube feed tolerance  Outcome: Progressing  Goal: BG  mg/dL  Outcome: Progressing  Goal: Lab values WNL  Outcome: Progressing  Goal: Electrolytes WNL  Outcome: Progressing  Goal: Promote healing  Outcome: Progressing  Goal: Maintain stable weight  Outcome: Progressing  Goal: Reduce weight from edema/fluid  Outcome: Progressing  Goal: Gradual weight gain  Outcome: Progressing  Goal: Improve ostomy output  Outcome: Progressing     Problem: Diabetes  Goal: Achieve decreasing blood glucose levels by end of shift  Outcome: Progressing  Goal: Increase stability of blood glucose readings by end of shift  Outcome: Progressing  Goal: Decrease in ketones present in urine by end of shift  Outcome: Progressing  Goal: Maintain electrolyte levels within acceptable range throughout shift  Outcome:  Progressing  Goal: Maintain glucose levels >70mg/dl to <250mg/dl throughout shift  Outcome: Progressing  Goal: No changes in neurological exam by end of shift  Outcome: Progressing  Goal: Learn about and adhere to nutrition recommendations by end of shift  Outcome: Progressing  Goal: Vital signs within normal range for age by end of shift  Outcome: Progressing  Goal: Increase self care and/or family involovement by end of shift  Outcome: Progressing  Goal: Receive DSME education by end of shift  Outcome: Progressing

## 2025-01-08 NOTE — CARE PLAN
Neuro Brief Note    MRI brain wwo contrast reviewed. No acute stroke to explain CN VI palsy.     Ok to discharge from neurology standpoint.   See progress note for full recommendations.     Poly Galeas, APRN-CNP

## 2025-01-08 NOTE — PROGRESS NOTES
[facility] Progress Note    Mane Nath is a 64 y.o. male on day 1 of admission presenting with Double vision.  Subjective   Pt seen this afternoon for first time by myself. Initially seen by Dr. Osuna. Pt resting in bed. No family present at time of examination. Reports no changes since yesterday. Awaiting MRI.        Objective     Vitals:    01/08/25 0511 01/08/25 0809 01/08/25 0915 01/08/25 1328   BP: 118/77 142/82 139/77 137/74   BP Location: Left arm  Left arm Left arm   Patient Position: Lying  Lying Lying   Pulse: 63 85 74 75   Resp: 18  18 18   Temp: 35.9 °C (96.6 °F)  35.9 °C (96.7 °F) 36.2 °C (97.2 °F)   TempSrc: Temporal  Temporal Temporal   SpO2: 98%  96% 97%   Weight:       Height:             Physical Exam  Vitals reviewed.   Eyes:      General: Lids are normal.      Extraocular Movements: EOM normal     Pupils: Pupils are equal, round, and reactive to light.   Neurological:      Motor: Motor strength is normal.  Psychiatric:         Speech: Speech normal.       Neurological Exam  Mental Status  Awake, alert and oriented to person, place and time. Speech is normal. Language is fluent with no aphasia.    Cranial Nerves  CN III, IV, VI: Abnormal extraocular movements: L lateral rectus palsy. Normal lids and orbits bilaterally. Pupils equal round and reactive to light bilaterally.  CN V: Facial sensation is normal.  CN VII: Full and symmetric facial movement.    Motor  Normal muscle bulk throughout. No fasciculations present. Normal muscle tone. The following abnormal movements were seen: Tremor L>R hand, fine  Worse with sustention/action.   Strength is 5/5 throughout all four extremities.    Sensory  Light touch is normal in upper and lower extremities.       Scheduled medications  amLODIPine, 5 mg, oral, Daily  ARIPiprazole, 30 mg, oral, Nightly  aspirin, 81 mg, oral, Daily  atorvastatin, 80 mg, oral, Daily  busPIRone, 10 mg, oral, BID  dapagliflozin propanediol, 10 mg, oral,  Daily  donepezil, 5 mg, oral, Nightly  enoxaparin, 40 mg, subcutaneous, q24h  gabapentin, 400 mg, oral, BID  insulin lispro, 0-5 Units, subcutaneous, TID AC  metoprolol succinate XL, 75 mg, oral, Daily  pantoprazole, 40 mg, oral, Daily before breakfast   Or  pantoprazole, 40 mg, intravenous, Daily before breakfast  venlafaxine XR, 150 mg, oral, Daily with breakfast      Continuous medications     PRN medications  PRN medications: acetaminophen, bisacodyl, bisacodyl, dextrose, dextrose, glucagon, glucagon, guaiFENesin, melatonin, ondansetron **OR** ondansetron     Lab Results   Component Value Date    WBC 6.3 01/08/2025    RBC 4.44 (L) 01/08/2025    HGB 14.1 01/08/2025    HCT 40.2 (L) 01/08/2025     (L) 01/08/2025     01/08/2025    K 4.1 01/08/2025     01/08/2025    BUN 19 01/08/2025    CREATININE 0.97 01/08/2025    EGFR 88 01/08/2025    CALCIUM 9.5 01/08/2025    ALKPHOS 121 01/06/2025    AST 24 01/06/2025    ALT 29 01/06/2025    MG 1.85 01/08/2025    HGBA1C 6.5 (H) 01/06/2025    LDLCALC 83 01/07/2025    CHOL 143 01/07/2025    HDL 29.8 01/07/2025    TRIG 152 (H) 01/07/2025    TSH 0.97 06/23/2023       Below CT and MRI images and reports have been personally reviewed in PACS, agree with interpretations.    CT angio head and neck w and wo IV contrast 01/07/2025    Narrative  Interpreted By:  Awa Kruger,  Rubio Mcfadden  STUDY:  CT ANGIO HEAD AND NECK W AND WO IV CONTRAST;  1/7/2025 11:05 am    INDICATION:  Signs/Symptoms:cranial nerve palsy.    COMPARISON:  CT head 01/06/2025, 11:58 p.m.    ACCESSION NUMBER(S):  [id number]    ORDERING CLINICIAN:  RAÚL MORRIS    TECHNIQUE:  50 ML of Omnipaque 350 was administered intravenously and axial  images of the head and neck were acquired.  Coronal, sagittal, and  3-D reconstructions were provided for review. 3D reconstructions were  performed utilizing an independent workstation.    FINDINGS:      CTA HEAD FINDINGS:    Anterior circulation: The  left ICA is diminutive in caliber relative  to the right. There are scattered calcifications bilaterally. The A1  segment of the left NEAL is diminutive in caliber compared with the  right. This is likely related to developmental variation. The  proximal right NEAL and bilateral MCAs are patent. There is diminished  visualization of M2 and more distal MCA branches on the left compared  with right on the volumetric reformats. There is a minimal  calcification of the carotid siphons bilaterally. Hypoplastic left  posterior communicating artery.    Posterior circulation: The right vertebral artery is dominant. There  is marked attenuation of flow related enhancement along the proximal  V4 segment on the left with reconstitution of a small vessel  extending to the vertebrobasilar junction. The basilar artery and  proximal PCAs are patent.    CTA NECK FINDINGS:    The aortic arch and arch vessels are somewhat degraded by artifact.  There are atherosclerotic changes of the proximal descending thoracic  aorta. There is a three-vessel arch.    Carotid vessels:    The proximal common carotid arteries are degraded by artifact but  demonstrate no measurable luminal narrowing. There is atherosclerotic  plaque at the right carotid bifurcation and involving the proximal  right ICA. Relative to the normal caliber of the vessel more distally  luminal narrowing measures at most 20%. There is minimal  atherosclerotic plaque on the left without measurable stenosis.  However, the left ICA is diffusely diminutive in caliber relative to  the right.    Vertebral vessels: The proximal right vertebral artery is degraded by  artifact. It otherwise appears dominant and patent. There is lack of  contrast opacification along the majority of the left vertebral  artery which is at best thread-like in appearance. There is  reconstitution at the C1 level.    There are postoperative changes from anterior plate and screw  fixation and interbody fusion  from C5 through C7. There is loss of  the normal cervical lordosis. There are multilevel degenerative  changes of the cervical spine.    Impression  1. Nonvisualization of much of the left vertebral artery within the  neck and extending intracranially is likely due in part to  developmental variation. However, superimposed marked the decreased  flow/at least partial occlusion is also suspected. MRI with  diffusion-weighted imaging would be a much more sensitive means of  assessing for acute ischemic injury.    2. Atherosclerotic changes of the carotid bifurcations and proximal  ICAs, right greater than left with mild stenosis on the right.  3. Mild decreased caliber of the left ICA in the neck and head  compared with right is favored to be related to developmental  variation as is diminished caliber of the A1 segment of the left NEAL.    I personally reviewed the images/study and I agree with the findings  as stated by resident Bogdan Maciel.    MACRO:  None    Signed by: Awa Kruger 1/7/2025 11:32 AM  Dictation workstation:   [id number]      CT head wo IV contrast 01/06/2025    Narrative  Interpreted By:  Scooter Kelsey,  STUDY:  CT HEAD WO IV CONTRAST;  1/6/2025 11:58 pm    INDICATION:  Signs/Symptoms:left 6th cranial nerve palsy.      COMPARISON:  CT head dated 02/25/2024.    ACCESSION NUMBER(S):  [id number]    ORDERING CLINICIAN:  HERMINIO LEDBETTER    TECHNIQUE:  Noncontrast axial CT scan of head was performed. Angled reformats in  brain and bone windows were generated. The images were reviewed in  bone, brain, blood and soft tissue windows.    FINDINGS:  No hyperdense intracranial hemorrhage is present. There is no mass  effect or midline shift identified.    Gray-white differentiation is intact, without evidence of CT apparent  transcortical infarct. Subtle attenuation changes present in the  periventricular and subcortical white matter of bilateral cerebral  hemispheres are nonspecific,  but favored to represent sequela of  microvascular disease.    No abnormal ventricular dilatation is present. Basal cisterns are  patent. No extra-axial fluid collections are identified.    Scalp soft tissues do not demonstrate any acute abnormality. No acute  calvarial abnormality is present. Mastoid air cells and middle ear  cavities are clear.    Visualized paranasal sinuses are unremarkable in appearance.    Impression  1.  No evidence of hemorrhage, CT apparent transcortical infarct, or  other acute intracranial abnormality.  2. Subtle attenuation changes present in the periventricular and  subcortical white matter of bilateral cerebral hemispheres are  nonspecific, although favored to represent early sequela of  microvascular disease.    MACRO:  None    Signed by: Scooter Kelsey 1/7/2025 12:03 AM  Dictation workstation:   [id number]      NIH Stroke Scale  1A. Level of Consciousness: Alert, Keenly Responsive  1B. Ask Month and Age: Both Questions Right  1C. Blink Eyes & Squeeze Hands: Performs Both Tasks  2. Best Gaze: Partial Gaze Palsy  3. Visual: No Visual Loss  4. Facial Palsy: Normal Symmetrical Movements  5A. Motor - Left Arm: No Drift  5B. Motor - Right Arm: No Drift  6A. Motor - Left Leg: No Drift  6B. Motor - Right Leg: No Drift  7. Limb Ataxia: Absent  8. Sensory Loss: Normal  9. Best Language: No Aphasia  10. Dysarthria: Normal  11. Extinction and Inattention: No Abnormality  NIH Stroke Scale: 1          Baskin Coma Scale  Best Eye Response: Spontaneous  Best Verbal Response: Oriented  Best Motor Response: Follows commands  Gilson Coma Scale Score: 15        Assessment/Plan      Assessment & Plan  Double vision      IMPRESSION:  Mane Nath is a 64 y.o. male with history of HTN, DLD, DM, depression, bipolar, and essential tremor presented for L eye pain and worsening double vision. Did see ophthalmology outpatient and dx with L CN VI palsy. Not on antiplatelet PTA. On HI statin.   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 Eye strain vs other  Diplopia, only with midline and leftward gaze  DM  HTN  DLD    RECOMMENDATIONS:  Continue ASA and statin daily.   MRI brain wwo contrast pending.   Continue to monitor and manage vascular risk factors  Continue symptomatic L eye patching  Follow up with ophthalmology outpatient  Continue PT/OT    Discussed with patient. All questions answered.   Will follow for MRI results.          I personally spent 55 minutes today, exclusive of procedures, providing care for this patient, including preparation, face to face time, documentation and other services such as review of medical records, diagnostic result, patient education, counseling, coordination of care as specified in the encounter.    Poly Galeas, APRN-CNP      "

## 2025-01-08 NOTE — PROGRESS NOTES
Physical Therapy    Physical Therapy Treatment    Patient Name: Mane Nath  MRN: [id number]  Department: [facility]  Room: [address] [place], [place] [address]  Today's Date: 1/8/2025  Time Calculation  Start Time: 1335  Stop Time: 1400  Time Calculation (min): 25 min    Assessment/Plan   PT Assessment  End of Session Communication: Bedside nurse  End of Session Patient Position: Bed, 3 rail up, Alarm on (transport arrived to take pt to MRI)     PT Plan  Treatment/Interventions: Bed mobility, Transfer training, Gait training, Strengthening, Endurance training  PT Plan: Ongoing PT  PT Frequency: 4 times per week  PT Discharge Recommendations: High intensity level of continued care  Equipment Recommended upon Discharge:  (TBD)  PT Recommended Transfer Status: Assist x2  PT - OK to Discharge: Yes (WHEN MEDICALLY CLEARED)    General Visit Information:   PT  Visit  PT Received On: 01/08/25  General  Prior to Session Communication: Bedside nurse  Patient Position Received: Bed, 3 rail up, Alarm on    Subjective   Precautions:  Precautions  Hearing/Visual Limitations: persistent double vision since 12/31. 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  Medical Precautions: Fall precautions    Objective   Pain:  Pain Assessment  Pain Assessment: 0-10  0-10 (Numeric) Pain Score: 0 - No pain  Cognition:  Cognition  Orientation Level: Oriented X4     Treatments:  Therapeutic Exercise  Therapeutic Exercise Performed: Yes  Therapeutic Exercise Activity 1: pt completed seated LE exercises: AP x 10 reps, LAQ x 10 reps, hip flexion x 10 reps    Therapeutic Activity  Therapeutic Activity Performed: Yes  Therapeutic Activity 1: pt completed a dynamic stand with Min A for2-3 min    Bed Mobility  Bed Mobility: Yes  Bed Mobility 1  Bed Mobility 1: Supine to sitting, Sitting to supine  Level of Assistance 1: Minimum assistance, Moderate verbal cues    Ambulation/Gait Training  Ambulation/Gait Training Performed:  Yes  Ambulation/Gait Training 1  Surface 1: Level tile  Device 1: Rolling walker  Assistance 1: Minimum assistance, Minimal verbal cues, Minimal tactile cues (+2)  Quality of Gait 1: Decreased step length, Forward flexed posture (tremulous)  Comments/Distance (ft) 1: 4 feet x 3  Transfers  Transfer: Yes  Transfer 1  Technique 1: Sit to stand, Stand to sit  Transfer Device 1: Walker  Transfer Level of Assistance 1: Minimum assistance, +2, Minimal verbal cues, Minimal tactile cues  Trials/Comments 1: cues for hand placement  Transfers 2  Transfer to 2: Toilet  Technique 2: Sit to stand, Stand to sit  Transfer Device 2:  (BSC)  Transfer Level of Assistance 2: Minimum assistance, +2, Minimal verbal cues, Minimal tactile cues    Outcome Measures:  [facility] Basic Mobility  Turning from your back to your side while in a flat bed without using bedrails: A little  Moving from lying on your back to sitting on the side of a flat bed without using bedrails: A little  Moving to and from bed to chair (including a wheelchair): A lot  Standing up from a chair using your arms (e.g. wheelchair or bedside chair): A lot  To walk in hospital room: A lot  Climbing 3-5 steps with railing: Total  Basic Mobility - Total Score: 13    Education Documentation  Mobility Training, taught by Fabi Velazco PTA at 1/8/2025  2:22 PM.  Learner: Patient  Readiness: Acceptance  Method: Explanation  Response: Needs Reinforcement    Education Comments  No comments found.        OP EDUCATION:       Encounter Problems       Encounter Problems (Active)       Mobility       STG - Patient will ambulate (Progressing)       Start:  01/07/25    Expected End:  01/17/25       FWW MIN A X1 75 FT         Goal 1 (Progressing)       Start:  01/07/25    Expected End:  01/28/25       20 REPS RROM INCREASING STRENGTH, COORDINATION EX TO DECREASED TREMORS & ASSISTED BALANCE ACTIVITIES IMPROVING GAIT STABILITY            PT Transfers       STG - Patient will transfer sit to  and from stand (Progressing)       Start:  01/07/25    Expected End:  01/14/25       FWW CGA USING PROPER TECHNIQUE

## 2025-01-08 NOTE — PROGRESS NOTES
01/08/25 1148   Discharge Planning   Living Arrangements Family members   Support Systems Family members   Type of Residence Private residence   Intensity of Service   Intensity of Service >30 min     Met with patient at bedside, introduced self and role as RN TCC. Patient lives at home with his brother. At baseline, patient states he is able to care for self, he does have walker and quad cane that he uses when needed. He has a shower chair he utilizes for dressing purposes, he does have a bath bench but that needs to be installed. He is active with [facility] in [place] for his PCP and he manages his own medications. Patient active with [email or web address]. He states he called the nurse help line through [email or web address] and she told him to go to ER for his vision concern. He started having double vision, he went to his eye doctor whom recommended a patch and dx him with nerve palsy in his eye, due to diabetes. Neuro is consulted here, wanting MRI to r/o stroke which is unlikely per notes. MRI not completed as of yet, per patient he could not tolerate it. PT OT recommended acute rehab, unsure patient is agreeable at this time as we were discussing at bedside. Will ask patient to be seen again by therapy to see if improvements were made. TCC to follow up with patient for decision on discharge planning.

## 2025-01-08 NOTE — CONSULTS
"Nutrition Initial Assessment:   Nutrition Assessment    Reason for Assessment: Admission nursing screening    Patient is a 64 y.o. male presenting with vision problems. Consulted by MST for wt loss and poor appetite. No good wt hx in chart, most wts are pt stated so questioning accuracy. Would like accurate pt weight if possible. Ate 100% of breakfast this am. Pt reports he has no problem eating. Continue to monitor PO intakes, if PO drops below 50% rec; supplementation.     Nutrition History:  Energy Intake: Fair 50-75 %, Good > 75 %  Food and Nutrient History: Pt reports eating well at home, has no problems eating.  Food Allergies/Intolerances:  None  GI Symptoms: None  Oral Problems: None     Anthropometrics:  Height: 177.8 cm (5' 10\")   Weight: 95.3 kg (210 lb)   BMI (Calculated): 30.13    Weight Change %:  Weight History / % Weight Change: No wt hx in chart, no notable wt changes.    Nutrition Focused Physical Exam Findings:  Subcutaneous Fat Loss:   Orbital Fat Pads: Mild-Moderate (slight dark circles and slight hollowing)  Buccal Fat Pads: Well nourished (full, rounded cheeks)  Muscle Wasting:  Temporalis: Well nourished (well-defined muscle)  Pectoralis (Clavicular Region): Well nourished (clavicle not visible)  Edema:  Edema: none  Physical Findings:  Hair: Negative  Eyes: Negative  Mouth: Negative    Nutrition Significant Labs:  CBC Trend:   Results from last 7 days   Lab Units 01/08/25  0633 01/07/25  0433 01/06/25  2327   WBC AUTO x10*3/uL 6.3 5.0 4.9   RBC AUTO x10*6/uL 4.44* 3.94* 3.97*   HEMOGLOBIN g/dL 14.1 12.7* 13.0*   HEMATOCRIT % 40.2* 35.4* 35.6*   MCV fL 91 90 90   PLATELETS AUTO x10*3/uL 109* 99* 106*    , BMP Trend:   Results from last 7 days   Lab Units 01/08/25  0821 01/07/25  0433 01/06/25  2327   GLUCOSE mg/dL 176* 197* 237*   CALCIUM mg/dL 9.5 8.3* 8.5*   SODIUM mmol/L 139 134* 132*   POTASSIUM mmol/L 4.1 4.3 4.2   CO2 mmol/L 25 27 25   CHLORIDE mmol/L 106 103 101   BUN mg/dL 19 17 19 "   CREATININE mg/dL 0.97 0.94 1.05    , A1C:  Lab Results   Component Value Date    HGBA1C 6.5 (H) 01/06/2025       Nutrition Specific Medications:  amLODIPine, 5 mg, oral, Daily  ARIPiprazole, 30 mg, oral, Nightly  aspirin, 81 mg, oral, Daily  atorvastatin, 80 mg, oral, Daily  busPIRone, 10 mg, oral, BID  dapagliflozin propanediol, 10 mg, oral, Daily  donepezil, 5 mg, oral, Nightly  enoxaparin, 40 mg, subcutaneous, q24h  gabapentin, 400 mg, oral, BID  insulin lispro, 0-5 Units, subcutaneous, TID AC  metoprolol succinate XL, 75 mg, oral, Daily  pantoprazole, 40 mg, oral, Daily before breakfast   Or  pantoprazole, 40 mg, intravenous, Daily before breakfast  venlafaxine XR, 150 mg, oral, Daily with breakfast         Dietary Orders (From admission, onward)       Start     Ordered    01/07/25 1346  May Participate in Room Service  ( ROOM SERVICE MAY PARTICIPATE)  Once        Question:  .  Answer:  Yes    01/07/25 1345    01/07/25 0059  Adult diet Consistent Carb; CCD 60 gm/meal  Diet effective now        Question Answer Comment   Diet type Consistent Carb    Carb diet selection: CCD 60 gm/meal        01/07/25 0058                     Estimated Needs:   Total Energy Estimated Needs (kCal): 2250 kCal  Method for Estimating Needs: 30kcals/kg IBW  Total Protein Estimated Needs (g): 90 g  Method for Estimating Needs: 1.2g/kg IBW  Total Fluid Estimated Needs (mL): 2250 mL  Method for Estimating Needs: 1ml/kcal or per MD        Nutrition Diagnosis   Malnutrition Diagnosis  Patient has Malnutrition Diagnosis: No    Nutrition Diagnosis  Patient has Nutrition Diagnosis: Yes  Diagnosis Status (1): New  Nutrition Diagnosis 1: No nutrition diagnosis at this time       Nutrition Interventions/Recommendations         Nutrition Prescription:  Individualized Nutrition Prescription Provided for : Individualized nutrition prescription of 2250 kcals and 90g of protein to be provided with diet order. Continue with Cons CHO diet.         Nutrition Interventions:   Interventions: Meals and snacks  Meals and Snacks: Carbohydrate-modified diet  Goal: >50% of all meals.    Nutrition Monitoring and Evaluation   Food/Nutrient Related History Monitoring  Monitoring and Evaluation Plan: Energy intake  Energy Intake: Estimated energy intake  Criteria: Continue to monitor PO intakes, goal of >50% of all meals.    Body Composition/Growth/Weight History  Monitoring and Evaluation Plan: Weight  Weight: Weight change  Criteria: Continue to monitor wt status and wt changes.    Biochemical Data, Medical Tests and Procedures  Monitoring and Evaluation Plan: Glucose/endocrine profile    Time Spent (min): 45 minutes

## 2025-01-08 NOTE — CARE PLAN
The patient's goals for the shift include pt will remain safe this shift.    The clinical goals for the shift include pt will ask for assistance with ambulation this shift.    Over the shift, the patient did make progress toward the goal.

## 2025-01-08 NOTE — NURSING NOTE
Patient discharging home. Discharge instructions reviewed with patient as well as medication changes and instructions. All questions and concerns addressed at this time. IV removed x1 with tip of catheter intact per policy. Tele removed and returned to , stepdown notified. Patient to  discharge medications at personal pharmacy. Patient discharging off unit in wheelchair with PCA.

## 2025-01-08 NOTE — PROGRESS NOTES
Social work consult placed for positive medical risk screen. SW reviewed pt's chart and communicated with TCC. No SW needs foreseen at this time. SW signing off; available upon request.    CLEVELAND Garcia, LSW ([id number])   Care Transitions

## 2025-01-09 NOTE — DISCHARGE SUMMARY
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    Discharge Meds     Medication List      START taking these medications     acetaminophen 325 mg tablet; Commonly known as: Tylenol; Take 2 tablets   (650 mg) by mouth every 4 hours if needed for mild pain (1 - 3),   headaches, fever (temp greater than 38.0 C) or moderate pain (4 - 6).   aspirin 81 mg EC tablet; Take 1 tablet (81 mg) by mouth once daily.     CONTINUE taking these medications     amLODIPine 5 mg tablet; Commonly known as: Norvasc   ARIPiprazole 30 mg tablet; Commonly known as: Abilify   atorvastatin 80 mg tablet; Commonly known as: Lipitor   busPIRone 10 mg tablet; Commonly known as: Buspar   dapagliflozin propanediol 10 mg; Commonly known as: Farxiga   docusate sodium 100 mg capsule; Commonly known as: Colace   donepezil 5 mg tablet; Commonly known as: Aricept   gabapentin 400 mg capsule; Commonly known as: Neurontin   hydrOXYzine pamoate 25 mg capsule; Commonly known as: Vistaril   metFORMIN 1,000 mg tablet; Commonly known as: Glucophage   metoprolol succinate XL 50 mg 24 hr tablet; Commonly known as:   Toprol-XL; Take 1.5 tablets (75 mg) by mouth once daily.   Miralax 17 gram/dose powder; Generic drug: polyethylene glycol   omeprazole 40 mg DR capsule; Commonly known as: PriLOSEC   simethicone 80 mg chewable tablet; Commonly known as: Mylicon   traZODone 100 mg tablet; Commonly known as: Desyrel   Trulicity 3 mg/0.5 mL injection; Generic drug: dulaglutide   venlafaxine  mg 24 hr capsule; Commonly known as: Effexor-XR   Voltaren Arthritis Pain 1 % gel; Generic drug: diclofenac sodium       Test Results Pending At Discharge  Pending Labs       Order Current Status    Lyme disease,  PCR In process            Hospital Course   .  Mane Nath is a 64 y.o. male with PMHx s/f HTN/HLD, DM2, depression/bipolar, essential tremor presenting with blurred vision. Pt states he started having double vision, especially when looking to the L on New Years Day (6 days ago). No inciting factors, it just came on suddenly. He saw an ophthalmologist on 1/3/25 who diagnosed him with a L 6th cranial nerve palsy and was instructed to patch. He has been using depth perception to estimate how bad his double vision was. Today he noticed things seemed much more apart than they were yesterday including his feet from his head, so he came to the ER. He had a mild L-sided headache and pain around his L eye that has been present since symptoms started. No hearing loss, tinnitus, facial droop, or speech changes noted. No chest pain shortness of breath, abdominal pain, nausea, vomiting, paresthesias, or focal weakness. He is a former smoker and has no history of strokes in the past. His ophthalmologist speculated his symptoms were from his diabetes.     ED Course (Summary - please note all labs, imaging studies, and interventions noted below have been personally reviewed and/or interpreted on day of admission):   Vitals on presentation: 98.7 F, 83 bpm, 18 rr, 136/80, 100% on RA  Labs: CMP glu 237,   Trop 5  INR 1.2  CBChg 13.0, plt 106  EKG: sinus rhythm at 74 bpm. No ST changes  Imaging: CT head - 1.  No evidence of hemorrhage, CT apparent transcortical infarct, or   other acute intracranial abnormality.   2. Subtle attenuation changes present in the periventricular and   subcortical white matter of bilateral cerebral hemispheres are   nonspecific, although favored to represent early sequela of   microvascular disease.   Interventions: Pt admitted for further care.     1/7: Pt experiencing worsening vertical double vision, feels that images are getting farther apart. Pain in left eye and forehead, worse with left  "eye lateral deviation, pain described as pressure-like that comes and goes. Still with left eye patch covering eye. Has a history of neuropathy in feet. Labs significant for hgb 12.7 and platelets 99, compared to 106 yesterday. Glu 192 -> 199 this AM, pt says home glucose checks average about 200, states he is compliant with medications. Patient to have MRI brain and CTA head and neck today.      Possibly discharge later today depending on findings.     1/8: Pt still with double vision and left eye pain, as well as decreased appetite. Denies any headaches or dizziness. Also having complaints of \"shakiness\" although he says this has been a long-standing problem, has a history of essential tremor. Plts increased to 109 this morning, Glu 117-175. Lyme PCR in process. Was supposed to have MRI yesterday however per patient, MRI had to be stopped d/t neck pain he was experiencing from the equipment placed behind his neck. Spoke to patient's nurse who stated MRI wasn't done d/t patient being tired and having a headache.        Addendum: MRI has come back negative okay to discharge home.  See after visit summary for complete plan.        35 minutes spent in the care of this patient.  Pertinent Physical Exam At Time of Discharge  Physical Exam     patient still with 6 cranial nerve palsy on the left side but is able to move the eye slightly more laterally than he was yesterday.  Otherwise see physical exam findings from progress note 1/1/8/2024 for more physical exam findings  .    See after visit summary for complete planient Follow-Up  Future Appointments   Date Time Provider Department Center   1/15/2025 11:30 AM Nayely Bhagat, KRISTEN-CNP [id number]         Magdaleno Mendez MD  "

## 2025-01-11 LAB
B BURGDOR DNA SPEC QL NAA+PROBE: NOT DETECTED
SPECIMEN SOURCE: NORMAL

## 2025-01-13 ASSESSMENT — ENCOUNTER SYMPTOMS
GASTROINTESTINAL NEGATIVE: 1
HEMATOLOGIC/LYMPHATIC NEGATIVE: 1
ENDOCRINE NEGATIVE: 1
SHORTNESS OF BREATH: 0
SYNCOPE: 0
LIGHT-HEADEDNESS: 0
MUSCULOSKELETAL NEGATIVE: 1
DIZZINESS: 0
PALPITATIONS: 0
EYES NEGATIVE: 1
NEAR-SYNCOPE: 0
DYSPNEA ON EXERTION: 0

## 2025-01-13 NOTE — PROGRESS NOTES
Primary Cardiologist: Dr. Soto    Chief Complaint:   No chief complaint on file.     History Of Present Illness:    Mane Nath is a 64 y.o. male with past medical history of HTN, orthostatic dizziness, palpitations, DM2, Gout, Unsteady gait, Benign tremor, Depression who presents for 6 month follow up.     8/26/24-8/29/24: Patient s/p open blayne, cardiology was consulted for intermittent palpitations post-op. No significant arrhythmia on monitor review, echo done and metoprolol was increased to 75 mg daily.     Today, Mane Nath is feeling       Last Recorded Vitals:  Visit Vitals  Smoking Status Former        Past Medical History:  He has a past medical history of Cirrhosis (Multi) (08/28/2024), Diabetes (Multi), HTN (hypertension), Personal history of other diseases of the musculoskeletal system and connective tissue, Personal history of other mental and behavioral disorders, Right upper quadrant abdominal pain (06/11/2024), and Type 2 diabetes mellitus.    Past Surgical History:  He has a past surgical history that includes Neck surgery (02/01/2018); Other surgical history (02/01/2018); Other surgical history (04/25/2022); and Cholecystectomy (N/A, 08/26/2024).      Social History:  He reports that he has quit smoking. His smoking use included cigarettes. He has never used smokeless tobacco. He reports that he does not drink alcohol and does not use drugs.    Family History:  Family History   Problem Relation Name Age of Onset    Dementia Mother      Heart attack Father      Cholecystitis Sister          x3    Cancer Father's Sister      Prostate cancer Father's Brother      Cancer Paternal Grandmother      Diabetes Paternal Grandfather          Allergies:  Latex    Outpatient Medications:  Current Outpatient Medications   Medication Instructions    acetaminophen (TYLENOL) 650 mg, oral, Every 4 hours PRN    amLODIPine (Norvasc) 5 mg tablet Daily    ARIPiprazole (ABILIFY) 30 mg, Nightly    aspirin 81  "mg, oral, Daily    atorvastatin (Lipitor) 80 mg tablet 1 tablet, Daily (0630)    busPIRone (Buspar) 10 mg tablet take 2 tablets by mouth twice a day as directed    dapagliflozin propanediol (FARXIGA) 10 mg, Daily    diclofenac sodium (Voltaren Arthritis Pain) 1 % gel Apply topically. Use as directeD PRN    docusate sodium (Colace) 100 mg capsule TAKE ONE (1) CAPSULE BY MOUTH ONCE DAILY AS NEEDED. **(COLACE 100 MG CAP EQUIV)**    donepezil (ARICEPT) 5 mg, Nightly    gabapentin (Neurontin) 400 mg capsule Take 1 capsule by mouth twice a day and 2 capsules at bedtime    hydrOXYzine pamoate (VISTARIL) 25 mg, Nightly PRN    metFORMIN (Glucophage) 1,000 mg tablet 1 tablet, 2 times daily    metoprolol succinate XL (TOPROL-XL) 75 mg, oral, Daily    omeprazole (PRILOSEC) 40 mg, Daily    polyethylene glycol (MIRALAX) 17 g, Daily    simethicone (Mylicon) 80 mg chewable tablet 1 tablet, 3 times daily before meals    traZODone (Desyrel) 100 mg tablet take 1 to 3 tablet by mouth at bedtime if needed    Trulicity 3 mg/0.5 mL pen injector Inject 4.5 mg as directed 1 (one) time per week. ON SUNDAYS    venlafaxine XR (Effexor-XR) 150 mg 24 hr capsule take 2 capsules by mouth once daily as directed         Review of Systems   Constitutional: Positive for malaise/fatigue (chronic from sleep schedule).   HENT: Negative.     Eyes: Negative.    Cardiovascular:  Positive for chest pain (A few days ago, felt a \"strain\" , lasted about a minute, does not feel he was exerting himself.). Negative for dyspnea on exertion, leg swelling, near-syncope, palpitations and syncope.   Respiratory:  Negative for shortness of breath.    Endocrine: Negative.    Hematologic/Lymphatic: Negative.    Skin: Negative.    Musculoskeletal: Negative.    Gastrointestinal: Negative.    Genitourinary: Negative.    Neurological:  Negative for dizziness and light-headedness.        Physical Exam  Vitals reviewed.   Constitutional:       Appearance: Normal appearance. He " is obese.   HENT:      Head: Normocephalic.      Mouth/Throat:      Mouth: Mucous membranes are moist.   Cardiovascular:      Rate and Rhythm: Normal rate and regular rhythm.      Pulses: Normal pulses.      Heart sounds: Normal heart sounds.   Pulmonary:      Effort: Pulmonary effort is normal.      Breath sounds: Normal breath sounds. No wheezing, rhonchi or rales.   Abdominal:      General: Bowel sounds are normal. There is no distension.      Palpations: Abdomen is soft.      Tenderness: There is no abdominal tenderness.   Musculoskeletal:      Cervical back: Normal range of motion.      Right lower le+ Pitting Edema present.      Left lower le+ Pitting Edema present.   Skin:     General: Skin is warm and dry.   Neurological:      General: No focal deficit present.      Mental Status: He is alert and oriented to person, place, and time.   Psychiatric:         Mood and Affect: Mood normal.         Behavior: Behavior normal.           Last Labs:  CBC -  Lab Results   Component Value Date    WBC 6.3 2025    HGB 14.1 2025    HCT 40.2 (L) 2025    MCV 91 2025     (L) 2025       CMP -  Lab Results   Component Value Date    CALCIUM 9.5 2025    PHOS 3.1 2024    PROT 7.1 2025    ALBUMIN 4.0 2025    AST 24 2025    ALT 29 2025    ALKPHOS 121 2025    BILITOT 0.8 2025       LIPID PANEL -   Lab Results   Component Value Date    CHOL 143 2025    TRIG 152 (H) 2025    HDL 29.8 2025    CHHDL 4.8 2025    VLDL 30 2025    NHDL 113 2025       RENAL FUNCTION PANEL -   Lab Results   Component Value Date    GLUCOSE 176 (H) 2025     2025    K 4.1 2025     2025    CO2 25 2025    ANIONGAP 12 2025    BUN 19 2025    CREATININE 0.97 2025    GFRMALE 57 (A) 2023    CALCIUM 9.5 2025    PHOS 3.1 2024    ALBUMIN 4.0 2025        Lab Results    Component Value Date    BNP 8 01/07/2025    HGBA1C 6.5 (H) 01/06/2025       Last Cardiology Tests:  ECG:  No results found for this or any previous visit from the past 1095 days.      Echo:  TTE 8/29/24:   1. The left ventricular systolic function is normal, with a Mcfarland's biplane calculated ejection fraction of 70%.   2. There is a mid cavity left ventricular obstruction. The resting gradient is 35.18 mmHg. The provoked gradient is 44.74 mmHg.   3. There is normal right ventricular global systolic function.    TTE 5/6/22:The left ventricular systolic function is normal with a 55-60% estimated ejection fraction.  2. There is moderate concentric left ventricular hypertrophy.       Ejection Fractions:  EF   Date/Time Value Ref Range Status   08/29/2024 02:58 PM 70 %        Cath:  No results found for this or any previous visit from the past 1095 days.      Stress Test:  Dobut stress echo 8/9/23:  1. Adequate level of stress achieved.  2. No ECG changes from baseline.  3. There were no stress-induced wall motion abnormalities. This is a negative stress echo test for ischemia.    NM Cardiac Stress 5/13/22: Normal stress myocardial perfusion imaging in response to  pharmacologic stress. Mild apical attenuation present, likely soft  tissue. Patient could not participate in prone imaging.  2. Well-maintained left ventricular function.  Cardiac Imaging:  No results found for this or any previous visit from the past 1095 days.        Lab review: I have personally reviewed the laboratory result(s)     Assessment/Plan     Mane Nath is a 64 y.o. male with past medical history of HTN, orthostatic dizziness, palpitations, DM2, Gout, Unsteady gait, Benign tremor, Depression who presents for hospital follow up.     Palpitations  Recently seen in hospital for palpitations, no arrhythmia on telemetry, metoprolol was increased and an echocardiogram was ordered.   TTE 8/29/24: LVEF 70%,   Today, denies any palpitations since  leaving the hospital.   Continue on metoprolol XL 75 mg daily    LVOT obstruction  TTE 8/29/24: There is a mid cavity left ventricular obstruction. The resting gradient is 35.18 mmHg. The provoked gradient is 44.74 mmHg.   Today, patient denies shortness of breath, no palpitations.   Blood pressure is well controlled  Continue on metoprolol XL 75 mg daily    Hypertension  Today's /72  Well controlled     Orthostatic dizziness  Patient reports rare episode of postural lightheadedness  General recommendations given to stay well hydrated, get up slowly    Dyslipidemia  No lipid panel available for review    Type 2 DM  Management per PCP    Nayely Bhagat, APRN-CNP

## 2025-01-14 ENCOUNTER — TELEPHONE (OUTPATIENT)
Dept: CARDIOLOGY | Facility: HOSPITAL | Age: 65
End: 2025-01-14
Payer: COMMERCIAL

## 2025-01-14 NOTE — TELEPHONE ENCOUNTER
Pt called back at this time regarding his symptoms he is having worsening double vision and balance issues. Pt is unable to wear eye patch as directed from eye doctor. RN advised pt at this time to call 911 due to symptoms are worsening at this time. Pt verbalized understanding and is not going to drive himself.

## 2025-01-14 NOTE — TELEPHONE ENCOUNTER
Patient called in to cancel appointment for today having double vision in eye, and coordination issues. Transferred to nurse.

## 2025-01-15 ENCOUNTER — APPOINTMENT (OUTPATIENT)
Dept: CARDIOLOGY | Facility: HOSPITAL | Age: 65
End: 2025-01-15
Payer: COMMERCIAL

## 2025-01-15 LAB
ATRIAL RATE: 74 BPM
P AXIS: 19 DEGREES
PR INTERVAL: 169 MS
Q ONSET: 249 MS
QRS COUNT: 11 BEATS
QRS DURATION: 96 MS
QT INTERVAL: 380 MS
QTC CALCULATION(BAZETT): 422 MS
QTC FREDERICIA: 407 MS
R AXIS: -25 DEGREES
T AXIS: 26 DEGREES
T OFFSET: 439 MS
VENTRICULAR RATE: 74 BPM

## 2025-02-10 ENCOUNTER — APPOINTMENT (OUTPATIENT)
Dept: CARDIOLOGY | Facility: HOSPITAL | Age: 65
End: 2025-02-10
Payer: COMMERCIAL

## 2025-02-10 ENCOUNTER — APPOINTMENT (OUTPATIENT)
Dept: RADIOLOGY | Facility: HOSPITAL | Age: 65
End: 2025-02-10
Payer: COMMERCIAL

## 2025-02-10 ENCOUNTER — HOSPITAL ENCOUNTER (OUTPATIENT)
Facility: HOSPITAL | Age: 65
Setting detail: OBSERVATION
Discharge: HOME | End: 2025-02-12
Attending: STUDENT IN AN ORGANIZED HEALTH CARE EDUCATION/TRAINING PROGRAM | Admitting: STUDENT IN AN ORGANIZED HEALTH CARE EDUCATION/TRAINING PROGRAM
Payer: COMMERCIAL

## 2025-02-10 DIAGNOSIS — R53.1 WEAKNESS: ICD-10-CM

## 2025-02-10 DIAGNOSIS — R55 PRE-SYNCOPE: Primary | ICD-10-CM

## 2025-02-10 DIAGNOSIS — R00.2 HEART PALPITATIONS: ICD-10-CM

## 2025-02-10 DIAGNOSIS — R55 SYNCOPE AND COLLAPSE: ICD-10-CM

## 2025-02-10 DIAGNOSIS — R42 DIZZINESS: ICD-10-CM

## 2025-02-10 LAB
ALBUMIN SERPL BCP-MCNC: 4.1 G/DL (ref 3.4–5)
ALP SERPL-CCNC: 111 U/L (ref 33–136)
ALT SERPL W P-5'-P-CCNC: 34 U/L (ref 10–52)
ANION GAP BLDV CALCULATED.4IONS-SCNC: 9 MMOL/L (ref 10–25)
ANION GAP SERPL CALC-SCNC: 14 MMOL/L (ref 10–20)
AST SERPL W P-5'-P-CCNC: 25 U/L (ref 9–39)
BASE EXCESS BLDV CALC-SCNC: -0.8 MMOL/L (ref -2–3)
BASOPHILS # BLD AUTO: 0.01 X10*3/UL (ref 0–0.1)
BASOPHILS NFR BLD AUTO: 0.2 %
BILIRUB SERPL-MCNC: 0.8 MG/DL (ref 0–1.2)
BODY TEMPERATURE: 37 DEGREES CELSIUS
BUN SERPL-MCNC: 23 MG/DL (ref 6–23)
CA-I BLDV-SCNC: 1.18 MMOL/L (ref 1.1–1.33)
CALCIUM SERPL-MCNC: 9 MG/DL (ref 8.6–10.3)
CARDIAC TROPONIN I PNL SERPL HS: 5 NG/L (ref 0–20)
CHLORIDE BLDV-SCNC: 101 MMOL/L (ref 98–107)
CHLORIDE SERPL-SCNC: 101 MMOL/L (ref 98–107)
CO2 SERPL-SCNC: 21 MMOL/L (ref 21–32)
CREAT SERPL-MCNC: 1.1 MG/DL (ref 0.5–1.3)
EGFRCR SERPLBLD CKD-EPI 2021: 76 ML/MIN/1.73M*2
EOSINOPHIL # BLD AUTO: 0.03 X10*3/UL (ref 0–0.7)
EOSINOPHIL NFR BLD AUTO: 0.5 %
ERYTHROCYTE [DISTWIDTH] IN BLOOD BY AUTOMATED COUNT: 13.9 % (ref 11.5–14.5)
FLUAV RNA RESP QL NAA+PROBE: NOT DETECTED
FLUBV RNA RESP QL NAA+PROBE: NOT DETECTED
GLUCOSE BLD MANUAL STRIP-MCNC: 222 MG/DL (ref 74–99)
GLUCOSE BLDV-MCNC: 228 MG/DL (ref 74–99)
GLUCOSE SERPL-MCNC: 223 MG/DL (ref 74–99)
HCO3 BLDV-SCNC: 24.3 MMOL/L (ref 22–26)
HCT VFR BLD AUTO: 36.6 % (ref 41–52)
HCT VFR BLD EST: 39 % (ref 41–52)
HGB BLD-MCNC: 13.4 G/DL (ref 13.5–17.5)
HGB BLDV-MCNC: 13 G/DL (ref 13.5–17.5)
IMM GRANULOCYTES # BLD AUTO: 0.03 X10*3/UL (ref 0–0.7)
IMM GRANULOCYTES NFR BLD AUTO: 0.5 % (ref 0–0.9)
INHALED O2 CONCENTRATION: 21 %
LACTATE BLDV-SCNC: 2.4 MMOL/L (ref 0.4–2)
LACTATE SERPL-SCNC: 2.3 MMOL/L (ref 0.4–2)
LIPASE SERPL-CCNC: 31 U/L (ref 9–82)
LYMPHOCYTES # BLD AUTO: 1.68 X10*3/UL (ref 1.2–4.8)
LYMPHOCYTES NFR BLD AUTO: 25.3 %
MAGNESIUM SERPL-MCNC: 1.52 MG/DL (ref 1.6–2.4)
MCH RBC QN AUTO: 32.8 PG (ref 26–34)
MCHC RBC AUTO-ENTMCNC: 36.6 G/DL (ref 32–36)
MCV RBC AUTO: 90 FL (ref 80–100)
MONOCYTES # BLD AUTO: 0.48 X10*3/UL (ref 0.1–1)
MONOCYTES NFR BLD AUTO: 7.2 %
NEUTROPHILS # BLD AUTO: 4.4 X10*3/UL (ref 1.2–7.7)
NEUTROPHILS NFR BLD AUTO: 66.3 %
NRBC BLD-RTO: 0 /100 WBCS (ref 0–0)
OXYHGB MFR BLDV: 73.9 % (ref 45–75)
PCO2 BLDV: 41 MM HG (ref 41–51)
PH BLDV: 7.38 PH (ref 7.33–7.43)
PLATELET # BLD AUTO: 111 X10*3/UL (ref 150–450)
PO2 BLDV: 46 MM HG (ref 35–45)
POTASSIUM BLDV-SCNC: 4.5 MMOL/L (ref 3.5–5.3)
POTASSIUM SERPL-SCNC: 4.5 MMOL/L (ref 3.5–5.3)
PROT SERPL-MCNC: 6.9 G/DL (ref 6.4–8.2)
RBC # BLD AUTO: 4.09 X10*6/UL (ref 4.5–5.9)
SAO2 % BLDV: 76 % (ref 45–75)
SARS-COV-2 RNA RESP QL NAA+PROBE: NOT DETECTED
SODIUM BLDV-SCNC: 130 MMOL/L (ref 136–145)
SODIUM SERPL-SCNC: 131 MMOL/L (ref 136–145)
WBC # BLD AUTO: 6.6 X10*3/UL (ref 4.4–11.3)

## 2025-02-10 PROCEDURE — 93005 ELECTROCARDIOGRAM TRACING: CPT

## 2025-02-10 PROCEDURE — 83690 ASSAY OF LIPASE: CPT | Performed by: STUDENT IN AN ORGANIZED HEALTH CARE EDUCATION/TRAINING PROGRAM

## 2025-02-10 PROCEDURE — 71046 X-RAY EXAM CHEST 2 VIEWS: CPT | Performed by: STUDENT IN AN ORGANIZED HEALTH CARE EDUCATION/TRAINING PROGRAM

## 2025-02-10 PROCEDURE — 84132 ASSAY OF SERUM POTASSIUM: CPT | Performed by: STUDENT IN AN ORGANIZED HEALTH CARE EDUCATION/TRAINING PROGRAM

## 2025-02-10 PROCEDURE — 84484 ASSAY OF TROPONIN QUANT: CPT | Performed by: STUDENT IN AN ORGANIZED HEALTH CARE EDUCATION/TRAINING PROGRAM

## 2025-02-10 PROCEDURE — 82947 ASSAY GLUCOSE BLOOD QUANT: CPT | Mod: 59

## 2025-02-10 PROCEDURE — 71046 X-RAY EXAM CHEST 2 VIEWS: CPT

## 2025-02-10 PROCEDURE — 83605 ASSAY OF LACTIC ACID: CPT | Performed by: STUDENT IN AN ORGANIZED HEALTH CARE EDUCATION/TRAINING PROGRAM

## 2025-02-10 PROCEDURE — 85025 COMPLETE CBC W/AUTO DIFF WBC: CPT | Performed by: STUDENT IN AN ORGANIZED HEALTH CARE EDUCATION/TRAINING PROGRAM

## 2025-02-10 PROCEDURE — 84132 ASSAY OF SERUM POTASSIUM: CPT | Mod: 59 | Performed by: STUDENT IN AN ORGANIZED HEALTH CARE EDUCATION/TRAINING PROGRAM

## 2025-02-10 PROCEDURE — 96360 HYDRATION IV INFUSION INIT: CPT | Mod: 59

## 2025-02-10 PROCEDURE — 36415 COLL VENOUS BLD VENIPUNCTURE: CPT | Performed by: STUDENT IN AN ORGANIZED HEALTH CARE EDUCATION/TRAINING PROGRAM

## 2025-02-10 PROCEDURE — 87636 SARSCOV2 & INF A&B AMP PRB: CPT | Performed by: STUDENT IN AN ORGANIZED HEALTH CARE EDUCATION/TRAINING PROGRAM

## 2025-02-10 PROCEDURE — 99285 EMERGENCY DEPT VISIT HI MDM: CPT | Mod: 25 | Performed by: STUDENT IN AN ORGANIZED HEALTH CARE EDUCATION/TRAINING PROGRAM

## 2025-02-10 PROCEDURE — 2500000004 HC RX 250 GENERAL PHARMACY W/ HCPCS (ALT 636 FOR OP/ED): Performed by: STUDENT IN AN ORGANIZED HEALTH CARE EDUCATION/TRAINING PROGRAM

## 2025-02-10 PROCEDURE — 83735 ASSAY OF MAGNESIUM: CPT | Performed by: STUDENT IN AN ORGANIZED HEALTH CARE EDUCATION/TRAINING PROGRAM

## 2025-02-10 RX ORDER — LANOLIN ALCOHOL/MO/W.PET/CERES
400 CREAM (GRAM) TOPICAL ONCE
Status: COMPLETED | OUTPATIENT
Start: 2025-02-10 | End: 2025-02-11

## 2025-02-10 RX ADMIN — SODIUM CHLORIDE 500 ML: 9 INJECTION, SOLUTION INTRAVENOUS at 22:31

## 2025-02-10 ASSESSMENT — PAIN DESCRIPTION - FREQUENCY: FREQUENCY: CONSTANT/CONTINUOUS

## 2025-02-10 ASSESSMENT — PAIN DESCRIPTION - DESCRIPTORS: DESCRIPTORS: ACHING

## 2025-02-10 ASSESSMENT — PAIN SCALES - GENERAL: PAINLEVEL_OUTOF10: 7

## 2025-02-10 ASSESSMENT — COLUMBIA-SUICIDE SEVERITY RATING SCALE - C-SSRS
2. HAVE YOU ACTUALLY HAD ANY THOUGHTS OF KILLING YOURSELF?: NO
6. HAVE YOU EVER DONE ANYTHING, STARTED TO DO ANYTHING, OR PREPARED TO DO ANYTHING TO END YOUR LIFE?: NO
1. IN THE PAST MONTH, HAVE YOU WISHED YOU WERE DEAD OR WISHED YOU COULD GO TO SLEEP AND NOT WAKE UP?: NO

## 2025-02-10 ASSESSMENT — PAIN - FUNCTIONAL ASSESSMENT: PAIN_FUNCTIONAL_ASSESSMENT: 0-10

## 2025-02-10 ASSESSMENT — PAIN DESCRIPTION - ORIENTATION: ORIENTATION: RIGHT;LEFT

## 2025-02-10 ASSESSMENT — PAIN DESCRIPTION - LOCATION: LOCATION: LEG

## 2025-02-10 ASSESSMENT — PAIN DESCRIPTION - PAIN TYPE: TYPE: CHRONIC PAIN

## 2025-02-11 ENCOUNTER — APPOINTMENT (OUTPATIENT)
Dept: CARDIOLOGY | Facility: HOSPITAL | Age: 65
End: 2025-02-11
Payer: COMMERCIAL

## 2025-02-11 PROBLEM — R55 PRE-SYNCOPE: Status: ACTIVE | Noted: 2025-02-11

## 2025-02-11 LAB
ANION GAP SERPL CALC-SCNC: 8 MMOL/L (ref 10–20)
AORTIC VALVE MEAN GRADIENT: 6 MMHG
AORTIC VALVE PEAK VELOCITY: 1.77 M/S
APPEARANCE UR: CLEAR
AV PEAK GRADIENT: 13 MMHG
AVA (PEAK VEL): 2.22 CM2
AVA (VTI): 2.38 CM2
BILIRUB UR STRIP.AUTO-MCNC: NEGATIVE MG/DL
BUN SERPL-MCNC: 22 MG/DL (ref 6–23)
CALCIUM SERPL-MCNC: 8.7 MG/DL (ref 8.6–10.3)
CARDIAC TROPONIN I PNL SERPL HS: 5 NG/L (ref 0–20)
CHLORIDE SERPL-SCNC: 102 MMOL/L (ref 98–107)
CO2 SERPL-SCNC: 26 MMOL/L (ref 21–32)
COLOR UR: ABNORMAL
CREAT SERPL-MCNC: 1.06 MG/DL (ref 0.5–1.3)
EGFRCR SERPLBLD CKD-EPI 2021: 79 ML/MIN/1.73M*2
EJECTION FRACTION APICAL 4 CHAMBER: 60.4
EJECTION FRACTION: 60 %
ERYTHROCYTE [DISTWIDTH] IN BLOOD BY AUTOMATED COUNT: 13.9 % (ref 11.5–14.5)
GLUCOSE BLD MANUAL STRIP-MCNC: 172 MG/DL (ref 74–99)
GLUCOSE BLD MANUAL STRIP-MCNC: 195 MG/DL (ref 74–99)
GLUCOSE BLD MANUAL STRIP-MCNC: 225 MG/DL (ref 74–99)
GLUCOSE BLD MANUAL STRIP-MCNC: 234 MG/DL (ref 74–99)
GLUCOSE SERPL-MCNC: 244 MG/DL (ref 74–99)
GLUCOSE UR STRIP.AUTO-MCNC: ABNORMAL MG/DL
HCT VFR BLD AUTO: 33 % (ref 41–52)
HGB BLD-MCNC: 11.8 G/DL (ref 13.5–17.5)
KETONES UR STRIP.AUTO-MCNC: NEGATIVE MG/DL
LACTATE BLDV-SCNC: 1.9 MMOL/L (ref 0.4–2)
LACTATE SERPL-SCNC: 1.8 MMOL/L (ref 0.4–2)
LEFT ATRIUM VOLUME AREA LENGTH INDEX BSA: 20.2 ML/M2
LEFT VENTRICLE INTERNAL DIMENSION DIASTOLE: 5 CM (ref 3.5–6)
LEFT VENTRICULAR OUTFLOW TRACT DIAMETER: 2 CM
LEUKOCYTE ESTERASE UR QL STRIP.AUTO: NEGATIVE
LV EJECTION FRACTION BIPLANE: 60 %
MCH RBC QN AUTO: 32.4 PG (ref 26–34)
MCHC RBC AUTO-ENTMCNC: 35.8 G/DL (ref 32–36)
MCV RBC AUTO: 91 FL (ref 80–100)
MITRAL VALVE E/A RATIO: 1.18
NITRITE UR QL STRIP.AUTO: NEGATIVE
NRBC BLD-RTO: 0 /100 WBCS (ref 0–0)
PH UR STRIP.AUTO: 5.5 [PH]
PLATELET # BLD AUTO: 89 X10*3/UL (ref 150–450)
POTASSIUM SERPL-SCNC: 4.4 MMOL/L (ref 3.5–5.3)
PROT UR STRIP.AUTO-MCNC: NEGATIVE MG/DL
RBC # BLD AUTO: 3.64 X10*6/UL (ref 4.5–5.9)
RBC # UR STRIP.AUTO: NEGATIVE MG/DL
RIGHT VENTRICLE FREE WALL PEAK S': 11.5 CM/S
RIGHT VENTRICLE PEAK SYSTOLIC PRESSURE: 23.8 MMHG
SODIUM SERPL-SCNC: 132 MMOL/L (ref 136–145)
SP GR UR STRIP.AUTO: 1.02
TRICUSPID ANNULAR PLANE SYSTOLIC EXCURSION: 2.2 CM
UROBILINOGEN UR STRIP.AUTO-MCNC: NORMAL MG/DL
WBC # BLD AUTO: 4.5 X10*3/UL (ref 4.4–11.3)

## 2025-02-11 PROCEDURE — G0378 HOSPITAL OBSERVATION PER HR: HCPCS

## 2025-02-11 PROCEDURE — 36415 COLL VENOUS BLD VENIPUNCTURE: CPT | Performed by: STUDENT IN AN ORGANIZED HEALTH CARE EDUCATION/TRAINING PROGRAM

## 2025-02-11 PROCEDURE — 99222 1ST HOSP IP/OBS MODERATE 55: CPT | Performed by: STUDENT IN AN ORGANIZED HEALTH CARE EDUCATION/TRAINING PROGRAM

## 2025-02-11 PROCEDURE — 2500000004 HC RX 250 GENERAL PHARMACY W/ HCPCS (ALT 636 FOR OP/ED): Performed by: STUDENT IN AN ORGANIZED HEALTH CARE EDUCATION/TRAINING PROGRAM

## 2025-02-11 PROCEDURE — 97165 OT EVAL LOW COMPLEX 30 MIN: CPT | Mod: GO

## 2025-02-11 PROCEDURE — 85027 COMPLETE CBC AUTOMATED: CPT | Performed by: STUDENT IN AN ORGANIZED HEALTH CARE EDUCATION/TRAINING PROGRAM

## 2025-02-11 PROCEDURE — 84484 ASSAY OF TROPONIN QUANT: CPT | Performed by: STUDENT IN AN ORGANIZED HEALTH CARE EDUCATION/TRAINING PROGRAM

## 2025-02-11 PROCEDURE — 2500000001 HC RX 250 WO HCPCS SELF ADMINISTERED DRUGS (ALT 637 FOR MEDICARE OP): Performed by: STUDENT IN AN ORGANIZED HEALTH CARE EDUCATION/TRAINING PROGRAM

## 2025-02-11 PROCEDURE — 93306 TTE W/DOPPLER COMPLETE: CPT | Performed by: INTERNAL MEDICINE

## 2025-02-11 PROCEDURE — 81003 URINALYSIS AUTO W/O SCOPE: CPT | Performed by: STUDENT IN AN ORGANIZED HEALTH CARE EDUCATION/TRAINING PROGRAM

## 2025-02-11 PROCEDURE — 80048 BASIC METABOLIC PNL TOTAL CA: CPT | Performed by: STUDENT IN AN ORGANIZED HEALTH CARE EDUCATION/TRAINING PROGRAM

## 2025-02-11 PROCEDURE — 83605 ASSAY OF LACTIC ACID: CPT | Performed by: STUDENT IN AN ORGANIZED HEALTH CARE EDUCATION/TRAINING PROGRAM

## 2025-02-11 PROCEDURE — 96361 HYDRATE IV INFUSION ADD-ON: CPT | Mod: 59

## 2025-02-11 PROCEDURE — 82947 ASSAY GLUCOSE BLOOD QUANT: CPT | Mod: 59

## 2025-02-11 PROCEDURE — 2500000002 HC RX 250 W HCPCS SELF ADMINISTERED DRUGS (ALT 637 FOR MEDICARE OP, ALT 636 FOR OP/ED): Performed by: STUDENT IN AN ORGANIZED HEALTH CARE EDUCATION/TRAINING PROGRAM

## 2025-02-11 PROCEDURE — 97162 PT EVAL MOD COMPLEX 30 MIN: CPT | Mod: GP

## 2025-02-11 PROCEDURE — 97112 NEUROMUSCULAR REEDUCATION: CPT | Mod: GP

## 2025-02-11 PROCEDURE — 97530 THERAPEUTIC ACTIVITIES: CPT | Mod: GO

## 2025-02-11 PROCEDURE — 96372 THER/PROPH/DIAG INJ SC/IM: CPT | Performed by: STUDENT IN AN ORGANIZED HEALTH CARE EDUCATION/TRAINING PROGRAM

## 2025-02-11 PROCEDURE — 2500000001 HC RX 250 WO HCPCS SELF ADMINISTERED DRUGS (ALT 637 FOR MEDICARE OP): Performed by: INTERNAL MEDICINE

## 2025-02-11 PROCEDURE — 93306 TTE W/DOPPLER COMPLETE: CPT

## 2025-02-11 RX ORDER — AMLODIPINE BESYLATE 5 MG/1
5 TABLET ORAL DAILY
Status: DISCONTINUED | OUTPATIENT
Start: 2025-02-11 | End: 2025-02-12 | Stop reason: HOSPADM

## 2025-02-11 RX ORDER — METOPROLOL SUCCINATE 25 MG/1
25 TABLET, EXTENDED RELEASE ORAL DAILY
COMMUNITY

## 2025-02-11 RX ORDER — ONDANSETRON HYDROCHLORIDE 2 MG/ML
4 INJECTION, SOLUTION INTRAVENOUS EVERY 8 HOURS PRN
Status: DISCONTINUED | OUTPATIENT
Start: 2025-02-11 | End: 2025-02-12 | Stop reason: HOSPADM

## 2025-02-11 RX ORDER — ONDANSETRON 4 MG/1
4 TABLET, FILM COATED ORAL EVERY 8 HOURS PRN
Status: DISCONTINUED | OUTPATIENT
Start: 2025-02-11 | End: 2025-02-12 | Stop reason: HOSPADM

## 2025-02-11 RX ORDER — ASPIRIN 81 MG/1
81 TABLET ORAL DAILY
Status: DISCONTINUED | OUTPATIENT
Start: 2025-02-11 | End: 2025-02-12 | Stop reason: HOSPADM

## 2025-02-11 RX ORDER — DONEPEZIL HYDROCHLORIDE 5 MG/1
5 TABLET, FILM COATED ORAL NIGHTLY
Status: DISCONTINUED | OUTPATIENT
Start: 2025-02-11 | End: 2025-02-12 | Stop reason: HOSPADM

## 2025-02-11 RX ORDER — BISACODYL 10 MG/1
10 SUPPOSITORY RECTAL DAILY PRN
Status: DISCONTINUED | OUTPATIENT
Start: 2025-02-11 | End: 2025-02-12 | Stop reason: HOSPADM

## 2025-02-11 RX ORDER — BUSPIRONE HYDROCHLORIDE 5 MG/1
20 TABLET ORAL DAILY
Status: DISCONTINUED | OUTPATIENT
Start: 2025-02-11 | End: 2025-02-11 | Stop reason: DRUGHIGH

## 2025-02-11 RX ORDER — ATORVASTATIN CALCIUM 40 MG/1
80 TABLET, FILM COATED ORAL NIGHTLY
Status: DISCONTINUED | OUTPATIENT
Start: 2025-02-11 | End: 2025-02-12 | Stop reason: HOSPADM

## 2025-02-11 RX ORDER — SODIUM CHLORIDE 9 MG/ML
100 INJECTION, SOLUTION INTRAVENOUS CONTINUOUS
Status: ACTIVE | OUTPATIENT
Start: 2025-02-11 | End: 2025-02-11

## 2025-02-11 RX ORDER — VENLAFAXINE HYDROCHLORIDE 150 MG/1
150 CAPSULE, EXTENDED RELEASE ORAL
Status: DISCONTINUED | OUTPATIENT
Start: 2025-02-11 | End: 2025-02-11 | Stop reason: DRUGHIGH

## 2025-02-11 RX ORDER — TALC
3 POWDER (GRAM) TOPICAL NIGHTLY PRN
Status: DISCONTINUED | OUTPATIENT
Start: 2025-02-11 | End: 2025-02-12 | Stop reason: HOSPADM

## 2025-02-11 RX ORDER — METOPROLOL SUCCINATE 50 MG/1
75 TABLET, EXTENDED RELEASE ORAL DAILY
Status: DISCONTINUED | OUTPATIENT
Start: 2025-02-11 | End: 2025-02-12 | Stop reason: HOSPADM

## 2025-02-11 RX ORDER — INSULIN LISPRO 100 [IU]/ML
0-5 INJECTION, SOLUTION INTRAVENOUS; SUBCUTANEOUS
Status: DISCONTINUED | OUTPATIENT
Start: 2025-02-11 | End: 2025-02-12 | Stop reason: HOSPADM

## 2025-02-11 RX ORDER — GUAIFENESIN 600 MG/1
600 TABLET, EXTENDED RELEASE ORAL EVERY 12 HOURS PRN
Status: DISCONTINUED | OUTPATIENT
Start: 2025-02-11 | End: 2025-02-12 | Stop reason: HOSPADM

## 2025-02-11 RX ORDER — PANTOPRAZOLE SODIUM 40 MG/10ML
40 INJECTION, POWDER, LYOPHILIZED, FOR SOLUTION INTRAVENOUS DAILY
Status: DISCONTINUED | OUTPATIENT
Start: 2025-02-11 | End: 2025-02-12 | Stop reason: HOSPADM

## 2025-02-11 RX ORDER — DEXTROSE 50 % IN WATER (D50W) INTRAVENOUS SYRINGE
25
Status: DISCONTINUED | OUTPATIENT
Start: 2025-02-11 | End: 2025-02-12 | Stop reason: HOSPADM

## 2025-02-11 RX ORDER — GABAPENTIN 400 MG/1
800 CAPSULE ORAL NIGHTLY
Status: DISCONTINUED | OUTPATIENT
Start: 2025-02-11 | End: 2025-02-12 | Stop reason: HOSPADM

## 2025-02-11 RX ORDER — DEXTROSE 50 % IN WATER (D50W) INTRAVENOUS SYRINGE
12.5
Status: DISCONTINUED | OUTPATIENT
Start: 2025-02-11 | End: 2025-02-12 | Stop reason: HOSPADM

## 2025-02-11 RX ORDER — ARIPIPRAZOLE 10 MG/1
30 TABLET ORAL NIGHTLY
Status: DISCONTINUED | OUTPATIENT
Start: 2025-02-11 | End: 2025-02-12 | Stop reason: HOSPADM

## 2025-02-11 RX ORDER — PANTOPRAZOLE SODIUM 40 MG/1
40 TABLET, DELAYED RELEASE ORAL DAILY
Status: DISCONTINUED | OUTPATIENT
Start: 2025-02-11 | End: 2025-02-12 | Stop reason: HOSPADM

## 2025-02-11 RX ORDER — GABAPENTIN 400 MG/1
400 CAPSULE ORAL DAILY
Status: DISCONTINUED | OUTPATIENT
Start: 2025-02-11 | End: 2025-02-11 | Stop reason: DRUGHIGH

## 2025-02-11 RX ORDER — BISACODYL 5 MG
10 TABLET, DELAYED RELEASE (ENTERIC COATED) ORAL DAILY PRN
Status: DISCONTINUED | OUTPATIENT
Start: 2025-02-11 | End: 2025-02-12 | Stop reason: HOSPADM

## 2025-02-11 RX ORDER — GABAPENTIN 400 MG/1
400 CAPSULE ORAL
Status: DISCONTINUED | OUTPATIENT
Start: 2025-02-11 | End: 2025-02-12 | Stop reason: HOSPADM

## 2025-02-11 RX ORDER — DAPAGLIFLOZIN 5 MG/1
10 TABLET, FILM COATED ORAL DAILY
Status: DISCONTINUED | OUTPATIENT
Start: 2025-02-11 | End: 2025-02-12 | Stop reason: HOSPADM

## 2025-02-11 RX ORDER — VENLAFAXINE HYDROCHLORIDE 150 MG/1
300 CAPSULE, EXTENDED RELEASE ORAL DAILY
Status: DISCONTINUED | OUTPATIENT
Start: 2025-02-11 | End: 2025-02-12 | Stop reason: HOSPADM

## 2025-02-11 RX ORDER — ENOXAPARIN SODIUM 100 MG/ML
40 INJECTION SUBCUTANEOUS EVERY 24 HOURS
Status: DISCONTINUED | OUTPATIENT
Start: 2025-02-11 | End: 2025-02-12 | Stop reason: HOSPADM

## 2025-02-11 RX ADMIN — PANTOPRAZOLE SODIUM 40 MG: 40 TABLET, DELAYED RELEASE ORAL at 08:48

## 2025-02-11 RX ADMIN — DONEPEZIL HYDROCHLORIDE 5 MG: 5 TABLET ORAL at 20:52

## 2025-02-11 RX ADMIN — VENLAFAXINE HYDROCHLORIDE 300 MG: 150 CAPSULE, EXTENDED RELEASE ORAL at 08:48

## 2025-02-11 RX ADMIN — ARIPIPRAZOLE 30 MG: 10 TABLET ORAL at 20:52

## 2025-02-11 RX ADMIN — ENOXAPARIN SODIUM 40 MG: 40 INJECTION SUBCUTANEOUS at 08:48

## 2025-02-11 RX ADMIN — GABAPENTIN 800 MG: 400 CAPSULE ORAL at 03:51

## 2025-02-11 RX ADMIN — ASPIRIN 81 MG: 81 TABLET, COATED ORAL at 08:48

## 2025-02-11 RX ADMIN — DAPAGLIFLOZIN 10 MG: 5 TABLET, FILM COATED ORAL at 08:55

## 2025-02-11 RX ADMIN — ATORVASTATIN CALCIUM 80 MG: 40 TABLET, FILM COATED ORAL at 20:53

## 2025-02-11 RX ADMIN — GABAPENTIN 800 MG: 400 CAPSULE ORAL at 20:52

## 2025-02-11 RX ADMIN — INSULIN LISPRO 1 UNITS: 100 INJECTION, SOLUTION INTRAVENOUS; SUBCUTANEOUS at 16:42

## 2025-02-11 RX ADMIN — METOPROLOL SUCCINATE 75 MG: 50 TABLET, EXTENDED RELEASE ORAL at 08:48

## 2025-02-11 RX ADMIN — BUSPIRONE HYDROCHLORIDE 20 MG: 5 TABLET ORAL at 03:51

## 2025-02-11 RX ADMIN — SODIUM CHLORIDE 100 ML/HR: 9 INJECTION, SOLUTION INTRAVENOUS at 09:09

## 2025-02-11 RX ADMIN — BUSPIRONE HYDROCHLORIDE 20 MG: 5 TABLET ORAL at 20:52

## 2025-02-11 RX ADMIN — SODIUM CHLORIDE 100 ML/HR: 9 INJECTION, SOLUTION INTRAVENOUS at 03:52

## 2025-02-11 RX ADMIN — AMLODIPINE BESYLATE 5 MG: 5 TABLET ORAL at 08:48

## 2025-02-11 RX ADMIN — GABAPENTIN 400 MG: 400 CAPSULE ORAL at 08:48

## 2025-02-11 RX ADMIN — ARIPIPRAZOLE 30 MG: 10 TABLET ORAL at 03:51

## 2025-02-11 RX ADMIN — GABAPENTIN 400 MG: 400 CAPSULE ORAL at 16:42

## 2025-02-11 RX ADMIN — Medication 400 MG: at 00:42

## 2025-02-11 RX ADMIN — INSULIN LISPRO 2 UNITS: 100 INJECTION, SOLUTION INTRAVENOUS; SUBCUTANEOUS at 12:39

## 2025-02-11 RX ADMIN — DONEPEZIL HYDROCHLORIDE 5 MG: 5 TABLET ORAL at 03:51

## 2025-02-11 RX ADMIN — BUSPIRONE HYDROCHLORIDE 20 MG: 5 TABLET ORAL at 08:54

## 2025-02-11 SDOH — SOCIAL STABILITY: SOCIAL INSECURITY: DO YOU FEEL UNSAFE GOING BACK TO THE PLACE WHERE YOU ARE LIVING?: NO

## 2025-02-11 SDOH — ECONOMIC STABILITY: INCOME INSECURITY: IN THE PAST 12 MONTHS HAS THE ELECTRIC, GAS, OIL, OR WATER COMPANY THREATENED TO SHUT OFF SERVICES IN YOUR HOME?: NO

## 2025-02-11 SDOH — ECONOMIC STABILITY: FOOD INSECURITY: HOW HARD IS IT FOR YOU TO PAY FOR THE VERY BASICS LIKE FOOD, HOUSING, MEDICAL CARE, AND HEATING?: SOMEWHAT HARD

## 2025-02-11 SDOH — ECONOMIC STABILITY: FOOD INSECURITY: WITHIN THE PAST 12 MONTHS, YOU WORRIED THAT YOUR FOOD WOULD RUN OUT BEFORE YOU GOT THE MONEY TO BUY MORE.: NEVER TRUE

## 2025-02-11 SDOH — SOCIAL STABILITY: SOCIAL INSECURITY: WITHIN THE LAST YEAR, HAVE YOU BEEN HUMILIATED OR EMOTIONALLY ABUSED IN OTHER WAYS BY YOUR PARTNER OR EX-PARTNER?: NO

## 2025-02-11 SDOH — SOCIAL STABILITY: SOCIAL INSECURITY: HAVE YOU HAD ANY THOUGHTS OF HARMING ANYONE ELSE?: NO

## 2025-02-11 SDOH — ECONOMIC STABILITY: HOUSING INSECURITY: IN THE LAST 12 MONTHS, WAS THERE A TIME WHEN YOU WERE NOT ABLE TO PAY THE MORTGAGE OR RENT ON TIME?: NO

## 2025-02-11 SDOH — SOCIAL STABILITY: SOCIAL INSECURITY: ABUSE: ADULT

## 2025-02-11 SDOH — SOCIAL STABILITY: SOCIAL INSECURITY: ARE YOU OR HAVE YOU BEEN THREATENED OR ABUSED PHYSICALLY, EMOTIONALLY, OR SEXUALLY BY ANYONE?: NO

## 2025-02-11 SDOH — SOCIAL STABILITY: SOCIAL INSECURITY: WITHIN THE LAST YEAR, HAVE YOU BEEN AFRAID OF YOUR PARTNER OR EX-PARTNER?: NO

## 2025-02-11 SDOH — SOCIAL STABILITY: SOCIAL INSECURITY: HAS ANYONE EVER THREATENED TO HURT YOUR FAMILY OR YOUR PETS?: NO

## 2025-02-11 SDOH — ECONOMIC STABILITY: HOUSING INSECURITY: AT ANY TIME IN THE PAST 12 MONTHS, WERE YOU HOMELESS OR LIVING IN A SHELTER (INCLUDING NOW)?: NO

## 2025-02-11 SDOH — SOCIAL STABILITY: SOCIAL INSECURITY: DO YOU FEEL ANYONE HAS EXPLOITED OR TAKEN ADVANTAGE OF YOU FINANCIALLY OR OF YOUR PERSONAL PROPERTY?: NO

## 2025-02-11 SDOH — ECONOMIC STABILITY: FOOD INSECURITY: WITHIN THE PAST 12 MONTHS, THE FOOD YOU BOUGHT JUST DIDN'T LAST AND YOU DIDN'T HAVE MONEY TO GET MORE.: NEVER TRUE

## 2025-02-11 SDOH — ECONOMIC STABILITY: TRANSPORTATION INSECURITY: IN THE PAST 12 MONTHS, HAS LACK OF TRANSPORTATION KEPT YOU FROM MEDICAL APPOINTMENTS OR FROM GETTING MEDICATIONS?: NO

## 2025-02-11 SDOH — SOCIAL STABILITY: SOCIAL INSECURITY: ARE THERE ANY APPARENT SIGNS OF INJURIES/BEHAVIORS THAT COULD BE RELATED TO ABUSE/NEGLECT?: NO

## 2025-02-11 SDOH — SOCIAL STABILITY: SOCIAL INSECURITY: HAVE YOU HAD THOUGHTS OF HARMING ANYONE ELSE?: NO

## 2025-02-11 SDOH — SOCIAL STABILITY: SOCIAL INSECURITY: DOES ANYONE TRY TO KEEP YOU FROM HAVING/CONTACTING OTHER FRIENDS OR DOING THINGS OUTSIDE YOUR HOME?: NO

## 2025-02-11 SDOH — ECONOMIC STABILITY: HOUSING INSECURITY: IN THE PAST 12 MONTHS, HOW MANY TIMES HAVE YOU MOVED WHERE YOU WERE LIVING?: 0

## 2025-02-11 ASSESSMENT — LIFESTYLE VARIABLES
AUDIT-C TOTAL SCORE: 0
SKIP TO QUESTIONS 9-10: 1
HOW OFTEN DO YOU HAVE A DRINK CONTAINING ALCOHOL: NEVER
HOW OFTEN DO YOU HAVE 6 OR MORE DRINKS ON ONE OCCASION: NEVER
AUDIT-C TOTAL SCORE: 0
HOW MANY STANDARD DRINKS CONTAINING ALCOHOL DO YOU HAVE ON A TYPICAL DAY: PATIENT DOES NOT DRINK

## 2025-02-11 ASSESSMENT — PATIENT HEALTH QUESTIONNAIRE - PHQ9
1. LITTLE INTEREST OR PLEASURE IN DOING THINGS: NOT AT ALL
SUM OF ALL RESPONSES TO PHQ9 QUESTIONS 1 & 2: 0
2. FEELING DOWN, DEPRESSED OR HOPELESS: NOT AT ALL

## 2025-02-11 ASSESSMENT — ENCOUNTER SYMPTOMS
HEMATURIA: 0
STRIDOR: 0
DIZZINESS: 0
APNEA: 0
BACK PAIN: 0
LIGHT-HEADEDNESS: 1
WOUND: 0
SHORTNESS OF BREATH: 0
COUGH: 0
ARTHRALGIAS: 0
RHINORRHEA: 0
FLANK PAIN: 0
JOINT SWELLING: 0
SORE THROAT: 0
FREQUENCY: 0
ABDOMINAL DISTENTION: 0
FATIGUE: 0
AGITATION: 0
PALPITATIONS: 0
DIAPHORESIS: 0
HEADACHES: 0
WEAKNESS: 0
FEVER: 0
DIFFICULTY URINATING: 0
APPETITE CHANGE: 0
NAUSEA: 0
CONFUSION: 0
ABDOMINAL PAIN: 0
NERVOUS/ANXIOUS: 0
VOMITING: 0
DYSURIA: 0
WHEEZING: 0
DECREASED CONCENTRATION: 0
MYALGIAS: 0
CONSTIPATION: 0
SINUS PAIN: 0
NUMBNESS: 0
ACTIVITY CHANGE: 0
COLOR CHANGE: 0
CHILLS: 0
DIARRHEA: 0
CHEST TIGHTNESS: 0

## 2025-02-11 ASSESSMENT — ACTIVITIES OF DAILY LIVING (ADL)
JUDGMENT_ADEQUATE_SAFELY_COMPLETE_DAILY_ACTIVITIES: YES
BATHING: NEEDS ASSISTANCE
WALKS IN HOME: NEEDS ASSISTANCE
DRESSING YOURSELF: NEEDS ASSISTANCE
HEARING - LEFT EAR: FUNCTIONAL
FEEDING YOURSELF: NEEDS ASSISTANCE
HEARING - RIGHT EAR: FUNCTIONAL
TOILETING: NEEDS ASSISTANCE
GROOMING: NEEDS ASSISTANCE
LACK_OF_TRANSPORTATION: NO
LACK_OF_TRANSPORTATION: NO
ASSISTIVE_DEVICE: WALKER
BATHING_ASSISTANCE: MAXIMAL
ADEQUATE_TO_COMPLETE_ADL: YES
PATIENT'S MEMORY ADEQUATE TO SAFELY COMPLETE DAILY ACTIVITIES?: YES

## 2025-02-11 ASSESSMENT — COGNITIVE AND FUNCTIONAL STATUS - GENERAL
CLIMB 3 TO 5 STEPS WITH RAILING: A LOT
MOVING FROM LYING ON BACK TO SITTING ON SIDE OF FLAT BED WITH BEDRAILS: A LITTLE
EATING MEALS: A LITTLE
EATING MEALS: A LITTLE
PATIENT BASELINE BEDBOUND: NO
TOILETING: A LOT
TOILETING: A LOT
TURNING FROM BACK TO SIDE WHILE IN FLAT BAD: A LITTLE
DAILY ACTIVITIY SCORE: 12
TURNING FROM BACK TO SIDE WHILE IN FLAT BAD: A LOT
DAILY ACTIVITIY SCORE: 15
STANDING UP FROM CHAIR USING ARMS: A LOT
STANDING UP FROM CHAIR USING ARMS: A LITTLE
DRESSING REGULAR LOWER BODY CLOTHING: A LOT
MOBILITY SCORE: 12
HELP NEEDED FOR BATHING: A LOT
MOVING TO AND FROM BED TO CHAIR: A LITTLE
MOVING TO AND FROM BED TO CHAIR: A LOT
WALKING IN HOSPITAL ROOM: A LOT
MOBILITY SCORE: 16
WALKING IN HOSPITAL ROOM: A LOT
PERSONAL GROOMING: A LITTLE
TURNING FROM BACK TO SIDE WHILE IN FLAT BAD: A LITTLE
WALKING IN HOSPITAL ROOM: A LOT
MOVING FROM LYING ON BACK TO SITTING ON SIDE OF FLAT BED WITH BEDRAILS: A LITTLE
CLIMB 3 TO 5 STEPS WITH RAILING: A LOT
MOVING FROM LYING ON BACK TO SITTING ON SIDE OF FLAT BED WITH BEDRAILS: A LITTLE
DRESSING REGULAR LOWER BODY CLOTHING: A LOT
MOVING TO AND FROM BED TO CHAIR: A LITTLE
DRESSING REGULAR LOWER BODY CLOTHING: TOTAL
EATING MEALS: A LITTLE
DRESSING REGULAR UPPER BODY CLOTHING: A LITTLE
DRESSING REGULAR UPPER BODY CLOTHING: A LITTLE
STANDING UP FROM CHAIR USING ARMS: A LITTLE
DRESSING REGULAR UPPER BODY CLOTHING: A LOT
PERSONAL GROOMING: A LITTLE
HELP NEEDED FOR BATHING: A LOT
MOBILITY SCORE: 16
PERSONAL GROOMING: A LOT
DAILY ACTIVITIY SCORE: 15
HELP NEEDED FOR BATHING: A LOT
TOILETING: A LOT
CLIMB 3 TO 5 STEPS WITH RAILING: TOTAL

## 2025-02-11 ASSESSMENT — PAIN - FUNCTIONAL ASSESSMENT: PAIN_FUNCTIONAL_ASSESSMENT: 0-10

## 2025-02-11 ASSESSMENT — PAIN SCALES - GENERAL: PAINLEVEL_OUTOF10: 0 - NO PAIN

## 2025-02-11 NOTE — NURSING NOTE
Patient's sister, Cecilia was called for updates on patient's status and location. Cecilia acknowledges room change to 2113, and plan of care. All questions answered at this time. Patient's sister also states that she would notify patient's brother, Sma, of updates.   Yes

## 2025-02-11 NOTE — CARE PLAN
Problem: Mobility  Goal: STG - Patient will ambulate  Description: FWW CGA X1 50 FT  Outcome: Not Progressing  Goal: Goal 1  Description: 20 RESP RROM INCREASING STRENGTH TO STABILIZE GAIT  Outcome: Not Progressing     Problem: PT Transfers  Goal: STG - Patient to transfer to and from sit to supine  Description: SBA USING RAILS  Outcome: Not Progressing  Goal: STG - Patient will transfer sit to and from stand  Description: FWW SBA USING PROPER TECHNIQUE  Outcome: Not Progressing

## 2025-02-11 NOTE — PROGRESS NOTES
"[facility] MEDICINE    Mane Nath is a 64 y.o. male on day 0 of admission presenting with Pre-syncope.    Subjective   History Of Present Illness:  Mane Nath is a 64 y.o. male with PMHx s/f HTN/HLD, DM2, depression/bipolar, essential tremor, L cranial nerve palsy presenting with near syncope. Pt very somnolent on exam owing to the time of day. He says over the past few days he has been having symptoms of a \"head rush\" with a near-syncopal feeling when he stands up. He denies syncope, fall, vertigo, or other injury. He says this started yesterday afternoon. He says going to the bathroom causes these symptoms, which are intermittent and resolve after a few minutes. He complains of fatigue and chronic pain in his legs. Otherwise no major complaints. No nausea, vomiting, chest pain, abdominal pain, fever, chills or other symptoms. Pt unsteady on his feet in the ER and is requesting placement for physical therapy.     ED Course (Summary - please note all labs, imaging studies, and interventions noted below have been personally reviewed and/or interpreted on day of admission):   Vitals on presentation: 97.7 F, 96 bpm, 20 rr, 116/77, 98% on RA  Labs: CMP glu 223,   Mag 1.52  Lactate 2.3 -> 1.8  Lipase 31  Trop 5 -> 5  CBC hg 13.4, plts 111  Flu and COVID PCR negative  VBG pH 7.38, pCO2 41, pO2 46, lactate 2.4 -> 1.9, HCO3 24.3  EKG: sinus rhythm at 98 bpm. LAFB.  Imaging: CXR - No acute cardiopulmonary process.   Interventions: Mag oxide 400 mg X1,  ml bolus, Pt admitted for further care.    2/11: Patient resting comfortably in bed. Denies chest pain, palpitations, dizziness, nausea or vomiting. Blood pressures have been stable. Lab work WNLs, UA unremarkable for UTI. TTE ordered, awaiting final report. PT/OT feel patient is below baseline, recommending skilled OT services and moderate therapy when patient medically stable for discharge. However, patient is comfortable with [facility]. Likely " discharge tomorrow.      Past Medical History  He has a past medical history of Cirrhosis (Multi) (08/28/2024), Diabetes (Multi), HTN (hypertension), Personal history of other diseases of the musculoskeletal system and connective tissue, Personal history of other mental and behavioral disorders, Right upper quadrant abdominal pain (06/11/2024), and Type 2 diabetes mellitus.    Surgical History  He has a past surgical history that includes Neck surgery (02/01/2018); Other surgical history (02/01/2018); Other surgical history (04/25/2022); and Cholecystectomy (N/A, 08/26/2024).     Social History  He reports that he has quit smoking. His smoking use included cigarettes. He has never used smokeless tobacco. He reports that he does not drink alcohol and does not use drugs.    Family History  Family History   Problem Relation Name Age of Onset    Dementia Mother      Heart attack Father      Cholecystitis Sister          x3    Cancer Father's Sister      Prostate cancer Father's Brother      Cancer Paternal Grandmother      Diabetes Paternal Grandfather         Allergies  Latex    Review of Systems   All pertinent positive symptoms are included in the history of present illness.   All other systems have been reviewed and are negative and noncontributory to this patient's current ailments and presentation.         Objective   Last Recorded Vitals  /69 (BP Location: Left arm, Patient Position: Lying)   Pulse 79   Temp 36.7 °C (98.1 °F) (Temporal)   Resp 16   Wt 98 kg (216 lb)   SpO2 98%     Intake/Output last 3 Shifts:    Intake/Output Summary (Last 24 hours) at 2/11/2025 1437  Last data filed at 2/11/2025 1341  Gross per 24 hour   Intake 965 ml   Output 1000 ml   Net -35 ml       Admission Weight  Weight: 98 kg (216 lb) (02/10/25 2134)    Daily Weight  02/10/25 : 98 kg (216 lb)      Image Results  ECG 12 lead    Result Date: 2/11/2025  Sinus rhythm Left anterior fascicular block ST depr, consider ischemia,  inferior leads Borderline ST elevation, anterolateral leads    XR chest 2 views    Result Date: 2/11/2025  Interpreted By:  Robert Sidhu, STUDY: XR CHEST 2 VIEWS;  2/10/2025 11:10 pm   INDICATION: Signs/Symptoms:weakness.     COMPARISON: 07/29/2024   ACCESSION NUMBER(S): [id number]   ORDERING CLINICIAN: KAITLYNN WANG   FINDINGS:     The cardiomediastinal silhouette and pulmonary vasculature are within normal limits. Bilateral nipple shadows are responsible for the symmetric bilateral nodules overlying the peripheral lungs. No consolidation, pleural effusion or pneumothorax.   ACDF noted.       No acute cardiopulmonary process.     MACRO: None.   Signed by: Robert Sidhu 2/11/2025 1:02 AM Dictation workstation:   [id number]     Lab Results  Results from last 7 days   Lab Units 02/11/25  0614 02/10/25  2228   WBC AUTO x10*3/uL 4.5 6.6   HEMOGLOBIN g/dL 11.8* 13.4*   HEMATOCRIT % 33.0* 36.6*   PLATELETS AUTO x10*3/uL 89* 111*      Results from last 7 days   Lab Units 02/11/25  0614 02/10/25  2228   SODIUM mmol/L 132* 131*   POTASSIUM mmol/L 4.4 4.5   CHLORIDE mmol/L 102 101   CO2 mmol/L 26 21   BUN mg/dL 22 23   CREATININE mg/dL 1.06 1.10   GLUCOSE mg/dL 244* 223*   CALCIUM mg/dL 8.7 9.0      Results from last 7 days   Lab Units 02/11/25  0614 02/10/25  2228   SODIUM mmol/L 132* 131*   POTASSIUM mmol/L 4.4 4.5   CHLORIDE mmol/L 102 101   CO2 mmol/L 26 21   BUN mg/dL 22 23   CREATININE mg/dL 1.06 1.10   CALCIUM mg/dL 8.7 9.0   PROTEIN TOTAL g/dL  --  6.9   BILIRUBIN TOTAL mg/dL  --  0.8   ALK PHOS U/L  --  111   ALT U/L  --  34   AST U/L  --  25   GLUCOSE mg/dL 244* 223*      Results from last 7 days   Lab Units 02/10/25  2228   MAGNESIUM mg/dL 1.52*        Medications  Scheduled medications:  amLODIPine, 5 mg, oral, Daily  ARIPiprazole, 30 mg, oral, Nightly  aspirin, 81 mg, oral, Daily  atorvastatin, 80 mg, oral, Nightly  busPIRone, 20 mg, oral, BID  dapagliflozin propanediol, 10 mg, oral,  Daily  donepezil, 5 mg, oral, Nightly  enoxaparin, 40 mg, subcutaneous, q24h  gabapentin, 400 mg, oral, BID  gabapentin, 800 mg, oral, Nightly  insulin lispro, 0-5 Units, subcutaneous, TID AC  metoprolol succinate XL, 75 mg, oral, Daily  pantoprazole, 40 mg, oral, Daily   Or  pantoprazole, 40 mg, intravenous, Daily  pneumococcal polysaccharide, 0.5 mL, intramuscular, During hospitalization  venlafaxine XR, 300 mg, oral, Daily      Continuous medications:     PRN medications:  PRN medications: bisacodyl, bisacodyl, dextrose, dextrose, glucagon, glucagon, guaiFENesin, melatonin, ondansetron **OR** ondansetron     Physical Exam:  Constitutional: Pleasant and cooperative. Laying in bed in no acute distress. Conversant.   Skin: Warm and dry; no obvious lesions, rashes, pallor, or jaundice.   Eyes: Anicteric sclera.   ENT: Mucous membranes dry; no obvious injury or deformity appreciated.   Head and Neck: No appreciable JVD.   Respiratory: Nonlabored on RA. Lungs clear to auscultation bilaterally without obvious adventitious sounds. Chest rise is equal.  Cardiovascular: RRR. No gross murmur, gallop, or rub. Extremities are warm and well-perfused with good capillary refill (< 3 seconds)  Gastro: Abdomen soft, nontender, nondistended. No obvious organomegaly appreciated.   MSK:  No limitations to AROM/PROM appreciated. 5/5 muscle strength bilaterally  Extremities: No cyanosis, edema, or clubbing evident. Neurovascularly intact.   Neuro: A&Ox3. Able to respond to questions appropriately and clearly. No acute focal neurologic deficits appreciated.  Psych: Appropriate mood and behavior.     Assessment/Plan   Near syncope, possible vasovagal vs orthostatic   Gentle hydration overnight  Check orthostatics in AM.  Echo 8/24 showed normal LVEF.  Pt/OT/SS consulted for possible SNF.  2/11: TTE ordered, not yet resulted. PT/OT recommending SNF placement. Patient prefers home health care. Will monitor for 1 more night with planned  discharge tomorrow.      DM2   SSI while inpatient. Continue Farxiga  Accuchecks, hypoglycemic protocol  Check A1c  2/11: Continue Accuchecks    Depression/bipolar/memory loss  Continue home psych meds     Code Status: Full Code   DVT Prophylaxis: Lovenox subQ  Please see orders for complete plan     Clarice Palacios  PA student     Pt seen and examined  with my PA student . I have modified the note to reflect my documentation of HPI and assessment and plan.    Echo showed no regional wall abnormalities.  Will plan discharge home with home health care tomorrow.    Nadya Dai MD MRCP

## 2025-02-11 NOTE — ED PROVIDER NOTES
HPI   Chief Complaint   Patient presents with    Dizziness    Nausea    Vomiting     Pt states did not sleep last night and took med 1300 to try and sleep  did not sleep but was drowsy  got up to bathroom and was dizzy  and had loose stool    called nursr for insurance and sent to ED  pt also states has had  intermittent feeling across lower chest for several weeks    pt seems concerned about caring for self        64-year-old male with past medical history of hypertension/hyperlipidemia, type 2 diabetes, depression/bipolar, essential tremor, left 6th cranial nerve palsy who presents to the emergency department for dizziness and intermittent feeling over his lower chest for several weeks.  Patient states that today he had to go to the bathroom twice and when he stood up he got a head rush.  He is not feeling dizzy currently.  He states that his bowel movements looked strange to him there was no blood or melena.  He reports that he was sitting in a wheelchair his legs hurt him but this pain went away.  He states he feels more fatigued.  He is also been having pain across his chest for multiple weeks.  No other complaints otherwise.                          Gilson Coma Scale Score: 15                  Patient History   Past Medical History:   Diagnosis Date    Cirrhosis (Multi) 08/28/2024    Diabetes (Multi)     HTN (hypertension)     Personal history of other diseases of the musculoskeletal system and connective tissue     History of gout    Personal history of other mental and behavioral disorders     History of depression    Right upper quadrant abdominal pain 06/11/2024    Type 2 diabetes mellitus      Past Surgical History:   Procedure Laterality Date    CHOLECYSTECTOMY N/A 08/26/2024    Laparoscopic converted to open partial cholecystectomy and drain placement    NECK SURGERY  02/01/2018    Neck Surgery    OTHER SURGICAL HISTORY  02/01/2018    Surgery Excision Lipoma    OTHER SURGICAL HISTORY  04/25/2022     Colonoscopy     Family History   Problem Relation Name Age of Onset    Dementia Mother      Heart attack Father      Cholecystitis Sister          x3    Cancer Father's Sister      Prostate cancer Father's Brother      Cancer Paternal Grandmother      Diabetes Paternal Grandfather       Social History     Tobacco Use    Smoking status: Former     Types: Cigarettes    Smokeless tobacco: Never   Vaping Use    Vaping status: Never Used   Substance Use Topics    Alcohol use: Never    Drug use: Never       Physical Exam   ED Triage Vitals [02/10/25 2134]   Temperature Heart Rate Respirations BP   36.5 °C (97.7 °F) 96 20 116/77      Pulse Ox Temp Source Heart Rate Source Patient Position   98 % Tympanic Monitor Sitting      BP Location FiO2 (%)     Left arm --       Physical Exam  Constitutional:       General: He is not in acute distress.  HENT:      Head: Normocephalic and atraumatic.      Right Ear: Tympanic membrane normal.      Left Ear: Tympanic membrane normal.      Mouth/Throat:      Mouth: Mucous membranes are moist.   Eyes:      Extraocular Movements: Extraocular movements intact.      Conjunctiva/sclera: Conjunctivae normal.      Pupils: Pupils are equal, round, and reactive to light.   Cardiovascular:      Rate and Rhythm: Normal rate and regular rhythm.      Heart sounds: No murmur heard.  Pulmonary:      Effort: Pulmonary effort is normal. No respiratory distress.      Breath sounds: Normal breath sounds. No stridor. No wheezing or rales.   Abdominal:      General: Bowel sounds are normal. There is no distension.      Tenderness: There is no abdominal tenderness. There is no guarding or rebound.   Musculoskeletal:         General: No swelling, tenderness or deformity. Normal range of motion.   Skin:     General: Skin is warm and dry.      Coloration: Skin is not jaundiced.      Findings: No bruising or lesion.   Neurological:      General: No focal deficit present.      Mental Status: He is alert and  oriented to person, place, and time. Mental status is at baseline.      Cranial Nerves: No cranial nerve deficit.      Motor: No weakness.   Psychiatric:         Mood and Affect: Mood normal.       Labs Reviewed - No data to display  No orders to display       ED Course & MDM   ED Course as of 02/11/25 0150   Mon Feb 10, 2025   2158 EKG on my read shows sinus rhythm rate 98 bpm, LA interval is 152, QTc is 463, no ST elevations seen.  There is a lot of wander but I do not see any obvious signs of STEMI. [CF]      ED Course User Index  [CF] Megan Gamez MD         Diagnoses as of 02/11/25 0150   Weakness       Medical Decision Making  This is a 64-year-old male who presents to the emergency department for intermittent dizziness and lower chest pain for several weeks.  He felt dizzy when standing currently he does not have any symptoms.  His EKG is nonischemic his troponin is negative x 2.  He has normal electrolytes.  Except for magnesium which is replaced.  COVID and flu are negative.  Chest x-ray shows no acute pathology.  Patient is was trying negative and he is not having any dizzy spells on standing anymore.  He does feel unsteady on his feet but states that this is how he has been he is requesting placement for physical therapy.  At this time will admit patient for physical therapy and social work consult.        Procedure  Procedures     Megan Gamez MD  02/11/25 0150

## 2025-02-11 NOTE — PROGRESS NOTES
Mane Nath is a 64 y.o. male admitted for Pre-syncope. Pharmacy reviewed the patient's cnypy-ma-iozbeepog medications and allergies for accuracy.    The list below reflects the PTA list prior to pharmacy medication history. A summary a changes to the PTA medication list has been listed below. Please review each medication in order reconciliation for additional clarification and justification.    Source of information: t2p     Medications added:  Metoprolol succinate 25mg - 1 every day     Medications modified:    Medications to be removed:  Norvasc 5mg   Metoprolol succinate 50mg     Medications of concern:  Trulicity 3mg/0.5ml pen - last filled 9/15 x7days      Prior to Admission Medications   Prescriptions Last Dose Informant Patient Reported? Taking?   ARIPiprazole (Abilify) 30 mg tablet   Yes No   Sig: Take 1 tablet (30 mg) by mouth once daily at bedtime.   Trulicity 3 mg/0.5 mL pen injector   Yes No   Sig: Inject 4.5 mg as directed 1 (one) time per week. ON SUNDAYS   acetaminophen (Tylenol) 325 mg tablet   No No   Sig: Take 2 tablets (650 mg) by mouth every 4 hours if needed for mild pain (1 - 3), headaches, fever (temp greater than 38.0 C) or moderate pain (4 - 6).   amLODIPine (Norvasc) 5 mg tablet   Yes No   Sig: Take by mouth once daily.   aspirin 81 mg EC tablet   No No   Sig: Take 1 tablet (81 mg) by mouth once daily.   atorvastatin (Lipitor) 80 mg tablet   Yes No   Sig: Take 1 tablet (80 mg) by mouth early in the morning..   busPIRone (Buspar) 10 mg tablet   Yes No   Sig: take 2 tablets by mouth twice a day as directed   dapagliflozin propanediol (Farxiga) 10 mg   Yes No   Sig: Take 1 tablet (10 mg) by mouth once daily.   diclofenac sodium (Voltaren Arthritis Pain) 1 % gel   Yes No   Sig: Apply topically. Use as directeD PRN   docusate sodium (Colace) 100 mg capsule   Yes No   Sig: TAKE ONE (1) CAPSULE BY MOUTH ONCE DAILY AS NEEDED. **(COLACE 100 MG CAP EQUIV)**   donepezil (Aricept) 5 mg tablet    Yes No   Sig: Take 1 tablet (5 mg) by mouth once daily at bedtime.   gabapentin (Neurontin) 400 mg capsule   Yes No   Sig: Take 1 capsule by mouth twice a day and 2 capsules at bedtime   hydrOXYzine pamoate (Vistaril) 25 mg capsule   Yes No   Sig: Take 1 capsule (25 mg) by mouth as needed at bedtime (for sleep after awake for 1 hour in bed).   metFORMIN (Glucophage) 1,000 mg tablet   Yes No   Sig: Take 1 tablet (1,000 mg) by mouth 2 times a day. With breakfast and dinner   metoprolol succinate XL (Toprol-XL) 50 mg 24 hr tablet   No No   Sig: Take 1.5 tablets (75 mg) by mouth once daily.   omeprazole (PriLOSEC) 40 mg DR capsule   Yes No   Sig: Take 1 capsule (40 mg) by mouth once daily. Do not crush or chew.   polyethylene glycol (Miralax) 17 gram/dose powder   Yes No   Sig: Mix 17 g of powder and drink once daily.   simethicone (Mylicon) 80 mg chewable tablet   Yes No   Sig: Chew 1 tablet (80 mg) 3 times a day before meals. And at bedtime prn   traZODone (Desyrel) 100 mg tablet   Yes No   Sig: take 1 to 3 tablet by mouth at bedtime if needed   venlafaxine XR (Effexor-XR) 150 mg 24 hr capsule   Yes No   Sig: take 2 capsules by mouth once daily as directed      Facility-Administered Medications: None       SNEHA NICHOLS

## 2025-02-11 NOTE — CARE PLAN
The patient's goals for the shift include      The clinical goals for the shift include patient safety.

## 2025-02-11 NOTE — PROGRESS NOTES
Physical Therapy    Physical Therapy Evaluation    Patient Name: Mane Nath  MRN: [id number]  Department: [facility]  Room: [facility]3/2313-A  Today's Date: 2/11/2025   Time Calculation  Start Time: 0953  Stop Time: 1029  Time Calculation (min): 36 min    Assessment/Plan   PT Assessment  PT Assessment Results: Decreased strength, Decreased endurance, Impaired balance, Decreased mobility, Decreased safety awareness, Impaired sensation, Impaired hearing, Impaired vision  Rehab Prognosis: Fair  Barriers to Discharge Home: Caregiver assistance, Cognition needs, Physical needs  Caregiver Assistance: Caregiver assistance needed per identified barriers - however, level of patient's required assistance exceeds assistance available at home  Cognition Needs: 24hr supervision for safety awareness needed, Medication and/or medical management daily assist needed, Recollection or understanding of precautions/restrictions limited, Recollection or understanding of home exercise program limited, Insight of patient limited regarding functional ability/needs, Cognition-related high falls risk  Physical Needs: Stair navigation into home limited by function/safety, In-home setup navigation limited by function/safety, Ambulating household distances limited by function/safety, 24hr mobility assistance needed, 24hr ADL assistance needed, High falls risk due to function or environment  Evaluation/Treatment Tolerance: Patient limited by fatigue  End of Session Communication: Bedside nurse (MOBILITY STATUS)  Assessment Comment: 1-2 A FOR BED MOBILITY AND TO TRANSFER TO [facility] AT BEDSIDE ,UNSTADYW/ LIGHTHEADEDNESS, BP STABLE, DECRESAED PSOCESSING OF COMANDS DIFFICULTY W/ WORD PROCESSING  End of Session Patient Position: Up in chair, Alarm on (CALL LIGHT IN REACH)  IP OR SWING BED PT PLAN  Inpatient or Swing Bed: Inpatient  PT Plan  Treatment/Interventions: Bed mobility, Transfer training, Gait training, Strengthening, Endurance training  PT  Plan: Ongoing PT  PT Frequency: 3 times per week  PT Discharge Recommendations: Moderate intensity level of continued care  Equipment Recommended upon Discharge:  (TBD)  PT Recommended Transfer Status: Assist x1, Assist x2 (FWW)  PT - OK to Discharge: Yes (WHEN MEDICALLY CLEARED)    Subjective   General Visit Information:  General  Reason for Referral: IMPAIRED MOBILTIY  Referred By: ALEXANDRA  Past Medical History Relevant to Rehab: D/V/N; DX: NEAR SYNCOPE; HX: HTN, DM, BIPOLAR, DEPRESSION, TREMOR, L 6TH CRANIAL NERVE PALSY, DOUBLE VISION, CIRRHOSIS, GOUT, NECHSURG, FALL  Co-Treatment: OT  Co-Treatment Reason: FACILICTATE MOBILTIY SAFETY  Prior to Session Communication: Bedside nurse (OK FOR THERAPY)  Patient Position Received: Bed, 3 rail up, Alarm on (ROOM 2313 ALERT IV; AGREES TO THERAPY)  Home Living:  Home Living  Home Living Comments: Pt reports living in a mobile home with his brother who works at nights, asleep most of the daytime.  6 LIS with handrail on L side going up, states difficulty with navigating steps. Tub/shower,  shower with shower chair, states he has been sponge bathing latey due to shower being broken.  Prior Level of Function:  Prior Function Per Pt/Caregiver Report  Level of [place]: Independent with ADLs and functional transfers, Independent with homemaking with ambulation  Prior Function Comments: HAS QUADCANE AND FWW AMB W/ THE FWW, BROTHER DOESTHE DRIVIGN/SHOPPING  Precautions:  Precautions  Hearing/Visual Limitations: [place], DOUBLE VISION  Medical Precautions: Fall precautions               Objective   Pain:  Pain Assessment  0-10 (Numeric) Pain Score:  (C/O FOOT PAIN & SHOULDER PAIN DIDNOT RATE)  Cognition:  Cognition  Overall Cognitive Status: Within Functional Limits  Arousal/Alertness: Delayed responses to stimuli  Orientation Level: Oriented X4  Problem Solving: Assistance required to identify errors made  Attention: Exceptions to WFL  Sustained Attention: Impaired  Memory:  Exceptions to WFL  Short-Term Memory: Impaired  Memory Comments: PT. C/O HAVING DIFFICULTY GETTING HIS WORDS OUT AND C/O NOT PROCESING VERY WELL  Problem Solving: Exceptions to WFL  Complex Functional Tasks: Impaired  Safety/Judgement: Exceptions to WFL  Complex Functional Tasks: Moderate  Novel Situations: Moderate  Routine Tasks: Moderate  Unable to Self-Monitor and Self-Correct Consistently: Moderate  Task Initiation: Delayed initiation  Flexibility of Thought: Reduced flexibility  Planning: Reduced planning skills  Organization: Moderately disorganized  Processing Speed: Delayed    General Assessments:                  Activity Tolerance  Endurance: Tolerates less than 10 min exercise with changes in vital signs, Decreased tolerance for upright activites    Sensation  Sensation Comment: REPORTS NEUROPATHY IN HANDS/FEET    Strength  Strength Comments: ROM IN LEGS WFL, MILD DECREASED HAMSTRING FLEXIBILITY;  STRENGTHIN LEGS 3/5  Strength  Strength Comments: ROM IN LEGS WFL, MILD DECREASED HAMSTRING FLEXIBILITY;  STRENGTHIN LEGS 3/5                     Static Sitting Balance  Static Sitting-Comment/Number of Minutes: FAIR/FAIR-  Dynamic Sitting Balance  Dynamic Sitting-Comments: FAIR-    Static Standing Balance  Static Standing-Comment/Number of Minutes: FAIR-  Dynamic Standing Balance  Dynamic Standing-Comments: FAIR-  Functional Assessments:  Bed Mobility  Bed Mobility:  (MIN A X1 SUPINE>SIT, SITS EOB 5 MIN S/ SBA/CGA)    Transfers  Transfer:  (SIT<>STAND CGA 1-2 A FWW, VC FOR SAFETY, 2 REPS W/ SEATED REST BETWEEN)    Ambulation/Gait Training  Ambulation/Gait Training Performed:  (3 FT TO CHIAR AT BEDSIDE, FWW  CGA A/ MIN A, MOVES SLOWLY SLIDES FEET ON FLOOR, C/O LIGHTHEADEDNESS W/ MOBILTIY SO TOOK BP SEATED 131/78, STANDING 129/81)  Extremity/Trunk Assessments:     Outcome Measures:  [facility] Basic Mobility  Turning from your back to your side while in a flat bed without using bedrails: A little  Moving from lying  on your back to sitting on the side of a flat bed without using bedrails: A lot  Moving to and from bed to chair (including a wheelchair): A lot  Standing up from a chair using your arms (e.g. wheelchair or bedside chair): A lot  To walk in hospital room: A lot  Climbing 3-5 steps with railing: Total  Basic Mobility - Total Score: 12    Encounter Problems       Encounter Problems (Active)       Mobility       STG - Patient will ambulate (Not Progressing)       Start:  02/11/25    Expected End:  02/21/25       FWW CGA X1 50 FT         Goal 1 (Not Progressing)       Start:  02/11/25    Expected End:  03/04/25       20 RESP RROM INCREASING STRENGTH TO STABILIZE GAIT            PT Transfers       STG - Patient to transfer to and from sit to supine (Not Progressing)       Start:  02/11/25    Expected End:  02/14/25       SBA USING RAILS         STG - Patient will transfer sit to and from stand (Not Progressing)       Start:  02/11/25    Expected End:  02/18/25       FWW SBA USING PROPER TECHNIQUE            Pain - Adult              Education Documentation  Mobility Training, taught by Amy He, PT at 2/11/2025  1:57 PM.  Learner: Patient  Readiness: Acceptance  Method: Explanation  Response: Needs Reinforcement  Comment: FWW SAFETY    Education Comments  No comments found.

## 2025-02-11 NOTE — CONSULTS
Progress note - MST screen    64 y.o. male with PMHx s/f HTN/HLD, DM2, depression/bipolar, essential tremor, L cranial nerve palsy presenting with near syncope. Pt very somnolent on exam owing to the time of day.      2/11:  Pt sitting on side of bed, reports good PO intake, and appetite.  Pt reports stable weight, and per weight records weight gain noted.  Noted possible discharge today after testing per patient.  Current diet is adequate, skin intact, pt reports no need for any diet education, and no nutrition concerns at this time.     Dietary Orders (From admission, onward)       Start     Ordered    02/11/25 0558  May Participate in Room Service With Assistance  ( ROOM SERVICE MAY PARTICIPATE WITH ASSISTANCE)  Once        Question:  .  Answer:  Yes    02/11/25 0557    02/11/25 0219  Adult diet Consistent Carb; CCD 60 gm/meal  Diet effective now        Question Answer Comment   Diet type Consistent Carb    Carb diet selection: CCD 60 gm/meal        02/11/25 0218                     Wt Readings from Last 10 Encounters:   02/10/25 98 kg (216 lb)   01/06/25 95.3 kg (210 lb)   10/08/24 84.8 kg (187 lb)   09/23/24 84.8 kg (187 lb)   09/20/24 85 kg (187 lb 6.4 oz)   09/06/24 85.4 kg (188 lb 3.2 oz)   08/26/24 95.8 kg (211 lb 3.2 oz)   08/16/24 93.9 kg (207 lb 0.2 oz)   07/29/24 93.9 kg (207 lb)   07/02/24 93.4 kg (206 lb)

## 2025-02-11 NOTE — PROGRESS NOTES
02/11/25 1247   Discharge Planning   Living Arrangements Family members   Support Systems Family members   Assistance Needed yes   Type of Residence Private residence   Home or Post Acute Services Post acute facilities (Rehab/SNF/etc)   Type of Post Acute Facility Services Skilled nursing   Expected Discharge Disposition SNF   Does the patient need discharge transport arranged? Yes   RoundTrip coordination needed? Yes   Patient Choice   Provider Choice list and CMS website (https://medicare.gov/care-compare#search) for post-acute Quality and Resource Measure Data were provided and reviewed with: Patient   Intensity of Service   Intensity of Service 0-30 min     Met with patient at bedside, introduced self and role as RN TCC. Patient lives at home with brother. He states he is normally independent in his own care. He states he has been feeling lightheaded, dizzy, fell off couch. He uses quad cane and walker at home for ambulation. PT OT pending at this time. He is agreeable to rehab if recommended, then transition to Home Care when done. He was at [facility] before and states he will not go back there. Will have Careport list printed for patient to review. Awaiting PT OT melania. TCC to follow.     Met with patient at bedside, he is okay with Home Care, he used [facility] in past. Will alert Dr Dai to place orders on DC when ready.     [facility] cannot see patient within a safe time frame, referral sent to several agencies, [facility] is able to accept. Will send [facility] to agency.

## 2025-02-11 NOTE — PROGRESS NOTES
Occupational Therapy  Evaluation    Patient Name: Mane Nath  MRN: [id number]  Today's Date: 2/11/2025  Time Calculation  Start Time: 0954  Stop Time: 1029  Time Calculation (min): 35 min    Current Problem:   1. Heart palpitations    2. Weakness    3. Dizziness        OT order: OT eval and treat   Referred by: Kevin  Reason for referral: ADL; Dx: Pre-syncope  Past medical history related to rehab: HTN/HLD, DM2, depression/bipolar, essential tremor, L cranial nerve palsy    Precautions:   Medical Precautions: Fall precautions    ASSESSMENT  OT Assessment: Pt is a 64 year old male presenting with dizziness and pre-syncope. Prior to admit, pt  reports being mostly independent  with ADLs, requiring A with LB dressing, required A with iADLs, did not use device for mobility. Upon eval pt required min A for bed mobility, total A for LB dressing. Pt able to complete transfers with CGA and FWW. Pt appears below functional baseline and would benefit from acute skilled OT services and mod therapy when medically stable for d/c.. Pt with Decreased ADL status, Decreased safe judgment during ADL, Decreased endurance, Decreased functional mobility  Prognosis: Fair  Barriers to discharge home: Caregiver assistance, Physical needs  Caregiver assistance needed per identified barriers - however, level of patient's required assistance exceeds assistance available at home  Stair navigation into home limited by function/safety, Intermittent mobility assistance needed, Intermittent ADL assistance needed, High falls risk due to function or environment     Tolerance: Patient limited by fatigue    PLAN  Frequency: 3 times per week  Treatment Interventions: ADL retraining, Functional transfer training, Endurance training  Discharge Recommendations: Moderate intensity level of continued care  OT OK to discharge: Yes (when medically cleared)    GENERAL VISIT INFORMATION   Start of session communication: Bedside nurse  End of session  "communication: Bedside nurse  Family/caregiver present: No  Caregiver feedback:    Co-Treatment: PT  Reason for co-treatment: To maximize pt safety while focusing on specifc discipline goals   Position Pt Received:  Bed, 3 rail up, Alarm on  End of session position: Alarm on, Up in chair (call light within reach)    SUBJECTIVE  Home Living:  Home Living Comments: Pt reports living in a mobile home with his brother who works at nights, asleep most of the daytime.  6 LIS with handrail on L side going up, states difficulty with navigating steps. Tub/shower,  shower with shower chair, states he has been sponge bathing latey due to shower being broken.     Prior Level of Function:  Level of [place]: Independent with ADLs and functional transfers, Needs assistance with homemaking  Prior Function Comments: Pt reports being independent with ADL and A with IADLs. Uses FWW for mobility. Reports 1 fall recently, rolled off of his couch when sleeping, stated he was able to get himself up.    IADL History:  Homemaking Responsibilities: No  Current License: No  Mode of Transportation: Family    Pain:  Assessment: 0-10  Score:  (Noted bilateral foot pain, did not numerically rate)    OBJECTIVE  Vital Signs:  BP:  (131/78 sitting, 129/81  standing)    Cognition:  Overall Cognitive Status: Impaired (Pt stated several times, \"I am confused\" and had slow speech, delayed processing time)  Attention: Exceptions to WFL  Safety/Judgement: Exceptions to WFL  Insight: Mild  Task Initiation: Delayed initiation  Flexibility of Thought: Reduced flexibility  Processing Speed: Delayed  Cognition Test Scores  Cognition Tests: Cognition Test Performed  SBT Test Score: 3= normal cognition (Increased time to complete)          Current ADL function:   EATING:  Minimal Setup, Increased time to complete, Supervision/safety   GROOMING: Moderate     BATHING: Maximal     UB DRESSING: Moderate     LB DRESSING: Total Don/doff R sock, Don/doff L " sock   TOILETING: Maximal    ADL comments:  Anticipated otherwise stated    Activity Tolerance:  Endurance: Tolerates less than 10 min exercise, no significant change in vital signs, Decreased tolerance for upright activites    Bed Mobility/Transfers:   Bed Mobility  Bed Mobility:  (Supine > sit with min A for trunk control, verbal cues for initation. No dizziness reported with postional changes)  Transfers  Transfer:  (STS <> x2 trials from EOB and low chair with FWW and CGA. Increased time to initiate. No dizziness noted)    Ambulation/Gait Training:  Functional Mobility  Functional Mobility Performed:  (Side steppping at EOB with FWW and CGA)    Sitting Balance:  Static Sitting Balance  Static Sitting-Level of Assistance: Close supervision  Dynamic Sitting Balance  Dynamic Sitting-Level of Assistance: Close supervision    Standing Balance:  Static Standing Balance  Static Standing-Level of Assistance: Contact guard  Dynamic Standing Balance  Dynamic Standing-Level of Assistance: Contact guard    Vision: Vision - Basic Assessment  Current Vision: Other (Comment)  Visual History: Other (Comment) (L cranial nerve palsy, double vision in L eye)   and      Sensation:  Light Touch: No apparent deficits (noted numbness in hand on/off)    Strength:  Strength Comments: B UE grossly 3+/5    Perception:  Inattention/Neglect: Appears intact    Coordination:  Movements are Fluid and Coordinated: Yes     Hand Function:  Hand Function  Gross Grasp: Functional  Coordination: Functional    Extremities: RUE   RUE : Within Functional Limits and LUE   LUE: Within Functional Limits    Outcome Measures: [facility] Daily Activity   Putting on and taking off regular lower body clothing: Total  Bathing (including washing, rinsing, drying): A lot  Putting on and taking off regular upper body clothing: A lot  Toileting, which includes using toilet, bedpan or urinal: A lot  Taking care of personal grooming such as brushing teeth: A lot   Eating  Meals: A little   Daily Activity - Total Score: 12    EDUCATION:     Education Documentation  Body Mechanics, taught by Cece Banks OT at 2/11/2025 12:45 PM.  Learner: Patient  Readiness: Acceptance  Method: Explanation  Response: Verbalizes Understanding  Comment: ADL and transfer safety/retraining    ADL Training, taught by Cece Banks OT at 2/11/2025 12:45 PM.  Learner: Patient  Readiness: Acceptance  Method: Explanation  Response: Verbalizes Understanding  Comment: ADL and transfer safety/retraining    Education Comments  No comments found.        Goals:   Encounter Problems       Encounter Problems (Active)       ADLs       Patien tto complete lower body dressing with minimal assist  level of assistance with PRN adaptive equipment while supported sitting       Start:  02/11/25    Expected End:  02/25/25            Patient will complete toileting including hygiene clothing management/hygiene with minimal assist  level of assistance and bedside commode.       Start:  02/11/25    Expected End:  02/25/25               TRANSFERS       Patient will complete functional transfer to [facility] with front wheeled walker with supervision level of assistance.       Start:  02/11/25    Expected End:  02/25/25

## 2025-02-11 NOTE — H&P
"[facility] - GENERAL MEDICINE HISTORY AND PHYSICAL    History Obtained From (Primary Source): Patient  Collateral History (Secondary Sources): D/w ED physician    History Of Present Illness (HPI):  Mane Nath is a 64 y.o. male with PMHx s/f HTN/HLD, DM2, depression/bipolar, essential tremor, L cranial nerve palsy presenting with near syncope. Pt very somnolent on exam owing to the time of day. He says over the past few days he has been having symptoms of a \"head rush\" with a near-syncopal feeling when he stands up. He denies syncope, fall, vertigo, or other injury. He says this started yesterday afternoon. He says going to the bathroom causes these symptoms, which are intermittent and resolve after a few minutes. He complains of fatigue and chronic pain in his legs. Otherwise no major complaints. No nausea, vomiting, chest pain, abdominal pain, fever, chills or other symptoms. Pt unsteady on his feet in the ER and is requesting placement for physical therapy.    ED Course (Summary - please note all labs, imaging studies, and interventions noted below have been personally reviewed and/or interpreted on day of admission):   Vitals on presentation: 97.7 F, 96 bpm, 20 rr, 116/77, 98% on RA  Labs: CMP glu 223,   Mag 1.52  Lactate 2.3 -> 1.8  Lipase 31  Trop 5 -> 5  CBC hg 13.4, plts 111  Flu and COVID PCR negative  VBG pH 7.38, pCO2 41, pO2 46, lactate 2.4 -> 1.9, HCO3 24.3  EKG: sinus rhythm at 98 bpm. LAFB.  Imaging: CXR - No acute cardiopulmonary process.   Interventions: Mag oxide 400 mg X1,  ml bolus, Pt admitted for further care.    12-point ROS reviewed and found to be negative aside from aforementioned positives in HPI and/or noted in dedicated ROS section below.     ED Course (From ED Provider):  ED Course as of 02/11/25 0203   Mon Feb 10, 2025   2158 EKG on my read shows sinus rhythm rate 98 bpm, MI interval is 152, QTc is 463, no ST elevations seen.  There is a lot of wander " but I do not see any obvious signs of STEMI. [CF]      ED Course User Index  [CF] Megan Gamez MD         Diagnoses as of 02/11/25 0203   Weakness     Relevant Results  Results for orders placed or performed during the hospital encounter of 02/10/25 (from the past 24 hours)   POCT GLUCOSE   Result Value Ref Range    POCT Glucose 222 (H) 74 - 99 mg/dL   CBC and Auto Differential   Result Value Ref Range    WBC 6.6 4.4 - 11.3 x10*3/uL    nRBC 0.0 0.0 - 0.0 /100 WBCs    RBC 4.09 (L) 4.50 - 5.90 x10*6/uL    Hemoglobin 13.4 (L) 13.5 - 17.5 g/dL    Hematocrit 36.6 (L) 41.0 - 52.0 %    MCV 90 80 - 100 fL    MCH 32.8 26.0 - 34.0 pg    MCHC 36.6 (H) 32.0 - 36.0 g/dL    RDW 13.9 11.5 - 14.5 %    Platelets 111 (L) 150 - 450 x10*3/uL    Neutrophils % 66.3 40.0 - 80.0 %    Immature Granulocytes %, Automated 0.5 0.0 - 0.9 %    Lymphocytes % 25.3 13.0 - 44.0 %    Monocytes % 7.2 2.0 - 10.0 %    Eosinophils % 0.5 0.0 - 6.0 %    Basophils % 0.2 0.0 - 2.0 %    Neutrophils Absolute 4.40 1.20 - 7.70 x10*3/uL    Immature Granulocytes Absolute, Automated 0.03 0.00 - 0.70 x10*3/uL    Lymphocytes Absolute 1.68 1.20 - 4.80 x10*3/uL    Monocytes Absolute 0.48 0.10 - 1.00 x10*3/uL    Eosinophils Absolute 0.03 0.00 - 0.70 x10*3/uL    Basophils Absolute 0.01 0.00 - 0.10 x10*3/uL   Magnesium   Result Value Ref Range    Magnesium 1.52 (L) 1.60 - 2.40 mg/dL   Comprehensive metabolic panel   Result Value Ref Range    Glucose 223 (H) 74 - 99 mg/dL    Sodium 131 (L) 136 - 145 mmol/L    Potassium 4.5 3.5 - 5.3 mmol/L    Chloride 101 98 - 107 mmol/L    Bicarbonate 21 21 - 32 mmol/L    Anion Gap 14 10 - 20 mmol/L    Urea Nitrogen 23 6 - 23 mg/dL    Creatinine 1.10 0.50 - 1.30 mg/dL    eGFR 76 >60 mL/min/1.73m*2    Calcium 9.0 8.6 - 10.3 mg/dL    Albumin 4.1 3.4 - 5.0 g/dL    Alkaline Phosphatase 111 33 - 136 U/L    Total Protein 6.9 6.4 - 8.2 g/dL    AST 25 9 - 39 U/L    Bilirubin, Total 0.8 0.0 - 1.2 mg/dL    ALT 34 10 - 52 U/L   Lipase   Result  Value Ref Range    Lipase 31 9 - 82 U/L   Lactate   Result Value Ref Range    Lactate 2.3 (H) 0.4 - 2.0 mmol/L   Blood Gas Venous Full Panel   Result Value Ref Range    POCT pH, Venous 7.38 7.33 - 7.43 pH    POCT pCO2, Venous 41 41 - 51 mm Hg    POCT pO2, Venous 46 (H) 35 - 45 mm Hg    POCT SO2, Venous 76 (H) 45 - 75 %    POCT Oxy Hemoglobin, Venous 73.9 45.0 - 75.0 %    POCT Hematocrit Calculated, Venous 39.0 (L) 41.0 - 52.0 %    POCT Sodium, Venous 130 (L) 136 - 145 mmol/L    POCT Potassium, Venous 4.5 3.5 - 5.3 mmol/L    POCT Chloride, Venous 101 98 - 107 mmol/L    POCT Ionized [place], Venous 1.18 1.10 - 1.33 mmol/L    POCT Glucose, Venous 228 (H) 74 - 99 mg/dL    POCT Lactate, Venous 2.4 (H) 0.4 - 2.0 mmol/L    POCT Base Excess, Venous -0.8 -2.0 - 3.0 mmol/L    POCT HCO3 Calculated, Venous 24.3 22.0 - 26.0 mmol/L    POCT Hemoglobin, Venous 13.0 (L) 13.5 - 17.5 g/dL    POCT Anion Gap, Venous 9.0 (L) 10.0 - 25.0 mmol/L    Patient Temperature 37.0 degrees Celsius    FiO2 21 %   Troponin I, High Sensitivity, Initial   Result Value Ref Range    Troponin I, High Sensitivity 5 0 - 20 ng/L   Sars-CoV-2 PCR   Result Value Ref Range    Coronavirus 2019, PCR Not Detected Not Detected   Influenza A, and B PCR   Result Value Ref Range    Flu A Result Not Detected Not Detected    Flu B Result Not Detected Not Detected   Troponin, High Sensitivity, 1 Hour   Result Value Ref Range    Troponin I, High Sensitivity 5 0 - 20 ng/L   Blood Gas Lactic Acid, Venous   Result Value Ref Range    POCT Lactate, Venous 1.9 0.4 - 2.0 mmol/L   Lactate   Result Value Ref Range    Lactate 1.8 0.4 - 2.0 mmol/L      XR chest 2 views    Result Date: 2/11/2025  Interpreted By:  Robert Sidhu, STUDY: XR CHEST 2 VIEWS;  2/10/2025 11:10 pm   INDICATION: Signs/Symptoms:weakness.     COMPARISON: 07/29/2024   ACCESSION NUMBER(S): [id number]   ORDERING CLINICIAN: KAITLYNN WANG   FINDINGS:     The cardiomediastinal silhouette and pulmonary  vasculature are within normal limits. Bilateral nipple shadows are responsible for the symmetric bilateral nodules overlying the peripheral lungs. No consolidation, pleural effusion or pneumothorax.   ACDF noted.       No acute cardiopulmonary process.     MACRO: None.   Signed by: Robert Sidhu 2/11/2025 1:02 AM Dictation workstation:   [id number]    Scheduled medications:  amLODIPine, 5 mg, oral, Daily  ARIPiprazole, 30 mg, oral, Nightly  aspirin, 81 mg, oral, Daily  atorvastatin, 80 mg, oral, Daily  busPIRone, 20 mg, oral, Daily  dapagliflozin propanediol, 10 mg, oral, Daily  donepezil, 5 mg, oral, Nightly  enoxaparin, 40 mg, subcutaneous, q24h  gabapentin, 400 mg, oral, Daily  gabapentin, 800 mg, oral, Nightly  metoprolol succinate XL, 75 mg, oral, Daily  pantoprazole, 40 mg, oral, Daily before breakfast   Or  pantoprazole, 40 mg, intravenous, Daily before breakfast  venlafaxine XR, 150 mg, oral, Daily with breakfast      Continuous medications:  sodium chloride 0.9%, 100 mL/hr      PRN medications:  PRN medications: bisacodyl, bisacodyl, guaiFENesin, melatonin, ondansetron **OR** ondansetron     Past Medical History  He has a past medical history of Cirrhosis (Multi) (08/28/2024), Diabetes (Multi), HTN (hypertension), Personal history of other diseases of the musculoskeletal system and connective tissue, Personal history of other mental and behavioral disorders, Right upper quadrant abdominal pain (06/11/2024), and Type 2 diabetes mellitus.    Surgical History  He has a past surgical history that includes Neck surgery (02/01/2018); Other surgical history (02/01/2018); Other surgical history (04/25/2022); and Cholecystectomy (N/A, 08/26/2024).     Social History  He reports that he has quit smoking. His smoking use included cigarettes. He has never used smokeless tobacco. He reports that he does not drink alcohol and does not use drugs.    Family History  Family History   Problem Relation Name Age of Onset     Dementia Mother      Heart attack Father      Cholecystitis Sister          x3    Cancer Father's Sister      Prostate cancer Father's Brother      Cancer Paternal Grandmother      Diabetes Paternal Grandfather         Allergies  Latex    Code Status  Full Code     Review of Systems   Constitutional:  Negative for activity change, appetite change, chills, diaphoresis, fatigue and fever.   HENT:  Negative for congestion, ear pain, rhinorrhea, sinus pain and sore throat.    Respiratory:  Negative for apnea, cough, chest tightness, shortness of breath, wheezing and stridor.    Cardiovascular:  Negative for chest pain, palpitations and leg swelling.   Gastrointestinal:  Negative for abdominal distention, abdominal pain, constipation, diarrhea, nausea and vomiting.   Genitourinary:  Negative for difficulty urinating, dysuria, flank pain, frequency, hematuria and urgency.   Musculoskeletal:  Negative for arthralgias, back pain, gait problem, joint swelling and myalgias.   Skin:  Negative for color change, pallor, rash and wound.   Neurological:  Positive for light-headedness. Negative for dizziness, syncope, weakness, numbness and headaches.   Psychiatric/Behavioral:  Negative for agitation, behavioral problems, confusion and decreased concentration. The patient is not nervous/anxious.    All other systems reviewed and are negative.      Last Recorded Vitals  /90 (Patient Position: Sitting)   Pulse 85   Temp 36.5 °C (97.7 °F) (Tympanic)   Resp 11   Wt 98 kg (216 lb)   SpO2 97%      Physical Exam:  Vital signs and nursing notes reviewed.   Constitutional: Pleasant and cooperative. Laying in bed in no acute distress. Conversant.   Skin: Warm and dry; no obvious lesions, rashes, pallor, or jaundice.   Eyes: EOMI. Anicteric sclera.   ENT: Mucous membranes moist; no obvious injury or deformity appreciated.   Head and Neck: Normocephalic, atraumatic. ROM preserved. Trachea midline. No appreciable JVD.    Respiratory: Nonlabored on RA. Lungs clear to auscultation bilaterally without obvious adventitious sounds. Chest rise is equal.  Cardiovascular: RRR. No gross murmur, gallop, or rub. Extremities are warm and well-perfused with good capillary refill (< 3 seconds). No chest wall tenderness.   GI: Abdomen soft, nontender, nondistended. No obvious organomegaly appreciated. Bowel sounds are present.  : No CVA tenderness.   MSK: No gross abnormalities appreciated. No limitations to AROM/PROM appreciated.   Extremities: No cyanosis, edema, or clubbing evident. Neurovascularly intact.   Neuro: A&Ox3. CN 2-12 grossly intact. Able to respond to questions appropriately and clearly. No acute focal neurologic deficits appreciated.  Psych: Appropriate mood and behavior.    Assessment/Plan     64 y.o. male with PMHx s/f HTN/HLD, DM2, depression/bipolar, essential tremor, L cranial nerve palsy presenting with near syncope.    Plan:  Admit to observation    Near syncope, possible vasovagal vs orthostatic  Gentle hydration overnight  Check orthostatics in AM.  Echo 8/24 showed normal LVEF.  Pt/OT/SS consulted for possible SNF.    DM2:  SSI while inpatient. Continue Farxiga  Accuchecks, hypoglycemic protocol  Check A1c     Depression/bipolar/memory loss: continue home psych meds, donepezil     HTN: continue home amlodipine, Toprol XL     Diabetic neuropathy: Continue home gabapentin    Diet: Consistent carb  DVT Prophylaxis: Lovenox SQ  Code Status: Full code  Case Discussed With: ED provider  Additional Sources Reviewed: Past admission notes    Anticipated Length of Stay (LOS): Patient will require 1-2 midnights for workup of near-syncope and possible ECF placement.     DO Nelson Vargas dictation software was used to dictate this note and thus there may be minor errors in translation/transcription including garbled speech or misspellings. Please contact for clarification if needed.

## 2025-02-12 VITALS
SYSTOLIC BLOOD PRESSURE: 126 MMHG | HEIGHT: 70 IN | TEMPERATURE: 97.2 F | OXYGEN SATURATION: 97 % | BODY MASS INDEX: 30.92 KG/M2 | DIASTOLIC BLOOD PRESSURE: 77 MMHG | WEIGHT: 216 LBS | RESPIRATION RATE: 18 BRPM | HEART RATE: 63 BPM

## 2025-02-12 LAB
ANION GAP SERPL CALC-SCNC: 12 MMOL/L (ref 10–20)
ATRIAL RATE: 98 BPM
BASOPHILS # BLD AUTO: 0.01 X10*3/UL (ref 0–0.1)
BASOPHILS NFR BLD AUTO: 0.2 %
BUN SERPL-MCNC: 16 MG/DL (ref 6–23)
CALCIUM SERPL-MCNC: 8.5 MG/DL (ref 8.6–10.3)
CHLORIDE SERPL-SCNC: 104 MMOL/L (ref 98–107)
CO2 SERPL-SCNC: 26 MMOL/L (ref 21–32)
CREAT SERPL-MCNC: 1.01 MG/DL (ref 0.5–1.3)
EGFRCR SERPLBLD CKD-EPI 2021: 84 ML/MIN/1.73M*2
EOSINOPHIL # BLD AUTO: 0.06 X10*3/UL (ref 0–0.7)
EOSINOPHIL NFR BLD AUTO: 1.4 %
ERYTHROCYTE [DISTWIDTH] IN BLOOD BY AUTOMATED COUNT: 14.1 % (ref 11.5–14.5)
GLUCOSE BLD MANUAL STRIP-MCNC: 121 MG/DL (ref 74–99)
GLUCOSE BLD MANUAL STRIP-MCNC: 136 MG/DL (ref 74–99)
GLUCOSE BLD MANUAL STRIP-MCNC: 177 MG/DL (ref 74–99)
GLUCOSE SERPL-MCNC: 127 MG/DL (ref 74–99)
HCT VFR BLD AUTO: 32.8 % (ref 41–52)
HGB BLD-MCNC: 11.6 G/DL (ref 13.5–17.5)
HOLD SPECIMEN: NORMAL
IMM GRANULOCYTES # BLD AUTO: 0.02 X10*3/UL (ref 0–0.7)
IMM GRANULOCYTES NFR BLD AUTO: 0.5 % (ref 0–0.9)
LYMPHOCYTES # BLD AUTO: 2 X10*3/UL (ref 1.2–4.8)
LYMPHOCYTES NFR BLD AUTO: 47.1 %
MCH RBC QN AUTO: 33.3 PG (ref 26–34)
MCHC RBC AUTO-ENTMCNC: 35.4 G/DL (ref 32–36)
MCV RBC AUTO: 94 FL (ref 80–100)
MONOCYTES # BLD AUTO: 0.4 X10*3/UL (ref 0.1–1)
MONOCYTES NFR BLD AUTO: 9.4 %
NEUTROPHILS # BLD AUTO: 1.76 X10*3/UL (ref 1.2–7.7)
NEUTROPHILS NFR BLD AUTO: 41.4 %
NRBC BLD-RTO: 0 /100 WBCS (ref 0–0)
P AXIS: 8 DEGREES
PLATELET # BLD AUTO: 82 X10*3/UL (ref 150–450)
POTASSIUM SERPL-SCNC: 4.3 MMOL/L (ref 3.5–5.3)
PR INTERVAL: 152 MS
Q ONSET: 249 MS
QRS COUNT: 16 BEATS
QRS DURATION: 92 MS
QT INTERVAL: 362 MS
QTC CALCULATION(BAZETT): 463 MS
QTC FREDERICIA: 426 MS
R AXIS: -40 DEGREES
RBC # BLD AUTO: 3.48 X10*6/UL (ref 4.5–5.9)
SODIUM SERPL-SCNC: 138 MMOL/L (ref 136–145)
T AXIS: 56 DEGREES
T OFFSET: 430 MS
VENTRICULAR RATE: 98 BPM
WBC # BLD AUTO: 4.3 X10*3/UL (ref 4.4–11.3)

## 2025-02-12 PROCEDURE — 97116 GAIT TRAINING THERAPY: CPT | Mod: GP,CQ

## 2025-02-12 PROCEDURE — 2500000002 HC RX 250 W HCPCS SELF ADMINISTERED DRUGS (ALT 637 FOR MEDICARE OP, ALT 636 FOR OP/ED): Performed by: INTERNAL MEDICINE

## 2025-02-12 PROCEDURE — 36415 COLL VENOUS BLD VENIPUNCTURE: CPT | Performed by: INTERNAL MEDICINE

## 2025-02-12 PROCEDURE — 2500000004 HC RX 250 GENERAL PHARMACY W/ HCPCS (ALT 636 FOR OP/ED): Performed by: STUDENT IN AN ORGANIZED HEALTH CARE EDUCATION/TRAINING PROGRAM

## 2025-02-12 PROCEDURE — 99239 HOSP IP/OBS DSCHRG MGMT >30: CPT | Performed by: INTERNAL MEDICINE

## 2025-02-12 PROCEDURE — 96372 THER/PROPH/DIAG INJ SC/IM: CPT | Performed by: STUDENT IN AN ORGANIZED HEALTH CARE EDUCATION/TRAINING PROGRAM

## 2025-02-12 PROCEDURE — 2500000001 HC RX 250 WO HCPCS SELF ADMINISTERED DRUGS (ALT 637 FOR MEDICARE OP): Performed by: INTERNAL MEDICINE

## 2025-02-12 PROCEDURE — 2500000001 HC RX 250 WO HCPCS SELF ADMINISTERED DRUGS (ALT 637 FOR MEDICARE OP): Performed by: STUDENT IN AN ORGANIZED HEALTH CARE EDUCATION/TRAINING PROGRAM

## 2025-02-12 PROCEDURE — 85025 COMPLETE CBC W/AUTO DIFF WBC: CPT | Performed by: INTERNAL MEDICINE

## 2025-02-12 PROCEDURE — 82947 ASSAY GLUCOSE BLOOD QUANT: CPT

## 2025-02-12 PROCEDURE — G0378 HOSPITAL OBSERVATION PER HR: HCPCS

## 2025-02-12 PROCEDURE — 80048 BASIC METABOLIC PNL TOTAL CA: CPT | Performed by: INTERNAL MEDICINE

## 2025-02-12 RX ADMIN — DAPAGLIFLOZIN 10 MG: 5 TABLET, FILM COATED ORAL at 08:13

## 2025-02-12 RX ADMIN — INSULIN LISPRO 1 UNITS: 100 INJECTION, SOLUTION INTRAVENOUS; SUBCUTANEOUS at 12:44

## 2025-02-12 RX ADMIN — GABAPENTIN 400 MG: 400 CAPSULE ORAL at 08:14

## 2025-02-12 RX ADMIN — PANTOPRAZOLE SODIUM 40 MG: 40 TABLET, DELAYED RELEASE ORAL at 08:14

## 2025-02-12 RX ADMIN — BUSPIRONE HYDROCHLORIDE 20 MG: 5 TABLET ORAL at 08:14

## 2025-02-12 RX ADMIN — AMLODIPINE BESYLATE 5 MG: 5 TABLET ORAL at 08:14

## 2025-02-12 RX ADMIN — VENLAFAXINE HYDROCHLORIDE 300 MG: 150 CAPSULE, EXTENDED RELEASE ORAL at 08:14

## 2025-02-12 RX ADMIN — ENOXAPARIN SODIUM 40 MG: 40 INJECTION SUBCUTANEOUS at 08:14

## 2025-02-12 RX ADMIN — ASPIRIN 81 MG: 81 TABLET, COATED ORAL at 08:14

## 2025-02-12 RX ADMIN — GABAPENTIN 400 MG: 400 CAPSULE ORAL at 18:59

## 2025-02-12 RX ADMIN — METOPROLOL SUCCINATE 75 MG: 50 TABLET, EXTENDED RELEASE ORAL at 08:14

## 2025-02-12 ASSESSMENT — PAIN - FUNCTIONAL ASSESSMENT: PAIN_FUNCTIONAL_ASSESSMENT: 0-10

## 2025-02-12 ASSESSMENT — COGNITIVE AND FUNCTIONAL STATUS - GENERAL
MOVING TO AND FROM BED TO CHAIR: A LITTLE
MOBILITY SCORE: 15
TURNING FROM BACK TO SIDE WHILE IN FLAT BAD: A LITTLE
MOVING FROM LYING ON BACK TO SITTING ON SIDE OF FLAT BED WITH BEDRAILS: A LITTLE
CLIMB 3 TO 5 STEPS WITH RAILING: TOTAL
STANDING UP FROM CHAIR USING ARMS: A LITTLE
WALKING IN HOSPITAL ROOM: A LOT

## 2025-02-12 ASSESSMENT — PAIN SCALES - GENERAL
PAINLEVEL_OUTOF10: 0 - NO PAIN

## 2025-02-12 NOTE — DISCHARGE SUMMARY
Discharge Diagnosis  Near syncope   DM2   Depression/bipolar/memory loss   HTN  Diabetic neuropathy     Issues Requiring Follow-Up  Follow up with PCP  Follow up with home health care agency     Discharge Meds     Medication List      CHANGE how you take these medications     metoprolol succinate XL 25 mg 24 hr tablet; Commonly known as:   Toprol-XL; What changed: Another medication with the same name was   removed. Continue taking this medication, and follow the directions you   see here.     CONTINUE taking these medications     acetaminophen 325 mg tablet; Commonly known as: Tylenol; Take 2 tablets   (650 mg) by mouth every 4 hours if needed for mild pain (1 - 3),   headaches, fever (temp greater than 38.0 C) or moderate pain (4 - 6).   ARIPiprazole 30 mg tablet; Commonly known as: Abilify   aspirin 81 mg EC tablet; Take 1 tablet (81 mg) by mouth once daily.   atorvastatin 80 mg tablet; Commonly known as: Lipitor   busPIRone 10 mg tablet; Commonly known as: Buspar   dapagliflozin propanediol 10 mg; Commonly known as: Farxiga   docusate sodium 100 mg capsule; Commonly known as: Colace   donepezil 5 mg tablet; Commonly known as: Aricept   gabapentin 400 mg capsule; Commonly known as: Neurontin   hydrOXYzine pamoate 25 mg capsule; Commonly known as: Vistaril   metFORMIN 1,000 mg tablet; Commonly known as: Glucophage   Miralax 17 gram/dose powder; Generic drug: polyethylene glycol   omeprazole 40 mg DR capsule; Commonly known as: PriLOSEC   simethicone 80 mg chewable tablet; Commonly known as: Mylicon   traZODone 100 mg tablet; Commonly known as: Desyrel   Trulicity 3 mg/0.5 mL injection; Generic drug: dulaglutide   venlafaxine  mg 24 hr capsule; Commonly known as: Effexor-XR   Voltaren Arthritis Pain 1 % gel; Generic drug: diclofenac sodium       Test Results Pending At Discharge  Pending Labs       No current pending labs.            Hospital Course   Mane Nath is a 64 y.o. male with PMHx s/f HTN/HLD,  "DM2, depression/bipolar, essential tremor, L cranial nerve palsy presenting with near syncope. Pt very somnolent on exam owing to the time of day. He says over the past few days he has been having symptoms of a \"head rush\" with a near-syncopal feeling when he stands up. He denies syncope, fall, vertigo, or other injury. He says this started yesterday afternoon. He says going to the bathroom causes these symptoms, which are intermittent and resolve after a few minutes. He complains of fatigue and chronic pain in his legs. Otherwise no major complaints. No nausea, vomiting, chest pain, abdominal pain, fever, chills or other symptoms. Pt unsteady on his feet in the ER and is requesting placement for physical therapy.     ED Course (Summary - please note all labs, imaging studies, and interventions noted below have been personally reviewed and/or interpreted on day of admission):   Vitals on presentation: 97.7 F, 96 bpm, 20 rr, 116/77, 98% on RA  Labs: CMP glu 223,   Mag 1.52  Lactate 2.3 -> 1.8  Lipase 31  Trop 5 -> 5  CBC hg 13.4, plts 111  Flu and COVID PCR negative  VBG pH 7.38, pCO2 41, pO2 46, lactate 2.4 -> 1.9, HCO3 24.3  EKG: sinus rhythm at 98 bpm. LAFB.  Imaging: CXR - No acute cardiopulmonary process.   Interventions: Mag oxide 400 mg X1,  ml bolus, Pt admitted for further care.     2/11: Patient resting comfortably in bed. Denies chest pain, palpitations, dizziness, nausea or vomiting. Blood pressures have been stable. Lab work WNLs, UA unremarkable for UTI. TTE ordered, awaiting final report. PT/OT feel patient is below baseline, recommending skilled OT services and moderate therapy when patient medically stable for discharge. However, patient is comfortable with [facility]. Likely discharge tomorrow.       2/12: Patient sitting comfortably on bed, eating breakfast. He states dizziness has resolved but he is still having weakness in his legs. Patient has been ambulating using walker. Denies chest " pain, palpitations, headaches, nausea or vomiting. TTE showed normal left ventricular systolic function with EF of 60%. Patient accepted by [facility] and will be discharged home today.     Pertinent Physical Exam At Time of Discharge  Physical Exam  Constitutional: Pleasant and cooperative. Laying in bed in no acute distress. Conversant.   Skin: Warm and dry; no obvious lesions, rashes, pallor, or jaundice.   Eyes: Anicteric sclera.   ENT: Mucous membranes dry; no obvious injury or deformity appreciated.   Head and Neck: No appreciable JVD.   Respiratory: Nonlabored on RA. Lungs clear to auscultation bilaterally without obvious adventitious sounds.   Cardiovascular: RRR. No gross murmur, gallop, or rub. Extremities are warm and well-perfused.  Gastro: Abdomen soft, nontender, nondistended.   MSK:  No limitations to AROM/PROM appreciated.   Extremities: No cyanosis, edema, or clubbing evident. Neurovascularly intact.   Neuro: A&Ox3. Able to respond to questions appropriately and clearly. No acute focal neurologic deficits appreciated.  Psych: Appropriate mood and behavior.    Outpatient Follow-Up  Future Appointments   Date Time Provider Department Center   6/30/2025  9:00 AM Edouard Osuna MD [id number] [facility]         Clarice Palacios   PA student       Pt seen and examined  with my PA student . I have modified the note to reflect my documentation of HPI and assessment and plan.    Nadya Dai MD MRCP

## 2025-02-12 NOTE — CARE PLAN
The patient's goals for the shift include  discharging soon, today  if possible.    The clinical goals for the shift include patient mobility will continue to increase.

## 2025-02-12 NOTE — PROGRESS NOTES
Physical Therapy    Physical Therapy Treatment    Patient Name: Mane Nath  MRN: [id number]  Department: [facility] 2 E OBS  Room: [facility]2313-A  Today's Date: 2/12/2025  Time Calculation  Start Time: 1356  Stop Time: 1419  Time Calculation (min): 23 min    Assessment/Plan   PT Assessment  End of Session Communication: Bedside nurse  End of Session Patient Position: Bed, 3 rail up, Alarm on (declined to sit up in chair)     PT Plan  Treatment/Interventions: Bed mobility, Transfer training, Gait training, Strengthening, Endurance training  PT Plan: Ongoing PT  PT Frequency: 3 times per week  PT Discharge Recommendations: Moderate intensity level of continued care  Equipment Recommended upon Discharge:  (TBD)  PT Recommended Transfer Status: Assist x1, Assist x2 (FWW)  PT - OK to Discharge: Yes (WHEN MEDICALLY CLEARED)    General Visit Information:   PT  Visit  PT Received On: 02/12/25  General  Prior to Session Communication: Bedside nurse  Patient Position Received: Bed, 3 rail up, Alarm on  General Comment: Pt agreeable to therapy    Subjective   Precautions:  Precautions  Hearing/Visual Limitations: [place], DOUBLE VISION  Medical Precautions: Fall precautions    Objective   Pain:  Pain Assessment  Pain Assessment: 0-10  0-10 (Numeric) Pain Score: 0 - No pain  Cognition:  Cognition  Orientation Level: Oriented X4    Treatments:     Therapeutic Activity  Therapeutic Activity Performed: Yes  Therapeutic Activity 1: pt sat EOB for 10 min with CGA    Bed Mobility  Bed Mobility: Yes  Bed Mobility 1  Bed Mobility 1: Supine to sitting, Sitting to supine  Level of Assistance 1: Contact guard, Minimal verbal cues    Ambulation/Gait Training  Ambulation/Gait Training Performed: Yes  Ambulation/Gait Training 1  Surface 1: Level tile  Device 1: Rolling walker  Assistance 1: Minimum assistance, Minimal verbal cues  Quality of Gait 1: Inconsistent stride length, Forward flexed posture  Comments/Distance (ft) 1: 150 feet x  2  Transfers  Transfer: Yes  Transfer 1  Technique 1: Sit to stand, Stand to sit  Transfer Device 1: Walker  Transfer Level of Assistance 1: Minimum assistance, Minimal verbal cues  Trials/Comments 1: cues for hand placement    Outcome Measures:  [facility] Basic Mobility  Turning from your back to your side while in a flat bed without using bedrails: A little  Moving from lying on your back to sitting on the side of a flat bed without using bedrails: A little  Moving to and from bed to chair (including a wheelchair): A little  Standing up from a chair using your arms (e.g. wheelchair or bedside chair): A little  To walk in hospital room: A lot  Climbing 3-5 steps with railing: Total  Basic Mobility - Total Score: 15    Education Documentation  Mobility Training, taught by Fabi Velazco PTA at 2/12/2025  2:53 PM.  Learner: Patient  Readiness: Acceptance  Method: Explanation  Response: Needs Reinforcement    Handouts, taught by Fabi Velazco PTA at 2/12/2025  2:53 PM.  Learner: Patient  Readiness: Acceptance  Method: Explanation  Response: Needs Reinforcement    Body Mechanics, taught by Fabi Velazco PTA at 2/12/2025  2:53 PM.  Learner: Patient  Readiness: Acceptance  Method: Explanation  Response: Needs Reinforcement    Precautions, taught by Fabi Velazco PTA at 2/12/2025  2:53 PM.  Learner: Patient  Readiness: Acceptance  Method: Explanation  Response: Needs Reinforcement    Home Exercise Program, taught by Fabi Velazco PTA at 2/12/2025  2:53 PM.  Learner: Patient  Readiness: Acceptance  Method: Explanation  Response: Needs Reinforcement    ADL Training, taught by Fabi Velazco PTA at 2/12/2025  2:53 PM.  Learner: Patient  Readiness: Acceptance  Method: Explanation  Response: Needs Reinforcement    Education Comments  No comments found.        OP EDUCATION:       Encounter Problems       Encounter Problems (Active)       Mobility       STG - Patient will ambulate (Progressing)       Start:  02/11/25     Expected End:  02/21/25       FWW CGA X1 50 FT         Goal 1 (Progressing)       Start:  02/11/25    Expected End:  03/04/25       20 RESP RROM INCREASING STRENGTH TO STABILIZE GAIT            PT Transfers       STG - Patient to transfer to and from sit to supine (Progressing)       Start:  02/11/25    Expected End:  02/14/25       SBA USING RAILS         STG - Patient will transfer sit to and from stand (Progressing)       Start:  02/11/25    Expected End:  02/18/25       FWW SBA USING PROPER TECHNIQUE            Pain - Adult

## 2025-02-12 NOTE — NURSING NOTE
Patient being discharged home with Home Care; placed a call to his sister, Maricruz, she will  and transport home. Discharge instructions printed, education given. [place] sent for [facility] intake nurse.

## 2025-02-12 NOTE — PROGRESS NOTES
"[facility] MEDICINE    Mane Nath is a 64 y.o. male on day 0 of admission presenting with Pre-syncope.    Subjective   History Of Present Illness:  Mane Nath is a 64 y.o. male with PMHx s/f HTN/HLD, DM2, depression/bipolar, essential tremor, L cranial nerve palsy presenting with near syncope. Pt very somnolent on exam owing to the time of day. He says over the past few days he has been having symptoms of a \"head rush\" with a near-syncopal feeling when he stands up. He denies syncope, fall, vertigo, or other injury. He says this started yesterday afternoon. He says going to the bathroom causes these symptoms, which are intermittent and resolve after a few minutes. He complains of fatigue and chronic pain in his legs. Otherwise no major complaints. No nausea, vomiting, chest pain, abdominal pain, fever, chills or other symptoms. Pt unsteady on his feet in the ER and is requesting placement for physical therapy.     ED Course (Summary - please note all labs, imaging studies, and interventions noted below have been personally reviewed and/or interpreted on day of admission):   Vitals on presentation: 97.7 F, 96 bpm, 20 rr, 116/77, 98% on RA  Labs: CMP glu 223,   Mag 1.52  Lactate 2.3 -> 1.8  Lipase 31  Trop 5 -> 5  CBC hg 13.4, plts 111  Flu and COVID PCR negative  VBG pH 7.38, pCO2 41, pO2 46, lactate 2.4 -> 1.9, HCO3 24.3  EKG: sinus rhythm at 98 bpm. LAFB.  Imaging: CXR - No acute cardiopulmonary process.   Interventions: Mag oxide 400 mg X1,  ml bolus, Pt admitted for further care.    2/11: Patient resting comfortably in bed. Denies chest pain, palpitations, dizziness, nausea or vomiting. Blood pressures have been stable. Lab work WNLs, UA unremarkable for UTI. TTE ordered, awaiting final report. PT/OT feel patient is below baseline, recommending skilled OT services and moderate therapy when patient medically stable for discharge. However, patient is comfortable with [facility]. Likely " discharge tomorrow.      2/12: Patient sitting comfortably on bed, eating breakfast. He states dizziness has resolved but he is still having weakness in his legs. Patient has been ambulating using walker. Denies chest pain, palpitations, headaches, nausea or vomiting. TTE showed normal left ventricular systolic function with EF of 60%. Patient accepted by [facility] and will be discharged home today.     Past Medical History  He has a past medical history of Cirrhosis (Multi) (08/28/2024), Diabetes (Multi), HTN (hypertension), Personal history of other diseases of the musculoskeletal system and connective tissue, Personal history of other mental and behavioral disorders, Right upper quadrant abdominal pain (06/11/2024), and Type 2 diabetes mellitus.    Surgical History  He has a past surgical history that includes Neck surgery (02/01/2018); Other surgical history (02/01/2018); Other surgical history (04/25/2022); and Cholecystectomy (N/A, 08/26/2024).     Social History  He reports that he has quit smoking. His smoking use included cigarettes. He has never used smokeless tobacco. He reports that he does not drink alcohol and does not use drugs.    Family History  Family History   Problem Relation Name Age of Onset    Dementia Mother      Heart attack Father      Cholecystitis Sister          x3    Cancer Father's Sister      Prostate cancer Father's Brother      Cancer Paternal Grandmother      Diabetes Paternal Grandfather         Allergies  Latex    Review of Systems   All pertinent positive symptoms are included in the history of present illness.   All other systems have been reviewed and are negative and noncontributory to this patient's current ailments and presentation.         Objective   Last Recorded Vitals  /76 (BP Location: Left arm, Patient Position: Lying)   Pulse 70   Temp 36.1 °C (97 °F) (Temporal)   Resp 16   Wt 98 kg (216 lb)   SpO2 96%     Intake/Output last 3  Shifts:    Intake/Output Summary (Last 24 hours) at 2/12/2025 1351  Last data filed at 2/12/2025 1248  Gross per 24 hour   Intake 541 ml   Output 1050 ml   Net -509 ml       Admission Weight  Weight: 98 kg (216 lb) (02/10/25 2134)    Daily Weight  02/10/25 : 98 kg (216 lb)      Image Results  ECG 12 lead    Result Date: 2/12/2025  Sinus rhythm Left anterior fascicular block ST depr, consider ischemia, inferior leads Borderline ST elevation, anterolateral leads See ED provider note for full interpretation and clinical correlation Confirmed by Martha Gardiner ([id number]) on 2/12/2025 12:06:12 PM    Transthoracic Echo (TTE) Complete    Result Date: 2/11/2025              [place] [address]      Phone [phone] Fax [phone] TRANSTHORACIC ECHOCARDIOGRAM REPORT Patient Name:       WES BUSTILLO [facility] Physician:    [id number] Elliott Lei MD Study Date:         2/11/2025            Ordering Provider:    [id number] LEE DURHAM MRN/PID:            [id number]             Fellow: Accession#:         [id number]         Nurse: Date of Birth/Age:  1960 / 64 years Sonographer:          Cecile May RDCS Gender Assigned at  M                    Additional Staff: Birth: Height:             177.80 cm            Admit Date:           2/10/2025 Weight:             97.98 kg             Admission Status:     Inpatient -                                                                Routine BSA / BMI:          2.16 m2 / 30.99      Department Location:  [address]/ Blood Pressure: 105 /69 mmHg Study Type:    TRANSTHORACIC ECHO (TTE) COMPLETE Diagnosis/ICD: Dizziness and giddiness-R42; Syncope-R55 Indication:    PRESYNCOPE, DIZZINESS CPT Codes:     Echo Complete w  Full Doppler-[id number] Patient History: Pertinent History: HTN. Study Detail: The following Echo studies were performed: 2D, M-Mode, Doppler and               color flow.  PHYSICIAN INTERPRETATION: Left Ventricle: The left ventricular systolic function is normal, with a Mcfarland's biplane calculated ejection fraction of 60%. There are no regional wall motion abnormalities. The left ventricular cavity size is normal. There is mild increased septal and mildly increased posterior left ventricular wall thickness. There is left ventricular concentric remodeling. Spectral Doppler shows a normal pattern of left ventricular diastolic filling. Left Atrium: The left atrial size is normal. Right Ventricle: The right ventricle is normal in size. There is normal right ventricular global systolic function. Right Atrium: The right atrial size is normal. Aortic Valve: The aortic valve is trileaflet. The aortic valve dimensionless index is 0.76. There is no evidence of aortic valve regurgitation. The peak instantaneous gradient of the aortic valve is 13 mmHg. The mean gradient of the aortic valve is 6 mmHg. Mitral Valve: The mitral valve is normal in structure. There is trace mitral valve regurgitation. Tricuspid Valve: The tricuspid valve is structurally normal. There is trace tricuspid regurgitation. Pulmonic Valve: The pulmonic valve is structurally normal. There is no indication of pulmonic valve regurgitation. Pericardium: No pericardial effusion noted. Aorta: The aortic root is normal. Systemic Veins: The inferior vena cava appears normal in size, IVC inspiratory collapse is not well visualized.  CONCLUSIONS:  1. The left ventricular systolic function is normal, with a Mcfarland's biplane calculated ejection fraction of 60%.  2. There is normal right ventricular global systolic function. QUANTITATIVE DATA SUMMARY:  2D MEASUREMENTS:             Normal Ranges: Ao Root d:       3.20 cm     (2.0-3.7cm) LAs:             3.30 cm      (2.7-4.0cm) IVSd:            1.20 cm     (0.6-1.1cm) LVPWd:           1.10 cm     (0.6-1.1cm) LVIDd:           5.00 cm     (3.9-5.9cm) LVIDs:           3.60 cm LV Mass Index:   102.1 g/m2 LVEDV Index:     47.36 ml/m2 LV % FS          28.0 %  LEFT ATRIUM:                  Normal Ranges: LA Vol A4C:        36.7 ml    (22+/-6mL/m2) LA Vol A2C:        50.6 ml LA Vol BP:         43.6 ml LA Vol Index A4C:  17.0ml/m2 LA Vol Index A2C:  23.5 ml/m2 LA Vol Index BP:   20.2 ml/m2 LA Area A4C:       15.5 cm2 LA Area A2C:       18.0 cm2 LA Major Axis A4C: 5.6 cm LA Major Axis A2C: 5.4 cm LA Volume Index:   20.2 ml/m2  RIGHT ATRIUM:          Normal Ranges: RA Area A4C:  14.5 cm2  AORTA MEASUREMENTS:         Normal Ranges: Ao Sinus, d:        3.20 cm (2.1-3.5cm) Ao STJ, d:          3.30 cm (1.7-3.4cm) Asc Ao, d:          3.30 cm (2.1-3.4cm)  LV SYSTOLIC FUNCTION:                      Normal Ranges: EF-A4C View:    60 % (>=55%) EF-A2C View:    58 % EF-Biplane:     60 % LV EF Reported: 60 %  LV DIASTOLIC FUNCTION:             Normal Ranges: MV Peak E:             1.15 m/s    (0.7-1.2 m/s) MV Peak A:             0.97 m/s    (0.42-0.7 m/s) E/A Ratio:             1.18        (1.0-2.2) MV e'                  0.096 m/s   (>8.0) MV lateral e'          0.11 m/s MV medial e'           0.08 m/s MV A Dur:              109.00 msec E/e' Ratio:            12.03       (<8.0)  MITRAL VALVE:          Normal Ranges: MV DT:        232 msec (150-240msec)  AORTIC VALVE:                      Normal Ranges: AoV Vmax:                1.77 m/s  (<=1.7m/s) AoV Peak P.5 mmHg (<20mmHg) AoV Mean P.0 mmHg  (1.7-11.5mmHg) LVOT Max Gabriele:            1.25 m/s  (<=1.1m/s) AoV VTI:                 34.00 cm  (18-25cm) LVOT VTI:                25.80 cm LVOT Diameter:           2.00 cm   (1.8-2.4cm) AoV Area, VTI:           2.38 cm2  (2.5-5.5cm2) AoV Area,Vmax:           2.22 cm2  (2.5-4.5cm2) AoV Dimensionless Index: 0.76  RIGHT  VENTRICLE: RV Basal 3.00 cm RV Mid   2.80 cm RV Major 7.5 cm TAPSE:   21.9 mm RV s'    0.12 m/s  TRICUSPID VALVE/RVSP:          Normal Ranges: Peak TR Velocity:     2.28 m/s RV Syst Pressure:     24 mmHg  (< 30mmHg) IVC Diam:             1.90 cm  [id number] Elliott Lei MD Electronically signed on 2/11/2025 at 4:34:15 PM  ** Final **     XR chest 2 views    Result Date: 2/11/2025  Interpreted By:  Robert Sidhu, STUDY: XR CHEST 2 VIEWS;  2/10/2025 11:10 pm   INDICATION: Signs/Symptoms:weakness.     COMPARISON: 07/29/2024   ACCESSION NUMBER(S): [id number]   ORDERING CLINICIAN: KAITLYNN WANG   FINDINGS:     The cardiomediastinal silhouette and pulmonary vasculature are within normal limits. Bilateral nipple shadows are responsible for the symmetric bilateral nodules overlying the peripheral lungs. No consolidation, pleural effusion or pneumothorax.   ACDF noted.       No acute cardiopulmonary process.     MACRO: None.   Signed by: Robert Sidhu 2/11/2025 1:02 AM Dictation workstation:   [id number]     Lab Results  Results from last 7 days   Lab Units 02/12/25  0506 02/11/25  0614 02/10/25  2228   WBC AUTO x10*3/uL 4.3* 4.5 6.6   HEMOGLOBIN g/dL 11.6* 11.8* 13.4*   HEMATOCRIT % 32.8* 33.0* 36.6*   PLATELETS AUTO x10*3/uL 82* 89* 111*      Results from last 7 days   Lab Units 02/12/25  0506 02/11/25  0614 02/10/25  2228   SODIUM mmol/L 138 132* 131*   POTASSIUM mmol/L 4.3 4.4 4.5   CHLORIDE mmol/L 104 102 101   CO2 mmol/L 26 26 21   BUN mg/dL 16 22 23   CREATININE mg/dL 1.01 1.06 1.10   GLUCOSE mg/dL 127* 244* 223*   CALCIUM mg/dL 8.5* 8.7 9.0      Results from last 7 days   Lab Units 02/12/25  0506 02/11/25  0614 02/10/25  2228   SODIUM mmol/L 138   < > 131*   POTASSIUM mmol/L 4.3   < > 4.5   CHLORIDE mmol/L 104   < > 101   CO2 mmol/L 26   < > 21   BUN mg/dL 16   < > 23   CREATININE mg/dL 1.01   < > 1.10   CALCIUM mg/dL 8.5*   < > 9.0   PROTEIN TOTAL g/dL  --   --  6.9   BILIRUBIN TOTAL mg/dL  --   --  0.8    ALK PHOS U/L  --   --  111   ALT U/L  --   --  34   AST U/L  --   --  25   GLUCOSE mg/dL 127*   < > 223*    < > = values in this interval not displayed.      Results from last 7 days   Lab Units 02/10/25  2228   MAGNESIUM mg/dL 1.52*        Medications  Scheduled medications:  amLODIPine, 5 mg, oral, Daily  ARIPiprazole, 30 mg, oral, Nightly  aspirin, 81 mg, oral, Daily  atorvastatin, 80 mg, oral, Nightly  busPIRone, 20 mg, oral, BID  dapagliflozin propanediol, 10 mg, oral, Daily  donepezil, 5 mg, oral, Nightly  enoxaparin, 40 mg, subcutaneous, q24h  gabapentin, 400 mg, oral, BID  gabapentin, 800 mg, oral, Nightly  insulin lispro, 0-5 Units, subcutaneous, TID AC  metoprolol succinate XL, 75 mg, oral, Daily  pantoprazole, 40 mg, oral, Daily   Or  pantoprazole, 40 mg, intravenous, Daily  pneumococcal polysaccharide, 0.5 mL, intramuscular, During hospitalization  venlafaxine XR, 300 mg, oral, Daily      Continuous medications:     PRN medications:  PRN medications: bisacodyl, bisacodyl, dextrose, dextrose, glucagon, glucagon, guaiFENesin, melatonin, ondansetron **OR** ondansetron     Physical Exam:  Constitutional: Pleasant and cooperative. Laying in bed in no acute distress. Conversant.   Skin: Warm and dry; no obvious lesions, rashes, pallor, or jaundice.   Eyes: Anicteric sclera.   ENT: Mucous membranes dry; no obvious injury or deformity appreciated.   Head and Neck: No appreciable JVD.   Respiratory: Nonlabored on RA. Lungs clear to auscultation bilaterally without obvious adventitious sounds.   Cardiovascular: RRR. No gross murmur, gallop, or rub. Extremities are warm and well-perfused.  Gastro: Abdomen soft, nontender, nondistended.   MSK:  No limitations to AROM/PROM appreciated.   Extremities: No cyanosis, edema, or clubbing evident. Neurovascularly intact.   Neuro: A&Ox3. Able to respond to questions appropriately and clearly. No acute focal neurologic deficits appreciated.  Psych: Appropriate mood and  behavior.     Assessment/Plan   Near syncope, possible vasovagal vs orthostatic   Gentle hydration overnight  Check orthostatics in AM.  Echo 8/24 showed normal LVEF.  Pt/OT/SS consulted for possible SNF.  2/11: TTE ordered, not yet resulted. PT/OT recommending SNF placement. Patient prefers home health care. Will monitor for 1 more night with planned discharge tomorrow.    2/12: TTE showed normal left ventricular systolic function with ejection fraction of 60%. Patient accepted by home health care agency. He will be discharged home today.     DM2   SSI while inpatient. Continue Farxiga  Accuchecks, hypoglycemic protocol  Check A1c  2/11: Continue Accuchecks    Depression/bipolar/memory loss  Continue home psych meds     HTN  Continue home amlodipine, Toprol XL     Diabetic neuropathy  Continue home gabapentin     Code Status: Full Code   DVT Prophylaxis: Lovenox subQ  Please see orders for complete plan     Clarice Palacios  PA student     Pt seen and examined  with my PA student . I have modified the note to reflect my documentation of HPI and assessment and plan.    Echo showed no regional wall abnormalities.  Will plan discharge home with home health care today.    Nadya Dai MD MRCP

## 2025-02-12 NOTE — CARE PLAN
The patient's goals for the shift include      The clinical goals for the shift include patient will remain free from fall or injury throughout shift      Problem: Pain - Adult  Goal: Verbalizes/displays adequate comfort level or baseline comfort level  Outcome: Progressing     Problem: Safety - Adult  Goal: Free from fall injury  Outcome: Progressing     Problem: Discharge Planning  Goal: Discharge to home or other facility with appropriate resources  Outcome: Progressing     Problem: Chronic Conditions and Co-morbidities  Goal: Patient's chronic conditions and co-morbidity symptoms are monitored and maintained or improved  Outcome: Progressing     Problem: Nutrition  Goal: Nutrient intake appropriate for maintaining nutritional needs  Outcome: Progressing     Problem: Diabetes  Goal: Achieve decreasing blood glucose levels by end of shift  Outcome: Progressing  Goal: Increase stability of blood glucose readings by end of shift  Outcome: Progressing  Goal: Decrease in ketones present in urine by end of shift  Outcome: Progressing  Goal: Maintain electrolyte levels within acceptable range throughout shift  Outcome: Progressing  Goal: Maintain glucose levels >70mg/dl to <250mg/dl throughout shift  Outcome: Progressing  Goal: No changes in neurological exam by end of shift  Outcome: Progressing  Goal: Learn about and adhere to nutrition recommendations by end of shift  Outcome: Progressing  Goal: Vital signs within normal range for age by end of shift  Outcome: Progressing  Goal: Increase self care and/or family involovement by end of shift  Outcome: Progressing  Goal: Receive DSME education by end of shift  Outcome: Progressing

## 2025-02-18 ENCOUNTER — TELEPHONE (OUTPATIENT)
Dept: CARDIOLOGY | Facility: HOSPITAL | Age: 65
End: 2025-02-18
Payer: COMMERCIAL

## 2025-02-18 NOTE — TELEPHONE ENCOUNTER
Patient is requesting an appointment with Dr. Soto regarding some testing that he had done. He is requesting a call back at [phone].

## 2025-02-19 NOTE — TELEPHONE ENCOUNTER
Rn called pt at this time regarding him wanting an appointment. Pt is on his way to another appointment and will call back to reschedule.

## 2025-02-26 NOTE — PROGRESS NOTES
"Chief Complaint:   No chief complaint on file.     History Of Present Illness:    Mane Nath is a 64 y.o. male presenting s/p testing. Here with sister.  H/o HTN, HLD, orthostatic dizziness, DM2, Gout, Unsteady gait, Benign tremor, L cranial palsy, former smoker.    Last seen in 10/2024 by LAXMI Bhagat s/p hospitalization for palpitations - metoprolol was increased.    Pt presented to ED on 2/10/25 with near syncope. Lab work WNLs, UA unremarkable for UTI. TTE showed normal left ventricular systolic function with EF of 60%. Today, Pt \"feels the same.\" He still c/o dizziness when he stands up. One of his visiting nurses were checking orthostatics; he was told the bottom number changed by 10 points. He thinks he takes his metoprolol in the morning with food. Sister reports their mother had a pacemaker around his current age which improved her sxs. Sister also reports tremor, difficulty lifting his feet, ambulating with walker - they have an appt on 6/30/25 with a neurologist to assess Parkinson's but on wait list for cancellation.     Review Of Systems:  The remainder of the review of systems was obtained, and was negative as pertains to the chief complaint.    CV testing and labs reviewed:    Labs 2/2025:  K 4.3  BUN 16  Cr 1.01  Ca 8.5  H&H 11.6 32.8  Lipid panel 1/2025:    HDL 29.8  LDL 83      TTE 2/2025:  CONCLUSIONS:   1. The left ventricular systolic function is normal, with a Mcfarland's biplane calculated ejection fraction of 60%.   2. There is normal right ventricular global systolic function.    Cardiac stress test 8/2023:  Summary:  1. Adequate level of stress achieved.  2. No ECG changes from baseline.  3. There were no stress-induced wall motion abnormalities. This is a negative stress echo test for ischemia.    Carotid Artery Duplex  US 5/2022: <50% stenosis of BL PICA    Last Recorded Vitals:  There were no vitals filed for this visit.    Past Medical History:  He has a past medical history " of Cirrhosis (Multi) (08/28/2024), Diabetes (Multi), HTN (hypertension), Personal history of other diseases of the musculoskeletal system and connective tissue, Personal history of other mental and behavioral disorders, Right upper quadrant abdominal pain (06/11/2024), and Type 2 diabetes mellitus.    Past Surgical History:  He has a past surgical history that includes Neck surgery (02/01/2018); Other surgical history (02/01/2018); Other surgical history (04/25/2022); and Cholecystectomy (N/A, 08/26/2024).      Social History:  He reports that he has quit smoking. His smoking use included cigarettes. He has never used smokeless tobacco. He reports that he does not drink alcohol and does not use drugs.    Family History:  Family History   Problem Relation Name Age of Onset    Dementia Mother      Heart attack Father      Cholecystitis Sister          x3    Cancer Father's Sister      Prostate cancer Father's Brother      Cancer Paternal Grandmother      Diabetes Paternal Grandfather          Allergies:  Latex    Outpatient Medications:  Current Outpatient Medications   Medication Instructions    acetaminophen (TYLENOL) 650 mg, oral, Every 4 hours PRN    ARIPiprazole (ABILIFY) 30 mg, Nightly    aspirin 81 mg, oral, Daily    atorvastatin (Lipitor) 80 mg tablet 1 tablet, Daily (0630)    busPIRone (Buspar) 10 mg tablet take 2 tablets by mouth twice a day as directed    dapagliflozin propanediol (FARXIGA) 10 mg, Daily    diclofenac sodium (Voltaren Arthritis Pain) 1 % gel Apply topically. Use as directed prn    docusate sodium (Colace) 100 mg capsule TAKE ONE (1) CAPSULE BY MOUTH ONCE DAILY AS NEEDED. **(COLACE 100 MG CAP EQUIV)**    donepezil (ARICEPT) 5 mg, Nightly    gabapentin (Neurontin) 400 mg capsule Take 1 capsule by mouth twice a day and 2 capsules at bedtime    hydrOXYzine pamoate (VISTARIL) 25 mg, Nightly PRN    metFORMIN (Glucophage) 1,000 mg tablet 1 tablet, 2 times daily    metoprolol succinate XL  (TOPROL-XL) 25 mg, Daily    omeprazole (PRILOSEC) 40 mg, Daily    polyethylene glycol (MIRALAX) 17 g, Daily    simethicone (Mylicon) 80 mg chewable tablet 1 tablet, 3 times daily before meals    traZODone (Desyrel) 100 mg tablet take 1 to 3 tablet by mouth at bedtime if needed    Trulicity 3 mg/0.5 mL pen injector Inject 4.5 mg as directed 1 (one) time per week. ON SUNDAYS    venlafaxine XR (Effexor-XR) 150 mg 24 hr capsule take 2 capsules by mouth once daily as directed       Physical Exam:  Physical Exam  Vitals reviewed.   HENT:      Head: Normocephalic.      Nose: Nose normal.      Mouth/Throat:      Mouth: Mucous membranes are dry.   Neck:      Vascular: No carotid bruit.   Cardiovascular:      Rate and Rhythm: Normal rate and regular rhythm.      Heart sounds: No murmur heard.  Pulmonary:      Effort: Pulmonary effort is normal.      Comments: Decreased breath sounds  No crackles  Abdominal:      General: Abdomen is flat.      Palpations: Abdomen is soft.   Musculoskeletal:         General: Normal range of motion.      Cervical back: Normal range of motion.      Right lower leg: No edema.      Left lower leg: No edema.      Comments: Ambulating with walker   Skin:     General: Skin is warm and dry.   Neurological:      General: No focal deficit present.      Mental Status: He is alert.   Psychiatric:         Mood and Affect: Mood normal.        Last Labs:  CBC -  Lab Results   Component Value Date    WBC 4.3 (L) 02/12/2025    HGB 11.6 (L) 02/12/2025    HCT 32.8 (L) 02/12/2025    MCV 94 02/12/2025    PLT 82 (L) 02/12/2025       CMP -  Lab Results   Component Value Date    CALCIUM 8.5 (L) 02/12/2025    PHOS 3.1 08/28/2024    PROT 6.9 02/10/2025    ALBUMIN 4.1 02/10/2025    AST 25 02/10/2025    ALT 34 02/10/2025    ALKPHOS 111 02/10/2025    BILITOT 0.8 02/10/2025       LIPID PANEL -   Lab Results   Component Value Date    CHOL 143 01/07/2025    TRIG 152 (H) 01/07/2025    HDL 29.8 01/07/2025    CHHDL 4.8  01/07/2025    VLDL 30 01/07/2025    NHDL 113 01/07/2025       RENAL FUNCTION PANEL -   Lab Results   Component Value Date    GLUCOSE 127 (H) 02/12/2025     02/12/2025    K 4.3 02/12/2025     02/12/2025    CO2 26 02/12/2025    ANIONGAP 12 02/12/2025    BUN 16 02/12/2025    CREATININE 1.01 02/12/2025    GFRMALE 57 (A) 09/23/2023    CALCIUM 8.5 (L) 02/12/2025    PHOS 3.1 08/28/2024    ALBUMIN 4.1 02/10/2025        Lab Results   Component Value Date    BNP 8 01/07/2025    HGBA1C 6.5 (H) 01/06/2025     Assessment/Plan   Near-syncope:  Pt presented to ED on 2/10/25. TTE showed normal LV systolic function with EF of 60%.   Sister also reports tremor, magnetic gait, ambulating with walker.  -decreased metoprolol to 50 mg daily to see if this improves dizziness  -check 14-day Holter - in abnormal, consider referral to EP for pacemaker  -discussed autonomic dysautonomia - Pt anticipating appt with neurology to assess for Parkinson's on 6/20/25 but also on their wait list    Palpitations:  Pt was seen in hospital in 8/2024 for palpitations, no arrhythmia on telemetry, metoprolol was increased and an echocardiogram was ordered.   Today, denies any palpitations since leaving the hospital.   -decreased metoprolol XL to 50 mg daily as above      LVOT obstruction:  TTE 8/29/24: There is a mid cavity left ventricular obstruction. The resting gradient is 35.18 mmHg. The provoked gradient is 44.74 mmHg.   Today, patient denies shortness of breath, no palpitations.   -decreased metoprolol XL to 50 mg daily as above      Hypertension:  Today's /84 >   -continue antihypertensives     Orthostatic dizziness:  Patient reports rare episode of postural lightheadedness.  -recommendations given to stay well hydrated, get up slowly     Dyslipidemia:  FLP 1/2025, LDL 83   - triglycerides elevated.  -continue statin    Type 2 DM:  -management per PCP    Former smoker:  Quit in 2000.    Scribe Attestation  By signing my name  below, I, Dinah Gardiner, [employer]   attest that this documentation has been prepared under the direction and in the presence of Cecily Soto MD.

## 2025-02-27 ENCOUNTER — OFFICE VISIT (OUTPATIENT)
Dept: CARDIOLOGY | Facility: HOSPITAL | Age: 65
End: 2025-02-27
Payer: COMMERCIAL

## 2025-02-27 ENCOUNTER — ANCILLARY PROCEDURE (OUTPATIENT)
Dept: CARDIOLOGY | Facility: HOSPITAL | Age: 65
End: 2025-02-27
Payer: COMMERCIAL

## 2025-02-27 VITALS
DIASTOLIC BLOOD PRESSURE: 84 MMHG | HEIGHT: 70 IN | SYSTOLIC BLOOD PRESSURE: 152 MMHG | WEIGHT: 214.8 LBS | HEART RATE: 79 BPM | BODY MASS INDEX: 30.75 KG/M2

## 2025-02-27 DIAGNOSIS — R42 POSTURAL DIZZINESS WITH PRESYNCOPE: ICD-10-CM

## 2025-02-27 DIAGNOSIS — R00.2 HEART PALPITATIONS: ICD-10-CM

## 2025-02-27 DIAGNOSIS — R00.2 HEART PALPITATIONS: Primary | ICD-10-CM

## 2025-02-27 DIAGNOSIS — R55 POSTURAL DIZZINESS WITH PRESYNCOPE: ICD-10-CM

## 2025-02-27 LAB — BODY SURFACE AREA: 2.19 M2

## 2025-02-27 PROCEDURE — 3008F BODY MASS INDEX DOCD: CPT | Performed by: INTERNAL MEDICINE

## 2025-02-27 PROCEDURE — 99214 OFFICE O/P EST MOD 30 MIN: CPT | Performed by: INTERNAL MEDICINE

## 2025-02-27 PROCEDURE — 93246 EXT ECG>7D<15D RECORDING: CPT

## 2025-02-27 PROCEDURE — 99214 OFFICE O/P EST MOD 30 MIN: CPT | Mod: 25 | Performed by: INTERNAL MEDICINE

## 2025-02-27 PROCEDURE — 3048F LDL-C <100 MG/DL: CPT | Performed by: INTERNAL MEDICINE

## 2025-02-27 PROCEDURE — 3077F SYST BP >= 140 MM HG: CPT | Performed by: INTERNAL MEDICINE

## 2025-02-27 PROCEDURE — 3079F DIAST BP 80-89 MM HG: CPT | Performed by: INTERNAL MEDICINE

## 2025-02-27 PROCEDURE — 3044F HG A1C LEVEL LT 7.0%: CPT | Performed by: INTERNAL MEDICINE

## 2025-02-27 PROCEDURE — 1036F TOBACCO NON-USER: CPT | Performed by: INTERNAL MEDICINE

## 2025-02-27 RX ORDER — METOPROLOL SUCCINATE 25 MG/1
12.5 TABLET, EXTENDED RELEASE ORAL DAILY
Qty: 45 TABLET | Refills: 3 | Status: SHIPPED | OUTPATIENT
Start: 2025-02-27 | End: 2026-02-27

## 2025-02-27 NOTE — PATIENT INSTRUCTIONS
Thanks for following up in office today.    1)   I decreased your metoprolol dose to 50 mg daily to see if this helps your dizziness.     2)  We will set you up with a 14-day Holter monitor to check for abnormal heart rhythm since you are feeling dizzy. Wear is as long as you can. If your results are abnormal, we can consider a referral to Dr. Mota to consider a pacemaker.    3)  Please continue your cardiac medications as prescribed.    4)  I encourage you to increase your water intake.    Follow up with Dr. Soto after testing  If you have any questions, please call us at [phone]

## 2025-02-27 NOTE — PROGRESS NOTES
Bere RASHID placed holter monitor on pt on 02/27/25     Pt was explained on what they can and cannot do while wearing the monitor for 14 days.    Pt verbalized understanding.

## 2025-03-07 ENCOUNTER — TELEPHONE (OUTPATIENT)
Dept: CARDIOLOGY | Facility: HOSPITAL | Age: 65
End: 2025-03-07
Payer: COMMERCIAL

## 2025-03-07 NOTE — TELEPHONE ENCOUNTER
"RN called and spoke with patient, patient stated that he felt like his \"it was trying to suck the air out of my lungs,\"  patient was sitting on the couch, patient reports pressing the button on his monitor states this has happened before, his home PT assessed him and he stated his VS were normal. He reported a /81. Patient denies any further symptoms but does report some Shortness of breath at times. Patient states he will get checked out with any further symptoms.   "

## 2025-03-19 LAB — BODY SURFACE AREA: 2.19 M2

## 2025-03-19 PROCEDURE — 93248 EXT ECG>7D<15D REV&INTERPJ: CPT | Performed by: INTERNAL MEDICINE

## 2025-03-20 ENCOUNTER — TELEPHONE (OUTPATIENT)
Dept: CARDIOLOGY | Facility: HOSPITAL | Age: 65
End: 2025-03-20
Payer: COMMERCIAL

## 2025-03-20 DIAGNOSIS — R55 POSTURAL DIZZINESS WITH PRESYNCOPE: ICD-10-CM

## 2025-03-20 DIAGNOSIS — R00.2 HEART PALPITATIONS: ICD-10-CM

## 2025-03-20 DIAGNOSIS — R42 POSTURAL DIZZINESS WITH PRESYNCOPE: ICD-10-CM

## 2025-03-20 RX ORDER — METOPROLOL SUCCINATE 25 MG/1
25 TABLET, EXTENDED RELEASE ORAL DAILY
Qty: 90 TABLET | Refills: 3 | Status: SHIPPED | OUTPATIENT
Start: 2025-03-20 | End: 2026-03-20

## 2025-03-20 NOTE — TELEPHONE ENCOUNTER
RN called pt at this time regarding his Zio report. Per Dr. Soto wants increase pts metoprolol to 25 mg daily. Pt verbalized understanding.

## 2025-03-21 ENCOUNTER — APPOINTMENT (OUTPATIENT)
Dept: CARDIOLOGY | Facility: HOSPITAL | Age: 65
End: 2025-03-21
Payer: COMMERCIAL

## 2025-03-21 ENCOUNTER — APPOINTMENT (OUTPATIENT)
Dept: RADIOLOGY | Facility: HOSPITAL | Age: 65
End: 2025-03-21
Payer: COMMERCIAL

## 2025-03-21 ENCOUNTER — HOSPITAL ENCOUNTER (EMERGENCY)
Facility: HOSPITAL | Age: 65
Discharge: HOME | End: 2025-03-21
Attending: STUDENT IN AN ORGANIZED HEALTH CARE EDUCATION/TRAINING PROGRAM
Payer: COMMERCIAL

## 2025-03-21 VITALS
RESPIRATION RATE: 16 BRPM | DIASTOLIC BLOOD PRESSURE: 87 MMHG | WEIGHT: 216 LBS | SYSTOLIC BLOOD PRESSURE: 148 MMHG | HEIGHT: 70 IN | HEART RATE: 90 BPM | OXYGEN SATURATION: 98 % | BODY MASS INDEX: 30.92 KG/M2 | TEMPERATURE: 97.2 F

## 2025-03-21 DIAGNOSIS — E83.42 HYPOMAGNESEMIA: ICD-10-CM

## 2025-03-21 DIAGNOSIS — R07.9 CHEST PAIN, UNSPECIFIED TYPE: Primary | ICD-10-CM

## 2025-03-21 LAB
ALBUMIN SERPL BCP-MCNC: 4.3 G/DL (ref 3.4–5)
ALP SERPL-CCNC: 138 U/L (ref 33–136)
ALT SERPL W P-5'-P-CCNC: 46 U/L (ref 10–52)
ANION GAP SERPL CALC-SCNC: 12 MMOL/L (ref 10–20)
AST SERPL W P-5'-P-CCNC: 37 U/L (ref 9–39)
ATRIAL RATE: 95 BPM
BASOPHILS # BLD AUTO: 0.01 X10*3/UL (ref 0–0.1)
BASOPHILS NFR BLD AUTO: 0.2 %
BILIRUB SERPL-MCNC: 0.7 MG/DL (ref 0–1.2)
BNP SERPL-MCNC: 5 PG/ML (ref 0–99)
BUN SERPL-MCNC: 11 MG/DL (ref 6–23)
CALCIUM SERPL-MCNC: 9.1 MG/DL (ref 8.6–10.3)
CARDIAC TROPONIN I PNL SERPL HS: 7 NG/L (ref 0–20)
CARDIAC TROPONIN I PNL SERPL HS: 7 NG/L (ref 0–20)
CHLORIDE SERPL-SCNC: 98 MMOL/L (ref 98–107)
CO2 SERPL-SCNC: 25 MMOL/L (ref 21–32)
CREAT SERPL-MCNC: 1.02 MG/DL (ref 0.5–1.3)
D DIMER PPP FEU-MCNC: 254 NG/ML FEU
DACRYOCYTES BLD QL SMEAR: NORMAL
EGFRCR SERPLBLD CKD-EPI 2021: 83 ML/MIN/1.73M*2
EOSINOPHIL # BLD AUTO: 0.03 X10*3/UL (ref 0–0.7)
EOSINOPHIL NFR BLD AUTO: 0.7 %
ERYTHROCYTE [DISTWIDTH] IN BLOOD BY AUTOMATED COUNT: 14.3 % (ref 11.5–14.5)
GLUCOSE SERPL-MCNC: 269 MG/DL (ref 74–99)
HCT VFR BLD AUTO: 35.5 % (ref 41–52)
HGB BLD-MCNC: 12.8 G/DL (ref 13.5–17.5)
HYPOCHROMIA BLD QL SMEAR: NORMAL
IMM GRANULOCYTES # BLD AUTO: 0.02 X10*3/UL (ref 0–0.7)
IMM GRANULOCYTES NFR BLD AUTO: 0.5 % (ref 0–0.9)
LYMPHOCYTES # BLD AUTO: 1.46 X10*3/UL (ref 1.2–4.8)
LYMPHOCYTES NFR BLD AUTO: 34.7 %
MAGNESIUM SERPL-MCNC: 1.31 MG/DL (ref 1.6–2.4)
MCH RBC QN AUTO: 33.2 PG (ref 26–34)
MCHC RBC AUTO-ENTMCNC: 36.1 G/DL (ref 32–36)
MCV RBC AUTO: 92 FL (ref 80–100)
MONOCYTES # BLD AUTO: 0.42 X10*3/UL (ref 0.1–1)
MONOCYTES NFR BLD AUTO: 10 %
NEUTROPHILS # BLD AUTO: 2.27 X10*3/UL (ref 1.2–7.7)
NEUTROPHILS NFR BLD AUTO: 53.9 %
NRBC BLD-RTO: 0 /100 WBCS (ref 0–0)
OVALOCYTES BLD QL SMEAR: NORMAL
P AXIS: 21 DEGREES
PLATELET # BLD AUTO: 98 X10*3/UL (ref 150–450)
POTASSIUM SERPL-SCNC: 5.1 MMOL/L (ref 3.5–5.3)
PR INTERVAL: 150 MS
PROT SERPL-MCNC: 7.1 G/DL (ref 6.4–8.2)
Q ONSET: 249 MS
QRS COUNT: 15 BEATS
QRS DURATION: 91 MS
QT INTERVAL: 351 MS
QTC CALCULATION(BAZETT): 439 MS
QTC FREDERICIA: 407 MS
R AXIS: -50 DEGREES
RBC # BLD AUTO: 3.86 X10*6/UL (ref 4.5–5.9)
RBC MORPH BLD: NORMAL
SODIUM SERPL-SCNC: 130 MMOL/L (ref 136–145)
T AXIS: 57 DEGREES
T OFFSET: 425 MS
TSH SERPL-ACNC: 1.1 MIU/L (ref 0.44–3.98)
VENTRICULAR RATE: 94 BPM
WBC # BLD AUTO: 4.2 X10*3/UL (ref 4.4–11.3)

## 2025-03-21 PROCEDURE — 71045 X-RAY EXAM CHEST 1 VIEW: CPT | Performed by: RADIOLOGY

## 2025-03-21 PROCEDURE — 84484 ASSAY OF TROPONIN QUANT: CPT | Performed by: NURSE PRACTITIONER

## 2025-03-21 PROCEDURE — 85025 COMPLETE CBC W/AUTO DIFF WBC: CPT | Performed by: NURSE PRACTITIONER

## 2025-03-21 PROCEDURE — 96365 THER/PROPH/DIAG IV INF INIT: CPT

## 2025-03-21 PROCEDURE — 83735 ASSAY OF MAGNESIUM: CPT | Performed by: NURSE PRACTITIONER

## 2025-03-21 PROCEDURE — 80053 COMPREHEN METABOLIC PANEL: CPT | Performed by: NURSE PRACTITIONER

## 2025-03-21 PROCEDURE — 71045 X-RAY EXAM CHEST 1 VIEW: CPT

## 2025-03-21 PROCEDURE — 83880 ASSAY OF NATRIURETIC PEPTIDE: CPT | Performed by: NURSE PRACTITIONER

## 2025-03-21 PROCEDURE — 84443 ASSAY THYROID STIM HORMONE: CPT | Performed by: NURSE PRACTITIONER

## 2025-03-21 PROCEDURE — 36415 COLL VENOUS BLD VENIPUNCTURE: CPT | Performed by: NURSE PRACTITIONER

## 2025-03-21 PROCEDURE — 96366 THER/PROPH/DIAG IV INF ADDON: CPT

## 2025-03-21 PROCEDURE — 2500000004 HC RX 250 GENERAL PHARMACY W/ HCPCS (ALT 636 FOR OP/ED): Performed by: NURSE PRACTITIONER

## 2025-03-21 PROCEDURE — 99285 EMERGENCY DEPT VISIT HI MDM: CPT | Mod: 25 | Performed by: STUDENT IN AN ORGANIZED HEALTH CARE EDUCATION/TRAINING PROGRAM

## 2025-03-21 PROCEDURE — 93005 ELECTROCARDIOGRAM TRACING: CPT

## 2025-03-21 PROCEDURE — 85379 FIBRIN DEGRADATION QUANT: CPT | Performed by: NURSE PRACTITIONER

## 2025-03-21 RX ORDER — MAGNESIUM SULFATE HEPTAHYDRATE 40 MG/ML
2 INJECTION, SOLUTION INTRAVENOUS ONCE
Status: COMPLETED | OUTPATIENT
Start: 2025-03-21 | End: 2025-03-21

## 2025-03-21 RX ADMIN — MAGNESIUM SULFATE HEPTAHYDRATE 2 G: 40 INJECTION, SOLUTION INTRAVENOUS at 11:52

## 2025-03-21 ASSESSMENT — LIFESTYLE VARIABLES
HAVE YOU EVER FELT YOU SHOULD CUT DOWN ON YOUR DRINKING: NO
EVER FELT BAD OR GUILTY ABOUT YOUR DRINKING: NO
EVER HAD A DRINK FIRST THING IN THE MORNING TO STEADY YOUR NERVES TO GET RID OF A HANGOVER: NO
TOTAL SCORE: 0
HAVE PEOPLE ANNOYED YOU BY CRITICIZING YOUR DRINKING: NO

## 2025-03-21 ASSESSMENT — PAIN SCALES - GENERAL
PAINLEVEL_OUTOF10: 0 - NO PAIN

## 2025-03-21 ASSESSMENT — PAIN - FUNCTIONAL ASSESSMENT: PAIN_FUNCTIONAL_ASSESSMENT: 0-10

## 2025-03-21 NOTE — ED PROVIDER NOTES
HPI   Chief Complaint   Patient presents with    Chest Pain     Since 8am after waking up.         64-year-old male with a history of Diabetes, hypertension, diabetic neuropathy, palpitations presents the emergency department today for chest pain and heart palpitations.  Patient states that he woke up this morning at 8 AM and began having left-sided chest discomfort.  There is no radiation to the arm neck or back.  No nausea, vomiting associated with this.  Mild shortness of breath.  Also has been having palpitations since that time.  There is no exacerbating or relieving factors associated.  States that he was recently admitted to our facility and is following up with cardiology outpatient.  Just had a Holter monitor completed and was notified yesterday that he needs to increase his metoprolol an additional 25 mg daily due to results.  He has not started taking that new dose.  No dizziness, headache or syncope.  No fever, chills, abdominal or back pain.  Denies any urinary symptoms.                          Gilson Coma Scale Score: 15                  Patient History   Past Medical History:   Diagnosis Date    Cirrhosis (Multi) 08/28/2024    Diabetes (Multi)     HTN (hypertension)     Personal history of other diseases of the musculoskeletal system and connective tissue     History of gout    Personal history of other mental and behavioral disorders     History of depression    Right upper quadrant abdominal pain 06/11/2024    Type 2 diabetes mellitus      Past Surgical History:   Procedure Laterality Date    CHOLECYSTECTOMY N/A 08/26/2024    Laparoscopic converted to open partial cholecystectomy and drain placement    NECK SURGERY  02/01/2018    Neck Surgery    OTHER SURGICAL HISTORY  02/01/2018    Surgery Excision Lipoma    OTHER SURGICAL HISTORY  04/25/2022    Colonoscopy     Family History   Problem Relation Name Age of Onset    Dementia Mother      Heart attack Father      Cholecystitis Sister          x3     Cancer Father's Sister      Prostate cancer Father's Brother      Cancer Paternal Grandmother      Diabetes Paternal Grandfather       Social History     Tobacco Use    Smoking status: Former     Types: Cigarettes    Smokeless tobacco: Never   Vaping Use    Vaping status: Never Used   Substance Use Topics    Alcohol use: Never    Drug use: Never       Physical Exam   ED Triage Vitals   Temperature Heart Rate Respirations BP   03/21/25 1041 03/21/25 1041 03/21/25 1041 03/21/25 1041   36.5 °C (97.7 °F) 98 20 (!) 153/100      Pulse Ox Temp src Heart Rate Source Patient Position   03/21/25 1041 -- -- 03/21/25 1118   97 %   Lying      BP Location FiO2 (%)     -- --             Physical Exam  Vitals and nursing note reviewed.   Constitutional:       General: He is not in acute distress.     Appearance: Normal appearance. He is not toxic-appearing.   HENT:      Right Ear: Tympanic membrane normal.      Left Ear: Tympanic membrane normal.      Mouth/Throat:      Mouth: Mucous membranes are moist.      Pharynx: Oropharynx is clear.   Eyes:      Extraocular Movements: Extraocular movements intact.      Pupils: Pupils are equal, round, and reactive to light.   Cardiovascular:      Rate and Rhythm: Normal rate and regular rhythm.      Pulses: Normal pulses.      Heart sounds: Normal heart sounds.   Pulmonary:      Effort: Pulmonary effort is normal.      Breath sounds: Normal breath sounds.   Abdominal:      General: Abdomen is flat. Bowel sounds are normal.      Palpations: Abdomen is soft.      Tenderness: There is no abdominal tenderness.   Musculoskeletal:         General: Normal range of motion.      Cervical back: Normal range of motion and neck supple.      Right lower leg: No edema.      Left lower leg: No edema.   Skin:     General: Skin is warm and dry.      Capillary Refill: Capillary refill takes less than 2 seconds.   Neurological:      General: No focal deficit present.      Mental Status: He is alert and oriented  to person, place, and time.   Psychiatric:         Mood and Affect: Mood normal.         Behavior: Behavior normal.         Judgment: Judgment normal.         Labs Reviewed   CBC WITH AUTO DIFFERENTIAL - Abnormal       Result Value    WBC 4.2 (*)     nRBC 0.0      RBC 3.86 (*)     Hemoglobin 12.8 (*)     Hematocrit 35.5 (*)     MCV 92      MCH 33.2      MCHC 36.1 (*)     RDW 14.3      Platelets 98 (*)     Neutrophils % 53.9      Immature Granulocytes %, Automated 0.5      Lymphocytes % 34.7      Monocytes % 10.0      Eosinophils % 0.7      Basophils % 0.2      Neutrophils Absolute 2.27      Immature Granulocytes Absolute, Automated 0.02      Lymphocytes Absolute 1.46      Monocytes Absolute 0.42      Eosinophils Absolute 0.03      Basophils Absolute 0.01     COMPREHENSIVE METABOLIC PANEL - Abnormal    Glucose 269 (*)     Sodium 130 (*)     Potassium 5.1      Chloride 98      Bicarbonate 25      Anion Gap 12      Urea Nitrogen 11      Creatinine 1.02      eGFR 83      Calcium 9.1      Albumin 4.3      Alkaline Phosphatase 138 (*)     Total Protein 7.1      AST 37      Bilirubin, Total 0.7      ALT 46     MAGNESIUM - Abnormal    Magnesium 1.31 (*)    B-TYPE NATRIURETIC PEPTIDE - Normal    BNP 5      Narrative:        <100 pg/mL - Heart failure unlikely  100-299 pg/mL - Intermediate probability of acute heart                  failure exacerbation. Correlate with clinical                  context and patient history.    >=300 pg/mL - Heart Failure likely. Correlate with clinical                  context and patient history.    BNP testing is performed using different testing methodology at [facility] than at other [facility]. Direct result comparisons should only be made within the same method.      TSH WITH REFLEX TO FREE T4 IF ABNORMAL - Normal    Thyroid Stimulating Hormone 1.10      Narrative:     TSH testing is performed using different testing methodology at [facility] than at  [facility]. Direct result comparisons should only be made within the same method.     SERIAL TROPONIN-INITIAL - Normal    Troponin I, High Sensitivity 7      Narrative:     Less than 99th percentile of normal range cutoff-  Female and children under 18 years old <14 ng/L; Male <21 ng/L: Negative  Repeat testing should be performed if clinically indicated.     Female and children under 18 years old 14-50 ng/L; Male 21-50 ng/L:  Consistent with possible cardiac damage and possible increased clinical   risk. Serial measurements may help to assess extent of myocardial damage.     >50 ng/L: Consistent with cardiac damage, increased clinical risk and  myocardial infarction. Serial measurements may help assess extent of   myocardial damage.      NOTE: Children less than 1 year old may have higher baseline troponin   levels and results should be interpreted in conjunction with the overall   clinical context.     NOTE: Troponin I testing is performed using a different   testing methodology at [facility] than at [facility]. Direct result comparisons should only   be made within the same method.   SERIAL TROPONIN, 1 HOUR - Normal    Troponin I, High Sensitivity 7      Narrative:     Less than 99th percentile of normal range cutoff-  Female and children under 18 years old <14 ng/L; Male <21 ng/L: Negative  Repeat testing should be performed if clinically indicated.     Female and children under 18 years old 14-50 ng/L; Male 21-50 ng/L:  Consistent with possible cardiac damage and possible increased clinical   risk. Serial measurements may help to assess extent of myocardial damage.     >50 ng/L: Consistent with cardiac damage, increased clinical risk and  myocardial infarction. Serial measurements may help assess extent of   myocardial damage.      NOTE: Children less than 1 year old may have higher baseline troponin   levels and results should be interpreted in conjunction with the  overall   clinical context.     NOTE: Troponin I testing is performed using a different   testing methodology at [facility] than at other   [facility] hospitals. Direct result comparisons should only   be made within the same method.   D-DIMER, VTE EXCLUSION - Normal    D-Dimer, Quantitative VTE Exclusion 254      Narrative:     The VTE Exclusion D-Dimer assay is reported in ng/mL Fibrinogen Equivalent Units (FEU).    Per 's instructions for use, a value of less than 500 ng/mL (FEU) may help to exclude DVT or PE in outpatients when the assay is used with a clinical pretest probability assessment.([facility] must utilize and document eCalc 'Wells Score Deep Vein Thrombosis Risk' for DVT exclusion only. Emergency Department should utilize  Guidelines for Emergency Department Use of the VTE Exclusion D-Dimer and Clinical Pretest probability assessment model for DVT or PE exclusion.)   TROPONIN SERIES- (INITIAL, 1 HR)    Narrative:     The following orders were created for panel order Troponin I Series, High Sensitivity (0, 1 HR).  Procedure                               Abnormality         Status                     ---------                               -----------         ------                     Troponin I, High Sensiti...[[id number]]  Normal              Final result               Troponin, High Sensitivi...[[id number]]  Normal              Final result                 Please view results for these tests on the individual orders.   MORPHOLOGY    RBC Morphology See Below      Hypochromia Mild      Ovalocytes Few      Teardrop Cells Few       Pain Management Panel           No data to display              XR chest 1 view   Final Result   Patchy left basilar airspace disease with pneumonia and atelectasis   in the differential. Radiographic follow-up to resolution in 2-3   weeks is recommended.             MACRO:   None        Signed by: Nahum Ramirez 3/21/2025 11:30 AM   Dictation workstation:    [id number]          ED Course & MDM   ED Course as of 03/21/25 1353   Fri Mar 21, 2025   1140 Magnesium found to be 1.31.  Replacement of 2 g ordered at this time. [RB]   1244 Dimer is 254. Pe excluded at this time [RB]      ED Course User Index  [RB] Henrietta Nixon, APRN-CNP         Diagnoses as of 03/21/25 1353   Chest pain, unspecified type   Hypomagnesemia       Medical Decision Making  On initial evaluation patient is well-appearing in the emergency department at this time.  Physical exam is essentially benign. Patient EKG shows sinus rhythm at a rate of 94  QRS 91 QTc 439 there is no ST elevation or axis deviation noted.  Labs were obtained in the emergency department magnesium replaced in the ED.  Troponin x 2 are negative BNP of 5 TSH within normal limits CBC shows white blood cell count hemoglobin stable CIWA natremia at 130.  Chest x-ray shows airspace disease in the left basilar.  Patient has no cough, congestion, fever or chills.  Likely related to atelectasis.  Repeat x-ray in 2 to 3 weeks.  Sat down discussed results in depth with the patient at this time.  He feels comfortable going home at this time.  He was ambulatory with a steady even gait in the ED and we discharged in stable condition with close outpatient follow-up with his cardiologist and strict return precautions.        Procedure  Procedures      Differential Diagnoses include cardiac arrhythmia, thyroid abnormality, ACS, anemia  This is not an exhaustive list of all the diagnosis and therapeutics are considered during the patient's evaluation for an emergency medical condition.    I discussed the differential, results and discharge plan with the patient and/or family/friend/caregiver if present.  I emphasized the importance of follow-up with the physician I referred them to in the timeframe recommended.  I explained reasons for the patient to return to the Emergency Department. Additional verbal discharge instructions were also  given and discussed with the patient to supplement those generated by the EMR. We also discussed medications that were prescribed (if any) including common side effects and interactions. The patient was advised to abstain from driving, operating heavy machinery or making significant decisions while taking medications such as opiates and muscle relaxers that may impair this. All questions were addressed.  They understand return precautions and discharge instructions. The patient and/or family/friend/caregiver expressed understanding.           Henrietta Nixon, KRISTEN-CNP  03/21/25 7013

## 2025-04-01 ENCOUNTER — OFFICE VISIT (OUTPATIENT)
Dept: CARDIOLOGY | Facility: HOSPITAL | Age: 65
End: 2025-04-01
Payer: COMMERCIAL

## 2025-04-01 VITALS
HEIGHT: 70 IN | BODY MASS INDEX: 30.92 KG/M2 | HEART RATE: 83 BPM | SYSTOLIC BLOOD PRESSURE: 128 MMHG | WEIGHT: 216 LBS | DIASTOLIC BLOOD PRESSURE: 84 MMHG

## 2025-04-01 DIAGNOSIS — E78.00 HYPERCHOLESTEREMIA: ICD-10-CM

## 2025-04-01 DIAGNOSIS — R55 POSTURAL DIZZINESS WITH PRESYNCOPE: ICD-10-CM

## 2025-04-01 DIAGNOSIS — R42 POSTURAL DIZZINESS WITH PRESYNCOPE: ICD-10-CM

## 2025-04-01 DIAGNOSIS — I47.10 PAROXYSMAL SVT (SUPRAVENTRICULAR TACHYCARDIA) (CMS-HCC): Primary | ICD-10-CM

## 2025-04-01 DIAGNOSIS — R00.2 HEART PALPITATIONS: ICD-10-CM

## 2025-04-01 LAB
ATRIAL RATE: 83 BPM
P AXIS: 65 DEGREES
P OFFSET: 187 MS
P ONSET: 133 MS
PR INTERVAL: 156 MS
Q ONSET: 211 MS
QRS COUNT: 14 BEATS
QRS DURATION: 100 MS
QT INTERVAL: 352 MS
QTC CALCULATION(BAZETT): 413 MS
QTC FREDERICIA: 392 MS
R AXIS: -28 DEGREES
T AXIS: 52 DEGREES
T OFFSET: 387 MS
VENTRICULAR RATE: 83 BPM

## 2025-04-01 PROCEDURE — 99215 OFFICE O/P EST HI 40 MIN: CPT | Mod: 25 | Performed by: INTERNAL MEDICINE

## 2025-04-01 PROCEDURE — 3044F HG A1C LEVEL LT 7.0%: CPT | Performed by: INTERNAL MEDICINE

## 2025-04-01 PROCEDURE — 3079F DIAST BP 80-89 MM HG: CPT | Performed by: INTERNAL MEDICINE

## 2025-04-01 PROCEDURE — 93010 ELECTROCARDIOGRAM REPORT: CPT | Performed by: INTERNAL MEDICINE

## 2025-04-01 PROCEDURE — 3008F BODY MASS INDEX DOCD: CPT | Performed by: INTERNAL MEDICINE

## 2025-04-01 PROCEDURE — 93005 ELECTROCARDIOGRAM TRACING: CPT | Performed by: INTERNAL MEDICINE

## 2025-04-01 PROCEDURE — 3048F LDL-C <100 MG/DL: CPT | Performed by: INTERNAL MEDICINE

## 2025-04-01 PROCEDURE — 99215 OFFICE O/P EST HI 40 MIN: CPT | Performed by: INTERNAL MEDICINE

## 2025-04-01 PROCEDURE — 3074F SYST BP LT 130 MM HG: CPT | Performed by: INTERNAL MEDICINE

## 2025-04-01 RX ORDER — METOPROLOL SUCCINATE 25 MG/1
37.5 TABLET, EXTENDED RELEASE ORAL DAILY
Qty: 135 TABLET | Refills: 3 | Status: SHIPPED | OUTPATIENT
Start: 2025-04-01 | End: 2026-04-01

## 2025-04-01 RX ORDER — REGADENOSON 0.08 MG/ML
0.4 INJECTION, SOLUTION INTRAVENOUS
OUTPATIENT
Start: 2025-04-01

## 2025-04-01 RX ORDER — AMINOPHYLLINE 25 MG/ML
125 INJECTION, SOLUTION INTRAVENOUS ONCE AS NEEDED
OUTPATIENT
Start: 2025-04-01

## 2025-04-01 NOTE — PATIENT INSTRUCTIONS
Thanks for following up in office today.    1)  I increased your dose of metoprolol to 1.5 tabs of 25 mg daily to see if this helps your chest pain.     2)  We will schedule you for a different chemical stress test to check for heart blockages.    3)  Please continue your cardiac medications as prescribed.    Follow up with Dr. Soto after testing  If you have any questions, please call us at [phone]

## 2025-04-01 NOTE — PROGRESS NOTES
"Chief Complaint:   No chief complaint on file.     History Of Present Illness:    Mane Nath is a 64 y.o. male presenting s/p testing.  Here with family.  H/o HTN, HLD, orthostatic dizziness, DM2, Gout, Unsteady gait, Benign tremor, L cranial palsy, former smoker.    Last seen by me in 2/2025 s/p testing. At the time, he felt the same after hospitalization, still reported dizziness when standing up. We decreased metoprolol succinate dose to see if this improves dizziness.  I was also suspicious that his dizziness may also be 2/2 autonomic dysfcn, and he has an appointment with neurology to be evaluated (see below).  We checked 14-day Holter 3/2025 which showed 11 episodes of SVT. Per telephone encounter 3/20/25, due to Zio report I advised to increase metoprolol succinate to 25 mg daily.     Pt was also in ER 3/21/25 for chest pain; he described waking up with left-sided chest discomfort but had not taken the new metoprolol dose. Labs showed mildly low Mg (was replaced) but negative heart enzymes.  He was discharged home with follow up.    Today, Pt reports he still has occasional episodes of mid-sternal chest pain even while sitting in office today. He describes a \"light cramp\", denies radiation to arm or neck, or nausea, vomiting, dizziness. Chest pain lasts a few seconds and resolved. Sxs have improved since starting metoprolol. Palpitations also improved on new metoprolol dose. Unsure if chest pain would worsen with exertion.     Pt anticipating appt with neurology to assess for Parkinson's on 6/20/25 but also on their wait list. Family states appt was recently pushed back to 9/2025 but they will call back.    Review Of Systems:  The remainder of the review of systems was obtained, and was negative as pertains to the chief complaint.    CV testing and labs reviewed:  EKG today shows LVH    Labs 3/2025:  K 5.1  BUN 11  Cr 1.02  Mg 1.31  HS Troponin 7>7  H&H 12.8 35.5  Lipid panel 1/2025:    HDL 29.8  " "LDL 83      Holter monitor worn 2/27 to 3/12/25:  Underlying SR with avg HR 85 bpm, rare SVE/VE, 11 episodes of SVT the longest lasting 16.9s, 51 patient triggered events correlating with SR, SVE/VE, or SVT.    TTE 2/2025:  CONCLUSIONS:   1. The left ventricular systolic function is normal, with a Mcfarland's biplane calculated ejection fraction of 60%.   2. There is normal right ventricular global systolic function.    Cardiac stress test 8/2023:  Summary:  1. Adequate level of stress achieved.  2. No ECG changes from baseline.  3. There were no stress-induced wall motion abnormalities. This is a negative stress echo test for ischemia.    Carotid Artery Duplex  US 5/2022: <50% stenosis of BL PICA    Prior Hx:  Pt presented to ED on 2/10/25 with near syncope. Lab work WNLs, UA unremarkable for UTI. TTE showed normal left ventricular systolic function with EF of 60%. After hospitalization, Pt \"feels the same.\" He still c/o dizziness when he stands up. Sister reports their mother had a pacemaker around his current age which improved her sxs. Sister also reports tremor, difficulty lifting his feet, ambulating with walker - they have an appt on 6/30/25 with a neurologist to assess Parkinson's but on wait list for cancellation.     Last Recorded Vitals:  There were no vitals filed for this visit.    Past Medical History:  He has a past medical history of Cirrhosis (Multi) (08/28/2024), Diabetes (Multi), HTN (hypertension), Personal history of other diseases of the musculoskeletal system and connective tissue, Personal history of other mental and behavioral disorders, Right upper quadrant abdominal pain (06/11/2024), and Type 2 diabetes mellitus.    Past Surgical History:  He has a past surgical history that includes Neck surgery (02/01/2018); Other surgical history (02/01/2018); Other surgical history (04/25/2022); and Cholecystectomy (N/A, 08/26/2024).      Social History:  He reports that he has quit smoking. His " smoking use included cigarettes. He has never used smokeless tobacco. He reports that he does not drink alcohol and does not use drugs.    Family History:  Family History   Problem Relation Name Age of Onset    Dementia Mother      Heart attack Father      Cholecystitis Sister          x3    Cancer Father's Sister      Prostate cancer Father's Brother      Cancer Paternal Grandmother      Diabetes Paternal Grandfather          Allergies:  Latex    Outpatient Medications:  Current Outpatient Medications   Medication Instructions    acetaminophen (TYLENOL) 650 mg, oral, Every 4 hours PRN    ARIPiprazole (ABILIFY) 30 mg, Nightly    aspirin 81 mg, oral, Daily    atorvastatin (Lipitor) 80 mg tablet 1 tablet, Daily (0630)    busPIRone (Buspar) 10 mg tablet take 2 tablets by mouth twice a day as directed    dapagliflozin propanediol (FARXIGA) 10 mg, Daily    diclofenac sodium (Voltaren Arthritis Pain) 1 % gel Apply topically. Use as directed prn    docusate sodium (Colace) 100 mg capsule TAKE ONE (1) CAPSULE BY MOUTH ONCE DAILY AS NEEDED. **(COLACE 100 MG CAP EQUIV)**    donepezil (ARICEPT) 5 mg, Nightly    gabapentin (Neurontin) 400 mg capsule Take 1 capsule by mouth twice a day and 2 capsules at bedtime    hydrOXYzine pamoate (VISTARIL) 25 mg, Nightly PRN    metFORMIN (Glucophage) 1,000 mg tablet 1 tablet, 2 times daily    metoprolol succinate XL (TOPROL-XL) 25 mg, oral, Daily, Do not crush or chew.    omeprazole (PRILOSEC) 40 mg, Daily    polyethylene glycol (MIRALAX) 17 g, Daily    simethicone (Mylicon) 80 mg chewable tablet 1 tablet, 3 times daily before meals    traZODone (Desyrel) 100 mg tablet take 1 to 3 tablet by mouth at bedtime if needed    Trulicity 3 mg/0.5 mL pen injector Inject 4.5 mg as directed 1 (one) time per week. ON SUNDAYS    venlafaxine XR (Effexor-XR) 150 mg 24 hr capsule take 2 capsules by mouth once daily as directed       Physical Exam:  Physical Exam  Vitals reviewed.   HENT:      Head:  Normocephalic.      Nose: Nose normal.      Mouth/Throat:      Mouth: Mucous membranes are dry.   Neck:      Vascular: No carotid bruit.   Cardiovascular:      Rate and Rhythm: Normal rate and regular rhythm.      Heart sounds: No murmur heard.  Pulmonary:      Effort: Pulmonary effort is normal.      Comments: Decreased breath sounds  No crackles  Abdominal:      General: Abdomen is flat.      Palpations: Abdomen is soft.   Musculoskeletal:         General: Normal range of motion.      Cervical back: Normal range of motion.      Right lower leg: No edema.      Left lower leg: No edema.      Comments: Ambulating with walker   Skin:     General: Skin is warm and dry.   Neurological:      General: No focal deficit present.      Mental Status: He is alert.   Psychiatric:         Mood and Affect: Mood normal.        Last Labs:  CBC -  Lab Results   Component Value Date    WBC 4.2 (L) 03/21/2025    HGB 12.8 (L) 03/21/2025    HCT 35.5 (L) 03/21/2025    MCV 92 03/21/2025    PLT 98 (L) 03/21/2025       CMP -  Lab Results   Component Value Date    CALCIUM 9.1 03/21/2025    PHOS 3.1 08/28/2024    PROT 7.1 03/21/2025    ALBUMIN 4.3 03/21/2025    AST 37 03/21/2025    ALT 46 03/21/2025    ALKPHOS 138 (H) 03/21/2025    BILITOT 0.7 03/21/2025       LIPID PANEL -   Lab Results   Component Value Date    CHOL 143 01/07/2025    TRIG 152 (H) 01/07/2025    HDL 29.8 01/07/2025    CHHDL 4.8 01/07/2025    VLDL 30 01/07/2025    NHDL 113 01/07/2025       RENAL FUNCTION PANEL -   Lab Results   Component Value Date    GLUCOSE 269 (H) 03/21/2025     (L) 03/21/2025    K 5.1 03/21/2025    CL 98 03/21/2025    CO2 25 03/21/2025    ANIONGAP 12 03/21/2025    BUN 11 03/21/2025    CREATININE 1.02 03/21/2025    GFRMALE 57 (A) 09/23/2023    CALCIUM 9.1 03/21/2025    PHOS 3.1 08/28/2024    ALBUMIN 4.3 03/21/2025        Lab Results   Component Value Date    BNP 5 03/21/2025    HGBA1C 6.5 (H) 01/06/2025     Assessment/Plan   Atypical chest  pain:  Recently in ER 3/21/25. Describes occasional episodes of mid-sternal chest pain lasting a few seconds. Improved since restarting metoprolol.  -increased metoprolol to 1.5 tabs of 25 mg daily to see if this helps  -check nuclear stress test    Near-syncope:  Pt presented to ED on 2/10/25. TTE showed normal LV systolic function with EF of 60%.   Sister also reports tremor, magnetic gait, ambulating with walker.  Holter 3/2025 which showed 11 episodes of SVT.   -discussed autonomic dysautonomia - Pt anticipating appt with neurology to assess for Parkinson's on 6/20/25 but also on their wait list. Family states appt was recently pushed back to 9/2025 but they will call back.    Palpitations:  Pt was seen in hospital in 8/2024 for palpitations, no arrhythmia on telemetry, metoprolol was increased and an echocardiogram was ordered.   Today, denies any palpitations since leaving the hospital and restarting metoprolol.   -continue metoprolol as above      LVOT obstruction:  TTE 8/29/24: There is a mid cavity left ventricular obstruction. The resting gradient is 35.18 mmHg. The provoked gradient is 44.74 mmHg.   Today, patient denies shortness of breath, no palpitations.   -continue metoprolol as above      Hypertension:  Today's /84  -continue antihypertensives     Orthostatic dizziness:  Patient reports rare episode of postural lightheadedness.  -recommendations given to stay well hydrated, get up slowly     Dyslipidemia:  FLP 1/2025, LDL 83   - triglycerides elevated.  -continue statin    Type 2 DM:  -management per PCP    Former smoker:  Quit in 2000.    Scribe Attestation  By signing my name below, IDinah, [employer]   attest that this documentation has been prepared under the direction and in the presence of Cecily Soto MD.

## 2025-04-16 ENCOUNTER — HOSPITAL ENCOUNTER (OUTPATIENT)
Dept: RADIOLOGY | Facility: HOSPITAL | Age: 65
Discharge: HOME | End: 2025-04-16
Payer: COMMERCIAL

## 2025-04-16 ENCOUNTER — HOSPITAL ENCOUNTER (OUTPATIENT)
Dept: CARDIOLOGY | Facility: HOSPITAL | Age: 65
Discharge: HOME | End: 2025-04-16
Payer: COMMERCIAL

## 2025-04-16 DIAGNOSIS — R00.2 HEART PALPITATIONS: ICD-10-CM

## 2025-04-16 DIAGNOSIS — R42 POSTURAL DIZZINESS WITH PRESYNCOPE: ICD-10-CM

## 2025-04-16 DIAGNOSIS — R55 POSTURAL DIZZINESS WITH PRESYNCOPE: ICD-10-CM

## 2025-04-16 PROCEDURE — 93018 CV STRESS TEST I&R ONLY: CPT | Performed by: INTERNAL MEDICINE

## 2025-04-16 PROCEDURE — 78452 HT MUSCLE IMAGE SPECT MULT: CPT

## 2025-04-16 PROCEDURE — 93017 CV STRESS TEST TRACING ONLY: CPT

## 2025-04-16 PROCEDURE — 93016 CV STRESS TEST SUPVJ ONLY: CPT | Performed by: INTERNAL MEDICINE

## 2025-04-16 PROCEDURE — 2500000004 HC RX 250 GENERAL PHARMACY W/ HCPCS (ALT 636 FOR OP/ED): Mod: JZ | Performed by: INTERNAL MEDICINE

## 2025-04-16 PROCEDURE — A9502 TC99M TETROFOSMIN: HCPCS | Performed by: INTERNAL MEDICINE

## 2025-04-16 PROCEDURE — 3430000001 HC RX 343 DIAGNOSTIC RADIOPHARMACEUTICALS: Performed by: INTERNAL MEDICINE

## 2025-04-16 RX ORDER — REGADENOSON 0.08 MG/ML
0.4 INJECTION, SOLUTION INTRAVENOUS
Status: COMPLETED | OUTPATIENT
Start: 2025-04-16 | End: 2025-04-16

## 2025-04-16 RX ADMIN — TETROFOSMIN 10.7 MILLICURIE: 0.23 INJECTION, POWDER, LYOPHILIZED, FOR SOLUTION INTRAVENOUS at 07:25

## 2025-04-16 RX ADMIN — TETROFOSMIN 34.6 MILLICURIE: 0.23 INJECTION, POWDER, LYOPHILIZED, FOR SOLUTION INTRAVENOUS at 08:50

## 2025-04-16 RX ADMIN — REGADENOSON 0.4 MG: 0.08 INJECTION, SOLUTION INTRAVENOUS at 08:49

## 2025-04-24 ENCOUNTER — APPOINTMENT (OUTPATIENT)
Dept: CARDIOLOGY | Facility: HOSPITAL | Age: 65
End: 2025-04-24
Payer: COMMERCIAL

## 2025-05-07 ENCOUNTER — OFFICE VISIT (OUTPATIENT)
Dept: NEUROLOGY | Facility: HOSPITAL | Age: 65
End: 2025-05-07
Payer: COMMERCIAL

## 2025-05-07 VITALS
SYSTOLIC BLOOD PRESSURE: 165 MMHG | HEART RATE: 107 BPM | DIASTOLIC BLOOD PRESSURE: 99 MMHG | BODY MASS INDEX: 30.46 KG/M2 | WEIGHT: 212.3 LBS

## 2025-05-07 DIAGNOSIS — H49.22 LEFT ABDUCENS NERVE PALSY: ICD-10-CM

## 2025-05-07 DIAGNOSIS — R25.1 TREMOR: Primary | ICD-10-CM

## 2025-05-07 PROCEDURE — 3077F SYST BP >= 140 MM HG: CPT | Performed by: PSYCHIATRY & NEUROLOGY

## 2025-05-07 PROCEDURE — 3080F DIAST BP >= 90 MM HG: CPT | Performed by: PSYCHIATRY & NEUROLOGY

## 2025-05-07 PROCEDURE — 99215 OFFICE O/P EST HI 40 MIN: CPT | Performed by: PSYCHIATRY & NEUROLOGY

## 2025-05-07 PROCEDURE — 3048F LDL-C <100 MG/DL: CPT | Performed by: PSYCHIATRY & NEUROLOGY

## 2025-05-07 PROCEDURE — 3044F HG A1C LEVEL LT 7.0%: CPT | Performed by: PSYCHIATRY & NEUROLOGY

## 2025-05-07 ASSESSMENT — PAIN SCALES - GENERAL: PAINLEVEL_OUTOF10: 0-NO PAIN

## 2025-05-07 ASSESSMENT — ENCOUNTER SYMPTOMS
DEPRESSION: 0
OCCASIONAL FEELINGS OF UNSTEADINESS: 1
LOSS OF SENSATION IN FEET: 1

## 2025-05-07 NOTE — LETTER
May 8, 2025     Ronald Leroy DO  [address]  [address]  [facility] [address]    Patient: Mane Nath   YOB: 1960   Date of Visit: 5/7/2025       Dear Dr. Ronald Leroy DO:    Thank you for referring Mane Nath to me for evaluation. Below are my notes for this consultation.  If you have questions, please do not hesitate to call me. I look forward to following your patient along with you.       Sincerely,     Edouard Osuna MD      CC: No Recipients  ______________________________________________________________________________________    In-clinic visit    Visit type: new patient visit    PCP: Ronald Leroy DO.    Subjective    Mane Nath is a 64 y.o. 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    Patient is accompanied by sister.     HPI    I first saw him 1/7/25 at [facility] as inpatient consult.     Listed history of hypertension, dyslipidemia, diabetes, depression, bipolar disorder, and essential tremor. States he was in his usual state of health until 12/31/2024 when he started having some pain about the left eye.  He started to notice some difficulty with depth perception.  At some point, he went to his local eye doctor, and per note was diagnosed apparently as having left cranial nerve VI palsy, thought to be possibly related to diabetes.  He was asked to put an eye patch over the left eye, which he had been wearing.  On 1/6/2025, he thought that the double vision was worsening, and so he presented to [facility] ED.  In the ED, initial vital signs documented blood pressure 136/80, heart rate 83, afebrile.  Head CT without contrast done did not show any acute findings, except for some vague hypodensity in the subcortical white matter, possible small vessel ischemic disease.  Sodium 132, creatinine normal.  HbA1c was 6.5.  Patient admitted to the hospital.  When patient seen, patient denied any  blurring of vision. Says diplopia only with both eyes and goes away when seeing with only one eye (either eye). Diplopia worse when looking leftward. Patient has not had any prior events like this.  Patient denies any prior stroke.  No pets/animal exposure.  No recent illness.  No medication changes.  He denies normally having headaches.  He denies any temporal pain or tenderness. Patient states he does not take any aspirin at home.  He denies any history of GI bleed. No hx cancer. Former smoker.  Denies alcohol or street drug use. I recommended start ASA and high intensity statin, and followup ophthalmology outpatient.    2/2025 TTE wnl  2/2025 Holter monitor done with pSVT     Here now for follow-up.    Also wants to talk about tremor.    States L CN 6 palsy is better.    Still hasn't seen eye doctor back.    Tremors in BUE, x2 years. Not bad today. Sometimes could be pouring a drink, and hand will just shake. Eating/writing is a problem. Never had this when younger. No fam hx shaking.    Saw Dr Yancey in the past, dx ET, was on primidone 50mg at bedtime. No SE remembered. ? Ran out. Currently not on primidone.    2/7/24 MRI c-spine wo: focal area of cord signal abn C7 (? Myelomalacia)    On multiple psych meds including Abilify 30mg at bedtime and buspirone, venlafaxine XR 150mg and trazodone. States sees Lubna Elena at [place].      Problem List[1]    Medical History[2]  Surgical History[3]  Social History     Tobacco Use   • Smoking status: Former     Types: Cigarettes   • Smokeless tobacco: Never   Substance Use Topics   • Alcohol use: Never     family history includes Cancer in his father's sister and paternal grandmother; Cholecystitis in his sister; Dementia in his mother; Diabetes in his paternal grandfather; Heart attack in his father; Prostate cancer in his father's brother.    Allergies[4]  Current Medications[5]    Objective    BP (!) 165/99   Pulse 107   Wt 96.3 kg (212 lb 4.8 oz)   BMI 30.46  kg/m²     (+) handwriting wavy on 5/7/25  (+) spiral test wavy on 5/7/25    No bradykinesia  ? Slight masked facies  (+) resting tremor L hand>>R hand, ? BLE  (+) good arm swing when walking B  (+) R hand resting tremor when walking    Awake, alert, oriented x3, in no distress  Well-nourished, ambulatory independently but prefers use of cane  No leg edema    Mental status exam as above, conversant   Fair fund of knowledge  Recent/remote memory fair  Fair attention span  Pupils round reactive to light, 3-4 mm, (-) RAPD   Fundoscopic examination was attempted but fundus was not visualized bilaterally   Full EOMs intact, no nystagmus, no ptosis   V1 to V3 sensation is intact   No facial droop   Hearing grossly intact   No dysarthria  Good shoulder shrug bilaterally   Tongue is midline     Motor strength is 5/5 on all extremities, tone/bulk normal   Reflexes 1+ on BUE and L knee, trace on R knee, absent on B ankles, downgoing toes bilaterally   Sensation is intact to light touch, vibration on all 4 extremities except dec vib in distal BLE  Finger to nose test intact bilaterally   (+) sway on Romberg  Normal gait       Lab Results   Component Value Date    WBC 4.2 (L) 03/21/2025    RBC 3.86 (L) 03/21/2025    HGB 12.8 (L) 03/21/2025    HCT 35.5 (L) 03/21/2025    PLT 98 (L) 03/21/2025     (L) 03/21/2025    K 5.1 03/21/2025    CL 98 03/21/2025    BUN 11 03/21/2025    CREATININE 1.02 03/21/2025    EGFR 83 03/21/2025    CALCIUM 9.1 03/21/2025    ALKPHOS 138 (H) 03/21/2025    AST 37 03/21/2025    ALT 46 03/21/2025    MG 1.31 (L) 03/21/2025    HGBA1C 6.5 (H) 01/06/2025    LDLCALC 83 01/07/2025    CHOL 143 01/07/2025    HDL 29.8 01/07/2025    TRIG 152 (H) 01/07/2025    TSH 1.10 03/21/2025        Assessment/Plan      L CN 6 palsy  Resolved  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 Tremors  Has resting and action/postural component  Prev dx and treated as ET, was on primidone  Has action and resting tremors  Ddx ? ET  vs PD vs med induced EPS vs other  Already having presyncopal episodes  On metoprolol for cardiac  Discussed poss issues with medicating given presyncopal episodes already    Plans:  Do DATscan  Followup with eye doc    Rtc after testing    All questions were answered.  Pt knows how to contact my office in case pt has any questions or concerns.    Edouard Osuna MD              [1]  Patient Active Problem List  Diagnosis   • Fatty liver   • Constipation   • Dizziness   • HTN (hypertension)   • Hypercholesteremia   • Heart palpitations   • Dyspnea on exertion   • Diabetes mellitus (Multi)   • Calculus of gallbladder with acute on chronic cholecystitis with obstruction   • Difficult intubation   • Adhesive capsulitis of right shoulder   • Allergic reaction   • Benign essential hypertension   • Benign essential tremor   • Essential tremor   • Carotid bruit   • Cellulitis of finger   • Disease due to severe acute respiratory syndrome coronavirus 2 (SARS-CoV-2)   • COVID-19   • Decreased range of motion of shoulder   • Diabetic polyneuropathy associated with type 2 diabetes mellitus   • Dyslipidemia   • Fever   • History of depression   • Hypoxia   • Long-term (current) use of injectable non-insulin antidiabetic drugs   • Lumbosacral strain   • Nausea and vomiting   • Numbness of upper extremity   • Retention of urine   • Pain of right shoulder region   • Right shoulder pain   • Subjective memory complaints   • Abnormal gait   • Palpitations   • SOB (shortness of breath) on exertion   • Orthostatic dizziness   • Urinary tract infection   • Unsteady gait   • Double vision   • Pre-syncope   [2]  Past Medical History:  Diagnosis Date   • Cirrhosis (Multi) 08/28/2024   • Diabetes (Multi)    • HTN (hypertension)    • Personal history of other diseases of the musculoskeletal system and connective tissue     History of gout   • Personal history of other mental and behavioral disorders     History of depression   • Right upper  quadrant abdominal pain 06/11/2024   • Type 2 diabetes mellitus    [3]  Past Surgical History:  Procedure Laterality Date   • CHOLECYSTECTOMY N/A 08/26/2024    Laparoscopic converted to open partial cholecystectomy and drain placement   • NECK SURGERY  02/01/2018    Neck Surgery   • OTHER SURGICAL HISTORY  02/01/2018    Surgery Excision Lipoma   • OTHER SURGICAL HISTORY  04/25/2022    Colonoscopy   [4]  Allergies  Allergen Reactions   • Latex Rash and Unknown   [5]    Current Outpatient Medications:   •  ARIPiprazole (Abilify) 30 mg tablet, Take 1 tablet (30 mg) by mouth once daily at bedtime., Disp: , Rfl:   •  aspirin 81 mg EC tablet, Take 1 tablet (81 mg) by mouth once daily., Disp: 30 tablet, Rfl: 1  •  atorvastatin (Lipitor) 80 mg tablet, Take 1 tablet (80 mg) by mouth early in the morning.., Disp: , Rfl:   •  busPIRone (Buspar) 10 mg tablet, take 2 tablets by mouth twice a day as directed, Disp: , Rfl:   •  dapagliflozin propanediol (Farxiga) 10 mg, Take 1 tablet (10 mg) by mouth once daily., Disp: , Rfl:   •  diclofenac sodium (Voltaren Arthritis Pain) 1 % gel, Apply topically. Use as directed prn, Disp: , Rfl:   •  docusate sodium (Colace) 100 mg capsule, TAKE ONE (1) CAPSULE BY MOUTH ONCE DAILY AS NEEDED. **(COLACE 100 MG CAP EQUIV)**, Disp: , Rfl:   •  donepezil (Aricept) 5 mg tablet, Take 1 tablet (5 mg) by mouth once daily at bedtime., Disp: , Rfl:   •  gabapentin (Neurontin) 400 mg capsule, Take 1 capsule by mouth twice a day and 2 capsules at bedtime, Disp: , Rfl:   •  hydrOXYzine pamoate (Vistaril) 25 mg capsule, Take 1 capsule (25 mg) by mouth as needed at bedtime (for sleep after awake for 1 hour in bed)., Disp: , Rfl:   •  metFORMIN (Glucophage) 1,000 mg tablet, Take 1 tablet (1,000 mg) by mouth 2 times a day. With breakfast and dinner, Disp: , Rfl:   •  metoprolol succinate XL (Toprol-XL) 25 mg 24 hr tablet, Take 1.5 tablets (37.5 mg) by mouth once daily. Do not crush or chew., Disp: 135 tablet,  Rfl: 3  •  omeprazole (PriLOSEC) 40 mg DR capsule, Take 1 capsule (40 mg) by mouth once daily. Do not crush or chew., Disp: , Rfl:   •  polyethylene glycol (Miralax) 17 gram/dose powder, Mix 17 g of powder and drink once daily., Disp: , Rfl:   •  simethicone (Mylicon) 80 mg chewable tablet, Chew 1 tablet (80 mg) 3 times a day before meals. And at bedtime prn, Disp: , Rfl:   •  traZODone (Desyrel) 100 mg tablet, take 1 to 3 tablet by mouth at bedtime if needed, Disp: , Rfl:   •  Trulicity 3 mg/0.5 mL pen injector, Inject 4.5 mg as directed 1 (one) time per week. ON SUNDAYS, Disp: , Rfl:   •  venlafaxine XR (Effexor-XR) 150 mg 24 hr capsule, take 2 capsules by mouth once daily as directed, Disp: , Rfl:        [1]  Patient Active Problem List  Diagnosis   • Fatty liver   • Constipation   • Dizziness   • HTN (hypertension)   • Hypercholesteremia   • Heart palpitations   • Dyspnea on exertion   • Diabetes mellitus (Multi)   • Calculus of gallbladder with acute on chronic cholecystitis with obstruction   • Difficult intubation   • Adhesive capsulitis of right shoulder   • Allergic reaction   • Benign essential hypertension   • Benign essential tremor   • Essential tremor   • Carotid bruit   • Cellulitis of finger   • Disease due to severe acute respiratory syndrome coronavirus 2 (SARS-CoV-2)   • COVID-19   • Decreased range of motion of shoulder   • Diabetic polyneuropathy associated with type 2 diabetes mellitus   • Dyslipidemia   • Fever   • History of depression   • Hypoxia   • Long-term (current) use of injectable non-insulin antidiabetic drugs   • Lumbosacral strain   • Nausea and vomiting   • Numbness of upper extremity   • Retention of urine   • Pain of right shoulder region   • Right shoulder pain   • Subjective memory complaints   • Abnormal gait   • Palpitations   • SOB (shortness of breath) on exertion   • Orthostatic dizziness   • Urinary tract infection   • Unsteady gait   • Double vision   • Pre-syncope    [2]  Past Medical History:  Diagnosis Date   • Cirrhosis (Multi) 08/28/2024   • Diabetes (Multi)    • HTN (hypertension)    • Personal history of other diseases of the musculoskeletal system and connective tissue     History of gout   • Personal history of other mental and behavioral disorders     History of depression   • Right upper quadrant abdominal pain 06/11/2024   • Type 2 diabetes mellitus    [3]  Past Surgical History:  Procedure Laterality Date   • CHOLECYSTECTOMY N/A 08/26/2024    Laparoscopic converted to open partial cholecystectomy and drain placement   • NECK SURGERY  02/01/2018    Neck Surgery   • OTHER SURGICAL HISTORY  02/01/2018    Surgery Excision Lipoma   • OTHER SURGICAL HISTORY  04/25/2022    Colonoscopy   [4]  Allergies  Allergen Reactions   • Latex Rash and Unknown   [5]    Current Outpatient Medications:   •  ARIPiprazole (Abilify) 30 mg tablet, Take 1 tablet (30 mg) by mouth once daily at bedtime., Disp: , Rfl:   •  aspirin 81 mg EC tablet, Take 1 tablet (81 mg) by mouth once daily., Disp: 30 tablet, Rfl: 1  •  atorvastatin (Lipitor) 80 mg tablet, Take 1 tablet (80 mg) by mouth early in the morning.., Disp: , Rfl:   •  busPIRone (Buspar) 10 mg tablet, take 2 tablets by mouth twice a day as directed, Disp: , Rfl:   •  dapagliflozin propanediol (Farxiga) 10 mg, Take 1 tablet (10 mg) by mouth once daily., Disp: , Rfl:   •  diclofenac sodium (Voltaren Arthritis Pain) 1 % gel, Apply topically. Use as directed prn, Disp: , Rfl:   •  docusate sodium (Colace) 100 mg capsule, TAKE ONE (1) CAPSULE BY MOUTH ONCE DAILY AS NEEDED. **(COLACE 100 MG CAP EQUIV)**, Disp: , Rfl:   •  donepezil (Aricept) 5 mg tablet, Take 1 tablet (5 mg) by mouth once daily at bedtime., Disp: , Rfl:   •  gabapentin (Neurontin) 400 mg capsule, Take 1 capsule by mouth twice a day and 2 capsules at bedtime, Disp: , Rfl:   •  hydrOXYzine pamoate (Vistaril) 25 mg capsule, Take 1 capsule (25 mg) by mouth as needed at bedtime  (for sleep after awake for 1 hour in bed)., Disp: , Rfl:   •  metFORMIN (Glucophage) 1,000 mg tablet, Take 1 tablet (1,000 mg) by mouth 2 times a day. With breakfast and dinner, Disp: , Rfl:   •  metoprolol succinate XL (Toprol-XL) 25 mg 24 hr tablet, Take 1.5 tablets (37.5 mg) by mouth once daily. Do not crush or chew., Disp: 135 tablet, Rfl: 3  •  omeprazole (PriLOSEC) 40 mg DR capsule, Take 1 capsule (40 mg) by mouth once daily. Do not crush or chew., Disp: , Rfl:   •  polyethylene glycol (Miralax) 17 gram/dose powder, Mix 17 g of powder and drink once daily., Disp: , Rfl:   •  simethicone (Mylicon) 80 mg chewable tablet, Chew 1 tablet (80 mg) 3 times a day before meals. And at bedtime prn, Disp: , Rfl:   •  traZODone (Desyrel) 100 mg tablet, take 1 to 3 tablet by mouth at bedtime if needed, Disp: , Rfl:   •  Trulicity 3 mg/0.5 mL pen injector, Inject 4.5 mg as directed 1 (one) time per week. ON SUNDAYS, Disp: , Rfl:   •  venlafaxine XR (Effexor-XR) 150 mg 24 hr capsule, take 2 capsules by mouth once daily as directed, Disp: , Rfl:

## 2025-05-07 NOTE — PROGRESS NOTES
In-clinic visit    Visit type: new patient visit    PCP: Ronald Leroy, DO.    Subjective     Don IWONA Nath is a 64 y.o. 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    Patient is accompanied by sister.     HPI    I first saw him 1/7/25 at [facility] as inpatient consult.     Listed history of hypertension, dyslipidemia, diabetes, depression, bipolar disorder, and essential tremor. States he was in his usual state of health until 12/31/2024 when he started having some pain about the left eye.  He started to notice some difficulty with depth perception.  At some point, he went to his local eye doctor, and per note was diagnosed apparently as having left cranial nerve VI palsy, thought to be possibly related to diabetes.  He was asked to put an eye patch over the left eye, which he had been wearing.  On 1/6/2025, he thought that the double vision was worsening, and so he presented to [facility] ED.  In the ED, initial vital signs documented blood pressure 136/80, heart rate 83, afebrile.  Head CT without contrast done did not show any acute findings, except for some vague hypodensity in the subcortical white matter, possible small vessel ischemic disease.  Sodium 132, creatinine normal.  HbA1c was 6.5.  Patient admitted to the hospital.  When patient seen, patient denied any blurring of vision. Says diplopia only with both eyes and goes away when seeing with only one eye (either eye). Diplopia worse when looking leftward. Patient has not had any prior events like this.  Patient denies any prior stroke.  No pets/animal exposure.  No recent illness.  No medication changes.  He denies normally having headaches.  He denies any temporal pain or tenderness. Patient states he does not take any aspirin at home.  He denies any history of GI bleed. No hx cancer. Former smoker.  Denies alcohol or street drug use. I recommended start ASA and high intensity statin,  and followup ophthalmology outpatient.    2/2025 TTE wnl  2/2025 Holter monitor done with pSVT     Here now for follow-up.    Also wants to talk about tremor.    States L CN 6 palsy is better.    Still hasn't seen eye doctor back.    Tremors in BUE, x2 years. Not bad today. Sometimes could be pouring a drink, and hand will just shake. Eating/writing is a problem. Never had this when younger. No fam hx shaking.    Saw Dr Yancey in the past, dx ET, was on primidone 50mg at bedtime. No SE remembered. ? Ran out. Currently not on primidone.    2/7/24 MRI c-spine wo: focal area of cord signal abn C7 (? Myelomalacia)    On multiple psych meds including Abilify 30mg at bedtime and buspirone, venlafaxine XR 150mg and trazodone. States sees Lubna Elena at [facility].      Problem List[1]    Medical History[2]  Surgical History[3]  Social History     Tobacco Use    Smoking status: Former     Types: Cigarettes    Smokeless tobacco: Never   Substance Use Topics    Alcohol use: Never     family history includes Cancer in his father's sister and paternal grandmother; Cholecystitis in his sister; Dementia in his mother; Diabetes in his paternal grandfather; Heart attack in his father; Prostate cancer in his father's brother.    Allergies[4]  Current Medications[5]    Objective     BP (!) 165/99   Pulse 107   Wt 96.3 kg (212 lb 4.8 oz)   BMI 30.46 kg/m²     (+) handwriting wavy on 5/7/25  (+) spiral test wavy on 5/7/25    No bradykinesia  ? Slight masked facies  (+) resting tremor L hand>>R hand, ? BLE  (+) good arm swing when walking B  (+) R hand resting tremor when walking    Awake, alert, oriented x3, in no distress  Well-nourished, ambulatory independently but prefers use of cane  No leg edema    Mental status exam as above, conversant   Fair fund of knowledge  Recent/remote memory fair  Fair attention span  Pupils round reactive to light, 3-4 mm, (-) RAPD   Fundoscopic examination was attempted but fundus was not  visualized bilaterally   Full EOMs intact, no nystagmus, no ptosis   V1 to V3 sensation is intact   No facial droop   Hearing grossly intact   No dysarthria  Good shoulder shrug bilaterally   Tongue is midline     Motor strength is 5/5 on all extremities, tone/bulk normal   Reflexes 1+ on BUE and L knee, trace on R knee, absent on B ankles, downgoing toes bilaterally   Sensation is intact to light touch, vibration on all 4 extremities except dec vib in distal BLE  Finger to nose test intact bilaterally   (+) sway on Romberg  Normal gait       Lab Results   Component Value Date    WBC 4.2 (L) 03/21/2025    RBC 3.86 (L) 03/21/2025    HGB 12.8 (L) 03/21/2025    HCT 35.5 (L) 03/21/2025    PLT 98 (L) 03/21/2025     (L) 03/21/2025    K 5.1 03/21/2025    CL 98 03/21/2025    BUN 11 03/21/2025    CREATININE 1.02 03/21/2025    EGFR 83 03/21/2025    CALCIUM 9.1 03/21/2025    ALKPHOS 138 (H) 03/21/2025    AST 37 03/21/2025    ALT 46 03/21/2025    MG 1.31 (L) 03/21/2025    HGBA1C 6.5 (H) 01/06/2025    LDLCALC 83 01/07/2025    CHOL 143 01/07/2025    HDL 29.8 01/07/2025    TRIG 152 (H) 01/07/2025    TSH 1.10 03/21/2025        Assessment/Plan       L CN 6 palsy  Resolved  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 Tremors  Has resting and action/postural component  Prev dx and treated as ET, was on primidone  Has action and resting tremors  Ddx ? ET vs PD vs med induced EPS vs other  Already having presyncopal episodes  On metoprolol for cardiac  Discussed poss issues with medicating given presyncopal episodes already    Plans:  Do DATscan  Followup with eye doc    Rtc after testing    All questions were answered.  Pt knows how to contact my office in case pt has any questions or concerns.    Edouard Osuna MD              [1]   Patient Active Problem List  Diagnosis    Fatty liver    Constipation    Dizziness    HTN (hypertension)    Hypercholesteremia    Heart palpitations    Dyspnea on exertion    Diabetes mellitus  (Multi)    Calculus of gallbladder with acute on chronic cholecystitis with obstruction    Difficult intubation    Adhesive capsulitis of right shoulder    Allergic reaction    Benign essential hypertension    Benign essential tremor    Essential tremor    Carotid bruit    Cellulitis of finger    Disease due to severe acute respiratory syndrome coronavirus 2 (SARS-CoV-2)    COVID-19    Decreased range of motion of shoulder    Diabetic polyneuropathy associated with type 2 diabetes mellitus    Dyslipidemia    Fever    History of depression    Hypoxia    Long-term (current) use of injectable non-insulin antidiabetic drugs    Lumbosacral strain    Nausea and vomiting    Numbness of upper extremity    Retention of urine    Pain of right shoulder region    Right shoulder pain    Subjective memory complaints    Abnormal gait    Palpitations    SOB (shortness of breath) on exertion    Orthostatic dizziness    Urinary tract infection    Unsteady gait    Double vision    Pre-syncope   [2]   Past Medical History:  Diagnosis Date    Cirrhosis (Multi) 08/28/2024    Diabetes (Multi)     HTN (hypertension)     Personal history of other diseases of the musculoskeletal system and connective tissue     History of gout    Personal history of other mental and behavioral disorders     History of depression    Right upper quadrant abdominal pain 06/11/2024    Type 2 diabetes mellitus    [3]   Past Surgical History:  Procedure Laterality Date    CHOLECYSTECTOMY N/A 08/26/2024    Laparoscopic converted to open partial cholecystectomy and drain placement    NECK SURGERY  02/01/2018    Neck Surgery    OTHER SURGICAL HISTORY  02/01/2018    Surgery Excision Lipoma    OTHER SURGICAL HISTORY  04/25/2022    Colonoscopy   [4]   Allergies  Allergen Reactions    Latex Rash and Unknown   [5]   Current Outpatient Medications:     ARIPiprazole (Abilify) 30 mg tablet, Take 1 tablet (30 mg) by mouth once daily at bedtime., Disp: , Rfl:     aspirin 81 mg  EC tablet, Take 1 tablet (81 mg) by mouth once daily., Disp: 30 tablet, Rfl: 1    atorvastatin (Lipitor) 80 mg tablet, Take 1 tablet (80 mg) by mouth early in the morning.., Disp: , Rfl:     busPIRone (Buspar) 10 mg tablet, take 2 tablets by mouth twice a day as directed, Disp: , Rfl:     dapagliflozin propanediol (Farxiga) 10 mg, Take 1 tablet (10 mg) by mouth once daily., Disp: , Rfl:     diclofenac sodium (Voltaren Arthritis Pain) 1 % gel, Apply topically. Use as directed prn, Disp: , Rfl:     docusate sodium (Colace) 100 mg capsule, TAKE ONE (1) CAPSULE BY MOUTH ONCE DAILY AS NEEDED. **(COLACE 100 MG CAP EQUIV)**, Disp: , Rfl:     donepezil (Aricept) 5 mg tablet, Take 1 tablet (5 mg) by mouth once daily at bedtime., Disp: , Rfl:     gabapentin (Neurontin) 400 mg capsule, Take 1 capsule by mouth twice a day and 2 capsules at bedtime, Disp: , Rfl:     hydrOXYzine pamoate (Vistaril) 25 mg capsule, Take 1 capsule (25 mg) by mouth as needed at bedtime (for sleep after awake for 1 hour in bed)., Disp: , Rfl:     metFORMIN (Glucophage) 1,000 mg tablet, Take 1 tablet (1,000 mg) by mouth 2 times a day. With breakfast and dinner, Disp: , Rfl:     metoprolol succinate XL (Toprol-XL) 25 mg 24 hr tablet, Take 1.5 tablets (37.5 mg) by mouth once daily. Do not crush or chew., Disp: 135 tablet, Rfl: 3    omeprazole (PriLOSEC) 40 mg DR capsule, Take 1 capsule (40 mg) by mouth once daily. Do not crush or chew., Disp: , Rfl:     polyethylene glycol (Miralax) 17 gram/dose powder, Mix 17 g of powder and drink once daily., Disp: , Rfl:     simethicone (Mylicon) 80 mg chewable tablet, Chew 1 tablet (80 mg) 3 times a day before meals. And at bedtime prn, Disp: , Rfl:     traZODone (Desyrel) 100 mg tablet, take 1 to 3 tablet by mouth at bedtime if needed, Disp: , Rfl:     Trulicity 3 mg/0.5 mL pen injector, Inject 4.5 mg as directed 1 (one) time per week. ON SUNDAYS, Disp: , Rfl:     venlafaxine XR (Effexor-XR) 150 mg 24 hr capsule,  take 2 capsules by mouth once daily as directed, Disp: , Rfl:

## 2025-05-08 PROBLEM — R25.1 TREMOR: Status: ACTIVE | Noted: 2025-05-08

## 2025-05-08 PROBLEM — H49.22 LEFT ABDUCENS NERVE PALSY: Status: ACTIVE | Noted: 2025-05-08

## 2025-05-15 ENCOUNTER — OFFICE VISIT (OUTPATIENT)
Dept: CARDIOLOGY | Facility: HOSPITAL | Age: 65
End: 2025-05-15
Payer: COMMERCIAL

## 2025-05-15 VITALS
BODY MASS INDEX: 30.35 KG/M2 | HEIGHT: 70 IN | DIASTOLIC BLOOD PRESSURE: 82 MMHG | WEIGHT: 212 LBS | SYSTOLIC BLOOD PRESSURE: 130 MMHG | HEART RATE: 73 BPM

## 2025-05-15 DIAGNOSIS — R42 ORTHOSTATIC DIZZINESS: ICD-10-CM

## 2025-05-15 DIAGNOSIS — I10 HYPERTENSION, UNSPECIFIED TYPE: Primary | ICD-10-CM

## 2025-05-15 DIAGNOSIS — R00.2 PALPITATIONS: ICD-10-CM

## 2025-05-15 DIAGNOSIS — R55 PRE-SYNCOPE: ICD-10-CM

## 2025-05-15 DIAGNOSIS — I51.89 ACQUIRED LEFT VENTRICULAR OUTFLOW TRACT OBSTRUCTION: ICD-10-CM

## 2025-05-15 PROCEDURE — 3079F DIAST BP 80-89 MM HG: CPT | Performed by: INTERNAL MEDICINE

## 2025-05-15 PROCEDURE — 3044F HG A1C LEVEL LT 7.0%: CPT | Performed by: INTERNAL MEDICINE

## 2025-05-15 PROCEDURE — 3008F BODY MASS INDEX DOCD: CPT | Performed by: INTERNAL MEDICINE

## 2025-05-15 PROCEDURE — 99214 OFFICE O/P EST MOD 30 MIN: CPT | Performed by: INTERNAL MEDICINE

## 2025-05-15 PROCEDURE — 3075F SYST BP GE 130 - 139MM HG: CPT | Performed by: INTERNAL MEDICINE

## 2025-05-15 PROCEDURE — 3048F LDL-C <100 MG/DL: CPT | Performed by: INTERNAL MEDICINE

## 2025-05-15 NOTE — PROGRESS NOTES
"Chief Complaint:   No chief complaint on file.     History Of Present Illness:    Mane Nath is a 64 y.o. male presenting s/p testing.  H/o HTN, HLD, orthostatic dizziness, DM2, Gout, Unsteady gait, Benign tremor, L cranial palsy, former smoker.    Last seen by me in 4/2025 s/p testing. At the time, we reviewed his ER visit in 3/21/25 for chest pain; he described waking up with left-sided chest discomfort but had not taken the new metoprolol dose. Labs showed mildly low Mg (was replaced) but negative heart enzymes. Last visit, Pt reported he still has occasional episodes of mid-sternal chest pain even while sitting in office today. He describes a \"light cramp\", chest pain lasts a few seconds and resolved. Sxs and palpitations improved since starting metoprolol. We increased metoprolol to 1.5 tabs of 25 mg daily to see if this helps. Stress test 4/2025 showed normal stress MPI, mild coronary artery calcification. Pt was anticipating appt with neurology to assess for Parkinson's on 6/20/25 but was able to get in 5/7/25.     Today, Pt is doing well from cardiovascular perspective.  Denies chest pain/pressure, SOB, dyspnea on exertion, LE edema, orthopnea, PND, palpitations, LH/dizziness, presyncope, overt syncope.  Compliant with home cardiac medications as prescribed. He will be getting a DaTscan soon.    Review Of Systems:  The remainder of the review of systems was obtained, and was negative as pertains to the chief complaint.    CV testing and labs reviewed:  EKG today shows LVH    Labs 3/2025:  K 5.1  BUN 11  Cr 1.02  Mg 1.31  HS Troponin 7>7  H&H 12.8 35.5  Lipid panel 1/2025:    HDL 29.8  LDL 83      Nuclear stress test 4/2025:  1. Normal stress myocardial perfusion imaging in response to   pharmacologic stress.   2. Minimally reduced left ventricular function.   3. Pertinent findings if any on low-dose non gated CT images-mild   coronary artery calcification.     Holter monitor worn 2/27 to " "3/12/25:  Underlying SR with avg HR 85 bpm, rare SVE/VE, 11 episodes of SVT the longest lasting 16.9s, 51 patient triggered events correlating with SR, SVE/VE, or SVT.    TTE 2/2025:  CONCLUSIONS:   1. The left ventricular systolic function is normal, with a Mcfarland's biplane calculated ejection fraction of 60%.   2. There is normal right ventricular global systolic function.    Cardiac stress test 8/2023:  Summary:  1. Adequate level of stress achieved.  2. No ECG changes from baseline.  3. There were no stress-induced wall motion abnormalities. This is a negative stress echo test for ischemia.    Carotid Artery Duplex  US 5/2022: <50% stenosis of BL PICA    Prior Hx:  Pt presented to ED on 2/10/25 with near syncope. Lab work WNLs, UA unremarkable for UTI. TTE showed normal left ventricular systolic function with EF of 60%. After hospitalization, Pt \"feels the same.\" He still c/o dizziness when he stands up. Sister reports their mother had a pacemaker around his current age which improved her sxs. Sister also reports tremor, difficulty lifting his feet, ambulating with walker - they have an appt on 6/30/25 with a neurologist to assess Parkinson's but on wait list for cancellation. We decreased metoprolol succinate dose to see if this improves dizziness.  I was also suspicious that his dizziness may also be 2/2 autonomic dysfcn, and he has an appointment with neurology.  We checked 14-day Holter 3/2025 which showed 11 episodes of SVT. Per telephone encounter 3/20/25, due to Zio report I advised to increase metoprolol succinate to 25 mg daily.     Last Recorded Vitals:  There were no vitals filed for this visit.    Past Medical History:  He has a past medical history of Cirrhosis (Multi) (08/28/2024), Diabetes (Multi), HTN (hypertension), Personal history of other diseases of the musculoskeletal system and connective tissue, Personal history of other mental and behavioral disorders, Right upper quadrant abdominal " pain (06/11/2024), and Type 2 diabetes mellitus.    Past Surgical History:  He has a past surgical history that includes Neck surgery (02/01/2018); Other surgical history (02/01/2018); Other surgical history (04/25/2022); and Cholecystectomy (N/A, 08/26/2024).      Social History:  He reports that he has quit smoking. His smoking use included cigarettes. He has never used smokeless tobacco. He reports that he does not drink alcohol and does not use drugs.    Family History:  Family History   Problem Relation Name Age of Onset    Dementia Mother      Heart attack Father      Cholecystitis Sister          x3    Cancer Father's Sister      Prostate cancer Father's Brother      Cancer Paternal Grandmother      Diabetes Paternal Grandfather          Allergies:  Latex    Outpatient Medications:  Current Outpatient Medications   Medication Instructions    ARIPiprazole (ABILIFY) 30 mg, Nightly    aspirin 81 mg, oral, Daily    atorvastatin (Lipitor) 80 mg tablet 1 tablet, Daily (0630)    busPIRone (Buspar) 10 mg tablet take 2 tablets by mouth twice a day as directed    dapagliflozin propanediol (FARXIGA) 10 mg, Daily    diclofenac sodium (Voltaren Arthritis Pain) 1 % gel Apply topically. Use as directed prn    docusate sodium (Colace) 100 mg capsule TAKE ONE (1) CAPSULE BY MOUTH ONCE DAILY AS NEEDED. **(COLACE 100 MG CAP EQUIV)**    donepezil (ARICEPT) 5 mg, Nightly    gabapentin (Neurontin) 400 mg capsule Take 1 capsule by mouth twice a day and 2 capsules at bedtime    hydrOXYzine pamoate (VISTARIL) 25 mg, Nightly PRN    metFORMIN (Glucophage) 1,000 mg tablet 1 tablet, 2 times daily    metoprolol succinate XL (TOPROL-XL) 37.5 mg, oral, Daily, Do not crush or chew.    omeprazole (PRILOSEC) 40 mg, Daily    polyethylene glycol (MIRALAX) 17 g, Daily    simethicone (Mylicon) 80 mg chewable tablet 1 tablet, 3 times daily before meals    traZODone (Desyrel) 100 mg tablet take 1 to 3 tablet by mouth at bedtime if needed     Trulicity 3 mg/0.5 mL pen injector Inject 4.5 mg as directed 1 (one) time per week. ON SUNDAYS    venlafaxine XR (Effexor-XR) 150 mg 24 hr capsule take 2 capsules by mouth once daily as directed       Physical Exam:  Physical Exam  Vitals reviewed.   HENT:      Head: Normocephalic.      Nose: Nose normal.      Mouth/Throat:      Mouth: Mucous membranes are dry.   Neck:      Vascular: No carotid bruit.   Cardiovascular:      Rate and Rhythm: Normal rate and regular rhythm.      Heart sounds: No murmur heard.  Pulmonary:      Effort: Pulmonary effort is normal.      Comments: Decreased breath sounds  No crackles  Abdominal:      General: Abdomen is flat.      Palpations: Abdomen is soft.   Musculoskeletal:         General: Normal range of motion.      Cervical back: Normal range of motion.      Right lower leg: No edema.      Left lower leg: No edema.      Comments: Ambulating with walker   Skin:     General: Skin is warm and dry.   Neurological:      General: No focal deficit present.      Mental Status: He is alert.   Psychiatric:         Mood and Affect: Mood normal.        Last Labs:  CBC -  Lab Results   Component Value Date    WBC 4.2 (L) 03/21/2025    HGB 12.8 (L) 03/21/2025    HCT 35.5 (L) 03/21/2025    MCV 92 03/21/2025    PLT 98 (L) 03/21/2025       CMP -  Lab Results   Component Value Date    CALCIUM 9.1 03/21/2025    PHOS 3.1 08/28/2024    PROT 7.1 03/21/2025    ALBUMIN 4.3 03/21/2025    AST 37 03/21/2025    ALT 46 03/21/2025    ALKPHOS 138 (H) 03/21/2025    BILITOT 0.7 03/21/2025       LIPID PANEL -   Lab Results   Component Value Date    CHOL 143 01/07/2025    TRIG 152 (H) 01/07/2025    HDL 29.8 01/07/2025    CHHDL 4.8 01/07/2025    VLDL 30 01/07/2025    NHDL 113 01/07/2025       RENAL FUNCTION PANEL -   Lab Results   Component Value Date    GLUCOSE 269 (H) 03/21/2025     (L) 03/21/2025    K 5.1 03/21/2025    CL 98 03/21/2025    CO2 25 03/21/2025    ANIONGAP 12 03/21/2025    BUN 11 03/21/2025     CREATININE 1.02 03/21/2025    GFRMALE 57 (A) 09/23/2023    CALCIUM 9.1 03/21/2025    PHOS 3.1 08/28/2024    ALBUMIN 4.3 03/21/2025        Lab Results   Component Value Date    BNP 5 03/21/2025    HGBA1C 6.5 (H) 01/06/2025     Assessment/Plan   Atypical chest pain:  Recently in ER 3/21/25. Describes occasional episodes of mid-sternal chest pain lasting a few seconds. Improved since restarting metoprolol.  Stress test 4/2025 showed normal stress MPI, mild coronary artery calcification.   -last visit increased metoprolol to 1.5 tabs of 25 mg daily to see if this helps    Near-syncope:  Pt presented to ED on 2/10/25. TTE showed normal LV systolic function with EF of 60%.   Sister also reports tremor, magnetic gait, ambulating with walker.  Holter 3/2025 which showed 11 episodes of SVT.   -discussed autonomic dysautonomia - Pt was able to see neurology earlier to assess for Parkinson's on 5/7/25 and they ordered DaTscan    Palpitations:  Pt was seen in hospital in 8/2024 for palpitations, no arrhythmia on telemetry, metoprolol was increased and an echocardiogram was ordered.   Today, denies any palpitations since leaving the hospital and restarting metoprolol.   -continue metoprolol as above      LVOT obstruction:  TTE 8/29/24: There is a mid cavity left ventricular obstruction. The resting gradient is 35.18 mmHg. The provoked gradient is 44.74 mmHg.   Today, patient denies shortness of breath, no palpitations.   -continue metoprolol as above      Hypertension:  Today's /82  -continue antihypertensives  -instructed to check BP and HR before taking meds, after taking meds, and in the evening and call us in 2 weeks with readings     Orthostatic dizziness:  Patient reports rare episode of postural lightheadedness.  -recommendations given to stay well hydrated, get up slowly     Dyslipidemia:  FLP 1/2025, LDL 83   - triglycerides elevated.  -continue statin    Type 2 DM:  -management per PCP    Former  smoker:  Quit in 2000.    Scribe Attestation  By signing my name below, IDinah Scribe   attest that this documentation has been prepared under the direction and in the presence of Cecily Soto MD.

## 2025-05-15 NOTE — PATIENT INSTRUCTIONS
Thanks for following up in office today.    1)  Please continue monitoring your blood pressure and heart rate. You can measure this 3 times a day: in the morning before you take you medicine (at least 2-3 hours before medication), then after taking your medicine, then finally in the evening. Please call us in 2 weeks for us to review.     2)  Decongestants can spike your blood pressure.    3)  Please continue your cardiac medications as prescribed.    Follow up with Dr. Soto in 6 months  If you have any questions, please call us at [phone]

## 2025-05-29 ENCOUNTER — HOSPITAL ENCOUNTER (OUTPATIENT)
Dept: RADIOLOGY | Facility: HOSPITAL | Age: 65
Discharge: HOME | End: 2025-05-29
Payer: COMMERCIAL

## 2025-05-29 DIAGNOSIS — R25.1 TREMOR: ICD-10-CM

## 2025-05-29 PROCEDURE — 3430000001 HC RX 343 DIAGNOSTIC RADIOPHARMACEUTICALS: Mod: JZ | Performed by: STUDENT IN AN ORGANIZED HEALTH CARE EDUCATION/TRAINING PROGRAM

## 2025-05-29 PROCEDURE — A9584 IODINE I-123 IOFLUPANE: HCPCS | Mod: JZ | Performed by: STUDENT IN AN ORGANIZED HEALTH CARE EDUCATION/TRAINING PROGRAM

## 2025-05-29 PROCEDURE — 78803 RP LOCLZJ TUM SPECT 1 AREA: CPT

## 2025-05-29 RX ORDER — IOFLUPANE I-123 2 MCI/ML
5 INJECTION, SOLUTION INTRAVENOUS
Status: COMPLETED | OUTPATIENT
Start: 2025-05-29 | End: 2025-05-29

## 2025-05-29 RX ADMIN — IOFLUPANE I-123 5 MILLICURIE: 2 INJECTION, SOLUTION INTRAVENOUS at 09:50

## 2025-05-30 ENCOUNTER — TELEPHONE (OUTPATIENT)
Dept: NEUROLOGY | Facility: HOSPITAL | Age: 65
End: 2025-05-30
Payer: COMMERCIAL

## 2025-06-03 ENCOUNTER — TELEPHONE (OUTPATIENT)
Dept: NEUROLOGY | Facility: HOSPITAL | Age: 65
End: 2025-06-03
Payer: COMMERCIAL

## 2025-06-03 NOTE — TELEPHONE ENCOUNTER
Pt called asking for sooned FU appt than August.  Placed on waiting list for week of 6/23 for Dr Osuna (anticipated return from [facility]).    States he is comfortable with this time frame.

## 2025-06-12 ENCOUNTER — OFFICE VISIT (OUTPATIENT)
Dept: NEUROLOGY | Facility: HOSPITAL | Age: 65
End: 2025-06-12
Payer: COMMERCIAL

## 2025-06-12 VITALS
HEART RATE: 73 BPM | DIASTOLIC BLOOD PRESSURE: 79 MMHG | BODY MASS INDEX: 30.5 KG/M2 | SYSTOLIC BLOOD PRESSURE: 118 MMHG | WEIGHT: 212.6 LBS

## 2025-06-12 DIAGNOSIS — G20.A1 PARKINSON'S DISEASE WITHOUT DYSKINESIA OR FLUCTUATING MANIFESTATIONS: Primary | ICD-10-CM

## 2025-06-12 PROCEDURE — 3044F HG A1C LEVEL LT 7.0%: CPT | Performed by: PSYCHIATRY & NEUROLOGY

## 2025-06-12 PROCEDURE — 3078F DIAST BP <80 MM HG: CPT | Performed by: PSYCHIATRY & NEUROLOGY

## 2025-06-12 PROCEDURE — 1036F TOBACCO NON-USER: CPT | Performed by: PSYCHIATRY & NEUROLOGY

## 2025-06-12 PROCEDURE — 99214 OFFICE O/P EST MOD 30 MIN: CPT | Performed by: PSYCHIATRY & NEUROLOGY

## 2025-06-12 PROCEDURE — 3048F LDL-C <100 MG/DL: CPT | Performed by: PSYCHIATRY & NEUROLOGY

## 2025-06-12 PROCEDURE — 3074F SYST BP LT 130 MM HG: CPT | Performed by: PSYCHIATRY & NEUROLOGY

## 2025-06-12 RX ORDER — CARBIDOPA AND LEVODOPA 25; 100 MG/1; MG/1
0.5 TABLET ORAL 3 TIMES DAILY
Qty: 45 TABLET | Refills: 0 | Status: SHIPPED | OUTPATIENT
Start: 2025-06-12 | End: 2025-07-12

## 2025-06-12 ASSESSMENT — COLUMBIA-SUICIDE SEVERITY RATING SCALE - C-SSRS
6. HAVE YOU EVER DONE ANYTHING, STARTED TO DO ANYTHING, OR PREPARED TO DO ANYTHING TO END YOUR LIFE?: DOESN'T REMEMBER
2. HAVE YOU ACTUALLY HAD ANY THOUGHTS OF KILLING YOURSELF?: NO
1. IN THE PAST MONTH, HAVE YOU WISHED YOU WERE DEAD OR WISHED YOU COULD GO TO SLEEP AND NOT WAKE UP?: NO
6. HAVE YOU EVER DONE ANYTHING, STARTED TO DO ANYTHING, OR PREPARED TO DO ANYTHING TO END YOUR LIFE?: YES
6. HAVE YOU EVER DONE ANYTHING, STARTED TO DO ANYTHING, OR PREPARED TO DO ANYTHING TO END YOUR LIFE?: YES

## 2025-06-12 ASSESSMENT — PATIENT HEALTH QUESTIONNAIRE - PHQ9
5. POOR APPETITE OR OVEREATING: NOT AT ALL
9. THOUGHTS THAT YOU WOULD BE BETTER OFF DEAD, OR OF HURTING YOURSELF: NOT AT ALL
3. TROUBLE FALLING OR STAYING ASLEEP OR SLEEPING TOO MUCH: NOT AT ALL
8. MOVING OR SPEAKING SO SLOWLY THAT OTHER PEOPLE COULD HAVE NOTICED. OR THE OPPOSITE, BEING SO FIGETY OR RESTLESS THAT YOU HAVE BEEN MOVING AROUND A LOT MORE THAN USUAL: MORE THAN HALF THE DAYS
2. FEELING DOWN, DEPRESSED OR HOPELESS: MORE THAN HALF THE DAYS
SUM OF ALL RESPONSES TO PHQ QUESTIONS 1-9: 10
1. LITTLE INTEREST OR PLEASURE IN DOING THINGS: MORE THAN HALF THE DAYS
4. FEELING TIRED OR HAVING LITTLE ENERGY: SEVERAL DAYS
SUM OF ALL RESPONSES TO PHQ9 QUESTIONS 1 AND 2: 4
7. TROUBLE CONCENTRATING ON THINGS, SUCH AS READING THE NEWSPAPER OR WATCHING TELEVISION: SEVERAL DAYS
6. FEELING BAD ABOUT YOURSELF - OR THAT YOU ARE A FAILURE OR HAVE LET YOURSELF OR YOUR FAMILY DOWN: MORE THAN HALF THE DAYS

## 2025-06-12 ASSESSMENT — ENCOUNTER SYMPTOMS
DEPRESSION: 1
OCCASIONAL FEELINGS OF UNSTEADINESS: 1
LOSS OF SENSATION IN FEET: 1

## 2025-06-12 ASSESSMENT — PAIN SCALES - GENERAL: PAINLEVEL_OUTOF10: 3

## 2025-06-12 NOTE — PATIENT INSTRUCTIONS
Start carbi/levo 25/100 1/2 tab 3x/day (separate from meals, 30 min before or 2 hours after)  Know that carbi/levo and parkinson's disease itself may cause lightheadedness/low BP when getting up    Call my office in 2 weeks    Rtc 3 mo

## 2025-06-12 NOTE — PROGRESS NOTES
Follow-up visit    Visit type: Follow-up    PCP: Ronald Leroy DO.    Subjective     Mane IWONA Nath is a 64 y.o. year old male here for follow-up. Last seen 5/7/2025.     Patient is accompanied by sister.       HPI    I first saw him 1/7/25 at [facility] as inpatient consult.  Listed history of hypertension, dyslipidemia, diabetes, depression, bipolar disorder, and essential tremor. States he was in his usual state of health until 12/31/2024 when he started having some pain about the left eye.  He started to notice some difficulty with depth perception.  At some point, he went to his local eye doctor, and per note was diagnosed apparently as having left cranial nerve VI palsy, thought to be possibly related to diabetes.  He was asked to put an eye patch over the left eye, which he had been wearing.  On 1/6/2025, he thought that the double vision was worsening, and so he presented to [facility] ED.  In the ED, initial vital signs documented blood pressure 136/80, heart rate 83, afebrile.  Head CT without contrast done did not show any acute findings, except for some vague hypodensity in the subcortical white matter, possible small vessel ischemic disease.  Sodium 132, creatinine normal.  HbA1c was 6.5.  Patient admitted to the hospital.  When patient seen, patient denied any blurring of vision. Says diplopia only with both eyes and goes away when seeing with only one eye (either eye). Diplopia worse when looking leftward. Patient has not had any prior events like this.  Patient denies any prior stroke.  No pets/animal exposure.  No recent illness.  No medication changes.  He denies normally having headaches.  He denies any temporal pain or tenderness. Patient states he does not take any aspirin at home.  He denies any history of GI bleed. No hx cancer. Former smoker.  Denies alcohol or street drug use. I recommended start ASA and high intensity statin, and followup ophthalmology outpatient.  2/2025 TTE wnl  2/2025  Holter monitor done with pSVT   Last visit he wanted to talk about his tremors as well, with both resting and action tremor component. Tremors in BUE, x2 years. Not bad today. Sometimes could be pouring a drink, and hand will just shake. Eating/writing is a problem. Never had this when younger. No fam hx shaking. Saw Dr Yancey in the past, dx ET, was on primidone 50mg at bedtime. No SE remembered. ? Ran out. Currently not on primidone. 2/7/24 MRI c-spine wo: focal area of cord signal abn C7 (? Myelomalacia). On multiple psych meds including Abilify 30mg at bedtime and buspirone, venlafaxine XR 150mg and trazodone. States sees Lubna Elena at [place]. I advised do DATscan.    Since last visit, DATscan abnormal.     Here now for follow-up, came with sister.      Not sure if on ASA. I discussed he should be on it.      Problem List[1]    Allergies[2]  Current Medications[3]     Objective     /79   Pulse 73   Wt 96.4 kg (212 lb 9.6 oz)   BMI 30.50 kg/m²      (+) handwriting wavy on 5/7/25  (+) spiral test wavy on 5/7/25     No bradykinesia  ? Slight masked facies  (+) resting tremor L hand>>R hand, ? BLE  (+) good arm swing when walking B  (+) R hand resting tremor when walking    Awake, alert, oriented x3, in no distress  Well-nourished, ambulatory independently    Mental status exam as above, conversant   Full EOMs intact, no nystagmus, no ptosis   No facial droop   Hearing grossly intact   No dysarthria    Motor strength is at least antigravity on all extremities  Normal gait      Lab Results   Component Value Date    WBC 4.2 (L) 03/21/2025    RBC 3.86 (L) 03/21/2025    HGB 12.8 (L) 03/21/2025    HCT 35.5 (L) 03/21/2025    PLT 98 (L) 03/21/2025     (L) 03/21/2025    K 5.1 03/21/2025    CL 98 03/21/2025    BUN 11 03/21/2025    CREATININE 1.02 03/21/2025    EGFR 83 03/21/2025    CALCIUM 9.1 03/21/2025    ALKPHOS 138 (H) 03/21/2025    AST 37 03/21/2025    ALT 46 03/21/2025    MG 1.31 (L) 03/21/2025     HGBA1C 6.5 (H) 01/06/2025    LDLCALC 83 01/07/2025    CHOL 143 01/07/2025    HDL 29.8 01/07/2025    TRIG 152 (H) 01/07/2025    TSH 1.10 03/21/2025        Assessment/Plan     1. Tremors  2025 with ABNORMAL DATscan  Has resting and action/postural component  Prev dx and treated as ET, was on primidone  Already having presyncopal episodes  On metoprolol for cardiac  Discussed with pt/sister pt has parkinsonian disorder, likely Parkinson's disease  Discussed--this condition may cause orthostatic hypotension, meds we use for symptomatic management may also cause orthostatic hypotension among others  Discussed poss trial of carbi/levo if wanting, poss SE discussed--but due to orthostatic hypotension will start with 1/2 tab tid (vs 1 tab tid fully) if/when starting  Realistic expectations from therapy discussed    After much discussions, pt wanted to try medication     2. 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  Pt knows how to contact my office in case pt has any questions or concerns.    Edouard Osuna MD              [1]   Patient Active Problem List  Diagnosis    Fatty liver    Constipation    Dizziness    HTN (hypertension)    Hypercholesteremia    Heart palpitations    Dyspnea on exertion    Diabetes mellitus (Multi)    Calculus of gallbladder with acute on chronic cholecystitis with obstruction    Difficult intubation    Adhesive capsulitis of right shoulder    Allergic reaction    Benign essential hypertension    Benign essential tremor    Essential tremor    Carotid bruit    Cellulitis of finger    Disease due to severe acute respiratory syndrome  coronavirus 2 (SARS-CoV-2)    COVID-19    Decreased range of motion of shoulder    Diabetic polyneuropathy associated with type 2 diabetes mellitus    Dyslipidemia    Fever    History of depression    Hypoxia    Long-term (current) use of injectable non-insulin antidiabetic drugs    Lumbosacral strain    Nausea and vomiting    Numbness of upper extremity    Retention of urine    Pain of right shoulder region    Right shoulder pain    Subjective memory complaints    Abnormal gait    Palpitations    SOB (shortness of breath) on exertion    Orthostatic dizziness    Urinary tract infection    Unsteady gait    Double vision    Pre-syncope    Tremor    Left abducens nerve palsy   [2]   Allergies  Allergen Reactions    Latex Rash and Unknown   [3]   Current Outpatient Medications:     ARIPiprazole (Abilify) 30 mg tablet, Take 1 tablet (30 mg) by mouth once daily at bedtime., Disp: , Rfl:     atorvastatin (Lipitor) 80 mg tablet, Take 1 tablet (80 mg) by mouth early in the morning.., Disp: , Rfl:     busPIRone (Buspar) 10 mg tablet, take 2 tablets by mouth twice a day as directed, Disp: , Rfl:     dapagliflozin propanediol (Farxiga) 10 mg, Take 1 tablet (10 mg) by mouth once daily., Disp: , Rfl:     diclofenac sodium (Voltaren Arthritis Pain) 1 % gel, Apply topically. Use as directed prn, Disp: , Rfl:     docusate sodium (Colace) 100 mg capsule, TAKE ONE (1) CAPSULE BY MOUTH ONCE DAILY AS NEEDED. **(COLACE 100 MG CAP EQUIV)**, Disp: , Rfl:     donepezil (Aricept) 5 mg tablet, Take 1 tablet (5 mg) by mouth once daily at bedtime., Disp: , Rfl:     gabapentin (Neurontin) 400 mg capsule, Take 1 capsule by mouth twice a day and 2 capsules at bedtime, Disp: , Rfl:     hydrOXYzine pamoate (Vistaril) 25 mg capsule, Take 1 capsule (25 mg) by mouth as needed at bedtime (for sleep after awake for 1 hour in bed)., Disp: , Rfl:     metFORMIN (Glucophage) 1,000 mg tablet, Take 1 tablet (1,000 mg) by mouth 2 times a day. With breakfast and  dinner, Disp: , Rfl:     metoprolol succinate XL (Toprol-XL) 25 mg 24 hr tablet, Take 1.5 tablets (37.5 mg) by mouth once daily. Do not crush or chew., Disp: 135 tablet, Rfl: 3    omeprazole (PriLOSEC) 40 mg DR capsule, Take 1 capsule (40 mg) by mouth once daily. Do not crush or chew., Disp: , Rfl:     traZODone (Desyrel) 100 mg tablet, take 1 to 3 tablet by mouth at bedtime if needed, Disp: , Rfl:     Trulicity 3 mg/0.5 mL pen injector, Inject 4.5 mg as directed 1 (one) time per week. ON SUNDAYS, Disp: , Rfl:     venlafaxine XR (Effexor-XR) 150 mg 24 hr capsule, take 2 capsules by mouth once daily as directed, Disp: , Rfl:     aspirin 81 mg EC tablet, Take 1 tablet (81 mg) by mouth once daily., Disp: 30 tablet, Rfl: 1    polyethylene glycol (Miralax) 17 gram/dose powder, Mix 17 g of powder and drink once daily., Disp: , Rfl:     simethicone (Mylicon) 80 mg chewable tablet, Chew 1 tablet (80 mg) 3 times a day before meals. And at bedtime prn (Patient not taking: Reported on 6/12/2025), Disp: , Rfl:

## 2025-06-12 NOTE — LETTER
June 13, 2025     oRnald Leroy DO  [address]  [address]  [facility] [id number]    Patient: Mane Nath   YOB: 1960   Date of Visit: 6/12/2025       Dear Dr. Ronald Leroy DO:    Thank you for referring Mane Nath to me for evaluation. Below are my notes for this consultation.  If you have questions, please do not hesitate to call me. I look forward to following your patient along with you.       Sincerely,     Edouard Osuna MD      CC: No Recipients  ______________________________________________________________________________________    Follow-up visit    Visit type: Follow-up    PCP: Ronald Leroy DO.    Subjective    Mane Nath is a 64 y.o. year old male here for follow-up. Last seen 5/7/2025.     Patient is accompanied by sister.       HPI    I first saw him 1/7/25 at [facility] as inpatient consult.  Listed history of hypertension, dyslipidemia, diabetes, depression, bipolar disorder, and essential tremor. States he was in his usual state of health until 12/31/2024 when he started having some pain about the left eye.  He started to notice some difficulty with depth perception.  At some point, he went to his local eye doctor, and per note was diagnosed apparently as having left cranial nerve VI palsy, thought to be possibly related to diabetes.  He was asked to put an eye patch over the left eye, which he had been wearing.  On 1/6/2025, he thought that the double vision was worsening, and so he presented to [facility] ED.  In the ED, initial vital signs documented blood pressure 136/80, heart rate 83, afebrile.  Head CT without contrast done did not show any acute findings, except for some vague hypodensity in the subcortical white matter, possible small vessel ischemic disease.  Sodium 132, creatinine normal.  HbA1c was 6.5.  Patient admitted to the hospital.  When patient seen, patient denied any blurring of vision. Says diplopia only with both eyes and goes  away when seeing with only one eye (either eye). Diplopia worse when looking leftward. Patient has not had any prior events like this.  Patient denies any prior stroke.  No pets/animal exposure.  No recent illness.  No medication changes.  He denies normally having headaches.  He denies any temporal pain or tenderness. Patient states he does not take any aspirin at home.  He denies any history of GI bleed. No hx cancer. Former smoker.  Denies alcohol or street drug use. I recommended start ASA and high intensity statin, and followup ophthalmology outpatient.  2/2025 TTE wnl  2/2025 Holter monitor done with pSVT   Last visit he wanted to talk about his tremors as well, with both resting and action tremor component. Tremors in BUE, x2 years. Not bad today. Sometimes could be pouring a drink, and hand will just shake. Eating/writing is a problem. Never had this when younger. No fam hx shaking. Saw Dr Yancey in the past, dx ET, was on primidone 50mg at bedtime. No SE remembered. ? Ran out. Currently not on primidone. 2/7/24 MRI c-spine wo: focal area of cord signal abn C7 (? Myelomalacia). On multiple psych meds including Abilify 30mg at bedtime and buspirone, venlafaxine XR 150mg and trazodone. States sees Lubna Elena at [place]. I advised do DATscan.    Since last visit, DATscan abnormal.     Here now for follow-up, came with sister.      Not sure if on ASA. I discussed he should be on it.      Problem List[1]    Allergies[2]  Current Medications[3]     Objective    /79   Pulse 73   Wt 96.4 kg (212 lb 9.6 oz)   BMI 30.50 kg/m²      (+) handwriting wavy on 5/7/25  (+) spiral test wavy on 5/7/25     No bradykinesia  ? Slight masked facies  (+) resting tremor L hand>>R hand, ? BLE  (+) good arm swing when walking B  (+) R hand resting tremor when walking    Awake, alert, oriented x3, in no distress  Well-nourished, ambulatory independently    Mental status exam as above, conversant   Full EOMs intact, no  nystagmus, no ptosis   No facial droop   Hearing grossly intact   No dysarthria    Motor strength is at least antigravity on all extremities  Normal gait      Lab Results   Component Value Date    WBC 4.2 (L) 03/21/2025    RBC 3.86 (L) 03/21/2025    HGB 12.8 (L) 03/21/2025    HCT 35.5 (L) 03/21/2025    PLT 98 (L) 03/21/2025     (L) 03/21/2025    K 5.1 03/21/2025    CL 98 03/21/2025    BUN 11 03/21/2025    CREATININE 1.02 03/21/2025    EGFR 83 03/21/2025    CALCIUM 9.1 03/21/2025    ALKPHOS 138 (H) 03/21/2025    AST 37 03/21/2025    ALT 46 03/21/2025    MG 1.31 (L) 03/21/2025    HGBA1C 6.5 (H) 01/06/2025    LDLCALC 83 01/07/2025    CHOL 143 01/07/2025    HDL 29.8 01/07/2025    TRIG 152 (H) 01/07/2025    TSH 1.10 03/21/2025        Assessment/Plan    1. Tremors  2025 with ABNORMAL DATscan  Has resting and action/postural component  Prev dx and treated as ET, was on primidone  Already having presyncopal episodes  On metoprolol for cardiac  Discussed with pt/sister pt has parkinsonian disorder, likely Parkinson's disease  Discussed--this condition may cause orthostatic hypotension, meds we use for symptomatic management may also cause orthostatic hypotension among others  Discussed poss trial of carbi/levo if wanting, poss SE discussed--but due to orthostatic hypotension will start with 1/2 tab tid (vs 1 tab tid fully) if/when starting  Realistic expectations from therapy discussed    After much discussions, pt wanted to try medication     2. 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  Pt knows how to contact my office in case pt has any questions or  concerns.    Edouard Osuna MD              [1]  Patient Active Problem List  Diagnosis   • Fatty liver   • Constipation   • Dizziness   • HTN (hypertension)   • Hypercholesteremia   • Heart palpitations   • Dyspnea on exertion   • Diabetes mellitus (Multi)   • Calculus of gallbladder with acute on chronic cholecystitis with obstruction   • Difficult intubation   • Adhesive capsulitis of right shoulder   • Allergic reaction   • Benign essential hypertension   • Benign essential tremor   • Essential tremor   • Carotid bruit   • Cellulitis of finger   • Disease due to severe acute respiratory syndrome coronavirus 2 (SARS-CoV-2)   • COVID-19   • Decreased range of motion of shoulder   • Diabetic polyneuropathy associated with type 2 diabetes mellitus   • Dyslipidemia   • Fever   • History of depression   • Hypoxia   • Long-term (current) use of injectable non-insulin antidiabetic drugs   • Lumbosacral strain   • Nausea and vomiting   • Numbness of upper extremity   • Retention of urine   • Pain of right shoulder region   • Right shoulder pain   • Subjective memory complaints   • Abnormal gait   • Palpitations   • SOB (shortness of breath) on exertion   • Orthostatic dizziness   • Urinary tract infection   • Unsteady gait   • Double vision   • Pre-syncope   • Tremor   • Left abducens nerve palsy   [2]  Allergies  Allergen Reactions   • Latex Rash and Unknown   [3]    Current Outpatient Medications:   •  ARIPiprazole (Abilify) 30 mg tablet, Take 1 tablet (30 mg) by mouth once daily at bedtime., Disp: , Rfl:   •  atorvastatin (Lipitor) 80 mg tablet, Take 1 tablet (80 mg) by mouth early in the morning.., Disp: , Rfl:   •  busPIRone (Buspar) 10 mg tablet, take 2 tablets by mouth twice a day as directed, Disp: , Rfl:   •  dapagliflozin propanediol (Farxiga) 10 mg, Take 1 tablet (10 mg) by mouth once daily., Disp: , Rfl:   •  diclofenac sodium (Voltaren Arthritis Pain) 1 % gel, Apply topically. Use as directed prn, Disp: ,  Rfl:   •  docusate sodium (Colace) 100 mg capsule, TAKE ONE (1) CAPSULE BY MOUTH ONCE DAILY AS NEEDED. **(COLACE 100 MG CAP EQUIV)**, Disp: , Rfl:   •  donepezil (Aricept) 5 mg tablet, Take 1 tablet (5 mg) by mouth once daily at bedtime., Disp: , Rfl:   •  gabapentin (Neurontin) 400 mg capsule, Take 1 capsule by mouth twice a day and 2 capsules at bedtime, Disp: , Rfl:   •  hydrOXYzine pamoate (Vistaril) 25 mg capsule, Take 1 capsule (25 mg) by mouth as needed at bedtime (for sleep after awake for 1 hour in bed)., Disp: , Rfl:   •  metFORMIN (Glucophage) 1,000 mg tablet, Take 1 tablet (1,000 mg) by mouth 2 times a day. With breakfast and dinner, Disp: , Rfl:   •  metoprolol succinate XL (Toprol-XL) 25 mg 24 hr tablet, Take 1.5 tablets (37.5 mg) by mouth once daily. Do not crush or chew., Disp: 135 tablet, Rfl: 3  •  omeprazole (PriLOSEC) 40 mg DR capsule, Take 1 capsule (40 mg) by mouth once daily. Do not crush or chew., Disp: , Rfl:   •  traZODone (Desyrel) 100 mg tablet, take 1 to 3 tablet by mouth at bedtime if needed, Disp: , Rfl:   •  Trulicity 3 mg/0.5 mL pen injector, Inject 4.5 mg as directed 1 (one) time per week. ON SUNDAYS, Disp: , Rfl:   •  venlafaxine XR (Effexor-XR) 150 mg 24 hr capsule, take 2 capsules by mouth once daily as directed, Disp: , Rfl:   •  aspirin 81 mg EC tablet, Take 1 tablet (81 mg) by mouth once daily., Disp: 30 tablet, Rfl: 1  •  polyethylene glycol (Miralax) 17 gram/dose powder, Mix 17 g of powder and drink once daily., Disp: , Rfl:   •  simethicone (Mylicon) 80 mg chewable tablet, Chew 1 tablet (80 mg) 3 times a day before meals. And at bedtime prn (Patient not taking: Reported on 6/12/2025), Disp: , Rfl:        [1]  Patient Active Problem List  Diagnosis   • Fatty liver   • Constipation   • Dizziness   • HTN (hypertension)   • Hypercholesteremia   • Heart palpitations   • Dyspnea on exertion   • Diabetes mellitus (Multi)   • Calculus of gallbladder with acute on chronic  cholecystitis with obstruction   • Difficult intubation   • Adhesive capsulitis of right shoulder   • Allergic reaction   • Benign essential hypertension   • Benign essential tremor   • Essential tremor   • Carotid bruit   • Cellulitis of finger   • Disease due to severe acute respiratory syndrome coronavirus 2 (SARS-CoV-2)   • COVID-19   • Decreased range of motion of shoulder   • Diabetic polyneuropathy associated with type 2 diabetes mellitus   • Dyslipidemia   • Fever   • History of depression   • Hypoxia   • Long-term (current) use of injectable non-insulin antidiabetic drugs   • Lumbosacral strain   • Nausea and vomiting   • Numbness of upper extremity   • Retention of urine   • Pain of right shoulder region   • Right shoulder pain   • Subjective memory complaints   • Abnormal gait   • Palpitations   • SOB (shortness of breath) on exertion   • Orthostatic dizziness   • Urinary tract infection   • Unsteady gait   • Double vision   • Pre-syncope   • Tremor   • Left abducens nerve palsy   [2]  Allergies  Allergen Reactions   • Latex Rash and Unknown   [3]    Current Outpatient Medications:   •  ARIPiprazole (Abilify) 30 mg tablet, Take 1 tablet (30 mg) by mouth once daily at bedtime., Disp: , Rfl:   •  atorvastatin (Lipitor) 80 mg tablet, Take 1 tablet (80 mg) by mouth early in the morning.., Disp: , Rfl:   •  busPIRone (Buspar) 10 mg tablet, take 2 tablets by mouth twice a day as directed, Disp: , Rfl:   •  dapagliflozin propanediol (Farxiga) 10 mg, Take 1 tablet (10 mg) by mouth once daily., Disp: , Rfl:   •  diclofenac sodium (Voltaren Arthritis Pain) 1 % gel, Apply topically. Use as directed prn, Disp: , Rfl:   •  docusate sodium (Colace) 100 mg capsule, TAKE ONE (1) CAPSULE BY MOUTH ONCE DAILY AS NEEDED. **(COLACE 100 MG CAP EQUIV)**, Disp: , Rfl:   •  donepezil (Aricept) 5 mg tablet, Take 1 tablet (5 mg) by mouth once daily at bedtime., Disp: , Rfl:   •  gabapentin (Neurontin) 400 mg capsule, Take 1 capsule  by mouth twice a day and 2 capsules at bedtime, Disp: , Rfl:   •  hydrOXYzine pamoate (Vistaril) 25 mg capsule, Take 1 capsule (25 mg) by mouth as needed at bedtime (for sleep after awake for 1 hour in bed)., Disp: , Rfl:   •  metFORMIN (Glucophage) 1,000 mg tablet, Take 1 tablet (1,000 mg) by mouth 2 times a day. With breakfast and dinner, Disp: , Rfl:   •  metoprolol succinate XL (Toprol-XL) 25 mg 24 hr tablet, Take 1.5 tablets (37.5 mg) by mouth once daily. Do not crush or chew., Disp: 135 tablet, Rfl: 3  •  omeprazole (PriLOSEC) 40 mg DR capsule, Take 1 capsule (40 mg) by mouth once daily. Do not crush or chew., Disp: , Rfl:   •  traZODone (Desyrel) 100 mg tablet, take 1 to 3 tablet by mouth at bedtime if needed, Disp: , Rfl:   •  Trulicity 3 mg/0.5 mL pen injector, Inject 4.5 mg as directed 1 (one) time per week. ON SUNDAYS, Disp: , Rfl:   •  venlafaxine XR (Effexor-XR) 150 mg 24 hr capsule, take 2 capsules by mouth once daily as directed, Disp: , Rfl:   •  aspirin 81 mg EC tablet, Take 1 tablet (81 mg) by mouth once daily., Disp: 30 tablet, Rfl: 1  •  polyethylene glycol (Miralax) 17 gram/dose powder, Mix 17 g of powder and drink once daily., Disp: , Rfl:   •  simethicone (Mylicon) 80 mg chewable tablet, Chew 1 tablet (80 mg) 3 times a day before meals. And at bedtime prn (Patient not taking: Reported on 6/12/2025), Disp: , Rfl:

## 2025-06-13 PROBLEM — G20.A1 PARKINSON'S DISEASE WITHOUT DYSKINESIA OR FLUCTUATING MANIFESTATIONS: Status: ACTIVE | Noted: 2025-06-13

## 2025-06-24 ENCOUNTER — TELEPHONE (OUTPATIENT)
Dept: NEUROLOGY | Facility: HOSPITAL | Age: 65
End: 2025-06-24
Payer: COMMERCIAL

## 2025-06-24 DIAGNOSIS — G20.A1 PARKINSON'S DISEASE WITHOUT DYSKINESIA OR FLUCTUATING MANIFESTATIONS: ICD-10-CM

## 2025-06-24 RX ORDER — CARBIDOPA AND LEVODOPA 25; 100 MG/1; MG/1
1 TABLET ORAL 3 TIMES DAILY
Qty: 90 TABLET | Refills: 2 | Status: SHIPPED | OUTPATIENT
Start: 2025-06-24 | End: 2025-09-22

## 2025-06-24 NOTE — TELEPHONE ENCOUNTER
Mane was calling to report how his carbidopa-levodopa 0.5 tabs tid  is working.  He said when he came is his shaking was a 10 and now it's a 4.  Its so much better.  But the stiffness in his legs is only slightly better.

## 2025-06-25 ENCOUNTER — TELEPHONE (OUTPATIENT)
Dept: NEUROLOGY | Facility: HOSPITAL | Age: 65
End: 2025-06-25
Payer: COMMERCIAL

## 2025-06-25 NOTE — TELEPHONE ENCOUNTER
Called and let the patient know not normal side effects of that medication.  Go ahead and increase per Dr. Osuna's instructions and keep an eye on SE.  And call office if gets worse.

## 2025-06-30 ENCOUNTER — APPOINTMENT (OUTPATIENT)
Dept: NEUROLOGY | Facility: HOSPITAL | Age: 65
End: 2025-06-30
Payer: COMMERCIAL

## 2025-07-09 ENCOUNTER — TELEPHONE (OUTPATIENT)
Dept: NEUROLOGY | Facility: HOSPITAL | Age: 65
End: 2025-07-09
Payer: COMMERCIAL

## 2025-07-09 NOTE — TELEPHONE ENCOUNTER
"Pt calling with concerns about pain above left eye approx 45 mins ago, lasting <5 minutes.  States felt like someone was poking him with a pencil.  Vision normal, no other symptoms.  Pt concerned b/c he states this is how he felt before his \"mini stroke.\"      I advised him to go to the ED if he is really worried, but that I would share this with Dr Osuna.    Verbalized understanding and satisfaction.  "

## 2025-07-22 ENCOUNTER — APPOINTMENT (OUTPATIENT)
Dept: RADIOLOGY | Facility: HOSPITAL | Age: 65
End: 2025-07-22
Payer: COMMERCIAL

## 2025-07-22 ENCOUNTER — APPOINTMENT (OUTPATIENT)
Dept: CARDIOLOGY | Facility: HOSPITAL | Age: 65
End: 2025-07-22
Payer: COMMERCIAL

## 2025-07-22 ENCOUNTER — HOSPITAL ENCOUNTER (EMERGENCY)
Facility: HOSPITAL | Age: 65
Discharge: PSYCHIATRIC OR SUBSTANCE USE FACILITY | End: 2025-07-23
Attending: EMERGENCY MEDICINE
Payer: COMMERCIAL

## 2025-07-22 DIAGNOSIS — N39.0 COMPLICATED URINARY TRACT INFECTION: ICD-10-CM

## 2025-07-22 DIAGNOSIS — R44.1 HALLUCINATIONS, VISUAL: Primary | ICD-10-CM

## 2025-07-22 LAB
ALBUMIN SERPL BCP-MCNC: 4.5 G/DL (ref 3.4–5)
ALP SERPL-CCNC: 118 U/L (ref 33–136)
ALT SERPL W P-5'-P-CCNC: 6 U/L (ref 10–52)
AMMONIA PLAS-SCNC: 41 UMOL/L (ref 16–53)
ANION GAP SERPL CALC-SCNC: 13 MMOL/L (ref 10–20)
APPEARANCE UR: CLEAR
AST SERPL W P-5'-P-CCNC: 30 U/L (ref 9–39)
BASOPHILS # BLD AUTO: 0.04 X10*3/UL (ref 0–0.1)
BASOPHILS NFR BLD AUTO: 0.4 %
BILIRUB SERPL-MCNC: 1.3 MG/DL (ref 0–1.2)
BILIRUB UR STRIP.AUTO-MCNC: NEGATIVE MG/DL
BNP SERPL-MCNC: 23 PG/ML (ref 0–99)
BUN SERPL-MCNC: 24 MG/DL (ref 6–23)
CALCIUM SERPL-MCNC: 9.4 MG/DL (ref 8.6–10.3)
CARDIAC TROPONIN I PNL SERPL HS: 6 NG/L (ref 0–20)
CARDIAC TROPONIN I PNL SERPL HS: 6 NG/L (ref 0–20)
CHLORIDE SERPL-SCNC: 101 MMOL/L (ref 98–107)
CO2 SERPL-SCNC: 24 MMOL/L (ref 21–32)
COLOR UR: YELLOW
CREAT SERPL-MCNC: 1.27 MG/DL (ref 0.5–1.3)
EGFRCR SERPLBLD CKD-EPI 2021: 64 ML/MIN/1.73M*2
EOSINOPHIL # BLD AUTO: 0.18 X10*3/UL (ref 0–0.7)
EOSINOPHIL NFR BLD AUTO: 1.6 %
ERYTHROCYTE [DISTWIDTH] IN BLOOD BY AUTOMATED COUNT: 13.8 % (ref 11.5–14.5)
GLUCOSE SERPL-MCNC: 115 MG/DL (ref 74–99)
GLUCOSE UR STRIP.AUTO-MCNC: ABNORMAL MG/DL
HCT VFR BLD AUTO: 40.1 % (ref 41–52)
HGB BLD-MCNC: 14.7 G/DL (ref 13.5–17.5)
HYALINE CASTS #/AREA URNS AUTO: ABNORMAL /LPF
IMM GRANULOCYTES # BLD AUTO: 0.06 X10*3/UL (ref 0–0.7)
IMM GRANULOCYTES NFR BLD AUTO: 0.5 % (ref 0–0.9)
KETONES UR STRIP.AUTO-MCNC: NEGATIVE MG/DL
LEUKOCYTE ESTERASE UR QL STRIP.AUTO: ABNORMAL
LYMPHOCYTES # BLD AUTO: 6.18 X10*3/UL (ref 1.2–4.8)
LYMPHOCYTES NFR BLD AUTO: 54.4 %
MCH RBC QN AUTO: 33.9 PG (ref 26–34)
MCHC RBC AUTO-ENTMCNC: 36.7 G/DL (ref 32–36)
MCV RBC AUTO: 92 FL (ref 80–100)
MONOCYTES # BLD AUTO: 0.92 X10*3/UL (ref 0.1–1)
MONOCYTES NFR BLD AUTO: 8.1 %
MUCOUS THREADS #/AREA URNS AUTO: ABNORMAL /LPF
NEUTROPHILS # BLD AUTO: 3.97 X10*3/UL (ref 1.2–7.7)
NEUTROPHILS NFR BLD AUTO: 35 %
NITRITE UR QL STRIP.AUTO: NEGATIVE
NRBC BLD-RTO: 0 /100 WBCS (ref 0–0)
PH UR STRIP.AUTO: 5.5 [PH]
PLATELET # BLD AUTO: 136 X10*3/UL (ref 150–450)
POTASSIUM SERPL-SCNC: 3.9 MMOL/L (ref 3.5–5.3)
PROT SERPL-MCNC: 7.3 G/DL (ref 6.4–8.2)
PROT UR STRIP.AUTO-MCNC: ABNORMAL MG/DL
RBC # BLD AUTO: 4.34 X10*6/UL (ref 4.5–5.9)
RBC # UR STRIP.AUTO: NEGATIVE MG/DL
RBC #/AREA URNS AUTO: ABNORMAL /HPF
SARS-COV-2 RNA RESP QL NAA+PROBE: NOT DETECTED
SODIUM SERPL-SCNC: 134 MMOL/L (ref 136–145)
SP GR UR STRIP.AUTO: 1.03
UROBILINOGEN UR STRIP.AUTO-MCNC: NORMAL MG/DL
WBC # BLD AUTO: 11.4 X10*3/UL (ref 4.4–11.3)
WBC #/AREA URNS AUTO: ABNORMAL /HPF
WBC CLUMPS #/AREA URNS AUTO: ABNORMAL /HPF

## 2025-07-22 PROCEDURE — 80053 COMPREHEN METABOLIC PANEL: CPT | Performed by: NURSE PRACTITIONER

## 2025-07-22 PROCEDURE — 85025 COMPLETE CBC W/AUTO DIFF WBC: CPT | Performed by: NURSE PRACTITIONER

## 2025-07-22 PROCEDURE — 82140 ASSAY OF AMMONIA: CPT | Performed by: EMERGENCY MEDICINE

## 2025-07-22 PROCEDURE — 93005 ELECTROCARDIOGRAM TRACING: CPT

## 2025-07-22 PROCEDURE — 81001 URINALYSIS AUTO W/SCOPE: CPT | Performed by: NURSE PRACTITIONER

## 2025-07-22 PROCEDURE — 96361 HYDRATE IV INFUSION ADD-ON: CPT

## 2025-07-22 PROCEDURE — 87635 SARS-COV-2 COVID-19 AMP PRB: CPT | Performed by: NURSE PRACTITIONER

## 2025-07-22 PROCEDURE — 36415 COLL VENOUS BLD VENIPUNCTURE: CPT | Performed by: NURSE PRACTITIONER

## 2025-07-22 PROCEDURE — 87086 URINE CULTURE/COLONY COUNT: CPT | Mod: PORLAB | Performed by: NURSE PRACTITIONER

## 2025-07-22 PROCEDURE — 70450 CT HEAD/BRAIN W/O DYE: CPT

## 2025-07-22 PROCEDURE — 99285 EMERGENCY DEPT VISIT HI MDM: CPT | Mod: 25 | Performed by: EMERGENCY MEDICINE

## 2025-07-22 PROCEDURE — 36415 COLL VENOUS BLD VENIPUNCTURE: CPT | Performed by: EMERGENCY MEDICINE

## 2025-07-22 PROCEDURE — 83880 ASSAY OF NATRIURETIC PEPTIDE: CPT | Performed by: NURSE PRACTITIONER

## 2025-07-22 PROCEDURE — 84484 ASSAY OF TROPONIN QUANT: CPT | Performed by: NURSE PRACTITIONER

## 2025-07-22 PROCEDURE — 2500000001 HC RX 250 WO HCPCS SELF ADMINISTERED DRUGS (ALT 637 FOR MEDICARE OP): Performed by: NURSE PRACTITIONER

## 2025-07-22 PROCEDURE — 71046 X-RAY EXAM CHEST 2 VIEWS: CPT

## 2025-07-22 PROCEDURE — 72125 CT NECK SPINE W/O DYE: CPT | Performed by: STUDENT IN AN ORGANIZED HEALTH CARE EDUCATION/TRAINING PROGRAM

## 2025-07-22 PROCEDURE — 72125 CT NECK SPINE W/O DYE: CPT

## 2025-07-22 PROCEDURE — 70450 CT HEAD/BRAIN W/O DYE: CPT | Performed by: STUDENT IN AN ORGANIZED HEALTH CARE EDUCATION/TRAINING PROGRAM

## 2025-07-22 PROCEDURE — 2500000004 HC RX 250 GENERAL PHARMACY W/ HCPCS (ALT 636 FOR OP/ED): Performed by: NURSE PRACTITIONER

## 2025-07-22 PROCEDURE — 71046 X-RAY EXAM CHEST 2 VIEWS: CPT | Mod: FOREIGN READ | Performed by: RADIOLOGY

## 2025-07-22 RX ORDER — CEFTRIAXONE 2 G/50ML
2 INJECTION, SOLUTION INTRAVENOUS ONCE
Status: COMPLETED | OUTPATIENT
Start: 2025-07-23 | End: 2025-07-23

## 2025-07-22 RX ORDER — ACETAMINOPHEN 325 MG/1
975 TABLET ORAL ONCE
Status: COMPLETED | OUTPATIENT
Start: 2025-07-22 | End: 2025-07-22

## 2025-07-22 RX ORDER — PANTOPRAZOLE SODIUM 40 MG/1
40 TABLET, DELAYED RELEASE ORAL ONCE
Status: COMPLETED | OUTPATIENT
Start: 2025-07-22 | End: 2025-07-22

## 2025-07-22 RX ADMIN — PANTOPRAZOLE SODIUM 40 MG: 40 TABLET, DELAYED RELEASE ORAL at 22:59

## 2025-07-22 RX ADMIN — SODIUM CHLORIDE 500 ML: 0.9 INJECTION, SOLUTION INTRAVENOUS at 21:01

## 2025-07-22 RX ADMIN — ACETAMINOPHEN 975 MG: 325 TABLET ORAL at 21:00

## 2025-07-22 SDOH — HEALTH STABILITY: MENTAL HEALTH: HALLUCINATION: AUDITORY;VISUAL

## 2025-07-22 SDOH — HEALTH STABILITY: MENTAL HEALTH: BEHAVIORS/MOOD: ANXIOUS;COOPERATIVE

## 2025-07-22 SDOH — HEALTH STABILITY: MENTAL HEALTH: BEHAVIORAL HEALTH(WDL): EXCEPTIONS TO WDL

## 2025-07-22 SDOH — HEALTH STABILITY: MENTAL HEALTH: BEHAVIORS/MOOD: CALM;COOPERATIVE

## 2025-07-22 ASSESSMENT — PAIN - FUNCTIONAL ASSESSMENT
PAIN_FUNCTIONAL_ASSESSMENT: 0-10
PAIN_FUNCTIONAL_ASSESSMENT: 0-10

## 2025-07-22 ASSESSMENT — LIFESTYLE VARIABLES
HAVE PEOPLE ANNOYED YOU BY CRITICIZING YOUR DRINKING: NO
EVER HAD A DRINK FIRST THING IN THE MORNING TO STEADY YOUR NERVES TO GET RID OF A HANGOVER: NO
HAVE YOU EVER FELT YOU SHOULD CUT DOWN ON YOUR DRINKING: NO
TOTAL SCORE: 0
EVER FELT BAD OR GUILTY ABOUT YOUR DRINKING: NO

## 2025-07-22 ASSESSMENT — PAIN SCALES - GENERAL
PAINLEVEL_OUTOF10: 0 - NO PAIN
PAINLEVEL_OUTOF10: 0 - NO PAIN
PAINLEVEL_OUTOF10: 3

## 2025-07-22 ASSESSMENT — PAIN DESCRIPTION - LOCATION: LOCATION: OTHER (COMMENT)

## 2025-07-22 ASSESSMENT — VISUAL ACUITY: OU: 1

## 2025-07-23 ENCOUNTER — HOSPITAL ENCOUNTER (INPATIENT)
Facility: HOSPITAL | Age: 65
LOS: 2 days | Discharge: HOME | End: 2025-07-25
Attending: PSYCHIATRY & NEUROLOGY | Admitting: PSYCHIATRY & NEUROLOGY
Payer: COMMERCIAL

## 2025-07-23 VITALS
OXYGEN SATURATION: 99 % | WEIGHT: 216 LBS | SYSTOLIC BLOOD PRESSURE: 119 MMHG | BODY MASS INDEX: 30.92 KG/M2 | TEMPERATURE: 97.8 F | DIASTOLIC BLOOD PRESSURE: 69 MMHG | RESPIRATION RATE: 16 BRPM | HEIGHT: 70 IN | HEART RATE: 60 BPM

## 2025-07-23 DIAGNOSIS — G20.A1 PARKINSON'S DISEASE WITHOUT DYSKINESIA OR FLUCTUATING MANIFESTATIONS: ICD-10-CM

## 2025-07-23 DIAGNOSIS — E11.42 DIABETIC POLYNEUROPATHY ASSOCIATED WITH TYPE 2 DIABETES MELLITUS: ICD-10-CM

## 2025-07-23 DIAGNOSIS — F33.9 RECURRENT MAJOR DEPRESSIVE DISORDER, REMISSION STATUS UNSPECIFIED: ICD-10-CM

## 2025-07-23 DIAGNOSIS — R25.1 TREMOR: ICD-10-CM

## 2025-07-23 DIAGNOSIS — R26.81 UNSTEADY GAIT: ICD-10-CM

## 2025-07-23 PROBLEM — F06.0: Status: ACTIVE | Noted: 2025-07-23

## 2025-07-23 LAB
ATRIAL RATE: 59 BPM
GLUCOSE BLD MANUAL STRIP-MCNC: 175 MG/DL (ref 74–99)
HOLD SPECIMEN: NORMAL
P AXIS: 86 DEGREES
P OFFSET: 174 MS
P ONSET: 152 MS
PR INTERVAL: 124 MS
Q ONSET: 214 MS
QRS COUNT: 10 BEATS
QRS DURATION: 78 MS
QT INTERVAL: 396 MS
QTC CALCULATION(BAZETT): 392 MS
QTC FREDERICIA: 394 MS
R AXIS: -39 DEGREES
T AXIS: 9 DEGREES
T OFFSET: 412 MS
VENTRICULAR RATE: 59 BPM

## 2025-07-23 PROCEDURE — 82947 ASSAY GLUCOSE BLOOD QUANT: CPT

## 2025-07-23 PROCEDURE — 96365 THER/PROPH/DIAG IV INF INIT: CPT

## 2025-07-23 PROCEDURE — 2500000001 HC RX 250 WO HCPCS SELF ADMINISTERED DRUGS (ALT 637 FOR MEDICARE OP): Performed by: INTERNAL MEDICINE

## 2025-07-23 PROCEDURE — 1140000001 HC PRIVATE PSYCH ROOM DAILY

## 2025-07-23 PROCEDURE — 2500000004 HC RX 250 GENERAL PHARMACY W/ HCPCS (ALT 636 FOR OP/ED): Performed by: NURSE PRACTITIONER

## 2025-07-23 RX ORDER — CARBIDOPA AND LEVODOPA 25; 100 MG/1; MG/1
1 TABLET ORAL 3 TIMES DAILY
Status: DISCONTINUED | OUTPATIENT
Start: 2025-07-23 | End: 2025-07-24

## 2025-07-23 RX ORDER — GABAPENTIN 400 MG/1
400 CAPSULE ORAL 2 TIMES DAILY
Status: DISCONTINUED | OUTPATIENT
Start: 2025-07-23 | End: 2025-07-25 | Stop reason: HOSPADM

## 2025-07-23 RX ORDER — DOCUSATE SODIUM 100 MG/1
100 CAPSULE, LIQUID FILLED ORAL DAILY PRN
Status: DISCONTINUED | OUTPATIENT
Start: 2025-07-23 | End: 2025-07-25 | Stop reason: HOSPADM

## 2025-07-23 RX ORDER — CEFDINIR 300 MG/1
300 CAPSULE ORAL 2 TIMES DAILY
Qty: 20 CAPSULE | Refills: 0 | Status: SHIPPED | OUTPATIENT
Start: 2025-07-23 | End: 2025-07-25 | Stop reason: HOSPADM

## 2025-07-23 RX ORDER — ADHESIVE BANDAGE
30 BANDAGE TOPICAL DAILY PRN
Status: DISCONTINUED | OUTPATIENT
Start: 2025-07-23 | End: 2025-07-25 | Stop reason: HOSPADM

## 2025-07-23 RX ORDER — TRAZODONE HYDROCHLORIDE 50 MG/1
25 TABLET ORAL NIGHTLY PRN
Status: DISCONTINUED | OUTPATIENT
Start: 2025-07-23 | End: 2025-07-24

## 2025-07-23 RX ORDER — POLYETHYLENE GLYCOL 3350 17 G/17G
17 POWDER, FOR SOLUTION ORAL DAILY
Status: DISCONTINUED | OUTPATIENT
Start: 2025-07-23 | End: 2025-07-25 | Stop reason: HOSPADM

## 2025-07-23 RX ORDER — ALUMINUM HYDROXIDE, MAGNESIUM HYDROXIDE, AND SIMETHICONE 2400; 240; 2400 MG/30ML; MG/30ML; MG/30ML
30 SUSPENSION ORAL EVERY 6 HOURS PRN
Status: DISCONTINUED | OUTPATIENT
Start: 2025-07-23 | End: 2025-07-25 | Stop reason: HOSPADM

## 2025-07-23 RX ORDER — CEFDINIR 300 MG/1
300 CAPSULE ORAL 2 TIMES DAILY
Status: DISCONTINUED | OUTPATIENT
Start: 2025-07-23 | End: 2025-07-24

## 2025-07-23 RX ORDER — ACETAMINOPHEN 325 MG/1
650 TABLET ORAL EVERY 4 HOURS PRN
Status: DISCONTINUED | OUTPATIENT
Start: 2025-07-23 | End: 2025-07-25 | Stop reason: HOSPADM

## 2025-07-23 RX ORDER — HYDROXYZINE HYDROCHLORIDE 10 MG/1
10 TABLET, FILM COATED ORAL EVERY 6 HOURS PRN
Status: DISCONTINUED | OUTPATIENT
Start: 2025-07-23 | End: 2025-07-24

## 2025-07-23 RX ORDER — ASPIRIN 81 MG/1
81 TABLET ORAL DAILY
Status: DISCONTINUED | OUTPATIENT
Start: 2025-07-23 | End: 2025-07-25 | Stop reason: HOSPADM

## 2025-07-23 RX ORDER — PANTOPRAZOLE SODIUM 20 MG/1
20 TABLET, DELAYED RELEASE ORAL
Status: DISCONTINUED | OUTPATIENT
Start: 2025-07-24 | End: 2025-07-25 | Stop reason: HOSPADM

## 2025-07-23 RX ORDER — QUETIAPINE FUMARATE 25 MG/1
25 TABLET, FILM COATED ORAL EVERY 4 HOURS PRN
Status: DISCONTINUED | OUTPATIENT
Start: 2025-07-23 | End: 2025-07-25 | Stop reason: HOSPADM

## 2025-07-23 RX ORDER — DAPAGLIFLOZIN 10 MG/1
10 TABLET, FILM COATED ORAL DAILY
Status: DISCONTINUED | OUTPATIENT
Start: 2025-07-23 | End: 2025-07-25 | Stop reason: HOSPADM

## 2025-07-23 RX ORDER — METFORMIN HYDROCHLORIDE 1000 MG/1
1000 TABLET ORAL 2 TIMES DAILY
Status: DISCONTINUED | OUTPATIENT
Start: 2025-07-23 | End: 2025-07-25 | Stop reason: HOSPADM

## 2025-07-23 RX ORDER — DICLOFENAC SODIUM 10 MG/G
2 GEL TOPICAL DAILY PRN
Status: DISCONTINUED | OUTPATIENT
Start: 2025-07-23 | End: 2025-07-25 | Stop reason: HOSPADM

## 2025-07-23 RX ORDER — ATORVASTATIN CALCIUM 80 MG/1
80 TABLET, FILM COATED ORAL
Status: DISCONTINUED | OUTPATIENT
Start: 2025-07-23 | End: 2025-07-25 | Stop reason: HOSPADM

## 2025-07-23 RX ADMIN — METFORMIN HYDROCHLORIDE 1000 MG: 1000 TABLET, FILM COATED ORAL at 20:09

## 2025-07-23 RX ADMIN — CEFDINIR 300 MG: 300 CAPSULE ORAL at 22:45

## 2025-07-23 RX ADMIN — ASPIRIN 81 MG: 81 TABLET, COATED ORAL at 17:12

## 2025-07-23 RX ADMIN — ATORVASTATIN CALCIUM 80 MG: 80 TABLET, FILM COATED ORAL at 17:13

## 2025-07-23 RX ADMIN — CARBIDOPA AND LEVODOPA 1 TABLET: 25; 100 TABLET ORAL at 20:09

## 2025-07-23 RX ADMIN — CARBIDOPA AND LEVODOPA 1 TABLET: 25; 100 TABLET ORAL at 17:12

## 2025-07-23 RX ADMIN — CEFTRIAXONE 2 G: 2 INJECTION, SOLUTION INTRAVENOUS at 00:13

## 2025-07-23 RX ADMIN — GABAPENTIN 400 MG: 400 CAPSULE ORAL at 20:09

## 2025-07-23 SDOH — SOCIAL STABILITY: SOCIAL NETWORK: HOW OFTEN DO YOU GET TOGETHER WITH FRIENDS OR RELATIVES?: NEVER

## 2025-07-23 SDOH — ECONOMIC STABILITY: FOOD INSECURITY: WITHIN THE PAST 12 MONTHS, THE FOOD YOU BOUGHT JUST DIDN'T LAST AND YOU DIDN'T HAVE MONEY TO GET MORE.: NEVER TRUE

## 2025-07-23 SDOH — ECONOMIC STABILITY: HOUSING INSECURITY: IN THE LAST 12 MONTHS, WAS THERE A TIME WHEN YOU WERE NOT ABLE TO PAY THE MORTGAGE OR RENT ON TIME?: NO

## 2025-07-23 SDOH — HEALTH STABILITY: MENTAL HEALTH: HAVE YOU EVER TRIED TO KILL YOURSELF?: NO

## 2025-07-23 SDOH — ECONOMIC STABILITY: TRANSPORTATION INSECURITY: IN THE PAST 12 MONTHS, HAS LACK OF TRANSPORTATION KEPT YOU FROM MEDICAL APPOINTMENTS OR FROM GETTING MEDICATIONS?: NO

## 2025-07-23 SDOH — SOCIAL STABILITY: SOCIAL INSECURITY: WITHIN THE LAST YEAR, HAVE YOU BEEN AFRAID OF YOUR PARTNER OR EX-PARTNER?: NO

## 2025-07-23 SDOH — HEALTH STABILITY: MENTAL HEALTH
DO YOU FEEL STRESS - TENSE, RESTLESS, NERVOUS, OR ANXIOUS, OR UNABLE TO SLEEP AT NIGHT BECAUSE YOUR MIND IS TROUBLED ALL THE TIME - THESE DAYS?: ONLY A LITTLE

## 2025-07-23 SDOH — SOCIAL STABILITY: SOCIAL INSECURITY: WITHIN THE LAST YEAR, HAVE YOU BEEN HUMILIATED OR EMOTIONALLY ABUSED IN OTHER WAYS BY YOUR PARTNER OR EX-PARTNER?: NO

## 2025-07-23 SDOH — HEALTH STABILITY: MENTAL HEALTH: SUICIDAL BEHAVIOR (LIFETIME): NO

## 2025-07-23 SDOH — HEALTH STABILITY: MENTAL HEALTH: BEHAVIORAL HEALTH(WDL): EXCEPTIONS TO WDL

## 2025-07-23 SDOH — HEALTH STABILITY: MENTAL HEALTH: IN THE PAST WEEK, HAVE YOU BEEN HAVING THOUGHTS ABOUT KILLING YOURSELF?: NO

## 2025-07-23 SDOH — ECONOMIC STABILITY: FOOD INSECURITY: HOW HARD IS IT FOR YOU TO PAY FOR THE VERY BASICS LIKE FOOD, HOUSING, MEDICAL CARE, AND HEATING?: SOMEWHAT HARD

## 2025-07-23 SDOH — HEALTH STABILITY: PHYSICAL HEALTH: ON AVERAGE, HOW MANY DAYS PER WEEK DO YOU ENGAGE IN MODERATE TO STRENUOUS EXERCISE (LIKE A BRISK WALK)?: 0 DAYS

## 2025-07-23 SDOH — ECONOMIC STABILITY: INCOME INSECURITY: IN THE PAST 12 MONTHS HAS THE ELECTRIC, GAS, OIL, OR WATER COMPANY THREATENED TO SHUT OFF SERVICES IN YOUR HOME?: NO

## 2025-07-23 SDOH — HEALTH STABILITY: MENTAL HEALTH: ANXIETY SYMPTOMS: GENERALIZED;FEELINGS OF DOOM

## 2025-07-23 SDOH — SOCIAL STABILITY: SOCIAL INSECURITY: ABUSE: ADULT

## 2025-07-23 SDOH — SOCIAL STABILITY: SOCIAL INSECURITY: HAVE YOU HAD ANY THOUGHTS OF HARMING ANYONE ELSE?: NO

## 2025-07-23 SDOH — HEALTH STABILITY: MENTAL HEALTH: BEHAVIORS/MOOD: SLEEPING

## 2025-07-23 SDOH — HEALTH STABILITY: MENTAL HEALTH: ARE YOU HAVING THOUGHTS OF KILLING YOURSELF RIGHT NOW?: NO

## 2025-07-23 SDOH — ECONOMIC STABILITY: HOUSING INSECURITY: IN THE PAST 12 MONTHS, HOW MANY TIMES HAVE YOU MOVED WHERE YOU WERE LIVING?: 1

## 2025-07-23 SDOH — HEALTH STABILITY: PHYSICAL HEALTH: ON AVERAGE, HOW MANY MINUTES DO YOU ENGAGE IN EXERCISE AT THIS LEVEL?: 0 MIN

## 2025-07-23 SDOH — HEALTH STABILITY: MENTAL HEALTH: IN THE PAST FEW WEEKS, HAVE YOU FELT THAT YOU OR YOUR FAMILY WOULD BE BETTER OFF IF YOU WERE DEAD?: NO

## 2025-07-23 SDOH — SOCIAL STABILITY: SOCIAL INSECURITY: ARE YOU OR HAVE YOU BEEN THREATENED OR ABUSED PHYSICALLY, EMOTIONALLY, OR SEXUALLY BY ANYONE?: NO

## 2025-07-23 SDOH — SOCIAL STABILITY: SOCIAL INSECURITY: ARE YOU MARRIED, WIDOWED, DIVORCED, SEPARATED, NEVER MARRIED, OR LIVING WITH A PARTNER?: PATIENT DECLINED

## 2025-07-23 SDOH — ECONOMIC STABILITY: HOUSING INSECURITY: AT ANY TIME IN THE PAST 12 MONTHS, WERE YOU HOMELESS OR LIVING IN A SHELTER (INCLUDING NOW)?: NO

## 2025-07-23 SDOH — SOCIAL STABILITY: SOCIAL INSECURITY: ARE THERE ANY APPARENT SIGNS OF INJURIES/BEHAVIORS THAT COULD BE RELATED TO ABUSE/NEGLECT?: NO

## 2025-07-23 SDOH — HEALTH STABILITY: MENTAL HEALTH: NON-SPECIFIC ACTIVE SUICIDAL THOUGHTS (PAST 1 MONTH): NO

## 2025-07-23 SDOH — HEALTH STABILITY: MENTAL HEALTH: WISH TO BE DEAD (PAST 1 MONTH): NO

## 2025-07-23 SDOH — SOCIAL STABILITY: SOCIAL NETWORK: HOW OFTEN DO YOU ATTEND MEETINGS OF THE CLUBS OR ORGANIZATIONS YOU BELONG TO?: NEVER

## 2025-07-23 SDOH — SOCIAL STABILITY: SOCIAL INSECURITY: WERE YOU ABLE TO COMPLETE ALL THE BEHAVIORAL HEALTH SCREENINGS?: YES

## 2025-07-23 SDOH — SOCIAL STABILITY: SOCIAL NETWORK
DO YOU BELONG TO ANY CLUBS OR ORGANIZATIONS SUCH AS CHURCH GROUPS, UNIONS, FRATERNAL OR ATHLETIC GROUPS, OR SCHOOL GROUPS?: NO

## 2025-07-23 SDOH — HEALTH STABILITY: MENTAL HEALTH: FOR HIGH RISK PATIENTS: 1:1 PATIENT OBSERVER AT ALL TIMES

## 2025-07-23 SDOH — HEALTH STABILITY: PHYSICAL HEALTH
HOW OFTEN DO YOU NEED TO HAVE SOMEONE HELP YOU WHEN YOU READ INSTRUCTIONS, PAMPHLETS, OR OTHER WRITTEN MATERIAL FROM YOUR DOCTOR OR PHARMACY?: NEVER

## 2025-07-23 SDOH — HEALTH STABILITY: MENTAL HEALTH: BEHAVIORS/MOOD: FLAT AFFECT

## 2025-07-23 SDOH — ECONOMIC STABILITY: FOOD INSECURITY: WITHIN THE PAST 12 MONTHS, YOU WORRIED THAT YOUR FOOD WOULD RUN OUT BEFORE YOU GOT THE MONEY TO BUY MORE.: NEVER TRUE

## 2025-07-23 SDOH — SOCIAL STABILITY: SOCIAL INSECURITY: DO YOU FEEL ANYONE HAS EXPLOITED OR TAKEN ADVANTAGE OF YOU FINANCIALLY OR OF YOUR PERSONAL PROPERTY?: NO

## 2025-07-23 SDOH — HEALTH STABILITY: MENTAL HEALTH: CONTENT: UNREMARKABLE

## 2025-07-23 SDOH — HEALTH STABILITY: MENTAL HEALTH

## 2025-07-23 SDOH — SOCIAL STABILITY: SOCIAL NETWORK: PARENT/GUARDIAN/SIGNIFICANT OTHER INVOLVEMENT: NO INVOLVEMENT

## 2025-07-23 SDOH — HEALTH STABILITY: MENTAL HEALTH: IN THE PAST FEW WEEKS, HAVE YOU WISHED YOU WERE DEAD?: NO

## 2025-07-23 SDOH — HEALTH STABILITY: MENTAL HEALTH
DEPRESSION SYMPTOMS: CHANGE IN ENERGY LEVEL;FEELINGS OF HELPLESSNESS;FEELINGS OF HOPELESSESS;FEELINGS OF WORTHLESSNESS;ISOLATIVE;LOSS OF INTEREST;PSYCHOMOTOR RETARDATION;SLEEP DISTURBANCE

## 2025-07-23 SDOH — HEALTH STABILITY: MENTAL HEALTH: NEEDS EXPRESSED: DENIES

## 2025-07-23 SDOH — SOCIAL STABILITY: SOCIAL NETWORK: HOW OFTEN DO YOU ATTEND CHURCH OR RELIGIOUS SERVICES?: NEVER

## 2025-07-23 SDOH — SOCIAL STABILITY: SOCIAL INSECURITY: HAVE YOU HAD THOUGHTS OF HARMING ANYONE ELSE?: NO

## 2025-07-23 SDOH — SOCIAL STABILITY: SOCIAL INSECURITY: HAS ANYONE EVER THREATENED TO HURT YOUR FAMILY OR YOUR PETS?: NO

## 2025-07-23 SDOH — SOCIAL STABILITY: SOCIAL INSECURITY: DOES ANYONE TRY TO KEEP YOU FROM HAVING/CONTACTING OTHER FRIENDS OR DOING THINGS OUTSIDE YOUR HOME?: NO

## 2025-07-23 SDOH — HEALTH STABILITY: MENTAL HEALTH: BEHAVIORS/MOOD: ANXIOUS;COOPERATIVE

## 2025-07-23 SDOH — SOCIAL STABILITY: SOCIAL INSECURITY: DO YOU FEEL UNSAFE GOING BACK TO THE PLACE WHERE YOU ARE LIVING?: NO

## 2025-07-23 SDOH — HEALTH STABILITY: MENTAL HEALTH: HALLUCINATION: AUDITORY;VISUAL

## 2025-07-23 SDOH — ECONOMIC STABILITY: HOUSING INSECURITY: FEELS SAFE LIVING IN HOME: YES

## 2025-07-23 SDOH — SOCIAL STABILITY: SOCIAL INSECURITY: POSSIBLE ABUSE REPORTED TO:: OTHER (COMMENT)

## 2025-07-23 ASSESSMENT — LIFESTYLE VARIABLES
PAROXYSMAL SWEATS: NO SWEAT VISIBLE
SUBSTANCE_ABUSE_PAST_12_MONTHS: NO
PRESCIPTION_ABUSE_PAST_12_MONTHS: NO
CIWA TOTAL SCORE: 0
ANXIETY: NO ANXIETY, AT EASE
ORIENTATION AND CLOUDING OF SENSORIUM: ORIENTED AND CAN DO SERIAL ADDITIONS
TOTAL_SCORE: 0
HOW OFTEN DO YOU HAVE 6 OR MORE DRINKS ON ONE OCCASION: NEVER
HEADACHE, FULLNESS IN HEAD: NOT PRESENT
AUDIT-C TOTAL SCORE: 0
NAUSEA AND VOMITING: NO NAUSEA AND NO VOMITING
SUBSTANCE_ABUSE_PAST_12_MONTHS: NO
HOW MANY STANDARD DRINKS CONTAINING ALCOHOL DO YOU HAVE ON A TYPICAL DAY: PATIENT DOES NOT DRINK
PRESCIPTION_ABUSE_PAST_12_MONTHS: NO
SKIP TO QUESTIONS 9-10: 1
AUDITORY DISTURBANCES: NOT PRESENT
VISUAL DISTURBANCES: NOT PRESENT
AUDIT-C TOTAL SCORE: 0
AGITATION: NORMAL ACTIVITY
HOW OFTEN DO YOU HAVE A DRINK CONTAINING ALCOHOL: NEVER
TREMOR: NO TREMOR

## 2025-07-23 ASSESSMENT — ACTIVITIES OF DAILY LIVING (ADL)
GROOMING: NEEDS ASSISTANCE
HEARING - LEFT EAR: FUNCTIONAL
ADEQUATE_TO_COMPLETE_ADL: YES
BATHING: NEEDS ASSISTANCE
DRESSING YOURSELF: NEEDS ASSISTANCE
JUDGMENT_ADEQUATE_SAFELY_COMPLETE_DAILY_ACTIVITIES: YES
TOILETING: NEEDS ASSISTANCE
HEARING - RIGHT EAR: FUNCTIONAL
FEEDING YOURSELF: NEEDS ASSISTANCE
WALKS IN HOME: NEEDS ASSISTANCE
PATIENT'S MEMORY ADEQUATE TO SAFELY COMPLETE DAILY ACTIVITIES?: YES
LACK_OF_TRANSPORTATION: NO
EFFECT OF PAIN ON DAILY ACTIVITIES: MINIMAL

## 2025-07-23 ASSESSMENT — PAIN DESCRIPTION - DESCRIPTORS: DESCRIPTORS: PATIENT UNABLE TO DESCRIBE

## 2025-07-23 ASSESSMENT — PATIENT HEALTH QUESTIONNAIRE - PHQ9
2. FEELING DOWN, DEPRESSED OR HOPELESS: MORE THAN HALF THE DAYS
SUM OF ALL RESPONSES TO PHQ9 QUESTIONS 1 & 2: 4
1. LITTLE INTEREST OR PLEASURE IN DOING THINGS: MORE THAN HALF THE DAYS

## 2025-07-23 ASSESSMENT — PAIN - FUNCTIONAL ASSESSMENT
PAIN_FUNCTIONAL_ASSESSMENT: 0-10
PAIN_FUNCTIONAL_ASSESSMENT: 0-10

## 2025-07-23 ASSESSMENT — PAIN SCALES - GENERAL
PAINLEVEL_OUTOF10: 0 - NO PAIN
PAINLEVEL_OUTOF10: 0 - NO PAIN

## 2025-07-23 NOTE — SIGNIFICANT EVENT
Application for Emergency Admission      Ready for Transfer?  Is the patient medically cleared for transfer to inpatient psychiatry: Yes  Has the patient been accepted to an inpatient psychiatric hospital: Yes    Application for Emergency Admission  IN ACCORDANCE WITH [place] 5122[place]10 [place]  The Chief Clinical Officer of: [facility] Pena 7/23/2025 .9:13 AM    Reason for Hospitalization  The undersigned has reason to believe that: Mane Nath Is a mentally ill person subject to hospitalization by court order under division B Section 5122.01 of the Revised Code, i.e., this person:    1.No  Represents a substantial risk of physical harm to self as manifested by evidence of threats of, or attempts at, suicide or serious self-inflicted bodily harm    2.No Represents a substantial risk of physical harm to others as manifested by evidence of recent homicidal or other violent behavior, evidence of recent threats that place another in reasonable fear of violent behavior and serious physical harm, or other evidence of present dangerousness    3.Yes Represents a substantial and immediate risk of serious physical impairment or injury to self as manifested by  evidence that the person is unable to provide for and is not providing for the person's basic physical needs because of the person's mental illness and that appropriate provision for those needs cannot be made  immediately available in the community    4.Yes Would benefit from treatment in a hospital for his mental illness and is in need of such treatment as manifested by evidence of behavior that creates a grave and imminent risk to substantial rights of others or  himself.    5.Yes Would benefit from treatment as manifested by evidence of behavior that indicates all of the following:       (a) The person is unlikely to survive safely in the community without supervision, based on a clinical determination.       (b) The person has a history of lack of compliance with  treatment for mental illness and one of the following applies:      (i) At least twice within the thirty-six months prior to the filing of an affidavit seeking court-ordered treatment of the person under section 5122.111 of the Revised Code, the lack of compliance has been a significant factor in necessitating hospitalization in a hospital or receipt of services in a forensic or other mental health unit of a correctional facility, provided that the thirty-six-month period shall be extended by the length of any hospitalization or incarceration of the person that occurred within the thirty-six-month period.      (ii) Within the forty-eight months prior to the filing of an affidavit seeking court-ordered treatment of the person under section 5122.111 of the Revised Code, the lack of compliance resulted in one or more acts of serious violent behavior toward self or others or threats of, or attempts at, serious physical harm to self or others, provided that the forty-eight-month period shall be extended by the length of any hospitalization or incarceration of the person that occurred within the forty-eight-month period.      (c) The person, as a result of mental illness, is unlikely to voluntarily participate in necessary treatment.       (d) In view of the person's treatment history and current behavior, the person is in need of treatment in order to prevent a relapse or deterioration that would be likely to result in substantial risk of serious harm to the person or others.    (e) Represents a substantial risk of physical harm to self or others if allowed to remain at liberty pending examination.    Therefore, it is requested that said person be admitted to the above named facility.    STATEMENT OF BELIEF    Must be filled out by one of the following: a psychiatrist, licensed physician, licensed clinical psychologist, health or ,  or .  (Statement shall include the circumstances under  which the individual was taken into custody and the reason for the person's belief that hospitalization is necessary. The statement shall also include a reference to efforts made to secure the individual's property at his residence if he was taken into custody there. Every reasonable and appropriate effort should be made to take this person into custody in the least conspicuous manner possible.)    Patient with hallucinations, unable to determine hallucinations from reality. H has a significant decrease in mood and is at risk of his depression symptoms.     Silvio Willams MD 7/23/2025     _____________________________________________________________   Place of Employment: [employer]    STATEMENT OF OBSERVATION BY PSYCHIATRIST, LICENSED PHYSICIAN, OR LICENSED CLINICAL PSYCHOLOGIST, IF APPLICABLE    Place of Observation (e.g., [facility], general hospital, office, emergency facility)  (If applicable, please complete)    Silvio Willams MD 7/23/2025    _____________________________________________________________

## 2025-07-23 NOTE — PROGRESS NOTES
EPAT - Social Work Psychiatric Assessment    Arrival Details  Mode of Arrival: Ambulance  Admission Source: Home  Admission Type: Involuntary  EPAT Assessment Start Date: 07/23/25  EPAT Assessment Start Time: 0224  Name of : Neyda HASKINS    History of Present Illness  Admission Reason: Mane presents to [facility] ED for evaluation of hallucinations.  He has a history of Parkinson's and was started on a new medication on June 27, carbidopa/levodopa.  Since then he has noticed that he may have been having hallucinations however tonight he confirms that he saw ghosts behind the TV.  He states it looked just like out of the movie ghost when the dark demons were pulling the ghost in the alley.  HPI:  reviewed  the patient's chart and medical record which indicates a psychiatric history of  Bipolar disorder, Depression, Generalized anxiety disorder. He also report recent diagnosis Parkinsons disease and history of diabtetes. No EPAT assessments to review.  The triage risk assessment was reviewed and no risk was noted in triage at time of assessment. EPAT consulted for eval/ Assessment completed via telepsych    SW Readmission Information   Readmission within 30 Days: No    Psychiatric Symptoms  Anxiety Symptoms: Generalized, Feelings of doom  Depression Symptoms: Change in energy level, Feelings of helplessness, Feelings of hopelessess, Feelings of worthlessness, Isolative, Loss of interest, Psychomotor retardation, Sleep disturbance  Belia Symptoms: Poor judgment    Psychosis Symptoms  Hallucination Type: Visual, Auditory  Delusion Type: No problems reported or observed.    Additional Symptoms - Adult  Generalized Anxiety Disorder: Difficult to control worry, Difficulity concentrating  Obsessive Compulsive Disorder: Ruminatory thoughts  Panic Attack: No problems reported or observed.  Post Traumatic Stress Disorder: No problems reported or observed., Other (Comment)  Delirium: No problems  "reported or observed.    Past Psychiatric History/Meds/Treatments   Diagnosis: Bipolar disorder, Depression/  History of suicide attempts: \"I don't recall\"/  History of SIB: denies   Medications: 150 venlafaxine, 3 mg trulicity, 100 mg trazadone, 25 mg hydroxazine, 400, gabapentin, 10 mg busbar/  Compliance: yes / Hospitalizations: [facility] \"along time ago\"  Past Violence/Victimization History: Pt denies current history of abuse and denies any past history of abuse including physical, sexual and emotional abuse. No history of violence noted.    Current Mental Health Contacts   Name/Phone Number: Agency: None  Provider Name/Phone Number: Agency: [facility]  Provider Last Appointment Date: Dr. 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 has 2 older sisters and 1 younger sister. sister has helped him alot. Denies family history of mental health issues    Legal  Legal Considerations: Patient/ Family Ability to Make Healthcare Decisions  Criminal Activity/ Legal Involvement Pertinent to Current Situation/ Hospitalization: None  Legal Concerns: [place]: Self POA: None Payee: None  Legal Comments: None reported    Drug Screening  Have you used any substances (canabis, cocaine, heroin, hallucinogens, inhalants, etc.) in the past 12 months?: No  Have you used any prescription drugs other than prescribed in the past 12 months?: No  Is a toxicology screen needed?: Yes    Stage of Change  Frequency of Substance Use: Pt denies alcohol or drug " use    Behavioral Health  Behavioral Health(WDL): Exceptions to WDL  Behaviors/Mood: Calm, Flat affect, Sad, Withdrawn  Affect: Appropriate to circumstances  Parent/Guardian/Significant Other Involvement: No involvement    Orientation  Orientation Level: Oriented X4, Appropriate for developmental age    General Appearance  Motor Activity: Unremarkable  General Attitude: Cooperative  Appearance/Hygiene: Unremarkable    Thought Process  Content: Unremarkable  Perception: Not altered  Hallucination: None  Judgment/Insight: Poor  Confusion: None  Cognition: Poor judgement         Risk Factors  Self Harm/Suicidal Ideation Plan: Current:  Previous Self Harm/Suicidal Plans: Past:    Violence Risk Assessment  Assessment of Violence: None noted  Thoughts of Harm to Others: No    Ability to Assess Risk Screen  Risk Screen - Ability to Assess: Able to be screened  Ask Suicide-Screening Questions  1. In the past few weeks, have you wished you were dead?: No  2. In the past few weeks, have you felt that you or your family would be better off if you were dead?: No  3. In the past week, have you been having thoughts about killing yourself?: No  4. Have you ever tried to kill yourself?: No  5. Are you having thoughts of killing yourself right now?: No  Calculated Risk Score: No intervention is necessary  Damar Suicide Severity Rating Scale (Screener/Recent Self-Report)  1. Wish to be Dead (Past 1 Month): No  2. Non-Specific Active Suicidal Thoughts (Past 1 Month): No  6. Suicidal Behavior (Lifetime): No  Calculated C-SSRS Risk Score (Lifetime/Recent): No Risk Indicated  Step 1: Risk Factors  Current & Past Psychiatric Dx: Mood disorder, Recent onset, Psychotic disorder  Presenting Symptoms: Psychosis, Anxiety and/or panic, Hopelessness or despair  Change in Treatment: Change in provider or treament (i.e., medications, psychotherapy, milieu)  Access to Lethal Methods : No  Step 3: Suicidal Ideation Intensity  Are There Things -  Anyone or Anything - That Stopped You From Wanting to Die or Acting on: Does not apply  What Sort of Reasons Did You Have For Thinking About Wanting to Die or Killing Yourself: Does not apply  Step 5: Documentation  Risk Level: Low suicide risk    Psychiatric Impression and Plan of Care  Assessment and Plan: Upon assessment, Pt presents  with new-onset visual hallucinations that began last night. He reports seeing “clouds” in his living room and described seeing semi-visible figures, including ghosts and demons. He also reports hearing a voice in certain parts of the living room. He notes that the hallucinations “didn’t make sense” and denies ever experiencing hallucinations in the past. The patient was recently diagnosed with Parkinson’s disease on June 12th, which he identifies as a significant stressor contributing to his emotional state. Since the diagnosis, he has experienced a noticeable decline in mood, energy, motivation, and enjoyment. He states, “I have no interest anymore.” He endorses worsening depression in recent weeks.. Mental status today is notable for flat affect, slow and minimal verbal responses, and poor eye contact. He presents as withdrawn and appears significantly depressed.Denies prior visual hallucinations before last night.  Visual and auditory hallucinations (clouds, ghosts, demons; voices in specific parts of room) he does have a known history of bipolar disorder, MDD, and LESLIE, who presents with acute onset of visual and auditory hallucinations, along with worsening depressive symptoms following a recent diagnosis of Parkinson’s disease. The hallucinations appear to be new and are concerning in the context of Parkinson’s-related psychosis or medication side effects. His current affect and slowed responses also raise concern for a depressive episode with psychotic features, or a neuropsychiatric syndrome related to Parkinson’s. Pt states his sleep is up and down. Not caring for himself  that great states his shower faucte broke and been taking baths. He also uses a cane for assistance walking otherwise can perform ADL's . Pt requires inpatient admission and he is agreeable with this plan.     Specific Resources Provided to Patient: Peer support services / [facility] not needed and /or not available at this time.     Diagnostic Impression: Bipolar disorder most recent episode depressed, Major Depressive Disorder, Generalized Anxiety Disorder /  Plan: Recommend psychiatric inpatient admission for further evaluation, safety, stabilization, and medication management    Outcome/Disposition  Patient's Perception of Outcome Achieved: cooperative and agreeable with plan of care  Assessment, Recommendations and Risk Level Reviewed with: Patient indicated to be low risk level after assessment completed. Reviewed recommendation with ED Physician, Dr Rodriguez who is inagreement with the recommendation for inpatient admission  Contact Name: none provided  EPAT Assessment Completed Date: 07/23/25  EPAT Assessment Completed Time: 0255  Patient Disposition: Out of network facility (Specify)

## 2025-07-23 NOTE — ED PROVIDER NOTES
"    HPI   Chief Complaint   Patient presents with    Hallucinations     Pt has history of parkinson's on June 12th and recently started a new med. Pt believes hallucinations that are being caused by new meds         Patient presents the emergency department for evaluation of hallucinations.  He has a history of Parkinson's and was started on a new medication on June 27, carbidopa/levodopa.  Since then he has noticed that he may have been having hallucinations however tonight he confirms that he saw ghosts behind the TV.  He states it looked just like out of the movie ghost when the dark demons were pulling the ghost in the alley.  He also have been doing some on things such as going around inside the house with just his underwear on.  He has not felt sick lately with no dizziness, syncope, URI symptoms, fever, chills, myalgias, malaise, chest pain, shortness of breath, nausea, vomiting, abdominal pain, UTI symptoms, leg swelling or any traumatic incident.  He knows something is wrong but could not pinpoint it until today.      History provided by:  Patient   used: No                          Gilson Coma Scale Score: 15                  Patient History   Medical History[1]  Surgical History[2]  Family History[3]  Social History[4]    Physical Exam   Visit Vitals  /82 (BP Location: Left arm, Patient Position: Lying)   Pulse 53   Temp 37 °C (98.6 °F)   Resp 16   Ht 1.778 m (5' 10\")   Wt 98 kg (216 lb)   SpO2 97%   BMI 30.99 kg/m²   Smoking Status Former   BSA 2.2 m²      Physical Exam  Vitals reviewed.   Constitutional:       Appearance: He is not toxic-appearing.      Comments: Resting tremor of the right upper extremity.   HENT:      Head: Normocephalic and atraumatic.      Comments: No outward sign of trauma.     Right Ear: Hearing and external ear normal.      Left Ear: Hearing and external ear normal.      Nose: Nose normal.      Mouth/Throat:      Lips: Pink.      Mouth: Mucous " membranes are moist.      Pharynx: Oropharynx is clear.     Eyes:      General: Vision grossly intact. No scleral icterus.     Pupils: Pupils are equal, round, and reactive to light.       Cardiovascular:      Rate and Rhythm: Normal rate and regular rhythm.      Heart sounds: No murmur heard.     Comments: No resting tachycardia.  Pulmonary:      Effort: Pulmonary effort is normal.      Breath sounds: Rhonchi present.      Comments: Clears scattered rhonchi with a cough  Abdominal:      General: Bowel sounds are normal.      Palpations: Abdomen is soft.      Tenderness: There is no abdominal tenderness.     Musculoskeletal:      Cervical back: Full passive range of motion without pain and neck supple.      Right lower leg: No edema.      Left lower leg: No edema.      Comments: FRANCES randomly.  No bony tenderness throughout.     Skin:     General: Skin is warm and moist.      Capillary Refill: Capillary refill takes less than 2 seconds.      Coloration: Skin is not cyanotic, jaundiced or pale.     Neurological:      Mental Status: He is alert and oriented to person, place, and time.      Sensory: Sensation is intact.      Motor: Motor function is intact.      Coordination: Coordination is intact.     Psychiatric:         Attention and Perception: He is attentive. He perceives auditory and visual hallucinations.         Mood and Affect: Mood is anxious. Mood is not depressed. Affect is not angry.         Speech: Speech normal. Speech is not rapid and pressured.         Behavior: Behavior is actively hallucinating. Behavior is not combative. Behavior is cooperative.         Thought Content: Thought content is paranoid. Thought content does not include homicidal or suicidal ideation.         CT cervical spine wo IV contrast   Final Result   No acute intracranial abnormality.        No evidence of cervical spine fracture or acute traumatic   malalignment. Postsurgical change of C5-C7 ACDF without hardware   complication.         Signed by: Wilber Alaniz 7/22/2025 10:18 PM   Dictation workstation:   [id number]      CT head wo IV contrast   Final Result   No acute intracranial abnormality.        No evidence of cervical spine fracture or acute traumatic   malalignment. Postsurgical change of C5-C7 ACDF without hardware   complication.        Signed by: Wilber Alaniz 7/22/2025 10:18 PM   Dictation workstation:   [id number]      XR chest 2 views   Final Result   No acute abnormality.   Signed by Mauri Connors DO          Labs Reviewed   CBC WITH AUTO DIFFERENTIAL - Abnormal       Result Value    WBC 11.4 (*)     nRBC 0.0      RBC 4.34 (*)     Hemoglobin 14.7      Hematocrit 40.1 (*)     MCV 92      MCH 33.9      MCHC 36.7 (*)     RDW 13.8      Platelets 136 (*)     Neutrophils % 35.0      Immature Granulocytes %, Automated 0.5      Lymphocytes % 54.4      Monocytes % 8.1      Eosinophils % 1.6      Basophils % 0.4      Neutrophils Absolute 3.97      Immature Granulocytes Absolute, Automated 0.06      Lymphocytes Absolute 6.18 (*)     Monocytes Absolute 0.92      Eosinophils Absolute 0.18      Basophils Absolute 0.04     COMPREHENSIVE METABOLIC PANEL - Abnormal    Glucose 115 (*)     Sodium 134 (*)     Potassium 3.9      Chloride 101      Bicarbonate 24      Anion Gap 13      Urea Nitrogen 24 (*)     Creatinine 1.27      eGFR 64      Calcium 9.4      Albumin 4.5      Alkaline Phosphatase 118      Total Protein 7.3      AST 30      Bilirubin, Total 1.3 (*)     ALT 6 (*)    URINALYSIS WITH REFLEX CULTURE AND MICROSCOPIC - Abnormal    Color, Urine Yellow      Appearance, Urine Clear      Specific Gravity, Urine 1.029      pH, Urine 5.5      Protein, Urine 30 (1+) (*)     Glucose, Urine 300 (3+) (*)     Blood, Urine NEGATIVE      Ketones, Urine NEGATIVE      Bilirubin, Urine NEGATIVE      Urobilinogen, Urine Normal      Nitrite, Urine NEGATIVE      Leukocyte Esterase, Urine 75 Maryanne/uL (*)    MICROSCOPIC ONLY, URINE - Abnormal    WBC,  Urine 21-50 (*)     WBC Clumps, Urine RARE      RBC, Urine 3-5      Mucus, Urine 4+      Hyaline Casts, Urine 3+ (*)    B-TYPE NATRIURETIC PEPTIDE - Normal    BNP 23      Narrative:        <100 pg/mL - Heart failure unlikely  100-299 pg/mL - Intermediate probability of acute heart                  failure exacerbation. Correlate with clinical                  context and patient history.    >=300 pg/mL - Heart Failure likely. Correlate with clinical                  context and patient history.    BNP testing is performed using different testing methodology at [facility] than at [facility] hospitals. Direct result comparisons should only be made within the same method.      SARS-COV-2 PCR - Normal    Coronavirus 2019, PCR Not Detected      Narrative:     This assay is an FDA-cleared, in vitro diagnostic nucleic acid amplification test for the qualitative detection and differentiation of SARS CoV-2 from nasopharyngeal specimens collected from individuals with signs and symptoms of respiratory tract infections, and has been validated for use at [facility]. Negative results do not preclude COVID-19 infections and should not be used as the sole basis for diagnosis, treatment, or other management decisions. Testing for SARS CoV-2 is recommended only for patients who meet current clinical and/or epidemiological criteria defined by federal, state, or local public health directives.   SERIAL TROPONIN-INITIAL - Normal    Troponin I, High Sensitivity 6      Narrative:     Less than 99th percentile of normal range cutoff-  Female and children under 18 years old <14 ng/L; Male <21 ng/L: Negative  Repeat testing should be performed if clinically indicated.     Female and children under 18 years old 14-50 ng/L; Male 21-50 ng/L:  Consistent with possible cardiac damage and possible increased clinical   risk. Serial measurements may help to assess extent of myocardial damage.     >50 ng/L:  Consistent with cardiac damage, increased clinical risk and  myocardial infarction. Serial measurements may help assess extent of   myocardial damage.      NOTE: Children less than 1 year old may have higher baseline troponin   levels and results should be interpreted in conjunction with the overall   clinical context.     NOTE: Troponin I testing is performed using a different   testing methodology at [facility] than at other   [facility]. Direct result comparisons should only   be made within the same method.   SERIAL TROPONIN, 1 HOUR - Normal    Troponin I, High Sensitivity 6      Narrative:     Less than 99th percentile of normal range cutoff-  Female and children under 18 years old <14 ng/L; Male <21 ng/L: Negative  Repeat testing should be performed if clinically indicated.     Female and children under 18 years old 14-50 ng/L; Male 21-50 ng/L:  Consistent with possible cardiac damage and possible increased clinical   risk. Serial measurements may help to assess extent of myocardial damage.     >50 ng/L: Consistent with cardiac damage, increased clinical risk and  myocardial infarction. Serial measurements may help assess extent of   myocardial damage.      NOTE: Children less than 1 year old may have higher baseline troponin   levels and results should be interpreted in conjunction with the overall   clinical context.     NOTE: Troponin I testing is performed using a different   testing methodology at [facility] than at other   [facility]. Direct result comparisons should only   be made within the same method.   AMMONIA - Normal    Ammonia 41     URINE CULTURE   TROPONIN SERIES- (INITIAL, 1 HR)    Narrative:     The following orders were created for panel order Troponin I Series, High Sensitivity (0, 1 HR).  Procedure                               Abnormality         Status                     ---------                               -----------         ------                      Troponin I, High Sensiti...[[id number]]  Normal              Final result               Troponin, High Sensitivi...[[id number]]  Normal              Final result                 Please view results for these tests on the individual orders.   URINALYSIS WITH REFLEX CULTURE AND MICROSCOPIC    Narrative:     The following orders were created for panel order Urinalysis with Reflex Culture and Microscopic.  Procedure                               Abnormality         Status                     ---------                               -----------         ------                     Urinalysis with Reflex C...[[id number]]  Abnormal            Final result               Extra Urine Gray Tube[[id number]]                            In process                   Please view results for these tests on the individual orders.   EXTRA URINE GRAY TUBE   POCT FINGERSTICK GLUCOSE       Encounter Date: 04/01/25   ECG 12 lead (Clinic Performed)   Result Value    Ventricular Rate 83    Atrial Rate 83    IN Interval 156    QRS Duration 100    QT Interval 352    QTC Calculation(Bazett) 413    P Axis 65    R Axis -28    T Axis 52    QRS Count 14    Q Onset 211    P Onset 133    P Offset 187    T Offset 387    QTC Fredericia 392    Narrative    Normal sinus rhythm  Moderate voltage criteria for LVH, may be normal variant ( R in aVL , Inola product )  Borderline ECG  When compared with ECG of 21-MAR-2025 10:41,  PREVIOUS ECG IS PRESENT  Confirmed by Cecily Soto ([id number]) on 4/1/2025 2:08:01 PM       ED Course & MDM     Medical Decision Making  Patient presents to the emergency department for evaluation of hallucinations.  Differential diagnosis medication side effect, infectious process and dementia to mention a few.  Plan is for baseline workup EKG, chest x-ray, baseline labs, troponin, COVID-19 and with his history of cirrhosis, ammonia level.  Will check CT imaging of the head and neck given he is here for hallucinations although  alert and oriented to ensure no intracranial pathology.  Patient may require [facility] psychiatry however will rule out medical pathology first.  Patient to be evaluated by ER attending as well.        ED Course as of 07/23/25 0030 Tue Jul 22, 2025 2136 Troponin I, High Sensitivity: 6 [NA]   2136 Coronavirus 2019, PCR: Not Detected [NA]   2136 Ammonia: 41 [NA]   2136 BNP: 23 [NA]   2136 WBC(!): 11.4 [NA]   2136 Platelets(!): 136  Thrombocytopenia improved compared to previous [NA]   2228 IMPRESSION:  No acute abnormality.   [NA]   2229 IMPRESSION:  No acute intracranial abnormality.      No evidence of cervical spine fracture or acute traumatic malalignment. Postsurgical change of C5-C7 ACDF without hardware complication.   [NA]   2258 EKG negative for STEMI.  As interpreted by myself shows sinus bradycardia, heart rate 59 bpm, QTc 392 ms. [NA]   2320 Patient evaluated by Dr. Anderson and medically cleared for EPAT evaluation while urine in process. [NA]   2355 WBC, Urine(!): 21-50  Rocephin ordered [NA]   2355 WBC Clumps, Urine: RARE [NA]   Wed Jul 23, 2025   0025 Patient awaiting EPAT evaluation. Dr. Anderson will transfer care to Dr. Rodriguez at end of his shift. Patient updated on results and aware of plan. He remains calm and cooperative with no needs at this time.  [NA]      ED Course User Index  [NA] Yari Sandoval, APRN-CNP         Diagnoses as of 07/23/25 0030   Hallucinations, visual   Complicated urinary tract infection          Your medication list        START taking these medications        Instructions Last Dose Given Next Dose Due   cefdinir 300 mg capsule  Commonly known as: Omnicef      Take 1 capsule (300 mg) by mouth 2 times a day for 10 days.              ASK your doctor about these medications        Instructions Last Dose Given Next Dose Due   ARIPiprazole 30 mg tablet  Commonly known as: Abilify           aspirin 81 mg EC tablet      Take 1 tablet (81 mg) by mouth once daily.       atorvastatin  80 mg tablet  Commonly known as: Lipitor           busPIRone 10 mg tablet  Commonly known as: Buspar           carbidopa-levodopa  mg tablet  Commonly known as: Sinemet      Take 1 tablet by mouth 3 times a day.       dapagliflozin propanediol 10 mg tablet  Commonly known as: Farxiga           docusate sodium 100 mg capsule  Commonly known as: Colace           donepezil 5 mg tablet  Commonly known as: Aricept           gabapentin 400 mg capsule  Commonly known as: Neurontin           hydrOXYzine pamoate 25 mg capsule  Commonly known as: Vistaril           metFORMIN 1,000 mg tablet  Commonly known as: Glucophage           metoprolol succinate XL 25 mg 24 hr tablet  Commonly known as: Toprol-XL      Take 1.5 tablets (37.5 mg) by mouth once daily. Do not crush or chew.       Miralax 17 gram/dose powder  Generic drug: polyethylene glycol           omeprazole 40 mg DR capsule  Commonly known as: PriLOSEC           traZODone 100 mg tablet  Commonly known as: Desyrel           Trulicity 3 mg/0.5 mL injection  Generic drug: dulaglutide           venlafaxine  mg 24 hr capsule  Commonly known as: Effexor-XR           Voltaren Arthritis Pain 1 % gel  Generic drug: diclofenac sodium                     Where to Get Your Medications        You can get these medications from any pharmacy    Bring a paper prescription for each of these medications  cefdinir 300 mg capsule         Procedure  None     *This report was transcribed using voice recognition software.  Every effort was made to ensure accuracy; however, inadvertent computerized transcription errors may be present.*  Yari Sandoval, KRISTEN-DORY  07/23/25           [1]   Past Medical History:  Diagnosis Date    Cirrhosis (Multi) 08/28/2024    Diabetes (Multi)     HTN (hypertension)     Personal history of other diseases of the musculoskeletal system and connective tissue     History of gout    Personal history of other mental and behavioral disorders     History  of depression    Right upper quadrant abdominal pain 06/11/2024    Type 2 diabetes mellitus    [2]   Past Surgical History:  Procedure Laterality Date    CHOLECYSTECTOMY N/A 08/26/2024    Laparoscopic converted to open partial cholecystectomy and drain placement    NECK SURGERY  02/01/2018    Neck Surgery    OTHER SURGICAL HISTORY  02/01/2018    Surgery Excision Lipoma    OTHER SURGICAL HISTORY  04/25/2022    Colonoscopy   [3]   Family History  Problem Relation Name Age of Onset    Dementia Mother      Heart attack Father      Cholecystitis Sister          x3    Cancer Father's Sister      Prostate cancer Father's Brother      Cancer Paternal Grandmother      Diabetes Paternal Grandfather     [4]   Social History  Tobacco Use    Smoking status: Former     Types: Cigarettes    Smokeless tobacco: Never   Vaping Use    Vaping status: Never Used   Substance Use Topics    Alcohol use: Never    Drug use: Never        KRISTEN Montez-DORY  07/23/25 0030

## 2025-07-23 NOTE — PROGRESS NOTES
Emergency Medicine Transition of Care Note.    I received Mane Nath in signout from Dr. Anderson.  Please see the previous ED provider note for all HPI, PE and MDM up to the time of signout at 0100. This is in addition to the primary record.    In brief Mane Nath is an 64 y.o. male presenting for   Chief Complaint   Patient presents with    Hallucinations     Pt has history of parkinson's on June 12th and recently started a new med. Pt believes hallucinations that are being caused by new meds       At the time of signout we were awaiting: Psychiatric evaluation    ED Course as of 07/23/25 0100 Tue Jul 22, 2025 2136 Troponin I, High Sensitivity: 6 [NA]   2136 Coronavirus 2019, PCR: Not Detected [NA]   2136 Ammonia: 41 [NA]   2136 BNP: 23 [NA]   2136 WBC(!): 11.4 [NA]   2136 Platelets(!): 136  Thrombocytopenia improved compared to previous [NA]   2228 IMPRESSION:  No acute abnormality.   [NA]   2229 IMPRESSION:  No acute intracranial abnormality.      No evidence of cervical spine fracture or acute traumatic malalignment. Postsurgical change of C5-C7 ACDF without hardware complication.   [NA]   2258 EKG negative for STEMI.  As interpreted by myself shows sinus bradycardia, heart rate 59 bpm, QTc 392 ms. [NA]   2320 Patient evaluated by Dr. Anderson and medically cleared for EPAT evaluation while urine in process. [NA]   2355 WBC, Urine(!): 21-50  Rocephin ordered [NA]   2355 WBC Clumps, Urine: RARE [NA]   Wed Jul 23, 2025   0025 Patient awaiting EPAT evaluation. Dr. Anderson will transfer care to Dr. Rodriguez at end of his shift. Patient updated on results and aware of plan. He remains calm and cooperative with no needs at this time.  [NA]      ED Course User Index  [NA] Yari Sandoval, APRN-CNP         Diagnoses as of 07/23/25 0100   Hallucinations, visual   Complicated urinary tract infection       Medical Decision Making  Patient was evaluated by EPAT.  The determining if the patient does meet  criteria for admission to a psychiatric facility.  Patient is medically cleared for psychiatric valuation and transfer.  Patient is pending placement at time of signout.  Patient was signed out to the oncoming physician.    Final diagnoses:   [R44.1] Hallucinations, visual   [N39.0] Complicated urinary tract infection           Procedure  Procedures    Char Rodriguez MD

## 2025-07-23 NOTE — PROGRESS NOTES
Emergency Department Transition of Care Note       Signout   I received Mane Nath in signout from Dr. Rodriguez.  Please see the ED Provider Note for all HPI, PE and MDM up to the time of signout at 715.  This is in addition to the primary record.    In brief Mane Nath is an 64 y.o. male presenting for hallucinations    At the time of signout we were awaiting:  Transport for psychiatric care    ED Course & Medical Decision Making   Medical Decision Making:  Under my care, patient remained stable without any acute events.  Patient transported to psychiatric care facility for further evaluation and stabilization of mental health    ED Course:  ED Course as of 07/25/25 1240   Tue Jul 22, 2025 2136 Troponin I, High Sensitivity: 6 [NA]   2136 Coronavirus 2019, PCR: Not Detected [NA]   2136 Ammonia: 41 [NA]   2136 BNP: 23 [NA]   2136 WBC(!): 11.4 [NA]   2136 Platelets(!): 136  Thrombocytopenia improved compared to previous [NA]   2228 IMPRESSION:  No acute abnormality.   [NA]   2229 IMPRESSION:  No acute intracranial abnormality.      No evidence of cervical spine fracture or acute traumatic malalignment. Postsurgical change of C5-C7 ACDF without hardware complication.   [NA]   2258 EKG negative for STEMI.  As interpreted by myself shows sinus bradycardia, heart rate 59 bpm, QTc 392 ms. [NA]   2320 Patient evaluated by Dr. Anderson and medically cleared for EPAT evaluation while urine in process. [NA]   2355 WBC, Urine(!): 21-50  Rocephin ordered [NA]   2355 WBC Clumps, Urine: RARE [NA]   Wed Jul 23, 2025   0025 Patient awaiting EPAT evaluation. Dr. Anderson will transfer care to Dr. Rodriguez at end of his shift. Patient updated on results and aware of plan. He remains calm and cooperative with no needs at this time.  [NA]   0915 EPAT to place at [facility]. Arbuckle slip placed [JM]      ED Course User Index  [JM] Silvio Willams MD  [NA] Yari Sandoval, APRN-CNP         Diagnoses as of 07/25/25 1240    Hallucinations, visual   Complicated urinary tract infection       Disposition   The patient has been placed at [facility] by the ED behavioral health team.  A pink slip and transfer note will be placed in the chart.  We will continue to monitor and manage the patient in the emergency department until transport for the transfer can be arranged.    Procedures   Procedures      Silvio Willams MD  Emergency Medicine

## 2025-07-23 NOTE — CARE PLAN
"The patient's goals for the shift include \"Settle in\"    The clinical goals for the shift include orient to unit    Over the shift, the patient did not make progress toward the following goals. Barriers to progression include being newly admitted. Recommendations to address these barriers include orientation.    "

## 2025-07-23 NOTE — ED TRIAGE NOTES
Pt has history of parkinson's on June 12th and recently started a new med. Pt believes hallucinations that are being caused by new meds. Pt axox 4 gcs 15 on arrival. Pt states that he saw ghostly figures moving around his home

## 2025-07-23 NOTE — NURSING NOTE
Nurse Admission Note    Reason for admission in patient's own words:  help    Patient living arrangements prior to admission:  Most recent living environment    Legal status:  voluntarily    Medical consult notification to:  Lindsey    Admission folder given to:   Pt.    Problems/treatment plans:  Mental Health Assistance    Patient requests family to be notified of admission?    no  Person notified:  self    Strengths:  Willing to follow estimated treatment plan    Liabilities:  Self-care deficit    Previous mental health treatment:  Past Psychiatric History yes    Preliminary discharge planning needs:  Community resources

## 2025-07-24 PROBLEM — R44.1 VISUAL HALLUCINATION: Status: ACTIVE | Noted: 2025-07-24

## 2025-07-24 PROBLEM — F33.9 RECURRENT MAJOR DEPRESSIVE DISORDER: Status: ACTIVE | Noted: 2025-07-24

## 2025-07-24 LAB
BACTERIA UR CULT: NORMAL
CHOLEST SERPL-MCNC: 127 MG/DL (ref 0–199)
CHOLESTEROL/HDL RATIO: 4.3
EST. AVERAGE GLUCOSE BLD GHB EST-MCNC: 166 MG/DL
GLUCOSE BLD MANUAL STRIP-MCNC: 127 MG/DL (ref 74–99)
GLUCOSE BLD MANUAL STRIP-MCNC: 209 MG/DL (ref 74–99)
HBA1C MFR BLD: 7.4 % (ref ?–5.7)
HDLC SERPL-MCNC: 29.4 MG/DL
LDLC SERPL CALC-MCNC: 56 MG/DL
NON HDL CHOLESTEROL: 98 MG/DL (ref 0–149)
T4 FREE SERPL-MCNC: 1.07 NG/DL (ref 0.61–1.12)
TRIGL SERPL-MCNC: 207 MG/DL (ref 0–149)
TSH SERPL-ACNC: 0.39 MIU/L (ref 0.44–3.98)
VLDL: 41 MG/DL (ref 0–40)

## 2025-07-24 PROCEDURE — 99223 1ST HOSP IP/OBS HIGH 75: CPT | Performed by: NURSE PRACTITIONER

## 2025-07-24 PROCEDURE — 84443 ASSAY THYROID STIM HORMONE: CPT | Performed by: NURSE PRACTITIONER

## 2025-07-24 PROCEDURE — 2500000002 HC RX 250 W HCPCS SELF ADMINISTERED DRUGS (ALT 637 FOR MEDICARE OP, ALT 636 FOR OP/ED): Performed by: INTERNAL MEDICINE

## 2025-07-24 PROCEDURE — 82947 ASSAY GLUCOSE BLOOD QUANT: CPT

## 2025-07-24 PROCEDURE — 97116 GAIT TRAINING THERAPY: CPT | Mod: GP

## 2025-07-24 PROCEDURE — 84439 ASSAY OF FREE THYROXINE: CPT | Performed by: NURSE PRACTITIONER

## 2025-07-24 PROCEDURE — 83036 HEMOGLOBIN GLYCOSYLATED A1C: CPT | Mod: TRILAB | Performed by: PSYCHIATRY & NEUROLOGY

## 2025-07-24 PROCEDURE — 2500000001 HC RX 250 WO HCPCS SELF ADMINISTERED DRUGS (ALT 637 FOR MEDICARE OP): Performed by: INTERNAL MEDICINE

## 2025-07-24 PROCEDURE — 99418 PROLNG IP/OBS E/M EA 15 MIN: CPT | Performed by: NURSE PRACTITIONER

## 2025-07-24 PROCEDURE — 97165 OT EVAL LOW COMPLEX 30 MIN: CPT | Mod: GO

## 2025-07-24 PROCEDURE — 97161 PT EVAL LOW COMPLEX 20 MIN: CPT | Mod: GP

## 2025-07-24 PROCEDURE — 80061 LIPID PANEL: CPT | Performed by: PSYCHIATRY & NEUROLOGY

## 2025-07-24 PROCEDURE — 99254 IP/OBS CNSLTJ NEW/EST MOD 60: CPT | Performed by: INTERNAL MEDICINE

## 2025-07-24 PROCEDURE — 1140000001 HC PRIVATE PSYCH ROOM DAILY

## 2025-07-24 PROCEDURE — 36415 COLL VENOUS BLD VENIPUNCTURE: CPT | Performed by: PSYCHIATRY & NEUROLOGY

## 2025-07-24 PROCEDURE — 2500000001 HC RX 250 WO HCPCS SELF ADMINISTERED DRUGS (ALT 637 FOR MEDICARE OP): Performed by: NURSE PRACTITIONER

## 2025-07-24 RX ORDER — ARIPIPRAZOLE 15 MG/1
30 TABLET ORAL NIGHTLY
Status: DISCONTINUED | OUTPATIENT
Start: 2025-07-24 | End: 2025-07-25 | Stop reason: HOSPADM

## 2025-07-24 RX ORDER — BUSPIRONE HYDROCHLORIDE 10 MG/1
20 TABLET ORAL 2 TIMES DAILY
Status: DISCONTINUED | OUTPATIENT
Start: 2025-07-24 | End: 2025-07-25 | Stop reason: HOSPADM

## 2025-07-24 RX ORDER — METOPROLOL SUCCINATE 25 MG/1
37.5 TABLET, EXTENDED RELEASE ORAL DAILY
Status: DISCONTINUED | OUTPATIENT
Start: 2025-07-24 | End: 2025-07-25 | Stop reason: HOSPADM

## 2025-07-24 RX ORDER — MIRTAZAPINE 7.5 MG/1
7.5 TABLET, FILM COATED ORAL NIGHTLY
Status: DISCONTINUED | OUTPATIENT
Start: 2025-07-24 | End: 2025-07-25 | Stop reason: HOSPADM

## 2025-07-24 RX ORDER — VENLAFAXINE HYDROCHLORIDE 150 MG/1
300 CAPSULE, EXTENDED RELEASE ORAL DAILY
Status: DISCONTINUED | OUTPATIENT
Start: 2025-07-24 | End: 2025-07-25 | Stop reason: HOSPADM

## 2025-07-24 RX ORDER — CARBIDOPA AND LEVODOPA 25; 100 MG/1; MG/1
0.5 TABLET ORAL 3 TIMES DAILY
Status: DISCONTINUED | OUTPATIENT
Start: 2025-07-24 | End: 2025-07-25 | Stop reason: HOSPADM

## 2025-07-24 RX ORDER — DONEPEZIL HYDROCHLORIDE 5 MG/1
5 TABLET, FILM COATED ORAL NIGHTLY
Status: DISCONTINUED | OUTPATIENT
Start: 2025-07-24 | End: 2025-07-25 | Stop reason: HOSPADM

## 2025-07-24 RX ADMIN — METOPROLOL SUCCINATE 37.5 MG: 25 TABLET, EXTENDED RELEASE ORAL at 12:43

## 2025-07-24 RX ADMIN — BUSPIRONE HYDROCHLORIDE 20 MG: 10 TABLET ORAL at 12:43

## 2025-07-24 RX ADMIN — CARBIDOPA AND LEVODOPA 1 TABLET: 25; 100 TABLET ORAL at 09:58

## 2025-07-24 RX ADMIN — GABAPENTIN 400 MG: 400 CAPSULE ORAL at 09:59

## 2025-07-24 RX ADMIN — BUSPIRONE HYDROCHLORIDE 20 MG: 10 TABLET ORAL at 20:38

## 2025-07-24 RX ADMIN — CARBIDOPA AND LEVODOPA 0.5 TABLET: 25; 100 TABLET ORAL at 20:38

## 2025-07-24 RX ADMIN — VENLAFAXINE HYDROCHLORIDE 300 MG: 150 CAPSULE, EXTENDED RELEASE ORAL at 12:44

## 2025-07-24 RX ADMIN — GABAPENTIN 400 MG: 400 CAPSULE ORAL at 20:38

## 2025-07-24 RX ADMIN — PANTOPRAZOLE SODIUM 20 MG: 20 TABLET, DELAYED RELEASE ORAL at 06:33

## 2025-07-24 RX ADMIN — METFORMIN HYDROCHLORIDE 1000 MG: 1000 TABLET, FILM COATED ORAL at 20:38

## 2025-07-24 RX ADMIN — ARIPIPRAZOLE 30 MG: 15 TABLET ORAL at 20:38

## 2025-07-24 RX ADMIN — CEFDINIR 300 MG: 300 CAPSULE ORAL at 09:58

## 2025-07-24 RX ADMIN — METFORMIN HYDROCHLORIDE 1000 MG: 1000 TABLET, FILM COATED ORAL at 09:59

## 2025-07-24 RX ADMIN — DONEPEZIL HYDROCHLORIDE 5 MG: 5 TABLET ORAL at 20:38

## 2025-07-24 RX ADMIN — MIRTAZAPINE 7.5 MG: 7.5 TABLET, FILM COATED ORAL at 20:38

## 2025-07-24 RX ADMIN — CARBIDOPA AND LEVODOPA 0.5 TABLET: 25; 100 TABLET ORAL at 15:48

## 2025-07-24 RX ADMIN — ASPIRIN 81 MG: 81 TABLET, COATED ORAL at 09:58

## 2025-07-24 RX ADMIN — ATORVASTATIN CALCIUM 80 MG: 80 TABLET, FILM COATED ORAL at 06:33

## 2025-07-24 RX ADMIN — DAPAGLIFLOZIN 10 MG: 10 TABLET, FILM COATED ORAL at 09:59

## 2025-07-24 SDOH — HEALTH STABILITY: MENTAL HEALTH: PREVIOUS MENTAL HEALTH TREATMENT: INPATIENT TREATMENT

## 2025-07-24 SDOH — HEALTH STABILITY: MENTAL HEALTH: HALLUCINATION TYPE: VISUAL

## 2025-07-24 SDOH — SOCIAL STABILITY: SOCIAL INSECURITY: FEELS SAFE LIVING IN HOME: YES

## 2025-07-24 SDOH — HEALTH STABILITY: MENTAL HEALTH: PATIENT PERSONAL STRENGTHS: FAITH/SPIRITUALITY;RESOURCEFUL;RESILIENT;SELF-RELIANT;STRONG SUPPORT SYSTEM

## 2025-07-24 SDOH — SOCIAL STABILITY: SOCIAL INSECURITY

## 2025-07-24 SDOH — HEALTH STABILITY: MENTAL HEALTH: BEHAVIOR: ORIENTED;OTHER (COMMENT)

## 2025-07-24 SDOH — HEALTH STABILITY: MENTAL HEALTH: MAJOR CHANGE/ LOSS/ STRESSOR: LOSS OF INDEPENDENCE;JOB CHANGE/ LOSS;MEDICAL CONDITION/ DIAGNOSIS

## 2025-07-24 SDOH — HEALTH STABILITY: MENTAL HEALTH

## 2025-07-24 SDOH — HEALTH STABILITY: MENTAL HEALTH: MENTAL HEALTH TREATMENT: OUTPATIENT TREATMENT

## 2025-07-24 SDOH — SOCIAL STABILITY: SOCIAL INSECURITY: RELIGIOUS/CULTURAL FACTORS: METHODIST

## 2025-07-24 SDOH — HEALTH STABILITY: MENTAL HEALTH: MENTAL HEALTH CONDITIONS/ SYMPTOMS: ANXIETY DISORDER;DEPRESSION

## 2025-07-24 ASSESSMENT — PAIN - FUNCTIONAL ASSESSMENT
PAIN_FUNCTIONAL_ASSESSMENT: 0-10

## 2025-07-24 ASSESSMENT — COGNITIVE AND FUNCTIONAL STATUS - GENERAL
DRESSING REGULAR UPPER BODY CLOTHING: A LITTLE
CLIMB 3 TO 5 STEPS WITH RAILING: A LITTLE
EATING MEALS: A LITTLE
PERSONAL GROOMING: A LITTLE
WALKING IN HOSPITAL ROOM: A LITTLE
TOILETING: A LITTLE
HELP NEEDED FOR BATHING: A LITTLE
STANDING UP FROM CHAIR USING ARMS: A LITTLE
DRESSING REGULAR LOWER BODY CLOTHING: A LITTLE
MOBILITY SCORE: 20
MOVING TO AND FROM BED TO CHAIR: A LITTLE
DAILY ACTIVITIY SCORE: 18

## 2025-07-24 ASSESSMENT — ACTIVITIES OF DAILY LIVING (ADL)
ADL_ASSISTANCE: INDEPENDENT
BATHING_ASSISTANCE: MINIMAL
ADL_ASSISTANCE: INDEPENDENT

## 2025-07-24 ASSESSMENT — PAIN SCALES - GENERAL
PAINLEVEL_OUTOF10: 7
PAINLEVEL_OUTOF10: 0 - NO PAIN

## 2025-07-24 NOTE — GROUP NOTE
Group Topic: Goals   Group Date: 7/24/2025  Start Time: 0730  End Time: 0800  Facilitators: Vargas Holiday   Department: [facility] Geriatric     Number of Participants: 15   Group Focus: goals, mindfulness, and self-awareness  Treatment Modality: Skills Training  Interventions utilized were assignment  Purpose: feelings and self-care    Name: Mane Nath YOB: 1960   MR: [id number]      Facilitator: [facility]  Level of Participation: did not attend  Progress: None  Comments: patient was not able to make progress towards treatment of psychotic disorder  Plan: patient will be encouraged to attend

## 2025-07-24 NOTE — CARE PLAN
"The patient's goals for the shift include \"Settle in\"    The clinical goals for the shift include maintain safety/rest      Problem: Safety - Adult  Goal: Free from fall injury  Outcome: Progressing     Problem: Discharge Planning  Goal: Discharge to home or other facility with appropriate resources  Outcome: Progressing     Problem: Chronic Conditions and Co-morbidities  Goal: Patient's chronic conditions and co-morbidity symptoms are monitored and maintained or improved  Outcome: Progressing           "

## 2025-07-24 NOTE — PROGRESS NOTES
Physical Therapy    Physical Therapy Evaluation & Treatment    Patient Name: Mane Nath  MRN: [id number]  Department: [facility]  Room: 413/413-A  Today's Date: 7/24/2025   Time Calculation  Start Time: 1416  Stop Time: 1439  Time Calculation (min): 23 min    Assessment/Plan   PT Assessment  PT Assessment Results: Decreased strength, Decreased endurance, Impaired balance, Decreased mobility, Decreased coordination  Rehab Prognosis: Good  Barriers to Discharge Home: No anticipated barriers  Evaluation/Treatment Tolerance: Patient limited by fatigue  Medical Staff Made Aware: Yes  Strengths: Premorbid level of function  Barriers to Participation: Comorbidities  End of Session Communication: Bedside nurse  Assessment Comment: Patient presents with functional decline from independent baseline and has suffered functional decline. Patient requires acute PT to address functional decline and promote safe return to home. Patient is safe to ambulate on unit with staff.  End of Session Patient Position: Bed, 3 rail up, Alarm off, not on at start of session (sittingup at edge of bed)   IP OR SWING BED PT PLAN  Inpatient or Swing Bed: Inpatient  PT Plan  Treatment/Interventions: Transfer training, Gait training, Balance training, Endurance training, Therapeutic exercise, Therapeutic activity  PT Plan: Ongoing PT  PT Frequency: 3 times per week (during this acute inpatient admission)  PT Discharge Recommendations: Low intensity level of continued care  Equipment Recommended upon Discharge: Wheeled walker  PT Recommended Transfer Status: Assist x1, Assistive device  PT - OK to Discharge: Yes      Subjective     PT Visit Info:  PT Received On: 07/24/25  General Visit Information:  General  Reason for Referral: Impaired mobility, psychotic disorder  Referred By: Dr. Bond  Past Medical History Relevant to Rehab: Parkinsons, dizziness, dyslipidemia, hypoxia, depression, UTI, DM type 2  Prior to Session Communication:  "Bedside nurse  Patient Position Received: Bed, 3 rail up, Alarm off, not on at start of session  Preferred Learning Style: verbal, visual  General Comment: Patient is a 64 year old male who presented to the ED with c/o hallucinations. Per EMR, patient started taking a new medication and was then beginning to have hallucinations. Patient admitted for further management. He is cleared by nursing for therapy. He is in bed upon arrival and agreeable to participate.  Home Living:  Home Living  Type of Home: Mobile home  Lives With: Siblings (brother, other siblings close by)  Home Adaptive Equipment: Walker rolling or standard, Quad cane  Home Layout: One level  Home Access: Stairs to enter with rails  Entrance Stairs-Rails: Right  Entrance Stairs-Number of Steps: 6  Bathroom Shower/Tub: Tub/shower unit  Bathroom Toilet: Standard  Bathroom Equipment: Grab bars in shower, Tub transfer bench  Home Living Comments: patient reports no recent falls, but states \"things have come close\"  Prior Level of Function:  Prior Function Per Pt/Caregiver Report  Level of [place]: Independent with ADLs and functional transfers, Needs assistance with homemaking  Receives Help From: Family (Brother - brother does work nights and sleeps during the day)  ADL Assistance: Independent  Homemaking Assistance: Needs assistance (brother manages)  Ambulatory Assistance:  (no AD vs quad cane vs RW)  Precautions:  Precautions  Hearing/Visual Limitations: [place], bifocals but are not here  Medical Precautions: Fall precautions         Objective   Pain:  Pain Assessment  Pain Assessment: 0-10  0-10 (Numeric) Pain Score: 0 - No pain  Cognition:  Cognition  Orientation Level: Disoriented to place  Insight: Mild  Impulsive: Mildly    General Assessments:  General Observation  General Observation: pleasant, cooperative,       Activity Tolerance  Endurance: Decreased tolerance for upright activites  Activity Tolerance Comments: Fair+ overall " tolerance    Sensation  Sensation Comment: patient reports hx of neuropathy in feet. some numbness/tingling in hands    Strength  Strength Comments: Grossly, 3+5 B LE  Coordination  Coordination Comment: hx of Parkinsons. Tremulous         Static Sitting Balance  Static Sitting-Balance Support: Feet supported  Static Sitting-Level of Assistance: Independent  Dynamic Sitting Balance  Dynamic Sitting-Balance Support: Feet supported  Dynamic Sitting-Level of Assistance: Independent    Static Standing Balance  Static Standing-Balance Support: Bilateral upper extremity supported  Static Standing-Level of Assistance: Close supervision  Static Standing-Comment/Number of Minutes: RW  Dynamic Standing Balance  Dynamic Standing-Balance Support: Bilateral upper extremity supported  Dynamic Standing-Level of Assistance: Close supervision  Dynamic Standing-Balance: Turning  Dynamic Standing-Comments: with RW  Functional Assessments:  Bed Mobility 1  Bed Mobility 1: Supine to sitting, Sitting to supine  Level of Assistance 1: Modified independent  Bed Mobility Comments 1: flat bed, no rails    Transfer 1  Transfer From 1: Bed to  Transfer to 1: Stand  Technique 1: Sit to stand, Stand to sit  Transfer Device 1: Walker  Transfer Level of Assistance 1: Contact guard  Trials/Comments 1: vc's for safe hand placement, slightly unsteady    Ambulation/Gait Training 1  Surface 1: Level tile  Device 1: Rolling walker  Assistance 1: Contact guard  Quality of Gait 1: Wide base of support, Decreased step length  Comments/Distance (ft) 1: 100 ft x 2 with turns, unsteady, lists          Treatments:  Ambulation/Gait Training 1  Surface 1: Level tile  Device 1: Rolling walker  Assistance 1: Contact guard  Quality of Gait 1: Wide base of support, Decreased step length  Comments/Distance (ft) 1: 100 ft x 2 with turns, unsteady, lists  Outcome Measures:  [facility] Basic Mobility  Turning from your back to your side while in a flat bed without using  bedrails: None  Moving from lying on your back to sitting on the side of a flat bed without using bedrails: None  Moving to and from bed to chair (including a wheelchair): A little  Standing up from a chair using your arms (e.g. wheelchair or bedside chair): A little  To walk in hospital room: A little  Climbing 3-5 steps with railing: A little  Basic Mobility - Total Score: 20    Encounter Problems       Encounter Problems (Active)       PT Goals       Patient will transfer sit to stand and stand to sit with  independent assist to facilitate mobility.  (Progressing)       Start:  07/24/25    Expected End:  08/07/25            Patient will amb 300 feet with least restrictive device including two turns on even surface with independent assist to facilitate safe mobility.  (Progressing)       Start:  07/24/25    Expected End:  08/07/25            Patient will improve standing dynamic balance to Good+ for safety during challenging dynamic balance activities. (Progressing)       Start:  07/24/25    Expected End:  08/07/25                   Education Documentation  Mobility Training, taught by Taryn Carrillo PT at 7/24/2025  2:51 PM.  Learner: Patient  Readiness: Acceptance  Method: Explanation  Response: Verbalizes Understanding  Comment: Education provided re: PT purpose, POC, and safe mobility techniques

## 2025-07-24 NOTE — GROUP NOTE
Group Topic: Cognitive Focus   Group Date: 7/24/2025  Start Time: 1500  End Time: 1545  Facilitators: SHANNON CabralS   Department: [facility]    Number of Participants: 7   Group Focus: clarity of thought, concentration, coping skills, leisure skills, and social skills  Treatment Modality: Recreational Therapy  Interventions utilized were leisure development  Purpose: coping skills    Name: Mane Nath YOB: 1960   MR: [id number]      Facilitator: Recreational Therapist  Level of Participation: active  Quality of Participation: appropriate/pleasant and cooperative  Interactions with others: appropriate  Mood/Affect: appropriate and brightens with interaction  Triggers (if applicable): n/a  Cognition: coherent/clear and processing slowly  Progress: Moderate  Comments: Patients engaged in a socially enriching experience of Mr. Lloyd, designed to foster cognitive skills and camaraderie. Patients are presented with thought provoking topics which encourage strategic thinking and collaborative engagement. Patients were able to showcase their knowledge, creativity, and teamwork in a supportive environment.   Pt pleasant, cooperative, cheerful. Stated difficulty remembering answers to questions on the spot.  Plan: continue with services

## 2025-07-24 NOTE — PROGRESS NOTES
"        07/24/25 1200   Initial Assessment   Attention Span 15-30 Miutes   Cognitive Behavior Status/Orientation Person;Place;Time;Attentive;Capable;Situation   Crisis Triggers Emotions;Mood;Other (Comment);Living situation  (Pt states he has been having auditory and visual hallucinations for about 2 months, thinks it's related to starting a new medication. Pt stated his diagnosis of Parkinson's has been unsettling, relating Carmelo Hernandez's neurological disorder to his own)   Emotional Concerns/Mood/Affect Calm;Cooperative;Friendly   Hearing Adequate  (\"I recently had the ear wax cleaned from my ears and I can hear a lot better\")   Memory Memory intact  (Pt states his memory has been good and hasn't noticed a change since Parkinson's diagnosis)   Motivation Level No encouragement needed  (Very friendly, social, forthcoming)   Negative Coping Skills Other (Comment)  (\"I've been isolating a little more lately\")   Speech/Communication/Socialization Verbal   Vision Other (Comment)  (Pt stated he started noticing double vision and has [place] appointment)    Rehab Leisure Interest Survey   Activity Preference Group;Independent   Activity Tolerance Good 30-60 minutes   At Home ADL Deficits Other (Comment)  (Utilizes a cane for ambulation but states no difficulties at home he shares with his brother)   Barriers to Leisure Participation Emotions;Lack of motivation;Mood/affect;Physical issues/mobility/function   Community Resources Active in community groups  (States he goes to the [facility] in [place] and also follows up at [place])   Creative Activities Other (Comment)  (A/V work, digitizing old photos of parents and family and setting them to a slideshow)   Education/School   ([employer])   Following Directions Able to follow multi-step commands   Leisure Interests Actively participates in leisure interests   Living Arrangement Relative  (\"I live with my brother in law in a mobile " "home\")   Motivators for Recreation/Leisure Involvement Relaxation;Social interaction;Self-esteem/sense of accomplishment;Creative expression;Intellectual expression;Fun/entertainment;Sense of well being/contentment   Outdoor Activities   (\"I'm not very much of an outdoors person, my dad was\")   Passive Games Trivia games;Educational games   Patient/Family Education Needs safety awareness   Patient Strengths \"I like to talk and love nostalgia\"   Patient Weakness \"Parkinson's\"   Physial Activity Other (Comment)  (walking)   Social/Group Activities Mu-ism/Alevism activities   Solitary Activities Watch/listen television;Computer activities;Music;Family oriented   Special Hobbies   (\"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

## 2025-07-24 NOTE — GROUP NOTE
"Group Topic: Self-Care/Wellness   Group Date: 7/24/2025  Start Time: 1400  End Time: 1430  Facilitators: TERI Cabral   Department: [facility]    Number of Participants: 7   Group Focus: safety plan and self-awareness  Treatment Modality: Recreation Therapy  Interventions utilized were assignment and patient education  Purpose: coping skills, insight or knowledge, self-care, and relapse prevention strategies    Name: Mane Nath YOB: 1960   MR: [id number]      Facilitator: Recreational Therapist  Level of Participation: did not attend  Progress: None  Comments: Patients were provided with the \"Mental Health Maintenance Plan\" handout. We discussed five main areas including (triggers, warning signs, self-care practices, coping strategies, and navigating the path back to well-being). Through open dialogue and collaborative exploration, we discussed a path back to well-being and patients were prompted to record personal responses in each area.   Patient declined invitation to group activity at this time. Patient will continue to be provided with opportunities to enhance leisure skills and/or coping mechanisms.  Plan: continue with services      "

## 2025-07-24 NOTE — SIGNIFICANT EVENT
07/24/25 0800   Referral Data   Referral Source Physician   Referral Name Dr. Bond   Referral Reason Psychosocial assessment   County Information   [facility]   Patient Information   Primary Caregiver Self   Provides Primary Care For No One   Support System Immediate family;[employer] institution   Lives With Brother   P[employer]al/Cultural Factors Bahai   SW Readmission Information    Readmission within 30 Days No   Home Safety   Feels Safe Living in Home Yes   Family Member Substance Use   Substance Use None   Activities of Daily Living   Functional Status Other (Comment)  (Plan for PT OT eval-New DX Parkinson's)   Assistive Device Other (Comment)  (Patient uses 4 prong cane at baseline)   Living Arrangement Lives with someone   Behavior Oriented;Other (Comment)  (Difficulty word finding)   Communication Talks;Understands speaking;Understands English;Can write;Reads   Income Information   Employment Status for Patient   Employment Status Unemployed   Current/Previous Occupation Other (Comment)  (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[place])   Emotional/ Psychological   Person Assessed Patient   Affect No Deficits Noted   Behaviors/Mood Calm;Cooperative   Verbal Skills Other (Comment)  (difficulty word finding)   Current Interpersonal Conduct/ Behavior Appropriate to Situation   Mental Health Conditions/ Symptoms Anxiety Disorder;Depression   Thought Process Alterations Hallucinations   Hallucination Type Visual   Mental Health Treatment Outpatient Treatment  (Patient has psychiatrist at [place])   Previous Mental Health Treatment Inpatient Treatment  (Patient cannot recall date of inpatient treatment)   Cognitive/Perceptual/Developmental   Developmental Stage (Eriksson's Stages of Development) Stage 7 (35-65 years/Middle Adulthood) Generativity vs Stagnation   Coping/ Stress   Major Change/ Loss/ Stressor Loss of [place];Job Change/ Loss;Medical Condition/ Diagnosis   Patient  Personal Strengths Judy/Spirituality;Resourceful;Resilient;Self-Reliant;Strong Support System   Reaction to Health Status Anxious   Understanding of Condition and Treatment Needs Additional Information   Coping/ Stress, Primary Caregiver/ Support Person   Support Person Comments Richard has signed JODY for siblings Devante Brooks and Pastor Geiger.   Legal   Legal Considerations Patient/ Family Ability to Make Healthcare Decisions   Criminal Activity/ Legal Involvement Pertinent to Current Situation/ Hospitalization None     Mane Nath is a 64 year old male admitted to [facility] on 7.23.25. Patient called EMS after experiencing visual hallucination of what he thought was smoke or ghosts swirling in his home. Evaluation in ED with recommendation for inpatient treatment leading to current placement. LSW is following for discharge planning. This worker and psychiatric NP met with patient to review goals of care and treatment. Patient stated he was recently diagnoses with Parkinson's. He has been started on new medication that he feels might be interacting with the medication he is already prescribed by his psychiatrist Dr. Hackett through the [facility]. Patient lives with his brother and reports close relationship with his siblings. He is [employer] and attends services regularly. 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 Patient reports feeling anxious about new diagnosis and how it will impact him. He reports using a 4 prong cane in the community. Reviewed option for PT OT eval and NP to follow for orders. Patient is agreeable to counseling services in addition to psychiatrist as outpatient to cope with his feelings toward this new diagnosis. Patient is hopeful for medication review and management as needed. LSW will continue to follow as a resource for transition  of care and will meet with patient to answer questions or concerns, and collaborate with IDT on patient needs to ensure safe return home. Patient was thankful for meeting with LSW feels care transitions has met needs in regards to discharge planning at this time.

## 2025-07-24 NOTE — H&P
"  The reason for admission includes: visual hallucinations.  Onset of symptoms was abrupt starting 2 days ago with completely resolved course since that time. Psychosocial Stressors: health.            History Of Present Illness  Mane Nath is a 64 y.o. male with past medical history as below (pertinent recent diagnosis of Parkinson's disease), and past psychiatric history of, per chart review, bipolar disorder and LESLIE who presented to [facility] ED On 7/22/25 with complaints of visual hallucinations. He was medically cleared and transferred to [facility] on 7/23/25.    Pt endorses long standing history of depression and anxiety, and past diagnosis of bipolar disorder however is unable to describe past tom. He describes overall his depression and anxiety have fairly well controlled, however after he lost his mother in 2018 and his recent diagnosis of Parkinson's his mood has decreased. Sleep is \"off and on\", around 7 hours a night. Describes apathy, decreased energy, feelings of loneliness (doesn't get out and socialize much, does not have a partner). He denies SI, HI - lists spiritual and family protective factors. No weapons at home. No recent illness or sick contacts.    What prompted him to go to the ED was acute onset of visual hallucination he describes as moving and swirling smoke-like figure around his television. It did not have a face and it did not interact with the patient. It lasted for around five minutes and resolved, has not re-occurred. He denies past experience with visual hallucinations, endorses sometimes hearing voices in his home hallway (lives in a trailer with his brother). Also endorses nightmares, unclear if new or chronic. Of note, he was recently started on Sinemet 25/100mg TID for new diagnosis of Parkinson's disease - he endorses it appears to be somewhat beneficial for his tremors.     In ED, toxic/metabolic workup remarkable for WBC 11.4 (however afebrile). , UA " "small amount of leukocyte w/culture negative. Ammonia WNL. LFTs WNL. Chest XRAY and CT head (personal review) both negative for acute process.    He is prescribed venlafaxine XR 300mg for mood and anxiety, aripiprazole 30mg bedtime for exact reasons unclear to patient, trazodone 100mg to 300mg at bedtime for sleep aide (inconsistent dosing, he states depends on \"how things are going\"), hydroxyzine 25mg bedtime for sleep aide (he states he takes nightly), buspirone 20mg BID for anxiety. He endorses compliance with all of his medications.      Past Medical History  He has a past medical history of Cirrhosis (Multi) (08/28/2024), Diabetes (Multi), HTN (hypertension), Personal history of other diseases of the musculoskeletal system and connective tissue, Personal history of other mental and behavioral disorders, Right upper quadrant abdominal pain (06/11/2024), and Type 2 diabetes mellitus.    Past Psychiatric History:   Previous therapy: yes  Previous psychiatric treatment and medication trials: yes - pt unable to recall  Previous psychiatric hospitalizations: yes - [place] [facility] [place]  Previous diagnoses: yes - bipolar disorder, LESLIE  Previous suicide attempts: no  History of violence: no  Currently in treatment with Dr. Lew Do.  Depression screening was performed with standardized tool: Yes - Depression    Surgical History  He has a past surgical history that includes Neck surgery (02/01/2018); Other surgical history (02/01/2018); Other surgical history (04/25/2022); and Cholecystectomy (N/A, 08/26/2024).     Social History  He reports that he has quit smoking. His smoking use included cigarettes. He has never used smokeless tobacco. He reports that he does not currently use alcohol. He reports that he does not use drugs.     Allergies  Latex    Review of Systems    Psychiatric ROS - Adult  Anxiety:  excessive anxiety/worry and sleep disturbance  Depression: anhedonia, " "energy, interest, and sleep decreased   Delirium: negative  Psychosis: visual hallucinations - has not experienced since admission  Belia: negative  Safety Issues: none  Psychiatric ROS Comment: Confounding Parkinson's disease    Physical Exam  Constitutional:       Appearance: Normal appearance.   Pulmonary:      Effort: Pulmonary effort is normal.     Neurological:      Mental Status: He is alert.      Comments: Resting and action tremor to BUE, L > R more pill rolling on L         Involuntary Movement Assessment  No abnormal movements concerning for EPS      Mental Status Examination  General Appearance: Fairly groomed. Hospital attire, sitting at side of his bed  Gait/Station: Stiff, shuffling gait, mostly uses wheelchair around facility  Speech: Normal rate, volume, prosody  Mood: \"Up and down\"  Affect: Flat with mild masked facies  Thought Process: Circumstantial  Thought Associations: No loosening of associations  Thought Content: normal  Perception: No perceptual abnormalities noted  Level of Consciousness: Alert  Orientation: Alert and oriented to person, place, time and situation  Attention and Concentration: Intact  Recent Memory: Intact as evidenced by ability to recall details from the past 24 hours   Remote Memory: Intact as evidenced by ability to recall previous medical issues   Executive function: Intact  Language: Fluent, no aphasia  Fund of knowledge: Good  Insight: Intact, as evidenced by recognizing symptoms of his psychiatric disease and neurological disease, when to seek help, benefits of prescribed medications, compliance with medications  Judgment: Intact, as evidenced by help-seeking behavior, ability to reason through medical decision making, and compliance with treatment recommendations      Psychiatric Risk Assessment  Violence Risk Assessment: lower socioeconomic class, major mental illness, and male  Acute Risk of Harm to Others is Considered: low   Suicide Risk Assessment: , " "chronic medical illness, chronic pain, current psychiatric illness, living alone or lack of social support, male, severe anxiety, and unmarried  Protective Factors against Suicide: fear of suicide, hopefulness/future orientation, moral objections to suicide, positive family relationships, and Hoahaoism affiliation/spirituality  Acute Risk of Harm to Self is Considered: low to moderate    Last Recorded Vitals  Blood pressure 122/62, pulse 70, temperature 36.9 °C (98.4 °F), temperature source Temporal, resp. rate 16, height 1.778 m (5' 10\"), weight 97.5 kg (215 lb), SpO2 96%.    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 Toxic/metabolic workup in ED revealed small amount of leukocyte in urine, negative culture, however he was started on Omnicef 300mg BID x 10 days for UTI treatment. Of note, patient was recently diagnosed with Parkinson's disease and started on Sinemet 12.5mg/50mg TID on 6/12/25, increased to 25mg/100mg TID on 6/24/25. He has been compliant with all of his medications. He does not present with nor endorse other psychotic symptoms. Denies past experience with visual hallucinations however endorses infrequently hearing voices in the bray of his home.    Via [employer] chat I contacted his neurologist, Dr. Osuna, regarding the possibility of new addition Sinemet contributing, his response: Toño Blackmon. Thanks for reaching out. Carbi/levo potentially may cause hallucinations (visual). He does have other meds. Is it possibly the carbi/levo--maybe? If nothing " else has changed, and hallucination is new, if he wants to go lower on his carbi/levo he can. I believe he might be on carbi/levo 25/100 1 tab tid at this time. He can go back down to 1/2 tab tid. Unfortunately as you know, PD itself, and all PD meds may cause hallucinations.     Many confounders for the development of acute visual hallucinations - mild infectious process (urinary), poor brain substrate, Parkinson's disease, recent medication regime addition of Sinemet, inadequate sleep, polypharmacy (donepezil, gabapentin, venlafaxine, aripiprazole, hydroxyzine, trazodone [doses of behavior health medications have remained unchanged over > the last six months and he endorses compliance]). Low suspicion the hallucinations are a primary psychiatric process.    Plan:    1. Biological  - Will reduce dose of Sinemet from 25/100mg TID to 12.5mg/50mg TID and monitor closely. Follow up with Dr. Osuna outpatient after discharge is necessary.  - continue home venlafaxine XR 300mg PO AM daily for mood/anxiety  - continue home aripiprazole 30mg PO bedtime for mood adjunct (assuming this is what it is prescribed for, as patient denies past psychotic episodes or symptoms)  - continue home buspirone 20mg BID for anxiety  - will hold home hydroxyzine 25mg PO bedtime (re: anticholinergic s/e profile)  - will trial mirtazapine 7.5mg PO bedtime (for mood adjunct and sleep aide) in place of home trazodone 300mg - re: inconsistent dosing of trazodone, lower orthostatic hypotension risk with mirtazapine (Front Cardiovasc Med. 2021 Aug 3;8:220030. doi: 10.3389/fcvm.2021.659374  ). Can titrate as warranted and tolerated. Discussed with patient and he is agreeable.  - medical co-managing pertinences, no other adjustments to home medications at this time  - labs as ordered    2. Psychological   - Patient is encouraged to participate with the therapeutic milieu and program and group therapy.     3. Sociological   - Patient encouraged to  cooperate with social work staff on issues relevant to discharge planning.     Goals for discharge include:  Psychiatric condition: to lower acute risk, return to baseline level of functioning, work to identify positive coping skills to manage psychiatric symptoms, allow for reconciliation of medications.  Physical condition: increase daily physical activity, demonstrate interest in completing daily activity, and complete ADL.  Social function: identify protective factors and family supports, increase social interactions on the unit with peers and providing staff, and demonstrate appropriate problem solving skills for engaging after care on discharge.    Nutrition/Malnutrition:  The diagnosis of malnutrition has significant impact on risk adjustment, mortality and readmission rates, length of stay and hospital reimbursement. Following is a representation of nutrition status if evaluated.  If blank, there is no associated malnutrition finding to incorporate per the dietician team:     Medication Consent  Medication Consent: risks, benefits, side effects reviewed for all ordered meds and patient expressed understanding and consent obtained    Total time spent with patient encounter 120 minutes. This includes patient interview, assessment, extensive chart review, personal review of labs and images as noted above, and formulation of recommendations.     Neymar Alamo, KRISTEN-CNP, AGPCNP-BC, PMHNP-BC  Psychiatry, [facility]  1* contact: YOUnite preferred

## 2025-07-24 NOTE — NURSING NOTE
"GAYLE NOTE     Problem:  Depression/hallucinations     Behavior:  Pt pleasant, calm and cooperative with care. On approach, pt maintains a bright affect. Good eye contact noted. Pt social with staff, telling various small stories about his life and family. Currently denying hallucinations. Endorses some depression \"since my health has declined.\"   Group Participation: attends  Appetite/Meals: HS snack provided.       Interventions:  Hs medications administered per MAR    Response:  Medication compliant.     Plan:  Continue to monitor and assist. Maintain q15 minute rounds for safety.    "

## 2025-07-24 NOTE — PROGRESS NOTES
Occupational Therapy    Evaluation    Patient Name: Mane Nath  MRN: [id number]  Department: [facility]  Room: 413/413-A  Today's Date: 7/24/2025  Time Calculation  Start Time: 0844  Stop Time: 0859  Time Calculation (min): 15 min    Assessment  IP OT Assessment  OT Assessment: Patient is a 64 year old male admitted with psychotic disorder with hallucinations. Patient is independent with ADLs and mobility at baseline. Patient will benefit from skilled OT to maximize his safety and independence with daily tasks.  Prognosis: Good  Barriers to Discharge Home: Caregiver assistance, Physical needs  Caregiver Assistance: Caregiver assistance needed per identified barriers - however, level of patient's required assistance exceeds assistance available at home  Physical Needs: Intermittent mobility assistance needed, Intermittent ADL assistance needed  Evaluation/Treatment Tolerance: Patient tolerated treatment well  End of Session Communication: Bedside nurse  End of Session Patient Position: Bed, 3 rail up, Alarm off, not on at start of session (sitting EOB)  Plan:  Treatment Interventions: ADL retraining, Functional transfer training, UE strengthening/ROM, Endurance training, Cognitive reorientation, Patient/family training, Equipment evaluation/education, Neuromuscular reeducation, Compensatory technique education  OT Frequency: 2 times per week  OT Discharge Recommendations: Low intensity level of continued care  Equipment Recommended upon Discharge: Wheeled walker  OT Recommended Transfer Status: Assistive equipment (Comment), Stand by assist  OT - OK to Discharge: Yes    Subjective   Current Problem:  No diagnosis found.  OT Visit Info:  OT Received On: 07/24/25  General Visit Info:  General  Reason for Referral: decline in ADLs, psychotic disorder  Referred By: Dr. Bond  Past Medical History Relevant to Rehab: Parkinsons, dizziness, dyslipidemia, hypoxia, depression, UTI, DM type 2  Family/Caregiver  "Present: No  Prior to Session Communication: Bedside nurse  Patient Position Received: Bed, 3 rail up, Alarm off, not on at start of session  Preferred Learning Style: verbal, visual  General Comment: Patient is a 64 year old male who presented to the ED with c/o hallucinations. Per EMR, patient started taking a new medication and was then beginning to have hallucinations. Patient admitted for further management. He is cleared by nursing for therapy. He is in bed upon arrival and agreeable to participate.  Precautions:  Medical Precautions: Fall precautions    Pain:  Pain Assessment  Pain Assessment: 0-10  0-10 (Numeric) Pain Score: 7  Pain Type: Chronic pain  Pain Location: Foot  Pain Orientation: Right, Left  Pain Frequency: Constant/continuous (neuropathy)  Pain Interventions: Repositioned, Ambulation/increased activity  Response to Interventions: Content/relaxed    Objective   Cognition:  Orientation Level: Disoriented to place (unable to state place, \"i know we are somewhere in [facility]\")     Home Living:  Type of Home: Mobile home  Lives With: Siblings (Brother)  Home Adaptive Equipment: Walker rolling or standard, Quad cane  Home Layout: One level  Home Access: Stairs to enter with rails  Entrance Stairs-Rails:  (one railing)  Entrance Stairs-Number of Steps: 6  Bathroom Shower/Tub: Tub/shower unit  Bathroom Toilet: Standard  Bathroom Equipment: Grab bars in shower, Tub transfer bench  Home Living Comments: patient reports no recent falls, but states \"things have come close\"   Prior Function:  Level of [place]: Independent with ADLs and functional transfers, Needs assistance with homemaking  Receives Help From: Family (Brother - brother does work nights and sleeps during the day)  ADL Assistance: Independent  Homemaking Assistance: Needs assistance (brother manages)  Ambulatory Assistance: Independent (1WW vs no AD vs quad cane (primarily quad cane though))  IADL History:  Homemaking Responsibilities: " No  IADL Comments: brother manages  ADL:  Eating Assistance: Stand by  Eating Deficit: Setup  Grooming Assistance: Stand by  Grooming Deficit: Steadying, Supervision/safety, Increased time to complete, Standing with assistive device (per clinical judgement)  Bathing Assistance: Minimal  Bathing Deficit: Setup, Steadying, Supervision/safety, Increased time to complete , Buttocks, Use of adaptive equipment (per clinical judgement)  UE Dressing Assistance: Minimal  UE Dressing Deficit: Pull around back, Pull down in back (per clinical judgement)  LE Dressing Assistance: Minimal  LE Dressing Deficit: Setup, Steadying, Requires assistive device for steadying, Increased time to complete (per clinical judgement)  Toileting Assistance with Device: Stand by  Toileting Deficit: Steadying, Supervison/safety (per clinical judgement)  Activity Tolerance:  Endurance: Decreased tolerance for upright activites  Activity Tolerance Comments: Fair+ tolerance  Bed Mobility/Transfers: Bed Mobility  Bed Mobility: Yes  Bed Mobility 1  Bed Mobility 1: Supine to sitting  Level of Assistance 1: Modified independent  Bed Mobility Comments 1: head of bed slightly elevated    Transfers  Transfer: Yes  Transfer 1  Transfer From 1: Bed to  Transfer to 1: Stand  Technique 1: Sit to stand  Transfer Device 1: Walker  Transfer Level of Assistance 1: Contact guard  Trials/Comments 1: cues for proper hand placement    Functional Mobility:  Functional Mobility  Functional Mobility Performed: Yes  Functional Mobility 1  Surface 1: Level tile  Device 1: Rolling walker  Assistance 1: Close supervision, Contact guard  Comments 1: no LOB. > household distances  Sitting Balance:  Static Sitting Balance  Static Sitting-Balance Support: Feet supported, No upper extremity supported  Static Sitting-Level of Assistance: Distant supervision  Standing Balance:  Static Standing Balance  Static Standing-Balance Support: Bilateral upper extremity supported  Static  Standing-Level of Assistance: Close supervision    IADL's:   Homemaking Responsibilities: No  IADL Comments: brother manages    Sensation:  Sensation Comment: patient reports hx of neuropathy in feet. some numbness/tingling in hands  Strength:  Strength Comments: B UE 3+/5 grossly  Coordination:  Movements are Fluid and Coordinated: No  Coordination Comment: hx of Parkinsons. Tremulous   Hand Function:  Hand Function  Gross Grasp: Functional  Coordination: Functional  Extremities: RUE   RUE : Within Functional Limits and LUE   LUE: Within Functional Limits    Outcome Measures: [facility] Daily Activity  Putting on and taking off regular lower body clothing: A little  Bathing (including washing, rinsing, drying): A little  Putting on and taking off regular upper body clothing: A little  Toileting, which includes using toilet, bedpan or urinal: A little  Taking care of personal grooming such as brushing teeth: A little  Eating Meals: A little  Daily Activity - Total Score: 18    Education Documentation  Body Mechanics, taught by Kim Quigley OT at 7/24/2025  9:42 AM.  Learner: Patient  Readiness: Acceptance  Method: Demonstration, Explanation  Response: Verbalizes Understanding, Needs Reinforcement  Comment: Reviewed OT POC. Education on safe functional transfers, mobility and fall risks    Precautions, taught by Kim Quigley OT at 7/24/2025  9:42 AM.  Learner: Patient  Readiness: Acceptance  Method: Demonstration, Explanation  Response: Verbalizes Understanding, Needs Reinforcement  Comment: Reviewed OT POC. Education on safe functional transfers, mobility and fall risks    ADL Training, taught by Kim Quigley OT at 7/24/2025  9:42 AM.  Learner: Patient  Readiness: Acceptance  Method: Demonstration, Explanation  Response: Verbalizes Understanding, Needs Reinforcement  Comment: Reviewed OT POC. Education on safe functional transfers, mobility and fall risks    Education Comments  No comments found.      Goals:    Encounter Problems       Encounter Problems (Active)       OT Goals       ADLs (Progressing)       Start:  07/24/25    Expected End:  08/14/25       Patient will complete ADL tasks, with modified independence, using AE need in order to increase patient's safety and independence with self-care tasks.          Functional Transfers (Progressing)       Start:  07/24/25    Expected End:  08/14/25       Patient will complete functional transfers with modified independence, using least restrictive device, in order to increase patient's safety and independence with daily tasks.          Functional Mobility (Progressing)       Start:  07/24/25    Expected End:  08/14/25       Patient will demonstrate the ability to complete item retrieval and functional mobility with modified independence, in order to increase safety and independence with daily tasks.          Activity Tolerance (Progressing)       Start:  07/24/25    Expected End:  08/14/25       Patient will demonstrate the ability to participate in functional activity at least >/= 25 minutes in order to increase patient's safety and independence with daily tasks.

## 2025-07-24 NOTE — GROUP NOTE
Group Topic: Goals   Group Date: 7/23/2025  Start Time: 2000  End Time: 2016  Facilitators: Bere Sheikh   Department: [facility]     Number of Participants: 7   Group Focus: goals  Treatment Modality: Other: PCA  Interventions utilized were reminiscence  Purpose: feelings and communication skills    Name: Mane Nath YOB: 1960   MR: [id number]      Facilitator: [facility]  Level of Participation: active  Quality of Participation: appropriate/pleasant, attentive, and cooperative  Interactions with others: appropriate, gave feedback, and supportive  Mood/Affect: positive  Triggers (if applicable): N/A  Cognition: coherent/clear  Progress: Moderate  Comments: Pt problem is hallucinations.   Plan: continue with services

## 2025-07-24 NOTE — CONSULTS
[facility]: [facility]  CONSULT NOTE      Mane Nath is a 64 y.o. male that is being seen  today for medical management at [facility].  Subjective   Patient is a 64-year-old male with a history of hypertension, hyperlipidemia, diabetes, newly diagnosed Parkinson's on carbidopa levodopa who is being seen for medical management at [facility].  Patient was admitted to the ER with hallucinations.  Patient was evaluated and medically cleared for admission to [facility].  Patient had evaluation done in the ER and labs were stable.  Patient does have diabetes and is on oral medications.  Patient's blood pressure is fairly controlled.  Denies any significant hallucinations at present.  Patient's urine was slightly abnormal and patient was started on antibiotics in the ER.  Patient's urine culture is normal so antibiotics are being discontinued.  Patient's recent hemoglobin A1c is 7.4.      ROS  Negative for fever or chills  Negative for sore throat, ear pain, nasal discharge  Negative for cough, shortness of breath or wheezing  Negative for chest pain, palpitations, swelling of legs  Negative for abdominal pain, constipation, diarrhea, blood in the stools  Negative for urinary complaints  Negative for headache, dizziness, weakness or numbness  Negative for joint pain  Positive for depression or anxiety  All other systems reviewed and were negative   Vitals:    07/23/25 2012   BP: 122/62   Pulse: 70   Resp: 16   Temp: 36.9 °C (98.4 °F)   SpO2: 96%      Vitals:    07/23/25 1516   Weight: 97.5 kg (215 lb)     Body mass index is 30.85 kg/m².  Physical Exam  Constitutional: Patient does not appear to be in any acute distress  Head and Face: NCAT  Eyes: Normal external exam, EOMI  ENT: Normal external inspection of ears and nose. Oropharynx normal.  Cardiovascular: RRR, S1/S2, no murmurs, rubs, or gallops, radial pulses +2, no edema of extremities  Pulmonary: CTAB, no respiratory distress.  Abdomen:  +BS, soft, non-tender, nondistended, no guarding or rebound, no masses noted  MSK: No joint swelling, normal movements of all extremities. Range of motion- normal.  Skin- No lesions, contusions, or erythema.  Peripheral puslses palpable bilaterally 2+  Neuro: AAO X3, Cranial nerves 2-12 grossly intact,DTR 2+ in all 4 limbs   Psychiatric: Judgment intact. Appropriate mood and behavior    LABS   [unfilled]  Lab Results   Component Value Date    GLUCOSE 115 (H) 07/22/2025    CALCIUM 9.4 07/22/2025     (L) 07/22/2025    K 3.9 07/22/2025    CO2 24 07/22/2025     07/22/2025    BUN 24 (H) 07/22/2025    CREATININE 1.27 07/22/2025     Lab Results   Component Value Date    ALT 6 (L) 07/22/2025    AST 30 07/22/2025    ALKPHOS 118 07/22/2025    BILITOT 1.3 (H) 07/22/2025     Lab Results   Component Value Date    WBC 11.4 (H) 07/22/2025    HGB 14.7 07/22/2025    HCT 40.1 (L) 07/22/2025    MCV 92 07/22/2025     (L) 07/22/2025     Lab Results   Component Value Date    CHOL 127 07/24/2025    CHOL 143 01/07/2025     Lab Results   Component Value Date    HDL 29.4 07/24/2025    HDL 29.8 01/07/2025     Lab Results   Component Value Date    LDLCALC 56 07/24/2025    LDLCALC 83 01/07/2025     Lab Results   Component Value Date    TRIG 207 (H) 07/24/2025    TRIG 152 (H) 01/07/2025     Lab Results   Component Value Date    HGBA1C 6.5 (H) 01/06/2025     Other labs not included in the list above were reviewed either before or during this encounter.    History    Medical History[1]  Surgical History[2]  Family History[3]  Allergies[4]  Medications Ordered Prior to Encounter[5]  Immunization History   Administered Date(s) Administered    Flu vaccine (IIV4), preservative free *Check age/dose* 12/16/2022, 10/02/2023     Patient's medical history was reviewed and updated either before or during this encounter.  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  Patient has history of diabetes, hypertension and hyperlipidemia.  Patient was diagnosed with Parkinson's and was started on carbidopa levodopa recently.  Patient's urine was abnormal in the ER and patient was started on antibiotics but urine culture is negative so antibiotics have been stopped.  Patient has diabetes and his recent hemoglobin A1c is 7.4.  Patient will be on Accu-Cheks twice a day and on diabetic diet.  Continue to monitor blood sugars.      Antwon Portillo MD          [1]   Past Medical History:  Diagnosis Date    Cirrhosis (Multi) 08/28/2024    Diabetes (Multi)     HTN (hypertension)     Personal history of other diseases of the musculoskeletal system and connective tissue     History of gout    Personal history of other mental and behavioral disorders     History of depression    Right upper quadrant abdominal pain 06/11/2024    Type 2 diabetes mellitus    [2]   Past Surgical History:  Procedure Laterality Date    CHOLECYSTECTOMY N/A 08/26/2024    Laparoscopic converted to open partial cholecystectomy and drain placement    NECK SURGERY  02/01/2018    Neck Surgery    OTHER SURGICAL HISTORY  02/01/2018    Surgery Excision Lipoma    OTHER SURGICAL HISTORY  04/25/2022    Colonoscopy   [3]   Family History  Problem Relation Name Age of Onset    Dementia Mother      Heart attack Father      Cholecystitis Sister          x3    Cancer Father's Sister      Prostate cancer Father's Brother      Cancer Paternal Grandmother      Diabetes Paternal Grandfather     [4]   Allergies  Allergen Reactions    Latex Rash and Unknown   [5]   No current facility-administered medications on file prior to encounter.     Current Outpatient Medications on File Prior to  Encounter   Medication Sig Dispense Refill    ARIPiprazole (Abilify) 30 mg tablet Take 1 tablet (30 mg) by mouth once daily at bedtime.      aspirin 81 mg EC tablet Take 1 tablet (81 mg) by mouth once daily. 30 tablet 1    atorvastatin (Lipitor) 80 mg tablet Take 1 tablet (80 mg) by mouth early in the morning..      busPIRone (Buspar) 10 mg tablet Take 2 tablets (20 mg) by mouth 2 times a day.      carbidopa-levodopa (Sinemet)  mg tablet Take 1 tablet by mouth 3 times a day. 90 tablet 2    cefdinir (Omnicef) 300 mg capsule Take 1 capsule (300 mg) by mouth 2 times a day for 10 days. 20 capsule 0    dapagliflozin propanediol (Farxiga) 10 mg Take 1 tablet (10 mg) by mouth once daily.      diclofenac sodium (Voltaren Arthritis Pain) 1 % gel Apply 2.25 inches (2 g) topically once daily as needed (pain). Use as directed prn      docusate sodium (Colace) 100 mg capsule Take 1 capsule (100 mg) by mouth once daily as needed for constipation.      donepezil (Aricept) 5 mg tablet Take 1 tablet (5 mg) by mouth once daily at bedtime.      gabapentin (Neurontin) 400 mg capsule Take 1 capsule by mouth twice a day and 2 capsules at bedtime      hydrOXYzine pamoate (Vistaril) 25 mg capsule Take 1 capsule (25 mg) by mouth as needed at bedtime (for sleep after awake for 1 hour in bed).      metFORMIN (Glucophage) 1,000 mg tablet Take 1 tablet (1,000 mg) by mouth 2 times a day. With breakfast and dinner      metoprolol succinate XL (Toprol-XL) 25 mg 24 hr tablet Take 1.5 tablets (37.5 mg) by mouth once daily. Do not crush or chew. 135 tablet 3    omeprazole (PriLOSEC) 40 mg DR capsule Take 1 capsule (40 mg) by mouth once daily. Do not crush or chew.      polyethylene glycol (Miralax) 17 gram/dose powder Mix 17 g of powder and drink once daily. Dilute this medication with liquid before administration. It is very important that you take or use this exactly as directed.  Do not skip doses or discontinue unless directed by your  doctor.      traZODone (Desyrel) 100 mg tablet Take 1-3 tablets (100-300 mg) by mouth as needed at bedtime for sleep.      venlafaxine XR (Effexor-XR) 150 mg 24 hr capsule Take 2 capsules (300 mg) by mouth once daily.      [DISCONTINUED] Trulicity 3 mg/0.5 mL pen injector Inject 4.5 mg as directed 1 (one) time per week. ON SUNDAYS

## 2025-07-25 ENCOUNTER — DOCUMENTATION (OUTPATIENT)
Dept: HOME HEALTH SERVICES | Facility: HOME HEALTH | Age: 65
End: 2025-07-25
Payer: COMMERCIAL

## 2025-07-25 ENCOUNTER — HOME HEALTH ADMISSION (OUTPATIENT)
Dept: HOME HEALTH SERVICES | Facility: HOME HEALTH | Age: 65
End: 2025-07-25
Payer: COMMERCIAL

## 2025-07-25 ENCOUNTER — PHARMACY VISIT (OUTPATIENT)
Dept: PHARMACY | Facility: CLINIC | Age: 65
End: 2025-07-25
Payer: MEDICAID

## 2025-07-25 VITALS
TEMPERATURE: 97.7 F | RESPIRATION RATE: 17 BRPM | BODY MASS INDEX: 30.78 KG/M2 | SYSTOLIC BLOOD PRESSURE: 127 MMHG | HEART RATE: 80 BPM | DIASTOLIC BLOOD PRESSURE: 68 MMHG | OXYGEN SATURATION: 97 % | WEIGHT: 215 LBS | HEIGHT: 70 IN

## 2025-07-25 PROBLEM — F06.0: Status: RESOLVED | Noted: 2025-07-23 | Resolved: 2025-07-25

## 2025-07-25 LAB
25(OH)D3 SERPL-MCNC: 26 NG/ML (ref 30–100)
ANION GAP SERPL CALCULATED.3IONS-SCNC: 13 MMOL/L (ref 10–20)
BASOPHILS # BLD AUTO: 0.01 X10*3/UL (ref 0–0.1)
BASOPHILS NFR BLD AUTO: 0.3 %
BUN SERPL-MCNC: 18 MG/DL (ref 6–23)
CALCIUM SERPL-MCNC: 9 MG/DL (ref 8.6–10.3)
CHLORIDE SERPL-SCNC: 105 MMOL/L (ref 98–107)
CO2 SERPL-SCNC: 24 MMOL/L (ref 21–32)
CREAT SERPL-MCNC: 1.08 MG/DL (ref 0.5–1.3)
EGFRCR SERPLBLD CKD-EPI 2021: 78 ML/MIN/1.73M*2
EOSINOPHIL # BLD AUTO: 0.04 X10*3/UL (ref 0–0.7)
EOSINOPHIL NFR BLD AUTO: 1.1 %
ERYTHROCYTE [DISTWIDTH] IN BLOOD BY AUTOMATED COUNT: 13.8 % (ref 11.5–14.5)
GLUCOSE BLD MANUAL STRIP-MCNC: 112 MG/DL (ref 74–99)
GLUCOSE BLD MANUAL STRIP-MCNC: 142 MG/DL (ref 74–99)
GLUCOSE SERPL-MCNC: 209 MG/DL (ref 74–99)
HCT VFR BLD AUTO: 34.4 % (ref 41–52)
HGB BLD-MCNC: 12.2 G/DL (ref 13.5–17.5)
IMM GRANULOCYTES # BLD AUTO: 0.02 X10*3/UL (ref 0–0.7)
IMM GRANULOCYTES NFR BLD AUTO: 0.5 % (ref 0–0.9)
LYMPHOCYTES # BLD AUTO: 1.41 X10*3/UL (ref 1.2–4.8)
LYMPHOCYTES NFR BLD AUTO: 38.2 %
MCH RBC QN AUTO: 33.4 PG (ref 26–34)
MCHC RBC AUTO-ENTMCNC: 35.5 G/DL (ref 32–36)
MCV RBC AUTO: 94 FL (ref 80–100)
MONOCYTES # BLD AUTO: 0.31 X10*3/UL (ref 0.1–1)
MONOCYTES NFR BLD AUTO: 8.4 %
NEUTROPHILS # BLD AUTO: 1.9 X10*3/UL (ref 1.2–7.7)
NEUTROPHILS NFR BLD AUTO: 51.5 %
NRBC BLD-RTO: 0 /100 WBCS (ref 0–0)
PLATELET # BLD AUTO: 86 X10*3/UL (ref 150–450)
POTASSIUM SERPL-SCNC: 4.1 MMOL/L (ref 3.5–5.3)
RBC # BLD AUTO: 3.65 X10*6/UL (ref 4.5–5.9)
SODIUM SERPL-SCNC: 138 MMOL/L (ref 136–145)
VIT B12 SERPL-MCNC: 321 PG/ML (ref 211–911)
WBC # BLD AUTO: 3.7 X10*3/UL (ref 4.4–11.3)

## 2025-07-25 PROCEDURE — 82306 VITAMIN D 25 HYDROXY: CPT | Mod: TRILAB | Performed by: NURSE PRACTITIONER

## 2025-07-25 PROCEDURE — 85025 COMPLETE CBC W/AUTO DIFF WBC: CPT | Performed by: NURSE PRACTITIONER

## 2025-07-25 PROCEDURE — 2500000002 HC RX 250 W HCPCS SELF ADMINISTERED DRUGS (ALT 637 FOR MEDICARE OP, ALT 636 FOR OP/ED): Performed by: INTERNAL MEDICINE

## 2025-07-25 PROCEDURE — 82947 ASSAY GLUCOSE BLOOD QUANT: CPT

## 2025-07-25 PROCEDURE — 36415 COLL VENOUS BLD VENIPUNCTURE: CPT | Performed by: NURSE PRACTITIONER

## 2025-07-25 PROCEDURE — 82607 VITAMIN B-12: CPT | Mod: TRILAB | Performed by: NURSE PRACTITIONER

## 2025-07-25 PROCEDURE — RXMED WILLOW AMBULATORY MEDICATION CHARGE

## 2025-07-25 PROCEDURE — 80048 BASIC METABOLIC PNL TOTAL CA: CPT | Performed by: NURSE PRACTITIONER

## 2025-07-25 PROCEDURE — 2500000001 HC RX 250 WO HCPCS SELF ADMINISTERED DRUGS (ALT 637 FOR MEDICARE OP): Performed by: INTERNAL MEDICINE

## 2025-07-25 PROCEDURE — 2500000001 HC RX 250 WO HCPCS SELF ADMINISTERED DRUGS (ALT 637 FOR MEDICARE OP): Performed by: NURSE PRACTITIONER

## 2025-07-25 PROCEDURE — 99238 HOSP IP/OBS DSCHRG MGMT 30/<: CPT | Performed by: NURSE PRACTITIONER

## 2025-07-25 RX ORDER — MIRTAZAPINE 7.5 MG/1
7.5 TABLET, FILM COATED ORAL NIGHTLY
Qty: 30 TABLET | Refills: 1 | Status: SHIPPED | OUTPATIENT
Start: 2025-07-25 | End: 2025-08-24

## 2025-07-25 RX ORDER — CARBIDOPA AND LEVODOPA 25; 100 MG/1; MG/1
0.5 TABLET ORAL 3 TIMES DAILY
Start: 2025-07-25

## 2025-07-25 RX ADMIN — ASPIRIN 81 MG: 81 TABLET, COATED ORAL at 08:14

## 2025-07-25 RX ADMIN — DAPAGLIFLOZIN 10 MG: 10 TABLET, FILM COATED ORAL at 08:15

## 2025-07-25 RX ADMIN — ATORVASTATIN CALCIUM 80 MG: 80 TABLET, FILM COATED ORAL at 06:41

## 2025-07-25 RX ADMIN — METFORMIN HYDROCHLORIDE 1000 MG: 1000 TABLET, FILM COATED ORAL at 08:15

## 2025-07-25 RX ADMIN — CARBIDOPA AND LEVODOPA 0.5 TABLET: 25; 100 TABLET ORAL at 08:15

## 2025-07-25 RX ADMIN — BUSPIRONE HYDROCHLORIDE 20 MG: 10 TABLET ORAL at 08:14

## 2025-07-25 RX ADMIN — GABAPENTIN 400 MG: 400 CAPSULE ORAL at 08:15

## 2025-07-25 RX ADMIN — VENLAFAXINE HYDROCHLORIDE 300 MG: 150 CAPSULE, EXTENDED RELEASE ORAL at 08:16

## 2025-07-25 RX ADMIN — METOPROLOL SUCCINATE 37.5 MG: 25 TABLET, EXTENDED RELEASE ORAL at 08:16

## 2025-07-25 RX ADMIN — CARBIDOPA AND LEVODOPA 0.5 TABLET: 25; 100 TABLET ORAL at 15:12

## 2025-07-25 RX ADMIN — PANTOPRAZOLE SODIUM 20 MG: 20 TABLET, DELAYED RELEASE ORAL at 06:41

## 2025-07-25 ASSESSMENT — PAIN SCALES - GENERAL: PAINLEVEL_OUTOF10: 0 - NO PAIN

## 2025-07-25 ASSESSMENT — PAIN - FUNCTIONAL ASSESSMENT: PAIN_FUNCTIONAL_ASSESSMENT: 0-10

## 2025-07-25 NOTE — NURSING NOTE
"Discharge Nursing Note    Discharge date: 07/25/2025  Discharge time: 1745    Discharged to:  home    Transportation provided by:  [employer]    Responsible person notified of discharge/transfer?  Include name of person if answering \"YES\"  Yes - Spoke with patient's sister Maricruz to let her know that patient will be transported home via [employer]. Maricruz states her and her brother Carolee will be present to receive patient when he arrives.     Patient left with all belongings:  Yes    Patient left with discharge medication list:  Yes    Patient left with discharge instructions:  Yes    AVS/Continuing Care Plan discussed with patient and copy given to either patient or handed to medical transport for discharges to facilities:  Yes    Other concerns at discharge:  None    Presentation on discharge:  Patient pleasant and looking forward to going home. Denies hallucinations. This nurse reviewed discharge medications and instructions with patient and patient voices understanding.   "

## 2025-07-25 NOTE — CARE PLAN
"The patient's goals for the shift include \"Settle in\"    The clinical goals for the shift include maintain safety/rest      Problem: Safety - Adult  Goal: Free from fall injury  Outcome: Progressing     Problem: Discharge Planning  Goal: Discharge to home or other facility with appropriate resources  Outcome: Progressing     Problem: Chronic Conditions and Co-morbidities  Goal: Patient's chronic conditions and co-morbidity symptoms are monitored and maintained or improved  Outcome: Progressing     Problem: Nutrition  Goal: Nutrient intake appropriate for maintaining nutritional needs  Outcome: Progressing     Problem: Discharge Planning - Care Management  Goal: Discharge to post-acute care or home with appropriate resources  Outcome: Progressing       "

## 2025-07-25 NOTE — NURSING NOTE
GAYLE NOTE     Problem:  Depression/hallucinations     Behavior:  Pt continues to deny having hallucinations since being here. Pt remains calm, pleasant and cooperative with care. Pt is social with peers and staff. Bright affect noted. Maintains good eye contact.   Group Participation: attends  Appetite/Meals: Hs snack provided.       Interventions:  Hs medications administered per MAR    Response:  Medication compliant.     Plan:  Continue to monitor and assist. Maintain q15 minute rounds for safety.

## 2025-07-25 NOTE — GROUP NOTE
Group Topic: Goals   Group Date: 7/24/2025  Start Time: 2001  End Time: 2019  Facilitators: Bere Sheikh   Department: [facility]     Number of Participants: 6   Group Focus: goals  Treatment Modality: Other: PCA  Interventions utilized were reminiscence  Purpose: feelings and communication skills    Name: Mane Nath YOB: 1960   MR: [id number]      Facilitator: [facility]  Level of Participation: active  Quality of Participation: appropriate/pleasant, attentive, and cooperative  Interactions with others: appropriate, gave feedback, and supportive  Mood/Affect: positive  Triggers (if applicable): N/A  Cognition: coherent/clear  Progress: Moderate  Comments: Pt problem is hallucination.   Plan: continue with services

## 2025-07-25 NOTE — HH CARE COORDINATION
Home Care received a Referral for Physical Therapy, Occupational Therapy, Home Health Aide, and Speech Language Pathology. We have processed the referral for a Start of Care within 48 hours of 07/26.     If you have any questions or concerns, please feel free to contact us at [phone]. Follow the prompts, enter your five digit zip code, and you will be directed to your care team on [facility].

## 2025-07-25 NOTE — CARE PLAN
The patient's goals for the shift include     The clinical goals for the shift include Maintain safety    Problem: Pain - Adult  Goal: Verbalizes/displays adequate comfort level or baseline comfort level  Outcome: Progressing     Problem: Safety - Adult  Goal: Free from fall injury  Outcome: Progressing     Problem: Chronic Conditions and Co-morbidities  Goal: Patient's chronic conditions and co-morbidity symptoms are monitored and maintained or improved  Outcome: Progressing     Problem: Nutrition  Goal: Nutrient intake appropriate for maintaining nutritional needs  Outcome: Progressing

## 2025-07-25 NOTE — PROGRESS NOTES
MUKESH set up transport via [employer] for pt via wheelchair and [facility] with  at 4pm taking pt home where he will be received by his brother.      CLEVELAND Mcfarland

## 2025-07-25 NOTE — GROUP NOTE
Group Topic: Goals   Group Date: 7/25/2025  Start Time: 0730  End Time: 0800  Facilitators: Vargas Whitley   Department: [facility]     Number of Participants: 15   Group Focus: goals  Treatment Modality: Individual Therapy  Interventions utilized were assignment  Purpose: self-care    Name: Mane Nath YOB: 1960   MR: [id number]      Facilitator: [facility]  Level of Participation: minimal  Quality of Participation: appropriate/pleasant  Interactions with others: appropriate  Mood/Affect: appropriate  Cognition: coherent/clear  Progress: Minimal  Comments: patient was able to make progress towards treatment of psychotic disorder  Plan: continue with services

## 2025-07-25 NOTE — PROGRESS NOTES
Mane Nath is a 64 y.o. male on day 2 of admission presenting with Visual hallucination. Met with patient to review progress and discharge plan. Patient feels ready for discharge to home and is hopeful this will occur this date. Patient made aware that LSW will make follow up appointments with his providers of choice. Reviewed home health care services as he was recently diagnosed with Parkinson's and is concerned with functional changes. Patient in agreement for home health and has used  in the past. NP to follow for orders. Message left for patient's sister, Cecilia, regarding discharge for this date. Patient stated his family will be unable to provide transport home as they live in [place] and their travel ability is limited. Patient can be scheduled for [facility] pending discharge orders.     There is no social service barrier in regards to this discharge. Patient reports significant support in family and [employer] community. He will have home health and follow up with medical and mental health providers. Await discharge order.

## 2025-07-25 NOTE — DISCHARGE SUMMARY
"Admit Date: 7/23/2025  Discharge Date: 7/25/2025    Reason For Admission:   Mane Nath is a 64 y.o. male with past medical history as below (pertinent recent diagnosis of Parkinson's disease), and past psychiatric history of, per chart review, bipolar disorder and LESLIE who presented to [facility] ED On 7/22/25 with complaints of visual hallucinations. He was medically cleared and transferred to [facility] on 7/23/25.     Hospital Course  Pt endorses long standing history of depression and anxiety, and past diagnosis of bipolar disorder however is unable to describe past tom. He describes overall his depression and anxiety have fairly well controlled, however after he lost his mother in 2018 and his recent diagnosis of Parkinson's his mood has decreased. Sleep is \"off and on\", around 7 hours a night. Describes apathy, decreased energy, feelings of loneliness (doesn't get out and socialize much, does not have a partner). He denies SI, HI - lists spiritual and family protective factors. No weapons at home. No recent illness or sick contacts.     What prompted him to go to the ED was acute onset of visual hallucination he describes as moving and swirling smoke-like figure around his television. It did not have a face and it did not interact with the patient. It lasted for around five minutes and resolved, has not re-occurred. He denies past experience with visual hallucinations, endorses sometimes hearing voices in his home hallway (lives in a trailer with his brother). Also endorses nightmares, unclear if new or chronic.     In ED, toxic/metabolic workup remarkable for WBC 11.4 (however afebrile). , UA small amount of leukocyte w/culture negative. Ammonia WNL. LFTs WNL. Chest XRAY and CT head both negative for acute process per radiology readings.     He is prescribed venlafaxine XR 300mg for mood and anxiety, aripiprazole 30mg bedtime for exact reasons unclear to patient, trazodone 100mg to 300mg at " "bedtime for sleep aide (inconsistent dosing, he states depends on \"how things are going\"), hydroxyzine 25mg bedtime for sleep aide (he states he takes nightly), buspirone 20mg BID for anxiety. He endorses compliance with all of his medications. Of note, he was recently started on Sinemet 25/100mg TID for new diagnosis of Parkinson's disease - he endorses it appears to be somewhat beneficial for his tremors.     Via EPIC chat admitted provider (Neymar Alamo CNP) contacted his neurologist, Dr. Osuna, regarding the possibility of new addition Sinemet contributing, his response: Toño Blackmon. Thanks for reaching out. Carbi/levo potentially may cause hallucinations (visual). He does have other meds. Is it possibly the carbi/levo--maybe? If nothing else has changed, and hallucination is new, if he wants to go lower on his carbi/levo he can. I believe he might be on carbi/levo 25/100 1 tab tid at this time. He can go back down to 1/2 tab tid. Unfortunately as you know, PD itself, and all PD meds may cause hallucinations.     Ultimately there was low suspicion the new onset hallucinations were a primary psychiatric process. Many potential confounders were recognized that have potential to contribute to the development of acute visual hallucinations: mild infectious process (urinary), poor brain substrate (as evidence on brain imaging), Parkinson's disease itself, recent medication regime addition of Sinemet, inadequate sleep, polypharmacy (donepezil, gabapentin, venlafaxine, aripiprazole, hydroxyzine, trazodone [doses of behavior health medications have remained unchanged for over the last six months and he endorses compliance]).     Upon admission interview and subsequent visits patient denied repeat episodes of visual hallucinations. He did not endorse auditory hallucinations and denied SI/HI. His home Sinemet was continued however dose adjusted per his neurologist Dr. Osuna recommendations as above. His home venlafaxine XR " 300mg, aripiprazole 30mg, buspirone 20mg BID were continued and doses unchanged. Home hydroxyzine 25mg PO bedtime was not continued due to side effect profile, help mitigate risk of neuropsychiatric side effects. His prescribed trazodone 100mg to 300mg PO nightly for sleep aide was discontinued due to concerns of side effects (orthostatic hypotension) and patient confusion on proper dosing. Chart review showed medical history of orthostatic hypotension. Patient did complain of sleep inadequacies that wax/wane despite use of trazodone. Given, trazodone was replaced with mirtazapine 7.5mg PO nightly for depression adjunct and sleep aide (lower orthostatic hypotension risk compared to trazodone, literature supporting: Front Cardiovasc Med. 2021 Aug 3;8:964517. doi: 10.3389/fcvm.2021.983451). Pt stated the mirtazapine was beneficial for sleep onset and maintenance, he did not endorse intolerance or side effect.    Prescription for mirtazapine 7.5mg was sent to [facility] 2 beds on day of discharge. He has adequate supply of all other meds at home and is able to split his Sinemet tablets. He was also provided with a  home health referral (he has utilized in the past) for PT, OT, ST and nursing re: decreased mobility, activity tolerance ISO Parkinson's disease.    He has follow up appointments scheduled as below.    Discharge Diagnosis  Visual hallucination    Issues Requiring Follow-Up  None    Test Results Pending At Discharge  Pending Labs       Order Current Status    Vitamin B12 In process    Vitamin D 25-Hydroxy,Total (for eval of Vitamin D levels) In process            Pertinent Physical Exam At Time of Discharge  Physical Exam  Vitals and nursing note reviewed.   Constitutional:       Appearance: Normal appearance.   Pulmonary:      Effort: Pulmonary effort is normal.     Neurological:      Mental Status: He is alert.     Resting and action tremor to BUE, L > R more pill rolling on L   No abnormal movements concerning  "for EPS    Mental Status Examination  General Appearance: Fairly groomed. Hospital attire, sitting at side of his bed  Gait/Station: Stiff, shuffling gait, mostly uses wheelchair around facility  Speech: Normal rate, volume, prosody  Mood: \"Better today\"  Affect: Flat with mild masked facies  Thought Process: Linear, goal directed  Thought Associations: No loosening of associations  Thought Content: normal  Perception: No perceptual abnormalities noted  Level of Consciousness: Alert  Orientation: Alert and oriented to person, place, time and situation  Attention and Concentration: Intact  Recent Memory: Intact as evidenced by ability to recall details from the past 24 hours   Remote Memory: Intact as evidenced by ability to recall previous medical issues   Executive function: Intact  Language: Fluent, no aphasia  Fund of knowledge: Good  Insight: Intact, as evidenced by recognizing symptoms of his psychiatric disease and neurological disease, when to seek help, benefits of prescribed medications, compliance with medications  Judgment: Intact, as evidenced by help-seeking behavior, ability to reason through medical decision making, and compliance with treatment recommendations        Psychiatric Risk Assessment  Violence Risk Assessment: lower socioeconomic class, major mental illness, and male  Acute Risk of Harm to Others is Considered: low   Suicide Risk Assessment: , chronic medical illness, chronic pain, current psychiatric illness, living alone or lack of social support, male, severe anxiety, and unmarried  Protective Factors against Suicide: fear of suicide, hopefulness/future orientation, moral objections to suicide, positive family relationships, and Taoist affiliation/spirituality  Acute Risk of Harm to Self is Considered: low       Outpatient Appointments/Follow-Ups  Future Appointments   Date Time Provider Department Center   9/24/2025  9:40 AM Edouard Osuna MD [id number] [facility]   11/20/2025 " 10:45 AM Cecily Soto MD [id number] [place]     Medication Management  [facility]  [address] [place]  [address]  [phone]  Go on 8/8/2025  GLENDA Chris will see you on Friday 8.8.25 at 10am    Medication Management ongoing follow up  [facility]   [address] [place]   [address]   [phone]  Go on 9/16/2025  9am appointment on 9.16.25    Crisis Services  Crisis and Suicide Intervention Hotline  Call  Call [phone] As needed    The Compass Line  The Compass Line can link you with local resources as needed.  Call  Call [phone] As needed    Edouard Osuna MD  [address] [facility] [address] [facility] [address]  [facility] [address]  [phone]    Go on 9/24/2025  Appointment is at 9:40am    Cecily Soto MD  [address] [facility] [address]  [phone]    Call on 11/20/2025  10:45am     [facility]  [address] [facility] [address]  [phone]  Call on 7/29/2025   home care will follow for RN PT OT and speech therapy services. If you do not hear from them by Tuesday 7.29.25 please call their office    Mental Health Counseling  [facility]   [address] [place]   [address]   [phone]  Go on 9/23/2025  11am on 9.23.25    Ronald Leroy, DO  [address] [facility] [address]  [facility] [address]  [phone]    Go on 7/29/2025  10:45am on Tuesday 7.29.25     [facility]  [address] [facility] [id number] [phone]          Neymar Alamo, APRN-CNP, AGPCNP-BC, PMHNP-BC  Psychiatry, [facility]  1* contact: [employer] preferred

## 2025-07-25 NOTE — PROGRESS NOTES
Patient's sister returned this workers call. She is aware and in agreement with discharge plan and thankful for follow up. No questions or concerns noted.

## 2025-07-28 ENCOUNTER — PATIENT OUTREACH (OUTPATIENT)
Dept: CARE COORDINATION | Facility: CLINIC | Age: 65
End: 2025-07-28
Payer: COMMERCIAL

## 2025-07-28 SDOH — ECONOMIC STABILITY: FOOD INSECURITY
ARE ANY OF YOUR NEEDS URGENT? FOR EXAMPLE, UNCERTAINTY OF WHERE YOU WILL GET YOUR NEXT MEAL OR NOT HAVING THE MEDICATIONS YOU NEED TO TAKE TOMORROW.: NO

## 2025-07-28 SDOH — ECONOMIC STABILITY: GENERAL: WOULD YOU LIKE HELP WITH ANY OF THE FOLLOWING NEEDS?: I DONT NEED HELP WITH ANY OF THESE

## 2025-07-28 NOTE — PROGRESS NOTES
"Outreach call to patient to support a smooth transition of care from recent admission.  Spoke with patient, reviewed discharge medications, discharge instructions, assessed social needs, care gaps reviewed and provided education on importance of follow-up appointment with provider. Will continue to monitor through transition period.    Wrap Up  Is the patient/caregiver familiar with Advance Care Planning?: Yes (7/28/2025 11:59 AM)  Would the patient like more information on Advance Care Planning?: No (7/28/2025 11:59 AM)  Call End Time: 1200 (7/28/2025 11:59 AM)    Engagement  Call Start Time: 1132 (7/28/2025 11:59 AM)    Medications  Medications reviewed with patient/caregiver?: Yes (7/28/2025 11:59 AM)  Is the patient having any side effects they believe may be caused by any medication additions or changes?: No (7/28/2025 11:59 AM)  Does the patient have all medications ordered at discharge?: Yes (7/28/2025 11:59 AM)  Care Management Interventions: No intervention needed (7/28/2025 11:59 AM)  Is the patient taking all medications as directed (includes completed medication regime)?: Yes (7/28/2025 11:59 AM)    Appointments  Does the patient have a primary care provider?: Yes (7/28/2025 11:59 AM)  Care Management Interventions: Verified appointment date/time/provider (7/28/2025 11:59 AM)  Has the patient kept scheduled appointments due by today?: Yes (7/28/2025 11:59 AM)  Care Management Interventions: Advised patient to keep appointment; Educated on importance of keeping appointment (per patient \"his follow up appt is scheduled with his pcp tomorrow 7/29.\") (7/28/2025 11:59 AM)    Patient Teaching  Does the patient have access to their discharge instructions?: Yes (7/28/2025 11:59 AM)  Care Management Interventions: Reviewed instructions with patient (7/28/2025 11:59 AM)  What is the patient's perception of their health status since discharge?: Improving (7/28/2025 11:59 AM)  Is the patient/caregiver able to teach " back the hierarchy of who to call/visit for symptoms/problems? PCP, Specialist, Home Health nurse, Urgent Care, ED, 911: Yes (7/28/2025 11:59 AM)  If the patient is a current smoker, are they able to teach back resources for cessation?: [id number]; Smoking cessation support groups (7/28/2025 11:59 AM)    Tisha Esquivel RN, [facility]  Phone [phone]

## 2025-07-29 ENCOUNTER — HOME CARE VISIT (OUTPATIENT)
Dept: HOME HEALTH SERVICES | Facility: HOME HEALTH | Age: 65
End: 2025-07-29
Payer: COMMERCIAL

## 2025-07-29 VITALS
DIASTOLIC BLOOD PRESSURE: 79 MMHG | SYSTOLIC BLOOD PRESSURE: 120 MMHG | HEART RATE: 86 BPM | RESPIRATION RATE: 16 BRPM | OXYGEN SATURATION: 98 %

## 2025-07-29 PROCEDURE — G0151 HHCP-SERV OF PT,EA 15 MIN: HCPCS

## 2025-07-29 ASSESSMENT — ENCOUNTER SYMPTOMS
MUSCLE WEAKNESS: 1
PERSON REPORTING PAIN: PATIENT
DENIES PAIN: 1
SLEEP QUALITY: ADEQUATE
ANGER WITHIN DEFINED LIMITS: 1
AGGRESSION WITHIN DEFINED LIMITS: 1

## 2025-07-29 ASSESSMENT — ACTIVITIES OF DAILY LIVING (ADL)
ENTERING_EXITING_HOME: STAND BY ASSIST
AMBULATION ASSISTANCE: 1
AMBULATION ASSISTANCE: SUPERVISION
CURRENT_FUNCTION: SUPERVISION
PHYSICAL TRANSFERS ASSESSED: 1
AMBULATION ASSISTANCE ON FLAT SURFACES: 1
OASIS_M1830: 05

## 2025-07-29 NOTE — Clinical Note
Pt lives w brother in mobile home w entry steps. Brother works night shift/sleeps during the day. New dx of Parkinsons. Pt reports aural and visual hallucinations prior to his hospitalization. Tremoring. Uses ww and QC. Pt very talkative, difficult to keep on task. Pt is superv for amb and transfers, appears to be close to his baseline. Will benefit from short term PT for gait trng, HEP.  OT and SLP ordered for evals. Pt feels he needs OT and Speech > PT.  There appears to be an ant infestation in the patient's home, barrier will not help. Pt is aware.  Primary Reason for Home Care: debility and fall risk  Skilled Needs: fall risk, decreased functional mobility.   Precautions: fall prevention  Instruction provided: Keep pathways clear, use cane or walker for stability, have superv for steps and home egress.   Patient response to instruction: receptive   Patient instructed on plan of care and visit frequency. 1w5  Discipline Communication: PTA OT MD PSC   Plan for next visit: HEP and gait trng, med rec, safety/falls prevention    Medication Reconciliation:  Demonstrates Adherence : Yes  Issues/Concerns: No  Provider Notified of Issues/Concerns: N/A  Caregiver Notified of Issues/Concerns: N/A  patient  response to instructions Verbalized Understanding  Pill bottles visualized during treatment Yes. Pt keeps bottles in locations where they are taken. ie. bedside for nighttime meds, etc. I will SWITCH the dose or number of times a day I take the medications listed below when I get home from the hospital:  None

## 2025-07-29 NOTE — HOME HEALTH
Pt lives w brother,who works nights, in mobile home w entry steps. New dx of Parkinsons. Pt reports aural and visual hallucinations prior to his hospitalization. Tremoring. Uses ww and QC. Pt very talkative, difficult to keep on task. Pt is superv for amb and transfers, appears to be close to his baseline. Will benefit from short term PT for gait trng, HEP.  OT and SLP ordered for evals. Pt feels he needs OT and Speech > PT.  There appears to be an ant infestation in the patient's home, barrier will not help. Pt is aware.  Primary Reason for Home Care: debility and fall risk  Skilled Needs: fall risk, decreased functional mobility.   Precautions: fall prevention  Instruction provided: Keep pathways clear, use cane or walker for stability, have superv for steps and home egress.   Patient response to instruction: receptive   Patient instructed on plan of care and visit frequency. 1w5  Discipline Communication: PTA OT MD PSC   Plan for next visit: HEP and gait trng, med rec, safety/falls prevention    Medication Reconciliation:  Demonstrates Adherence : Yes  Issues/Concerns: No  Provider Notified of Issues/Concerns: N/A  Caregiver Notified of Issues/Concerns: N/A  patient  response to instructions Verbalized Understanding  Pill bottles visualized during treatment Yes. Bottles kept in locations where they are taken, ie. bedside for night time meds. Pt not interested in pill box method.

## 2025-07-30 ENCOUNTER — HOME CARE VISIT (OUTPATIENT)
Dept: HOME HEALTH SERVICES | Facility: HOME HEALTH | Age: 65
End: 2025-07-30
Payer: COMMERCIAL

## 2025-07-30 VITALS
HEART RATE: 77 BPM | SYSTOLIC BLOOD PRESSURE: 130 MMHG | TEMPERATURE: 98.3 F | OXYGEN SATURATION: 98 % | DIASTOLIC BLOOD PRESSURE: 65 MMHG

## 2025-07-30 PROCEDURE — G0152 HHCP-SERV OF OT,EA 15 MIN: HCPCS

## 2025-07-30 ASSESSMENT — ACTIVITIES OF DAILY LIVING (ADL)
AMBULATION ASSISTANCE: SUPERVISION
GROOMING_WITHIN_DEFINED_LIMITS: 1
FEEDING_WITHIN_DEFINED_LIMITS: 1
PHYSICAL TRANSFERS ASSESSED: 1
AMBULATION ASSISTANCE: 1
CURRENT_FUNCTION: SUPERVISION
WASHING_LB_CURRENT_FUNCTION: SUPERVISION
WASHING_UPB_CURRENT_FUNCTION: SUPERVISION

## 2025-07-30 ASSESSMENT — ENCOUNTER SYMPTOMS
DEPRESSION: 0
AGGRESSION WITHIN DEFINED LIMITS: 1
PERSON REPORTING PAIN: PATIENT
OCCASIONAL FEELINGS OF UNSTEADINESS: 1
ANGER WITHIN DEFINED LIMITS: 1
LOSS OF SENSATION IN FEET: 1
DENIES PAIN: 1

## 2025-08-01 ENCOUNTER — HOME CARE VISIT (OUTPATIENT)
Dept: HOME HEALTH SERVICES | Facility: HOME HEALTH | Age: 65
End: 2025-08-01
Payer: COMMERCIAL

## 2025-08-01 PROCEDURE — G0153 HHCP-SVS OF S/L PATH,EA 15MN: HCPCS

## 2025-08-05 ENCOUNTER — HOME CARE VISIT (OUTPATIENT)
Dept: HOME HEALTH SERVICES | Facility: HOME HEALTH | Age: 65
End: 2025-08-05
Payer: COMMERCIAL

## 2025-08-05 PROCEDURE — G0158 HHC OT ASSISTANT EA 15: HCPCS | Mod: CO

## 2025-08-05 PROCEDURE — G0153 HHCP-SVS OF S/L PATH,EA 15MN: HCPCS

## 2025-08-05 ASSESSMENT — ENCOUNTER SYMPTOMS
DENIES PAIN: 1
PAIN LOCATION - PAIN QUALITY: STABBING
PAIN LOCATION: ABDOMEN
PAIN: 1
PAIN LOCATION - PAIN SEVERITY: 2/10
PERSON REPORTING PAIN: PATIENT
PAIN LOCATION - PAIN DURATION: SINCE SURGERY
PAIN LOCATION - PAIN FREQUENCY: CONSTANT

## 2025-08-06 ENCOUNTER — HOME CARE VISIT (OUTPATIENT)
Dept: HOME HEALTH SERVICES | Facility: HOME HEALTH | Age: 65
End: 2025-08-06
Payer: COMMERCIAL

## 2025-08-07 ENCOUNTER — HOME CARE VISIT (OUTPATIENT)
Dept: HOME HEALTH SERVICES | Facility: HOME HEALTH | Age: 65
End: 2025-08-07
Payer: COMMERCIAL

## 2025-08-07 LAB
ATRIAL RATE: 59 BPM
P AXIS: 86 DEGREES
P OFFSET: 174 MS
P ONSET: 152 MS
PR INTERVAL: 124 MS
Q ONSET: 214 MS
QRS COUNT: 10 BEATS
QRS DURATION: 78 MS
QT INTERVAL: 396 MS
QTC CALCULATION(BAZETT): 392 MS
QTC FREDERICIA: 394 MS
R AXIS: -39 DEGREES
T AXIS: 9 DEGREES
T OFFSET: 412 MS
VENTRICULAR RATE: 59 BPM

## 2025-08-07 NOTE — CASE COMMUNICATION
PT missed visit notification: Pt. calls therapist this morning to cancel scheduled therapy session.  Pt. states he is ill and needs to cancel and the rest of this week will not work and requests therapist to return next week.

## 2025-08-11 ENCOUNTER — PATIENT OUTREACH (OUTPATIENT)
Dept: CARE COORDINATION | Facility: CLINIC | Age: 65
End: 2025-08-11
Payer: COMMERCIAL

## 2025-08-11 SDOH — ECONOMIC STABILITY: GENERAL: WOULD YOU LIKE HELP WITH ANY OF THE FOLLOWING NEEDS?: I DONT NEED HELP WITH ANY OF THESE

## 2025-08-12 ENCOUNTER — HOME CARE VISIT (OUTPATIENT)
Dept: HOME HEALTH SERVICES | Facility: HOME HEALTH | Age: 65
End: 2025-08-12
Payer: COMMERCIAL

## 2025-08-12 PROCEDURE — G0158 HHC OT ASSISTANT EA 15: HCPCS | Mod: CO

## 2025-08-12 PROCEDURE — G0153 HHCP-SVS OF S/L PATH,EA 15MN: HCPCS

## 2025-08-12 ASSESSMENT — ENCOUNTER SYMPTOMS
PERSON REPORTING PAIN: PATIENT
DENIES PAIN: 1
DENIES PAIN: 1

## 2025-08-14 ENCOUNTER — TELEPHONE (OUTPATIENT)
Dept: NEUROLOGY | Facility: HOSPITAL | Age: 65
End: 2025-08-14
Payer: COMMERCIAL

## 2025-08-14 ENCOUNTER — HOME CARE VISIT (OUTPATIENT)
Dept: HOME HEALTH SERVICES | Facility: HOME HEALTH | Age: 65
End: 2025-08-14
Payer: COMMERCIAL

## 2025-08-14 VITALS
DIASTOLIC BLOOD PRESSURE: 72 MMHG | OXYGEN SATURATION: 95 % | HEART RATE: 76 BPM | TEMPERATURE: 97.9 F | RESPIRATION RATE: 16 BRPM | SYSTOLIC BLOOD PRESSURE: 122 MMHG

## 2025-08-14 DIAGNOSIS — G20.A1 PARKINSON'S DISEASE WITHOUT DYSKINESIA OR FLUCTUATING MANIFESTATIONS: ICD-10-CM

## 2025-08-14 PROCEDURE — G0153 HHCP-SVS OF S/L PATH,EA 15MN: HCPCS

## 2025-08-14 PROCEDURE — G0157 HHC PT ASSISTANT EA 15: HCPCS | Mod: CQ

## 2025-08-14 RX ORDER — CARBIDOPA AND LEVODOPA 25; 100 MG/1; MG/1
0.5 TABLET ORAL 3 TIMES DAILY
Qty: 45 TABLET | Refills: 1 | Status: SHIPPED | OUTPATIENT
Start: 2025-08-14 | End: 2025-10-13

## 2025-08-14 SDOH — HEALTH STABILITY: PHYSICAL HEALTH
EXERCISE COMMENTS: 1 SET OF 15 EACH, BLUE THERABAND, 2# ANKLE WEIGHTS  SEATED THER EX: MARCHES, LEG EXT, LAQ, TBAND HIP ABD, BALL SQUEEZES HIP ADD  STANDING THER EX: MARCHES, HEEL/TOE RAISES, HIP ABD, HIP FLEX/EXT, MINI SQUATS , HAM CURLS  BUE: BLUE THERABAND SHOULDER

## 2025-08-14 SDOH — HEALTH STABILITY: PHYSICAL HEALTH: EXERCISE COMMENTS: ABD/ADD,FLEX,EXT, IR/ER  BICEP CURLS, ROWS , 2# BALL BICEP CURLS, SHOULDER PRESS

## 2025-08-14 SDOH — HEALTH STABILITY: PHYSICAL HEALTH: EXERCISE TYPE: B LE STRENGTHENING THER EX, ENDURANCE TRAINING

## 2025-08-14 ASSESSMENT — ENCOUNTER SYMPTOMS
PAIN LOCATION - PAIN QUALITY: TINGLING
PAIN LOCATION - PAIN FREQUENCY: INTERMITTENT
PAIN LOCATION - EXACERBATING FACTORS: TIME OF DAY, WEIGHT BEARING
PAIN LOCATION: RIGHT FOOT
PAIN LOCATION - PAIN QUALITY: TINGLING
LOWEST PAIN SEVERITY IN PAST 24 HOURS: 3/10
HIGHEST PAIN SEVERITY IN PAST 24 HOURS: 6/10
SUBJECTIVE PAIN PROGRESSION: WAXING AND WANING
MUSCLE WEAKNESS: 1
PAIN SEVERITY GOAL: 0/10
PAIN: 1
DENIES PAIN: 1
OCCASIONAL FEELINGS OF UNSTEADINESS: 1
PERSON REPORTING PAIN: PATIENT
ARTHRALGIAS: 1
PAIN LOCATION - EXACERBATING FACTORS: TIME OF DAY, WEIGHT BEARING
PAIN LOCATION - PAIN FREQUENCY: INTERMITTENT
PAIN LOCATION - PAIN SEVERITY: 3/10
PAIN LOCATION - RELIEVING FACTORS: TIME OF DAY, REST
PAIN LOCATION: LEFT FOOT
PAIN LOCATION - PAIN SEVERITY: 3/10

## 2025-08-14 ASSESSMENT — ACTIVITIES OF DAILY LIVING (ADL)
AMBULATION ASSISTANCE: 1
AMBULATION ASSISTANCE: STAND BY ASSIST
AMBULATION_DISTANCE/DURATION_TOLERATED: 95 FT
AMBULATION ASSISTANCE ON FLAT SURFACES: 1

## 2025-08-18 ENCOUNTER — HOME CARE VISIT (OUTPATIENT)
Dept: HOME HEALTH SERVICES | Facility: HOME HEALTH | Age: 65
End: 2025-08-18
Payer: COMMERCIAL

## 2025-08-18 VITALS
TEMPERATURE: 97.3 F | DIASTOLIC BLOOD PRESSURE: 64 MMHG | OXYGEN SATURATION: 97 % | SYSTOLIC BLOOD PRESSURE: 120 MMHG | HEART RATE: 92 BPM

## 2025-08-18 PROCEDURE — G0152 HHCP-SERV OF OT,EA 15 MIN: HCPCS

## 2025-08-18 ASSESSMENT — ENCOUNTER SYMPTOMS
PAIN LOCATION - PAIN FREQUENCY: CONSTANT
PAIN LOCATION - PAIN SEVERITY: 6/10
PAIN LOCATION - PAIN DURATION: DAILY
PAIN: 1
PAIN LOCATION: RIGHT FOOT
PAIN LOCATION - PAIN SEVERITY: 6/10
PAIN LOCATION - PAIN FREQUENCY: CONSTANT
PAIN LOCATION: LEFT FOOT
PAIN LOCATION - PAIN QUALITY: BURNING
PERSON REPORTING PAIN: PATIENT
PAIN LOCATION - PAIN QUALITY: BURNING
PAIN LOCATION - PAIN DURATION: DAILY

## 2025-08-18 ASSESSMENT — ACTIVITIES OF DAILY LIVING (ADL)
AMBULATION ASSISTANCE: 1
PHYSICAL TRANSFERS ASSESSED: 1
CURRENT_FUNCTION: INDEPENDENT
AMBULATION ASSISTANCE: INDEPENDENT

## 2025-08-19 ENCOUNTER — HOME CARE VISIT (OUTPATIENT)
Dept: HOME HEALTH SERVICES | Facility: HOME HEALTH | Age: 65
End: 2025-08-19
Payer: COMMERCIAL

## 2025-08-19 PROCEDURE — G0153 HHCP-SVS OF S/L PATH,EA 15MN: HCPCS

## 2025-08-19 ASSESSMENT — ENCOUNTER SYMPTOMS: DENIES PAIN: 1

## 2025-08-20 ENCOUNTER — APPOINTMENT (OUTPATIENT)
Dept: NEUROLOGY | Facility: HOSPITAL | Age: 65
End: 2025-08-20
Payer: COMMERCIAL

## 2025-08-21 ENCOUNTER — HOME CARE VISIT (OUTPATIENT)
Dept: HOME HEALTH SERVICES | Facility: HOME HEALTH | Age: 65
End: 2025-08-21
Payer: COMMERCIAL

## 2025-08-22 ENCOUNTER — HOME CARE VISIT (OUTPATIENT)
Dept: HOME HEALTH SERVICES | Facility: HOME HEALTH | Age: 65
End: 2025-08-22
Payer: COMMERCIAL

## 2025-08-22 VITALS
TEMPERATURE: 97.7 F | HEART RATE: 82 BPM | OXYGEN SATURATION: 98 % | SYSTOLIC BLOOD PRESSURE: 123 MMHG | RESPIRATION RATE: 16 BRPM | DIASTOLIC BLOOD PRESSURE: 76 MMHG

## 2025-08-22 PROCEDURE — G0157 HHC PT ASSISTANT EA 15: HCPCS | Mod: CQ

## 2025-08-22 SDOH — HEALTH STABILITY: PHYSICAL HEALTH: EXERCISE TYPE: B LE STRENGTHENING THER EX, ENDURANCE TRAINING

## 2025-08-22 SDOH — HEALTH STABILITY: PHYSICAL HEALTH: EXERCISE COMMENTS: ABD/ADD,FLEX,EXT, IR/ER  BICEP CURLS, ROWS , 2# BALL BICEP CURLS, SHOULDER PRESS

## 2025-08-22 ASSESSMENT — ENCOUNTER SYMPTOMS
PAIN LOCATION - PAIN QUALITY: ACHING
HIGHEST PAIN SEVERITY IN PAST 24 HOURS: 9/10
MUSCLE WEAKNESS: 1
PAIN LOCATION - PAIN SEVERITY: 8/10
PAIN: 1
PAIN LOCATION: LEFT FOOT
PAIN LOCATION - PAIN QUALITY: ACHING
PAIN SEVERITY GOAL: 0/10
LOWEST PAIN SEVERITY IN PAST 24 HOURS: 4/10
PAIN LOCATION - RELIEVING FACTORS: TIME OF DAY
PERSON REPORTING PAIN: PATIENT
PAIN LOCATION - PAIN SEVERITY: 8/10
SUBJECTIVE PAIN PROGRESSION: WAXING AND WANING
PAIN LOCATION - EXACERBATING FACTORS: TIME OF DAY
PAIN LOCATION - PAIN FREQUENCY: INTERMITTENT
PAIN LOCATION - EXACERBATING FACTORS: TIME OF DAY
PAIN LOCATION: RIGHT FOOT
ARTHRALGIAS: 1
OCCASIONAL FEELINGS OF UNSTEADINESS: 1
PAIN LOCATION - RELIEVING FACTORS: TIME OF DAY
PAIN LOCATION - PAIN FREQUENCY: INTERMITTENT

## 2025-08-22 ASSESSMENT — ACTIVITIES OF DAILY LIVING (ADL)
BATHING_CURRENT_FUNCTION: INDEPENDENT
AMBULATION_DISTANCE/DURATION_TOLERATED: 150 FT
BATHING EQUIPMENT USED: SHOWER CHAIR
AMBULATION ASSISTANCE ON FLAT SURFACES: 1
BATHING ASSESSED: 1

## 2025-08-26 ENCOUNTER — HOME CARE VISIT (OUTPATIENT)
Dept: HOME HEALTH SERVICES | Facility: HOME HEALTH | Age: 65
End: 2025-08-26
Payer: COMMERCIAL

## 2025-08-26 ENCOUNTER — PATIENT OUTREACH (OUTPATIENT)
Dept: CARE COORDINATION | Facility: CLINIC | Age: 65
End: 2025-08-26
Payer: COMMERCIAL

## 2025-08-26 PROCEDURE — G0153 HHCP-SVS OF S/L PATH,EA 15MN: HCPCS

## 2025-08-26 SDOH — ECONOMIC STABILITY: GENERAL: WOULD YOU LIKE HELP WITH ANY OF THE FOLLOWING NEEDS?: I DONT NEED HELP WITH ANY OF THESE

## 2025-08-26 ASSESSMENT — ENCOUNTER SYMPTOMS: DENIES PAIN: 1

## 2025-08-27 ENCOUNTER — HOME CARE VISIT (OUTPATIENT)
Dept: HOME HEALTH SERVICES | Facility: HOME HEALTH | Age: 65
End: 2025-08-27
Payer: COMMERCIAL

## 2025-08-27 PROCEDURE — G0151 HHCP-SERV OF PT,EA 15 MIN: HCPCS

## 2025-08-27 SDOH — HEALTH STABILITY: PHYSICAL HEALTH: EXERCISE COMMENTS: PATIENT KNOWS CURRENT EXERCISES. NO QUESTIONS. AGREEABLE TO CONTINUE INDEPENDENTLY.

## 2025-08-27 ASSESSMENT — ACTIVITIES OF DAILY LIVING (ADL)
AMBULATION ASSISTANCE: 1
CURRENT_FUNCTION: INDEPENDENT
PHYSICAL TRANSFERS ASSESSED: 1
AMBULATION ASSISTANCE: INDEPENDENT

## 2025-08-27 ASSESSMENT — ENCOUNTER SYMPTOMS
PERSON REPORTING PAIN: PATIENT
DENIES PAIN: 1

## 2025-08-28 ENCOUNTER — HOME CARE VISIT (OUTPATIENT)
Dept: HOME HEALTH SERVICES | Facility: HOME HEALTH | Age: 65
End: 2025-08-28
Payer: COMMERCIAL

## 2025-08-30 ENCOUNTER — HOSPITAL ENCOUNTER (EMERGENCY)
Facility: HOSPITAL | Age: 65
Discharge: HOME | End: 2025-08-30
Payer: COMMERCIAL

## 2025-08-30 ENCOUNTER — APPOINTMENT (OUTPATIENT)
Dept: RADIOLOGY | Facility: HOSPITAL | Age: 65
End: 2025-08-30
Payer: COMMERCIAL

## 2025-08-30 ASSESSMENT — PAIN DESCRIPTION - ORIENTATION: ORIENTATION: RIGHT

## 2025-08-30 ASSESSMENT — PAIN - FUNCTIONAL ASSESSMENT: PAIN_FUNCTIONAL_ASSESSMENT: 0-10

## 2025-08-30 ASSESSMENT — LIFESTYLE VARIABLES
HAVE YOU EVER FELT YOU SHOULD CUT DOWN ON YOUR DRINKING: NO
EVER HAD A DRINK FIRST THING IN THE MORNING TO STEADY YOUR NERVES TO GET RID OF A HANGOVER: NO
TOTAL SCORE: 0
HAVE PEOPLE ANNOYED YOU BY CRITICIZING YOUR DRINKING: NO
EVER FELT BAD OR GUILTY ABOUT YOUR DRINKING: NO

## 2025-08-30 ASSESSMENT — VISUAL ACUITY: OU: 1

## 2025-08-30 ASSESSMENT — PAIN DESCRIPTION - LOCATION: LOCATION: FOOT

## 2025-08-30 ASSESSMENT — PAIN SCALES - GENERAL
PAINLEVEL_OUTOF10: 8
PAINLEVEL_OUTOF10: 0 - NO PAIN
PAINLEVEL_OUTOF10: 0 - NO PAIN

## 2025-08-30 ASSESSMENT — PAIN DESCRIPTION - PAIN TYPE: TYPE: ACUTE PAIN

## 2025-09-02 ENCOUNTER — APPOINTMENT (OUTPATIENT)
Dept: RADIOLOGY | Facility: HOSPITAL | Age: 65
End: 2025-09-02
Payer: COMMERCIAL

## 2025-09-02 ENCOUNTER — HOSPITAL ENCOUNTER (EMERGENCY)
Facility: HOSPITAL | Age: 65
Discharge: HOME | End: 2025-09-02
Attending: EMERGENCY MEDICINE
Payer: COMMERCIAL

## 2025-09-02 ENCOUNTER — PATIENT OUTREACH (OUTPATIENT)
Dept: CARE COORDINATION | Facility: CLINIC | Age: 65
End: 2025-09-02
Payer: COMMERCIAL

## 2025-09-02 ENCOUNTER — APPOINTMENT (OUTPATIENT)
Dept: CARDIOLOGY | Facility: HOSPITAL | Age: 65
End: 2025-09-02
Payer: COMMERCIAL

## 2025-09-02 VITALS
HEART RATE: 88 BPM | DIASTOLIC BLOOD PRESSURE: 55 MMHG | BODY MASS INDEX: 30.77 KG/M2 | WEIGHT: 214.95 LBS | SYSTOLIC BLOOD PRESSURE: 125 MMHG | RESPIRATION RATE: 16 BRPM | HEIGHT: 70 IN | TEMPERATURE: 98 F | OXYGEN SATURATION: 100 %

## 2025-09-02 DIAGNOSIS — R42 DIZZINESS: Primary | ICD-10-CM

## 2025-09-02 LAB
ALBUMIN SERPL BCP-MCNC: 4.7 G/DL (ref 3.4–5)
ALP SERPL-CCNC: 138 U/L (ref 33–136)
ALT SERPL W P-5'-P-CCNC: 29 U/L (ref 10–52)
ANION GAP SERPL CALC-SCNC: 12 MMOL/L (ref 10–20)
AST SERPL W P-5'-P-CCNC: 29 U/L (ref 9–39)
BASOPHILS # BLD AUTO: 0.01 X10*3/UL (ref 0–0.1)
BASOPHILS NFR BLD AUTO: 0.2 %
BILIRUB SERPL-MCNC: 0.7 MG/DL (ref 0–1.2)
BUN SERPL-MCNC: 16 MG/DL (ref 6–23)
CALCIUM SERPL-MCNC: 9.3 MG/DL (ref 8.6–10.3)
CARDIAC TROPONIN I PNL SERPL HS: 7 NG/L (ref 0–20)
CARDIAC TROPONIN I PNL SERPL HS: 8 NG/L (ref 0–20)
CHLORIDE SERPL-SCNC: 104 MMOL/L (ref 98–107)
CO2 SERPL-SCNC: 26 MMOL/L (ref 21–32)
CREAT SERPL-MCNC: 1.04 MG/DL (ref 0.5–1.3)
D DIMER PPP FEU-MCNC: <215 NG/ML FEU
EGFRCR SERPLBLD CKD-EPI 2021: 81 ML/MIN/1.73M*2
EOSINOPHIL # BLD AUTO: 0.06 X10*3/UL (ref 0–0.7)
EOSINOPHIL NFR BLD AUTO: 1.2 %
ERYTHROCYTE [DISTWIDTH] IN BLOOD BY AUTOMATED COUNT: 15 % (ref 11.5–14.5)
GLUCOSE SERPL-MCNC: 177 MG/DL (ref 74–99)
HCT VFR BLD AUTO: 35.6 % (ref 41–52)
HGB BLD-MCNC: 12.7 G/DL (ref 13.5–17.5)
IMM GRANULOCYTES # BLD AUTO: 0.04 X10*3/UL (ref 0–0.7)
IMM GRANULOCYTES NFR BLD AUTO: 0.8 % (ref 0–0.9)
LYMPHOCYTES # BLD AUTO: 1.43 X10*3/UL (ref 1.2–4.8)
LYMPHOCYTES NFR BLD AUTO: 28.4 %
MCH RBC QN AUTO: 34.5 PG (ref 26–34)
MCHC RBC AUTO-ENTMCNC: 35.7 G/DL (ref 32–36)
MCV RBC AUTO: 97 FL (ref 80–100)
MONOCYTES # BLD AUTO: 0.36 X10*3/UL (ref 0.1–1)
MONOCYTES NFR BLD AUTO: 7.1 %
NEUTROPHILS # BLD AUTO: 3.14 X10*3/UL (ref 1.2–7.7)
NEUTROPHILS NFR BLD AUTO: 62.3 %
NRBC BLD-RTO: 0 /100 WBCS (ref 0–0)
PLATELET # BLD AUTO: 83 X10*3/UL (ref 150–450)
POTASSIUM SERPL-SCNC: 4.2 MMOL/L (ref 3.5–5.3)
PROT SERPL-MCNC: 7.3 G/DL (ref 6.4–8.2)
RBC # BLD AUTO: 3.68 X10*6/UL (ref 4.5–5.9)
SODIUM SERPL-SCNC: 138 MMOL/L (ref 136–145)
WBC # BLD AUTO: 5 X10*3/UL (ref 4.4–11.3)

## 2025-09-02 PROCEDURE — 36415 COLL VENOUS BLD VENIPUNCTURE: CPT | Performed by: NURSE PRACTITIONER

## 2025-09-02 PROCEDURE — 2500000004 HC RX 250 GENERAL PHARMACY W/ HCPCS (ALT 636 FOR OP/ED): Performed by: NURSE PRACTITIONER

## 2025-09-02 PROCEDURE — 85379 FIBRIN DEGRADATION QUANT: CPT | Performed by: NURSE PRACTITIONER

## 2025-09-02 PROCEDURE — 85025 COMPLETE CBC W/AUTO DIFF WBC: CPT | Performed by: NURSE PRACTITIONER

## 2025-09-02 PROCEDURE — 70450 CT HEAD/BRAIN W/O DYE: CPT | Performed by: RADIOLOGY

## 2025-09-02 PROCEDURE — 93005 ELECTROCARDIOGRAM TRACING: CPT

## 2025-09-02 PROCEDURE — 72125 CT NECK SPINE W/O DYE: CPT

## 2025-09-02 PROCEDURE — 72125 CT NECK SPINE W/O DYE: CPT | Performed by: RADIOLOGY

## 2025-09-02 PROCEDURE — 70450 CT HEAD/BRAIN W/O DYE: CPT

## 2025-09-02 PROCEDURE — 71046 X-RAY EXAM CHEST 2 VIEWS: CPT

## 2025-09-02 PROCEDURE — 84484 ASSAY OF TROPONIN QUANT: CPT | Performed by: NURSE PRACTITIONER

## 2025-09-02 PROCEDURE — 80053 COMPREHEN METABOLIC PANEL: CPT | Performed by: NURSE PRACTITIONER

## 2025-09-02 PROCEDURE — 99285 EMERGENCY DEPT VISIT HI MDM: CPT | Mod: 25 | Performed by: EMERGENCY MEDICINE

## 2025-09-02 PROCEDURE — 71046 X-RAY EXAM CHEST 2 VIEWS: CPT | Performed by: RADIOLOGY

## 2025-09-02 RX ADMIN — SODIUM CHLORIDE 500 ML: 0.9 INJECTION, SOLUTION INTRAVENOUS at 12:46

## 2025-09-02 ASSESSMENT — PAIN SCALES - GENERAL: PAINLEVEL_OUTOF10: 0 - NO PAIN

## 2025-09-02 ASSESSMENT — PAIN DESCRIPTION - PAIN TYPE: TYPE: ACUTE PAIN

## 2025-09-02 ASSESSMENT — PAIN - FUNCTIONAL ASSESSMENT: PAIN_FUNCTIONAL_ASSESSMENT: 0-10

## 2025-09-03 ENCOUNTER — PATIENT OUTREACH (OUTPATIENT)
Dept: CARE COORDINATION | Facility: CLINIC | Age: 65
End: 2025-09-03
Payer: COMMERCIAL

## 2025-09-04 LAB
ATRIAL RATE: 72 BPM
P AXIS: 2 DEGREES
PR INTERVAL: 129 MS
Q ONSET: 251 MS
QRS COUNT: 12 BEATS
QRS DURATION: 93 MS
QT INTERVAL: 389 MS
QTC CALCULATION(BAZETT): 426 MS
QTC FREDERICIA: 413 MS
R AXIS: -27 DEGREES
T AXIS: 30 DEGREES
T OFFSET: 445 MS
VENTRICULAR RATE: 72 BPM

## 2025-09-05 ENCOUNTER — OFFICE VISIT (OUTPATIENT)
Dept: WOUND CARE | Facility: CLINIC | Age: 65
End: 2025-09-05
Payer: COMMERCIAL

## 2025-09-05 PROCEDURE — 11042 DBRDMT SUBQ TIS 1ST 20SQCM/<: CPT

## 2025-09-05 PROCEDURE — 99213 OFFICE O/P EST LOW 20 MIN: CPT | Mod: 25

## 2025-09-09 ENCOUNTER — APPOINTMENT (OUTPATIENT)
Dept: NEUROLOGY | Facility: HOSPITAL | Age: 65
End: 2025-09-09
Payer: COMMERCIAL

## (undated) DEVICE — SUTURE, ETHILON, 2-0, 18 IN, FS, BLACK, BX/12

## (undated) DEVICE — APPLICATOR, CHLORAPREP, W/ORANGE TINT, 26ML

## (undated) DEVICE — TROCAR SYSTEM, BALLOON, KII GELPORT, 12 X 100MM

## (undated) DEVICE — SOLUTION, IRRIGATION, SODIUM CHLORIDE 0.9%, 1000 ML, POUR BOTTLE

## (undated) DEVICE — SYRINGE, 10 CC, LUER LOCK

## (undated) DEVICE — APPLIER, MULTIPLE CLIP, LIGACLIP, W/20 MEDIUM, 11.5 APPLIER

## (undated) DEVICE — NEEDLE, HYPODERMIC, REGULAR WALL, REGULAR BEVEL, 22 G X 1.5 IN

## (undated) DEVICE — Device

## (undated) DEVICE — PAD, GROUNDING, ELECTROSURGICAL, W/9 FT CABLE, POLYHESIVE II, ADULT, LF

## (undated) DEVICE — SOLUTION, IRRIGATION, USP, SODIUM CHLORIDE 0.9%, 3000 ML

## (undated) DEVICE — ASSEMBLY, STRYKER FLOW 2, SUCTION IRRIGATOR, WITH TIP

## (undated) DEVICE — TOWEL PACK, STERILE, 4/PACK, BLUE

## (undated) DEVICE — ELECTRODE, OPTI2 LAPAROSCOPIC SPATULA, CURVED

## (undated) DEVICE — SUTURE, VICRYL, 4-0, 18 IN, PS2, UNDYED

## (undated) DEVICE — GLOVE, PROTEXIS PI CLASSIC, SZ-7.0, PF, LF

## (undated) DEVICE — SUTURE, VICRYL, 0, 27 IN, UR-6, VIOLET

## (undated) DEVICE — SLEEVE, KII, Z-THREAD, 5X100CM

## (undated) DEVICE — TROCAR, KII OPTICAL BLADELESS 5MM Z THREAD 100MM LNGTH

## (undated) DEVICE — STRIP, SKIN CLOSURE, STERI STRIP, REINFORCED, 0.5 X 4 IN

## (undated) DEVICE — COVER HANDLE LIGHT, STERIS, BLUE, STERILE

## (undated) DEVICE — DRESSING, NON-ADHERENT, TELFA, OUCHLESS, 3 X 8 IN, STERILE

## (undated) DEVICE — DRAPE, SHEET, ENDOSCOPY, GENERAL, FENESTRATED, ARMBOARD COVER, 98 X 123.5 IN, DISPOSABLE, LF, STERILE

## (undated) DEVICE — SCOPE WARMER, LAPAROSCOPE, BAG ONLY, LF

## (undated) DEVICE — DRAIN, JP CHANNEL, 15 FR, RND, W/TROCAR

## (undated) DEVICE — BANDAGE, ADHESIVE, FLEXIBLE FABRIC, 1 X 3

## (undated) DEVICE — STAPLER, SKIN, MULTIFIRE, PREMIUM, WIDE, 35 W

## (undated) DEVICE — EVACUATOR, WOUND, SUCTION, CLOSED, JACKSON-PRATT, 100 CC, SILICONE

## (undated) DEVICE — CAUTERY, PENCIL, PUSH BUTTON, SMOKE EVAC, 70MM